# Patient Record
Sex: MALE | Race: WHITE | NOT HISPANIC OR LATINO | Employment: FULL TIME | URBAN - METROPOLITAN AREA
[De-identification: names, ages, dates, MRNs, and addresses within clinical notes are randomized per-mention and may not be internally consistent; named-entity substitution may affect disease eponyms.]

---

## 2017-02-13 ENCOUNTER — HOSPITAL ENCOUNTER (OUTPATIENT)
Facility: HOSPITAL | Age: 62
LOS: 1 days | Discharge: HOME | End: 2017-02-13
Attending: EMERGENCY MEDICINE | Admitting: INTERNAL MEDICINE
Payer: COMMERCIAL

## 2017-02-13 ENCOUNTER — TELEPHONE (OUTPATIENT)
Dept: FAMILY MEDICINE | Facility: CLINIC | Age: 62
End: 2017-02-13

## 2017-02-13 ENCOUNTER — NURSE TRIAGE (OUTPATIENT)
Dept: ADMINISTRATIVE | Facility: CLINIC | Age: 62
End: 2017-02-13

## 2017-02-13 VITALS
DIASTOLIC BLOOD PRESSURE: 80 MMHG | SYSTOLIC BLOOD PRESSURE: 128 MMHG | OXYGEN SATURATION: 100 % | WEIGHT: 205 LBS | RESPIRATION RATE: 19 BRPM | TEMPERATURE: 98 F | HEART RATE: 85 BPM | HEIGHT: 70 IN | BODY MASS INDEX: 29.35 KG/M2

## 2017-02-13 DIAGNOSIS — I48.91 ATRIAL FIBRILLATION: Primary | ICD-10-CM

## 2017-02-13 PROBLEM — Z71.89 ENCOUNTER FOR ANTICOAGULATION DISCUSSION AND COUNSELING: Status: ACTIVE | Noted: 2017-02-13

## 2017-02-13 LAB
ALBUMIN SERPL BCP-MCNC: 4.2 G/DL
ALP SERPL-CCNC: 80 U/L
ALT SERPL W/O P-5'-P-CCNC: 20 U/L
ANION GAP SERPL CALC-SCNC: 9 MMOL/L
AST SERPL-CCNC: 17 U/L
BASOPHILS # BLD AUTO: 0.03 K/UL
BASOPHILS NFR BLD: 0.3 %
BILIRUB SERPL-MCNC: 0.5 MG/DL
BNP SERPL-MCNC: 141 PG/ML
BUN SERPL-MCNC: 12 MG/DL
CALCIUM SERPL-MCNC: 9.7 MG/DL
CHLORIDE SERPL-SCNC: 103 MMOL/L
CO2 SERPL-SCNC: 24 MMOL/L
CREAT SERPL-MCNC: 1 MG/DL
DIASTOLIC DYSFUNCTION: NO
DIFFERENTIAL METHOD: ABNORMAL
EOSINOPHIL # BLD AUTO: 0.2 K/UL
EOSINOPHIL NFR BLD: 1.6 %
ERYTHROCYTE [DISTWIDTH] IN BLOOD BY AUTOMATED COUNT: 13.5 %
EST. GFR  (AFRICAN AMERICAN): >60 ML/MIN/1.73 M^2
EST. GFR  (NON AFRICAN AMERICAN): >60 ML/MIN/1.73 M^2
GLUCOSE SERPL-MCNC: 124 MG/DL
HCT VFR BLD AUTO: 45.6 %
HGB BLD-MCNC: 15.6 G/DL
INR PPP: 1.1
LYMPHOCYTES # BLD AUTO: 2.4 K/UL
LYMPHOCYTES NFR BLD: 20.2 %
MAGNESIUM SERPL-MCNC: 2.2 MG/DL
MCH RBC QN AUTO: 31 PG
MCHC RBC AUTO-ENTMCNC: 34.2 %
MCV RBC AUTO: 91 FL
MONOCYTES # BLD AUTO: 1 K/UL
MONOCYTES NFR BLD: 8.3 %
NEUTROPHILS # BLD AUTO: 8.1 K/UL
NEUTROPHILS NFR BLD: 69.3 %
PLATELET # BLD AUTO: 268 K/UL
PMV BLD AUTO: 11.2 FL
POTASSIUM SERPL-SCNC: 3.8 MMOL/L
PROT SERPL-MCNC: 7.6 G/DL
PROTHROMBIN TIME: 11.4 SEC
RBC # BLD AUTO: 5.03 M/UL
RETIRED EF AND QEF - SEE NOTES: 55 (ref 55–65)
SODIUM SERPL-SCNC: 136 MMOL/L
TROPONIN I SERPL DL<=0.01 NG/ML-MCNC: 0.01 NG/ML
TROPONIN I SERPL DL<=0.01 NG/ML-MCNC: 0.01 NG/ML
WBC # BLD AUTO: 11.66 K/UL

## 2017-02-13 PROCEDURE — 85610 PROTHROMBIN TIME: CPT

## 2017-02-13 PROCEDURE — G0378 HOSPITAL OBSERVATION PER HR: HCPCS

## 2017-02-13 PROCEDURE — 96361 HYDRATE IV INFUSION ADD-ON: CPT

## 2017-02-13 PROCEDURE — 93010 ELECTROCARDIOGRAM REPORT: CPT | Mod: ,,, | Performed by: INTERNAL MEDICINE

## 2017-02-13 PROCEDURE — C8929 TTE W OR WO FOL WCON,DOPPLER: HCPCS

## 2017-02-13 PROCEDURE — 96365 THER/PROPH/DIAG IV INF INIT: CPT

## 2017-02-13 PROCEDURE — 83735 ASSAY OF MAGNESIUM: CPT

## 2017-02-13 PROCEDURE — 93306 TTE W/DOPPLER COMPLETE: CPT | Mod: 26,,, | Performed by: INTERNAL MEDICINE

## 2017-02-13 PROCEDURE — 25000003 PHARM REV CODE 250: Performed by: EMERGENCY MEDICINE

## 2017-02-13 PROCEDURE — 93005 ELECTROCARDIOGRAM TRACING: CPT

## 2017-02-13 PROCEDURE — 93010 ELECTROCARDIOGRAM REPORT: CPT | Mod: 76,,, | Performed by: INTERNAL MEDICINE

## 2017-02-13 PROCEDURE — 25000003 PHARM REV CODE 250: Performed by: INTERNAL MEDICINE

## 2017-02-13 PROCEDURE — 99285 EMERGENCY DEPT VISIT HI MDM: CPT | Mod: 25

## 2017-02-13 PROCEDURE — 85025 COMPLETE CBC W/AUTO DIFF WBC: CPT

## 2017-02-13 PROCEDURE — 99236 HOSP IP/OBS SAME DATE HI 85: CPT | Mod: ,,, | Performed by: PHYSICIAN ASSISTANT

## 2017-02-13 PROCEDURE — 96376 TX/PRO/DX INJ SAME DRUG ADON: CPT

## 2017-02-13 PROCEDURE — 99236 HOSP IP/OBS SAME DATE HI 85: CPT | Mod: ,,, | Performed by: INTERNAL MEDICINE

## 2017-02-13 PROCEDURE — 84484 ASSAY OF TROPONIN QUANT: CPT

## 2017-02-13 PROCEDURE — 80053 COMPREHEN METABOLIC PANEL: CPT

## 2017-02-13 PROCEDURE — 99285 EMERGENCY DEPT VISIT HI MDM: CPT | Mod: ,,, | Performed by: EMERGENCY MEDICINE

## 2017-02-13 PROCEDURE — 63600175 PHARM REV CODE 636 W HCPCS: Performed by: EMERGENCY MEDICINE

## 2017-02-13 PROCEDURE — 96375 TX/PRO/DX INJ NEW DRUG ADDON: CPT

## 2017-02-13 PROCEDURE — 83880 ASSAY OF NATRIURETIC PEPTIDE: CPT

## 2017-02-13 RX ORDER — METOPROLOL TARTRATE 25 MG/1
25 TABLET, FILM COATED ORAL 2 TIMES DAILY
Qty: 60 TABLET | Refills: 3 | Status: ON HOLD | OUTPATIENT
Start: 2017-02-13 | End: 2017-06-15

## 2017-02-13 RX ORDER — METOPROLOL TARTRATE 25 MG/1
25 TABLET, FILM COATED ORAL 2 TIMES DAILY
Status: DISCONTINUED | OUTPATIENT
Start: 2017-02-13 | End: 2017-02-13 | Stop reason: HOSPADM

## 2017-02-13 RX ORDER — ASPIRIN 325 MG
325 TABLET ORAL
Status: COMPLETED | OUTPATIENT
Start: 2017-02-13 | End: 2017-02-13

## 2017-02-13 RX ORDER — METOPROLOL TARTRATE 25 MG/1
25 TABLET, FILM COATED ORAL 2 TIMES DAILY
Status: DISCONTINUED | OUTPATIENT
Start: 2017-02-13 | End: 2017-02-13

## 2017-02-13 RX ORDER — DILTIAZEM HYDROCHLORIDE 5 MG/ML
15 INJECTION INTRAVENOUS
Status: COMPLETED | OUTPATIENT
Start: 2017-02-13 | End: 2017-02-13

## 2017-02-13 RX ORDER — DILTIAZEM HCL 1 MG/ML
5 INJECTION, SOLUTION INTRAVENOUS
Status: COMPLETED | OUTPATIENT
Start: 2017-02-13 | End: 2017-02-13

## 2017-02-13 RX ORDER — ONDANSETRON 2 MG/ML
4 INJECTION INTRAMUSCULAR; INTRAVENOUS
Status: COMPLETED | OUTPATIENT
Start: 2017-02-13 | End: 2017-02-13

## 2017-02-13 RX ADMIN — DILTIAZEM HYDROCHLORIDE 5 MG/HR: 5 INJECTION INTRAVENOUS at 11:02

## 2017-02-13 RX ADMIN — SODIUM CHLORIDE 1000 ML: 0.9 INJECTION, SOLUTION INTRAVENOUS at 11:02

## 2017-02-13 RX ADMIN — ASPIRIN 325 MG ORAL TABLET 325 MG: 325 PILL ORAL at 11:02

## 2017-02-13 RX ADMIN — DILTIAZEM HYDROCHLORIDE 15 MG: 5 INJECTION INTRAVENOUS at 11:02

## 2017-02-13 RX ADMIN — ONDANSETRON 4 MG: 2 INJECTION INTRAMUSCULAR; INTRAVENOUS at 11:02

## 2017-02-13 RX ADMIN — APIXABAN 5 MG: 5 TABLET, FILM COATED ORAL at 03:02

## 2017-02-13 NOTE — PROVIDER PROGRESS NOTES - EMERGENCY DEPT.
Encounter Date: 2/13/2017    ED Physician Progress Notes       SCRIBE NOTE: I, Santos Martell, am scribing for, and in the presence of,  Dr. Hull.  Physician Statement: I, Dr. Hull, personally performed the services described in this documentation as scribed by Santos Martell in my presence, and it is both accurate and complete.     Physician Note:   EKG: heart rate 163, atrial flutter, no STEMI    EKG - STEMI Decision  Initial Reading: No STEMI present.

## 2017-02-13 NOTE — PLAN OF CARE
Rec'd call from Mandy Escamilla NP regarding pt in the ED. Amb Referral entered. Request for one week appt In EP  Future Appointments  Date Time Provider Department Center   2/13/2017 3:00 PM INPATIENT, [facility] [address]     Future Appointments  Date Time Provider Department Center   2/13/2017 3:00 PM INPATIENT, [facility] [address]   2/20/2017 11:20 AM Ajay Krishnan MD [facility]

## 2017-02-13 NOTE — ASSESSMENT & PLAN NOTE
- Patient presented to ED with new onset AFlutter and -180. Given Diltiazem and converted rhythm to AFib. Started on Diltiazem infusion and converted to NSR.  - EP and Cardiology consulted in ED. Initially planned for LOPEZ and DCCV but patient now rate controlled.   - Started on Apixaban 5 mg BID x 1 month and metoprolol 25 mg BID. Patient with no history of HTN or CHF and BP currently 120-131/67. D/C'd ASA (no current indication).   - 2D echo completed 2/13 but report pending at time of discharge  - Patient remained in NSR on telemetry and discharged home in stable condition  - Follow up Dr. José Grey/EP in 4 weeks. Will discuss need to discuss risk/benefit of long term anticoagulation given low CHADS-VASc score.

## 2017-02-13 NOTE — PLAN OF CARE
"Rec'd call back from Mandy Escamilla, RAFFI .  Instead of EP appt in one week, she wants Dr. José Grey appt in 4 weeks  Sent msg and they will contact the pt    Per Dr. uMniz's notes written 2/13"Follow-up with Dr. José Grey in EP in 4 weeks. "    Future Appointments  Date Time Provider Department Center   2/13/2017 3:00 PM INPATIENT, [facility]       "

## 2017-02-13 NOTE — ASSESSMENT & PLAN NOTE
- See above.   - Patient started on apixiban and counseled on anticoagulation options and s/s of bleeding. Avoid high risk activity.

## 2017-02-13 NOTE — ED PROVIDER NOTES
Encounter Date: 2/13/2017    SCRIBE #1 NOTE: I, Santosmel Martell, am scribing for, and in the presence of,  Dr. Hull. I have scribed the entire note.       History     Chief Complaint   Patient presents with    Shortness of Breath     sweats, rapid heart beat over weekend 80-90     Review of patient's allergies indicates:  No Known Allergies  HPI Comments: Time patient was seen by the provider: 10:51 AM      The patient is a 61 y.o. male that presents to the ED with a complaint of shortness of breath, which began within the last several days. Over the weekend the pt was noted to have an irregular pulse by family. 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 Today he reports an episode of diaphoresis while at work. No fever. No history of thyroid dysfunction. Pt is a former smoker.     The history is provided by the patient.     History reviewed. No pertinent past medical history.  No past medical history pertinent negatives.  Past Surgical History:   Procedure Laterality Date    COLONOSCOPY N/A 4/14/2016    Procedure: COLONOSCOPY;  Surgeon: Kade Wang MD;  Location: [address];  Service: Endoscopy;  Laterality: N/A;     History reviewed. No pertinent family history.  Social History   Substance Use Topics    Smoking status: Former Smoker    Smokeless tobacco: None    Alcohol use No     Review of Systems   Constitutional: Negative for activity change, chills and fever.   HENT: Negative for sore throat.    Eyes: Negative for photophobia and visual disturbance.   Respiratory: Positive for shortness of breath.    Cardiovascular: Negative for chest pain and leg swelling.   Gastrointestinal: Negative for nausea.   Genitourinary: Negative for dysuria.   Musculoskeletal: Negative for back pain, neck pain and neck stiffness.   Skin: Negative for rash.   Neurological: Positive for light-headedness. Negative for syncope and weakness.   Hematological:  Does not bruise/bleed easily.       Physical Exam   Initial Vitals   BP Pulse Resp Temp SpO2   02/13/17 1032 02/13/17 1032 02/13/17 1032 02/13/17 1032 02/13/17 1032   110/69 94 22 98.5 °F (36.9 °C) 95 %     Physical Exam    Constitutional: He is Obese . He is cooperative. He does not appear ill. No distress.   HENT:   Head: Normocephalic and atraumatic.   Mouth/Throat: Oropharynx is clear and moist and mucous membranes are normal.   Eyes: Conjunctivae and lids are normal. No scleral icterus.   Neck: Normal range of motion. Neck supple.   Cardiovascular: Normal heart sounds. An irregularly irregular rhythm present. Tachycardia present.    Pulmonary/Chest: Breath sounds normal. No accessory muscle usage. No tachypnea. No respiratory distress.   Abdominal: Normal appearance. He exhibits no distension. There is no tenderness.   Musculoskeletal: Normal range of motion.   Neurological: He is alert and oriented to person, place, and time. No cranial nerve deficit or sensory deficit. GCS eye subscore is 4. GCS verbal subscore is 5. GCS motor subscore is 6.   Skin: Skin is warm and dry.         ED Course   Procedures  Labs Reviewed   CBC W/ AUTO DIFFERENTIAL - Abnormal; Notable for the following:        Result Value    Gran # 8.1 (*)     All other components within normal limits    Narrative:     PLEASE REVIEW ORDER START TIME BEFORE MARKING SPECIMEN  COLLECTED.   COMPREHENSIVE METABOLIC PANEL - Abnormal; Notable for the following:     Glucose 124 (*)     All other components within normal limits    Narrative:     PLEASE REVIEW ORDER START TIME BEFORE MARKING SPECIMEN  COLLECTED.   B-TYPE NATRIURETIC PEPTIDE - Abnormal; Notable for the following:      (*)     All other components within normal limits    Narrative:     PLEASE REVIEW ORDER START TIME BEFORE MARKING SPECIMEN  COLLECTED.   PROTIME-INR    Narrative:     PLEASE REVIEW ORDER START TIME BEFORE MARKING SPECIMEN  COLLECTED.   TROPONIN I    Narrative:     PLEASE  REVIEW ORDER START TIME BEFORE MARKING SPECIMEN  COLLECTED.   TROPONIN I    Narrative:     PLEASE REVIEW ORDER START TIME BEFORE MARKING SPECIMEN  COLLECTED.   MAGNESIUM    Narrative:     PLEASE REVIEW ORDER START TIME BEFORE MARKING SPECIMEN  COLLECTED.   TSH     EKG Readings: (Independently Interpreted)   Heart rate 163, atrial flutter, no STEMI           Medical Decision Making:   History:   Old Medical Records: I decided to obtain old medical records.  Initial Assessment:   Patient presenting to the ED with a three-day history of noted tachycardia with mild symptoms works as a  and noted that he was having more shortness of breath and dyspnea on exertion denied any chest pain or any over-the-counter medication use  Differential Diagnosis:   SVT atrial fibrillation with RVR  Independently Interpreted Test(s):   I have ordered and independently interpreted EKG Reading(s) - see prior notes  Clinical Tests:   Lab Tests: Ordered and Reviewed  Radiological Study: Ordered and Reviewed  Medical Tests: Ordered and Reviewed  ED Management:  We'll do labs including cardiac markers and EKG,will control cardiac rate and will get cardiology consultation       Patient was evaluated in the ED and medication was started to control his heart rate cardiology was consulted patient will be placed in observation to [facility]          Scribe Attestation:   Scribe #1: I performed the above scribed service and the documentation accurately describes the services I performed. I attest to the accuracy of the note.    Attending Attestation:           Physician Attestation for Scribe:  Physician Attestation Statement for Scribe #1: I, Dr. Hull, reviewed documentation, as scribed by Santos Martell in my presence, and it is both accurate and complete.          patient converted to a normal sinus rhythm while in the ED; both cardiology and internal medicine C felt that the patient could be discharged on medication and follow-up with  cardiology the patient was subsequently discharged follow-up was arranged and the patient is improved condition upon discharge        ED Course     Clinical Impression:   The encounter diagnosis was Atrial fibrillation.    Disposition:   Disposition: Discharged  Condition: Stable       Hayes Hull MD  02/26/17 2042

## 2017-02-13 NOTE — CONSULTS
TRANSESOPHAGEAL ECHOCARDIOGRAPHY   PRE-PROCEDURE NOTE    02/13/2017    HPI:     Marcus Watkins is a 61 y.o. man without significant prior medical history who presents to the ED with complaints of palpitations. On arrival, he was found to be in atrial flutter with variable block and a HR from 160s to 180s. He was given diltiazem which converted his rhythm to atrial fibrillation.    LOPEZ is being requested prior to DCCV.    Dysphagia or odynophagia:  No  Liver Disease, esophageal disease, or known varices:  No  Upper GI Bleeding: No  Snoring:  No  Sleep Apnea:  No  Prior neck surgery or radiation:  No  History of anesthetic difficulties:  No  Family history of anesthetic difficulties:  No  Last oral intake:  12 hours ago  Able to move neck in all directions:  Yes      Meds:     Scheduled Meds:   apixaban  5 mg Oral BID     PRN Meds:  Continuous Infusions:     Physical Exam:     Vitals:  Temp:  [98.5 °F (36.9 °C)]   Pulse:  []   Resp:  [18-25]   BP: (104-132)/(56-94)   SpO2:  [93 %-97 %]        Constitutional: NAD, conversant  HEENT:   Sclera anicteric, Uvula midline, EOMI, OP clear  Neck:               No JVD, moves to all direction without any limitations  CV:               RRR, no murmurs / rubs / gallops, normal S1/S2  Pulm:               CTAB, no wheezes, rales, or ronchi  GI:               Abdomen soft, NTND, +BS  Extremities:              No LE edema, warm with palpable pulses  Neuro:   AAOX3, no focal motor deficits      Labs:     Recent Results (from the past 336 hour(s))   CBC auto differential    Collection Time: 02/13/17 11:02 AM   Result Value Ref Range    WBC 11.66 3.90 - 12.70 K/uL    Hemoglobin 15.6 14.0 - 18.0 g/dL    Hematocrit 45.6 40.0 - 54.0 %    Platelets 268 150 - 350 K/uL       No results found for this or any previous visit (from the past 336 hour(s)).    Estimated Creatinine Clearance: 86.6 mL/min (based on Cr of 1).    INR: 1.1    Imaging:  CXR pending  No prior echo in the system    EKG:  02/13/2017  aFib @ 84 bpm          Assessment & Plan:     PLAN:  1. LOPEZ for evaluation of WILIAN prior to DCCV    -The risks, benefits & alternatives of the procedure were explained to the patient.    -The risks of transesophageal echo include but are not limited to:  Dental trauma, esophageal trauma/perforation, bleeding, laryngospasm/brochospasm, aspiration, sore throat/hoarseness, & dislodgement of the endotracheal tube/nasogastric tube (where applicable).    -The risks of moderate sedation include hypotension, respiratory depression, arrhythmias, bronchospasm, & death.    -Informed consent was obtained & the patient is agreeable to proceed with the procedure.    I will discuss with the attending physician. Attending addendum is to follow.    Signed:    Alber Zavaleta MD  Cardiology Fellow, PGY-5  Pager: [phone]  2/13/2017 12:52 PM

## 2017-02-13 NOTE — ED NOTES
Hospital pharmacy called and notified diltiazem 125 mg in D5W 125 ml infusion is not loaded in pyxis and to please send ASAP

## 2017-02-13 NOTE — H&P
[facility] Hospital Medicine  History & Physical    Patient Name: Marcus Watkins  MRN: [id number]  Admission Date: 2/13/2017  Attending Physician: Dr. Camarillo  Primary Care Provider: Primary Doctor [facility]     Patient information was obtained from patient, spouse/SO, past medical records and ER records.     Subjective:     Principal Problem:Atrial fibrillation with RVR    Chief Complaint:   Chief Complaint   Patient presents with    Shortness of Breath     sweats, rapid heart beat over weekend 80-90        HPI: 61 year old male with no known significant past medical history. He presents to ED today with wife at bedside for shortness of breath and palpitations. Per patient, he first noticed symptoms of SOB on Saturday but would come and go with no associated chest pain. He reports associated diaphoresis and palpitations. No h/o similar episodes. His wife bought an over the counter heart rate cuff and noticed his resting heart rate was  over this weekend. This morning she checked it again and the cuff reported an 'irregular heart rate.' She contacted her PCP who instructed them to come to ED. At time of exam, patient denies SOB, CP, or palpitations. Denies smoking, drinking, or drug use.     While in ED, patient found be in AFlutter and -180. BP stable. He was given diltiazem and converted rhythm to AFib. Cardiology and EP consulted. He was started on diltiazem infusion and converted to normal sinus rhythm on monitor. Started on apixiban. Placed in Observation for further management.       History reviewed. No pertinent past medical history.    Past Surgical History   Procedure Laterality Date    Colonoscopy N/A 4/14/2016     Procedure: COLONOSCOPY;  Surgeon: Kade Wang MD;  Location: [address]);  Service: Endoscopy;  Laterality: N/A;       Review of patient's allergies indicates:  No Known Allergies    No current  facility-administered medications on file prior to encounter.      Current Outpatient Prescriptions on File Prior to Encounter   Medication Sig    aspirin 81 MG Chew Take 81 mg by mouth once daily.    multivitamin (THERAGRAN) per tablet Take 1 tablet by mouth once daily.    hydrocortisone-pramoxine (PROCTOFOAM-HS) rectal foam Place 1 applicator rectally 2 (two) times daily.     Family History     None        Social History Main Topics    Smoking status: Former Smoker    Smokeless tobacco: Not on file    Alcohol use No    Drug use: Not on file    Sexual activity: Not on file     Review of Systems   Constitutional: Positive for diaphoresis. Negative for activity change, appetite change, chills, fatigue and fever.   HENT: Negative for congestion, rhinorrhea, sinus pressure and sore throat.    Eyes: Negative for pain, redness and visual disturbance.   Respiratory: Positive for shortness of breath. Negative for cough, chest tightness, wheezing and stridor.    Cardiovascular: Positive for palpitations. Negative for chest pain and leg swelling.   Gastrointestinal: Negative for abdominal distention, abdominal pain, blood in stool, constipation, diarrhea, nausea and vomiting.   Endocrine: Negative for cold intolerance and heat intolerance.   Genitourinary: Negative for dysuria, frequency, hematuria and urgency.   Musculoskeletal: Negative for arthralgias, back pain, myalgias and neck pain.   Skin: Negative for color change, pallor and rash.   Neurological: Negative for dizziness, tremors, syncope, weakness, numbness and headaches.   Hematological: Does not bruise/bleed easily.   Psychiatric/Behavioral: Negative for agitation, confusion and hallucinations. The patient is not nervous/anxious.      Objective:     Vital Signs (Most Recent):  Temp: 98.5 °F (36.9 °C) (02/13/17 1032)  Pulse: 82 (02/13/17 1352)  Resp: (!) 23 (02/13/17 1157)  BP: 131/67 (02/13/17 1352)  SpO2: (!) 93 % (02/13/17 1352) Vital Signs (24h  Range):  Temp:  [98.5 °F (36.9 °C)] 98.5 °F (36.9 °C)  Pulse:  [] 82  Resp:  [18-25] 23  SpO2:  [93 %-97 %] 93 %  BP: (104-132)/(56-94) 131/67     Weight: 93 kg (205 lb)  Body mass index is 29.41 kg/(m^2).    Physical Exam   Constitutional: He is oriented to person, place, and time. He appears well-developed and well-nourished. No distress.   HENT:   Head: Normocephalic and atraumatic.   Mouth/Throat: Oropharynx is clear and moist.   Eyes: Conjunctivae and EOM are normal. Pupils are equal, round, and reactive to light. No scleral icterus.   Neck: Normal range of motion. Neck supple. No JVD present. No tracheal deviation present. No thyromegaly present.   Cardiovascular: Normal rate, regular rhythm and intact distal pulses.  Exam reveals no gallop and no friction rub.    No murmur heard.  Pulmonary/Chest: Effort normal and breath sounds normal. No respiratory distress. He has no wheezes. He has no rales.   Abdominal: Soft. Bowel sounds are normal. He exhibits no distension and no mass. There is no tenderness. There is no rebound and no guarding.   Musculoskeletal: Normal range of motion. He exhibits no edema.   Neurological: He is alert and oriented to person, place, and time. He displays normal reflexes. No cranial nerve deficit.   Skin: Skin is warm and dry. No rash noted. No erythema.   Psychiatric: He has a normal mood and affect. His behavior is normal.        Significant Labs:   BMP:   Recent Labs  Lab 02/13/17  1102   *      K 3.8      CO2 24   BUN 12   CREATININE 1.0   CALCIUM 9.7   MG 2.2     CMP:   Recent Labs  Lab 02/13/17  1102      K 3.8      CO2 24   *   BUN 12   CREATININE 1.0   CALCIUM 9.7   PROT 7.6   ALBUMIN 4.2   BILITOT 0.5   ALKPHOS 80   AST 17   ALT 20   ANIONGAP 9   EGFRNONAA >60.0     Cardiac Markers:   Recent Labs  Lab 02/13/17  1102   *     Coagulation:   Recent Labs  Lab 02/13/17  1102   INR 1.1     Troponin:   Recent Labs  Lab  17  1102   TROPONINI 0.009  0.009       Significant Imaging: CXR: I have reviewed all pertinent results/findings within the past 24 hours and my personal findings are:  no acute findings  EKG: I have reviewed all pertinent results/findings within the past 24 hours and my personal findings are: EKG: 10:36 showed AFlutter with rate 163. EK:29:34 showed AFib with rate 84. EKG 13:21:05 showed NSR with rate 83    Assessment/Plan:     * Atrial fibrillation with RVR  - Patient presented to ED with new onset AFlutter and -180. Given Diltiazem and converted rhythm to AFib. Started on Diltiazem infusion and converted to NSR.  - EP and Cardiology consulted in ED. Initially planned for LOPEZ and DCCV but patient now rate controlled.   - Continue on Apixaban 5 mg BID and start metoprolol 25 mg BID. Patient with no history of HTN and BP currently 120-131/67.   - Order stat 2D echo  - Monitor on telemetry and if rate remains controlled likely discharge later today.   - Will need close outpatient follow up with EP upon discharge.    Encounter for anticoagulation discussion and counseling  - See above.   - Patient started on apixiban and counseled on anticoagulation options and s/s of bleeding. Avoid high risk activity.     VTE Risk Mitigation     None        Nidhi Escamilla PA-C  Department of Hospital Medicine   [facility]

## 2017-02-13 NOTE — TELEPHONE ENCOUNTER
received the below nurse triage message   Shortness of Breath            ÁNGEL Cruz [facility] Staff 3 minutes ago (9:16 AM)                 Please give Mrs. Watkins a call back this morning at [phone]. Mr. Watkins's blood pressure monitor read that he has been having an irregular heart beat since the weekend. His blood pressure was 128/111 (63), then 123/71 (70) at around 5:00 this morning. Patient began sweating with sob. Mr. Watkins states symptoms have resolved, but he continues have sob with activity. Patient is currently at work. Appointment was made today @ 2:40 pm. Please give patient a call back if Dr. Brunson advises the ED or if he can be seen earlier, thank you. (Routing comment)                        Shelby Da Silva RN 11 minutes ago (9:08 AM)                    Reason for Disposition   MILD difficulty breathing (e.g., minimal/no SOB at rest, SOB with walking, pulse < 100) of new onset or worse than normal    Protocols used: ST BREATHING DIFFICULTY-A-OH  Mr. Watkins's blood pressure monitor read that he has been having an irregular heart beat since the weekend. His blood pressure was 128/111 (63), then 123/71 (70) at around 5:00 this morning. Patient began sweating with sob. Mr. Watkins states symptoms have resolved, but he continues have sob with activity. Patient is currently at work.

## 2017-02-13 NOTE — PROGRESS NOTES
Consent obtained. optison given ivp via left forearm sl for imaging. Denies transfusion rxn. Tolerated well. Sl flushed before and after with ns.

## 2017-02-13 NOTE — DISCHARGE SUMMARY
[facility] Hospital Medicine  Discharge Summary      Patient Name: Marcus Watkins  MRN: [id number]  Admission Date: 2/13/2017  Hospital Length of Stay: 1 days  Discharge Date and Time:  02/13/2017 3:53 PM  Attending Physician: Hayes Hull, *   Discharging Provider: Nidhi Escamilla PA-C  Primary Care Provider: Primary Doctor [facility] Medicine Team: [facility]OSP MED  Nidhi Escamilla PA-C    HPI:   61 year old male with no known significant past medical history. He presents to ED today with wife at bedside for shortness of breath and palpitations. Per patient, he first noticed symptoms of SOB on Saturday but would come and go with no associated chest pain. He reports associated diaphoresis and palpitations. No h/o similar episodes. His wife bought an over the counter heart rate cuff and noticed his resting heart rate was  over this weekend. This morning she checked it again and the cuff reported an 'irregular heart rate.' She contacted her PCP who instructed them to come to ED. At time of exam, patient denies SOB, CP, or palpitations. Denies smoking, drinking, or drug use.     While in ED, patient found be in AFlutter and -180. BP stable. He was given diltiazem and converted rhythm to AFib. Cardiology and EP consulted. He was started on diltiazem infusion and converted to normal sinus rhythm on monitor. Started on apixiban. Placed in Observation for further management.       Procedure(s) (LRB):  TRANSESOPHAGEAL ECHOCARDIOGRAM (LOPEZ) (N/A)      Indwelling Lines/Drains at time of discharge:   Lines/Drains/Airways          No matching active lines, drains, or airways        Hospital Course:   Placed in observation for management of AFib with RVR. At time of admission, patient already converted to NSR with medical management and monitored on telemetry. Started on metoprolol 25 mg BID and apixiban with close follow up as outpatient with Dr. Grey. 2D echo completed but report  pending at time of discharge. Ordered 48 hour holter monitor and referral placed to sleep disorders to arrange follow up sleep study. Patient discharged home in stable condition.      Consults:   Consults         Status Ordering Provider     Inpatient consult to Cardiology  Once     Provider:  (Not yet assigned)    Final result CARINA HAMILTON          Significant Diagnostic Studies: Labs:   BMP:   Recent Labs  Lab 02/13/17  1102   *      K 3.8      CO2 24   BUN 12   CREATININE 1.0   CALCIUM 9.7   MG 2.2   , CMP   Recent Labs  Lab 02/13/17  1102      K 3.8      CO2 24   *   BUN 12   CREATININE 1.0   CALCIUM 9.7   PROT 7.6   ALBUMIN 4.2   BILITOT 0.5   ALKPHOS 80   AST 17   ALT 20   ANIONGAP 9   ESTGFRAFRICA >60.0   EGFRNONAA >60.0   , Troponin   Recent Labs  Lab 02/13/17  1102   TROPONINI 0.009  0.009    and All labs within the past 24 hours have been reviewed    Pending Diagnostic Studies:     Procedure Component Value Units Date/Time    2D echo with color flow doppler [[id number]]     Order Status:  Sent Lab Status:  No result         Final Active Diagnoses:    Diagnosis Date Noted POA    Encounter for anticoagulation discussion and counseling [Z71.89] 02/13/2017 Not Applicable      Problems Resolved During this Admission:    Diagnosis Date Noted Date Resolved POA    PRINCIPAL PROBLEM:  Atrial fibrillation with RVR [I48.91] 02/13/2017 02/13/2017 Yes      * Atrial fibrillation with RVR, resolved as of 2/13/2017  - Patient presented to ED with new onset AFlutter and -180. Given Diltiazem and converted rhythm to AFib. Started on Diltiazem infusion and converted to NSR.  - EP and Cardiology consulted in ED. Initially planned for LOPEZ and DCCV but patient now rate controlled.   - Started on Apixaban 5 mg BID x 1 month and metoprolol 25 mg BID. Patient with no history of HTN or CHF and BP currently 120-131/67. D/C'd ASA (no current indication).   - 2D echo completed  2/13 but report pending at time of discharge  - Patient remained in NSR on telemetry and discharged home in stable condition  - Follow up Dr. José Grey/EP in 4 weeks. Will discuss need to discuss risk/benefit of long term anticoagulation given low CHADS-VASc score.    Encounter for anticoagulation discussion and counseling  - See above.   - Patient started on apixiban and counseled on anticoagulation options and s/s of bleeding. Avoid high risk activity.       Discharged Condition: good    Disposition: Home or Self Care    Follow Up:  Follow-up Information     Follow up with José Grey MD In 4 weeks.    Specialties:  Electrophysiology, Cardiovascular Disease    Contact information:    [facility]  [facility] [address]  [phone]          Please follow up.    Why:  Follow up PCP in 1 week        Patient Instructions:     Ambulatory Referral to Electrophysiology   Referral Priority: Routine Referral Type: Consultation   Referral Reason: Specialty Services Required    Requested Specialty: Electrophysiology    Number of Visits Requested: 1      Ambulatory consult to Sleep Disorders   Referral Priority: Routine Referral Type: Consultation   Number of Visits Requested: 1      Diet general     Activity as tolerated     Call MD for:  difficulty breathing or increased cough     Call MD for:  severe persistent headache     Call MD for:  persistent dizziness, light-headedness, or visual disturbances     Call MD for:  increased confusion or weakness     Call MD for:  temperature >100.4     Call MD for:  persistent nausea and vomiting or diarrhea     Holter monitor - 48 hour   Standing Status: Future  Standing Exp. Date: 02/13/18       Medications:  Reconciled Home Medications:   Current Discharge Medication List      START taking these medications    Details   apixaban 5 mg Tab Take 1 tablet (5 mg total) by mouth 2 (two) times daily.  Qty: 60 tablet, Refills: 3      metoprolol tartrate (LOPRESSOR) 25 MG tablet  Take 1 tablet (25 mg total) by mouth 2 (two) times daily.  Qty: 60 tablet, Refills: 3         CONTINUE these medications which have NOT CHANGED    Details   multivitamin (THERAGRAN) per tablet Take 1 tablet by mouth once daily.      hydrocortisone-pramoxine (PROCTOFOAM-HS) rectal foam Place 1 applicator rectally 2 (two) times daily.  Qty: 10 g, Refills: 2    Associated Diagnoses: Rectal bleeding         STOP taking these medications       aspirin 81 MG Chew Comments:   Reason for Stopping:             Time spent on the discharge of patient: 31 minutes    Nidhi Escamilla PA-C  Department of Hospital Medicine  [facility]

## 2017-02-13 NOTE — CONSULTS
History and Physical Exam  Cardiology    2/13/2017    Marcus Watkins  Age: 61 y.o.  Location: ED 04/04  Admit Date: 2/13/2017  Length of Stay: 1    SUBJECTIVE:     Chief Complaint/Reason for consult: atrial fibrillation / flutter    History of Presenting Illness:   Patient is a 61 y.o. male with history of hemorrhoids s/p banding presents to the ER with dyspnea, nausea, vomiting, and fast heart rate since Saturday. Prior to that he was in his normal state of health and denied dyspnea, angina, syncope, orthopnea, PND, or pedal edema. He did report one episode of palpitations a few months ago. Patient denies prior cardiac pathology and denies alcohol use. He does snore. In the ER his initial ECG showed atrial flutter with RVR. He was treated with IV diltiazem and then converted to atrial fibrillation and then converted spontaneously to sinus rhythm.     Review of Systems:  General: No syncope, fatigue, fevers, chills. + nausea, or vomiting  Neuro: No focal weakness or numbness  HEENT: no change in vision or pallor  Cardiac: No chest pain, palpitations, orthopnea, or PND  Pulmonary: + dyspnea. No cough, hemoptysis, or wheezing  GI: No abdominal distention, pain, constipation, or diarrhea  Hematologic: No night sweats, easy bruising, or enlarged lymph nodes  Extremities: No edema, claudication, arthralgias, or myalgias  Derm: No cyanosis or rash    History reviewed. No pertinent past medical history.    Past Surgical History   Procedure Laterality Date    Colonoscopy N/A 4/14/2016     Procedure: COLONOSCOPY;  Surgeon: Kade Wang MD;  Location: [address];  Service: Endoscopy;  Laterality: N/A;       Current Facility-Administered Medications   Medication    apixaban tablet 5 mg     Current Outpatient Prescriptions   Medication Sig    aspirin 81 MG Chew Take 81 mg by mouth once daily.    multivitamin (THERAGRAN) per tablet Take 1 tablet by mouth once daily.    hydrocortisone-pramoxine (PROCTOFOAM-HS)  "rectal foam Place 1 applicator rectally 2 (two) times daily.       Review of patient's allergies indicates:  No Known Allergies    History reviewed. No pertinent family history.    Social History   Substance Use Topics    Smoking status: Former Smoker    Smokeless tobacco: None    Alcohol use No       OBJECTIVE:     Visit Vitals    BP (!) 125/58    Pulse (!) 151    Temp 98.5 °F (36.9 °C) (Oral)    Resp (!) 23    Ht 5' 10" (1.778 m)    Wt 93 kg (205 lb)    SpO2 95%    BMI 29.41 kg/m2     Body mass index is 29.41 kg/(m^2).    Temp:  [98.5 °F (36.9 °C)]   Pulse:  []   Resp:  [18-25]   BP: (104-132)/(56-94)   SpO2:  [93 %-97 %]     No intake or output data in the 24 hours ending 02/13/17 1348    Physical Exam:  General: CM NAD  HEENT: No conjunctival injection, pallor, or icterus. No xanthelasma or Brandon's sign present.   Neuro: No focal motor or sensory deficits. Cranial nerves intact.   Neck: No JVD. No carotid bruits.   Heart: PMI not displaced. No thrills or heave. S1, S2 present. No murmurs, rubs, or gallops.   Pulses: Irregularly irregular. Carotid 2+, radial 2+, dorsalis pedis 2+, posterior tibial 2+.   Pulmonary: Clear to auscultation bilaterally.   Abdominal: Soft, non-tender. No hepatomegaly or abdominal distention. No abdominal bruits. No pulsatile mass.   Extremities: No clubbing, cyanosis, or edema.   Skin: No bruising, rash, ulceration, xanthomata, or splinter hemorrhage present.     Laboratory:     Recent Labs  Lab 02/13/17  1102      K 3.8      CO2 24   BUN 12   CREATININE 1.0   *   ANIONGAP 9       Recent Labs  Lab 02/13/17  1102   AST 17   ALT 20   ALKPHOS 80   BILITOT 0.5   ALBUMIN 4.2       Recent Labs  Lab 02/13/17  1102   CALCIUM 9.7   MG 2.2     Lab Results   Component Value Date     (H) 02/13/2017     Lab Results   Component Value Date    CHOL 185 03/09/2016    HDL 38 (L) 03/09/2016    TRIG 94 03/09/2016     Lab Results   Component Value Date    HGBA1C " 6.0 03/09/2016     Lab Results   Component Value Date    TSH 0.963 03/09/2016     No results for input(s): NITRITE, LEUKOCYTESUR, WBCUA, BACTERIA in the last 168 hours.    Invalid input(s): EPITHELIALCE, COGRCA   Recent Labs  Lab 02/13/17  1102   WBC 11.66   HGB 15.6   HCT 45.6      GRAN 69.3  8.1*       Recent Labs  Lab 02/13/17  1102   INR 1.1     No results for input(s): PHART, MVQ6RKO, PO2ART, UCW2ZYL, W9OHYADX in the last 168 hours.  No results for input(s): LACTATE in the last 168 hours.    Recent Labs  Lab 02/13/17  1102   TROPONINI 0.009  0.009           Investigation Results:    No results found for: EF    ASSESSMENT/PLAN:   Assessment:  61 y.o. male with no significant past medical history presenting to the ER with new onset atrial flutter / fibrillation. Converted to sinus rhythm with diltiazem in ER. Has CHADS-VASc of 0.     Plan:  1. Start low dose beta-blocker  2. Anticoagulation: Eliquis 5 mg PO BID for at least one month. Discontinue aspirin. Will then discuss risks / benefits of long-term anticoagulation given low CHADS-VASc score.   3. Echocardiogram with CFD  4. Outpatient 48-hour Holter monitor  5. Recommend sleep study as outpatient  6. Follow-up with Dr. José Grey in EP in 4 weeks.     Emerson Muniz  Cardiology Fellow  Pager: [phone]

## 2017-02-13 NOTE — SUBJECTIVE & OBJECTIVE
History reviewed. No pertinent past medical history.    Past Surgical History   Procedure Laterality Date    Colonoscopy N/A 4/14/2016     Procedure: COLONOSCOPY;  Surgeon: Kade Wang MD;  Location: [facility] ([address] [place] [address]);  Service: Endoscopy;  Laterality: N/A;       Review of patient's allergies indicates:  No Known Allergies    No current facility-administered medications on file prior to encounter.      Current Outpatient Prescriptions on File Prior to Encounter   Medication Sig    aspirin 81 MG Chew Take 81 mg by mouth once daily.    multivitamin (THERAGRAN) per tablet Take 1 tablet by mouth once daily.    hydrocortisone-pramoxine (PROCTOFOAM-HS) rectal foam Place 1 applicator rectally 2 (two) times daily.     Family History     None        Social History Main Topics    Smoking status: Former Smoker    Smokeless tobacco: Not on file    Alcohol use No    Drug use: Not on file    Sexual activity: Not on file     Review of Systems   Constitutional: Positive for diaphoresis. Negative for activity change, appetite change, chills, fatigue and fever.   HENT: Negative for congestion, rhinorrhea, sinus pressure and sore throat.    Eyes: Negative for pain, redness and visual disturbance.   Respiratory: Positive for shortness of breath. Negative for cough, chest tightness, wheezing and stridor.    Cardiovascular: Positive for palpitations. Negative for chest pain and leg swelling.   Gastrointestinal: Negative for abdominal distention, abdominal pain, blood in stool, constipation, diarrhea, nausea and vomiting.   Endocrine: Negative for cold intolerance and heat intolerance.   Genitourinary: Negative for dysuria, frequency, hematuria and urgency.   Musculoskeletal: Negative for arthralgias, back pain, myalgias and neck pain.   Skin: Negative for color change, pallor and rash.   Neurological: Negative for dizziness, tremors, syncope, weakness, numbness and headaches.   Hematological: Does not bruise/bleed easily.    Psychiatric/Behavioral: Negative for agitation, confusion and hallucinations. The patient is not nervous/anxious.      Objective:     Vital Signs (Most Recent):  Temp: 98.5 °F (36.9 °C) (02/13/17 1032)  Pulse: 82 (02/13/17 1352)  Resp: (!) 23 (02/13/17 1157)  BP: 131/67 (02/13/17 1352)  SpO2: (!) 93 % (02/13/17 1352) Vital Signs (24h Range):  Temp:  [98.5 °F (36.9 °C)] 98.5 °F (36.9 °C)  Pulse:  [] 82  Resp:  [18-25] 23  SpO2:  [93 %-97 %] 93 %  BP: (104-132)/(56-94) 131/67     Weight: 93 kg (205 lb)  Body mass index is 29.41 kg/(m^2).    Physical Exam   Constitutional: He is oriented to person, place, and time. He appears well-developed and well-nourished. No distress.   HENT:   Head: Normocephalic and atraumatic.   Mouth/Throat: Oropharynx is clear and moist.   Eyes: Conjunctivae and EOM are normal. Pupils are equal, round, and reactive to light. No scleral icterus.   Neck: Normal range of motion. Neck supple. No JVD present. No tracheal deviation present. No thyromegaly present.   Cardiovascular: Normal rate, regular rhythm and intact distal pulses.  Exam reveals no gallop and no friction rub.    No murmur heard.  Pulmonary/Chest: Effort normal and breath sounds normal. No respiratory distress. He has no wheezes. He has no rales.   Abdominal: Soft. Bowel sounds are normal. He exhibits no distension and no mass. There is no tenderness. There is no rebound and no guarding.   Musculoskeletal: Normal range of motion. He exhibits no edema.   Neurological: He is alert and oriented to person, place, and time. He displays normal reflexes. No cranial nerve deficit.   Skin: Skin is warm and dry. No rash noted. No erythema.   Psychiatric: He has a normal mood and affect. His behavior is normal.        Significant Labs:   BMP:   Recent Labs  Lab 02/13/17  1102   *      K 3.8      CO2 24   BUN 12   CREATININE 1.0   CALCIUM 9.7   MG 2.2     CMP:   Recent Labs  Lab 02/13/17  1102      K 3.8   CL  103   CO2 24   *   BUN 12   CREATININE 1.0   CALCIUM 9.7   PROT 7.6   ALBUMIN 4.2   BILITOT 0.5   ALKPHOS 80   AST 17   ALT 20   ANIONGAP 9   EGFRNONAA >60.0     Cardiac Markers:   Recent Labs  Lab 17  1102   *     Coagulation:   Recent Labs  Lab 17  1102   INR 1.1     Troponin:   Recent Labs  Lab 17  1102   TROPONINI 0.009  0.009       Significant Imaging: CXR: I have reviewed all pertinent results/findings within the past 24 hours and my personal findings are:  no acute findings  EKG: I have reviewed all pertinent results/findings within the past 24 hours and my personal findings are: EKG: 10:36 showed AFlutter with rate 163. EK:29:34 showed AFib with rate 84. EKG 13:21:05 showed NSR with rate 83

## 2017-02-13 NOTE — ED AVS SNAPSHOT
[facility]  [address] [facility] [address]               Marcus Watkins   2017 10:39 AM   ED    Description:  Male : 1955   Department:  [facility]           Your Care was Coordinated By:     Provider Role From To    Hayes Hull MD Attending Provider 17 1038 --    Jackie Alfaro MD Admitting Provider -- --      Reason for Visit     Shortness of Breath           Diagnoses this Visit        Comments    Atrial fibrillation    -  Primary       ED Disposition     ED Disposition Condition Comment    Observation  Marcus Watkins will be placed in OBS to IM C           To Do List           Follow-up Information     Follow up with José Grey MD In 4 weeks.    Specialties:  Electrophysiology, Cardiovascular Disease    Contact information:    [address] [place], [place] [id number]  [phone]          Please follow up.    Why:  Follow up PCP in 1 week      Referral Details     Referred By    Referred To    Kade Camarillo MD   [address] [place], [place] [id number]   Phone:  [phone]   Fax:  [phone]    Diagnoses:  Atrial fibrillation   Order:  Ambulatory Referral To Electrophysiology   Reason:  Specialty Services Required              Kade Camarillo MD   [address] [place] [address]   Phone:  [phone]   Fax:  [phone]    Diagnoses:  Atrial fibrillation   Order:  Ambulatory Consult To Sleep Disorders           These Medications        Disp Refills Start End    apixaban 5 mg Tab 60 tablet 3 2017     Take 1 tablet (5 mg total) by mouth 2 (two) times daily. - Oral    Pharmacy: [facility] [address] [place] [address] Ph #: [phone]       metoprolol tartrate (LOPRESSOR) 25 MG tablet 60 tablet 3 2017    Take 1 tablet (25 mg total) by mouth 2 (two) times daily. - Oral    Pharmacy: [facility] [address] [place] [address]  [facility] #: [phone]         [facility] On Call     [facility] On Call Nurse Care Line - 24/7 Assistance  Registered nurses in the [facility] On Call Center provide clinical advisement, health education, appointment booking, and other advisory services.  Call for this free service at [phone].             Medications           Message regarding Medications     Verify the changes and/or additions to your medication regime listed below are the same as discussed with your clinician today.  If any of these changes or additions are incorrect, please notify your healthcare provider.        START taking these NEW medications        Refills    apixaban 5 mg Tab 3    Sig: Take 1 tablet (5 mg total) by mouth 2 (two) times daily.    Class: Normal    Route: Oral    metoprolol tartrate (LOPRESSOR) 25 MG tablet 3    Sig: Take 1 tablet (25 mg total) by mouth 2 (two) times daily.    Class: Normal    Route: Oral      These medications were administered today        Dose Freq    aspirin tablet 325 mg 325 mg ED 1 Time    Sig: Take 1 tablet (325 mg total) by mouth ED 1 Time.    Class: Normal    Route: Oral    sodium chloride 0.9% bolus 1,000 mL 1,000 mL Once    Sig: Inject 1,000 mLs into the vein once.    Class: Normal    Route: Intravenous    ondansetron injection 4 mg 4 mg ED 1 Time    Sig: Inject 4 mg into the vein ED 1 Time.    Class: Normal    Route: Intravenous    diltiaZEM 125 mg in D5W 125 mL infusion 5 mg/hr ED 1 Time    Sig: Inject 5 mg/hr into the vein ED 1 Time.    Class: Normal    Route: Intravenous    diltiaZEM injection 15 mg 15 mg ED 1 Time    Sig: Inject 3 mLs (15 mg total) into the vein ED 1 Time.    Class: Normal    Route: Intravenous    apixaban tablet 5 mg 5 mg 2 times daily    Sig: Take 1 tablet (5 mg total) by mouth 2 (two) times daily.    Class: Normal    Route: Oral    metoprolol tartrate (LOPRESSOR) tablet 25 mg 25 mg 2 times daily    Sig: Take 1 tablet (25 mg total) by mouth 2 (two) times daily.    Class:  "Normal    Route: Oral      STOP taking these medications     aspirin 81 MG Chew Take 81 mg by mouth once daily.           Verify that the below list of medications is an accurate representation of the medications you are currently taking.  If none reported, the list may be blank. If incorrect, please contact your healthcare provider. Carry this list with you in case of emergency.           Current Medications     multivitamin (THERAGRAN) per tablet Take 1 tablet by mouth once daily.    apixaban 5 mg Tab Take 1 tablet (5 mg total) by mouth 2 (two) times daily.    apixaban tablet 5 mg Take 1 tablet (5 mg total) by mouth 2 (two) times daily.    hydrocortisone-pramoxine (PROCTOFOAM-HS) rectal foam Place 1 applicator rectally 2 (two) times daily.    metoprolol tartrate (LOPRESSOR) 25 MG tablet Take 1 tablet (25 mg total) by mouth 2 (two) times daily.    metoprolol tartrate (LOPRESSOR) tablet 25 mg Take 1 tablet (25 mg total) by mouth 2 (two) times daily.           Clinical Reference Information           Your Vitals Were     BP Pulse Temp Resp Height Weight    112/58 80 98.5 °F (36.9 °C) (Oral) 23 5' 10" (1.778 m) 93 kg (205 lb)    SpO2 BMI             93% 29.41 kg/m2         Allergies as of 2/13/2017     No Known Allergies      Immunizations Administered on Date of Encounter - 2/13/2017     None      ED Micro, Lab, POCT     Start Ordered       Status Ordering Provider    02/13/17 1107 02/13/17 1106    STAT,   Status:  Canceled      Canceled     02/13/17 1107 02/13/17 1107  TSH  Add-on      Completed     02/13/17 1044 02/13/17 1045  CBC auto differential  STAT      Final result     02/13/17 1044 02/13/17 1045  Comprehensive metabolic panel  STAT      Final result     02/13/17 1044 02/13/17 1045  Protime-INR  STAT      Final result     02/13/17 1044 02/13/17 1045  Troponin I  Now then every 3 hours     Comments:  PLEASE REVIEW ORDER START TIME BEFORE MARKING SPECIMEN COLLECTED.   Start Status   02/13/17 1044 Final result "   02/13/17 1344 Final result       Acknowledged     02/13/17 1044 02/13/17 1045  B-Type natriuretic peptide (BNP)  STAT      Final result     02/13/17 1044 02/13/17 1045  Magnesium  STAT      Final result       ED Imaging Orders     Start Ordered       Status Ordering Provider    02/13/17 1044 02/13/17 1045  X-Ray Chest AP Portable  1 time imaging      Final result       Your Scheduled Appointments     Mar 27, 2017 10:00 AM CDT   Established Patient Visit with MD Noe Tucker - Arrhythmia ([facility] )    [address]  [facility] [address]   [phone]              [facility] Sign-Up     Activating your [facility] account is as easy as 1-2-3!     1) Visit [email or web address], select Sign Up Now, enter this activation code and your date of birth, then select Next.  [email or web address]  Expires: 3/30/2017  5:18 PM      2) Create a username and password to use when you visit [facility] in the future and select a security question in case you lose your password and select Next.    3) Enter your e-mail address and click Sign Up!    Additional Information  If you have questions, please e-mail [email or web address] or call [phone] to talk to our [facility] staff. Remember, [facility] is NOT to be used for urgent needs. For medical emergencies, dial 911.         Smoking Cessation     If you would like to quit smoking:   You may be eligible for free services if you are a [place] resident and started smoking cigarettes before September 1, 1988.  Call the Smoking Cessation Trust (SCT) toll free at (630) 375-4705 or (847) 562-5158.   Call 2-800-QUIT-NOW if you do not meet the above criteria.             [facility] complies with applicable Federal civil rights laws and does not discriminate on the basis of race, color, national origin, age, disability, or sex.        Language Assistance Services     ATTENTION: Language assistance services are available, free of charge. Please call  [phone].      ATENCIÓN: Si habla español, tiene a cam disposición servicios gratuitos de asistencia lingüística. Llame al [phone].     CHÚ Ý: N?u b?n nói Ti?ng Vi?t, có các d?ch v? h? tr? ngôn ng? mi?n phí dành cho b?n. G?i s? [phone].

## 2017-02-13 NOTE — TELEPHONE ENCOUNTER
Reason for Disposition   MILD difficulty breathing (e.g., minimal/no SOB at rest, SOB with walking, pulse < 100) of new onset or worse than normal    Protocols used: ST BREATHING DIFFICULTY-A-OH  Mr. Watkins's blood pressure monitor read that he has been having an irregular heart beat since the weekend. His blood pressure was 128/111 (63), then 123/71 (70) at around 5:00 this morning. Patient began sweating with sob. Mr. Watkins states symptoms have resolved, but he continues have sob with activity. Patient is currently at work.

## 2017-02-13 NOTE — ED TRIAGE NOTES
Patient received with complaint of intermittent palpitations, diaphoresis, and shortness of breath.  Denies chest pain.  Onset at rest.     No LDA's in place on arrival to department.    Family present.    Pain:  Denies pain.    Psychosocial:  Patient is calm and cooperative.  Patients insight and judgement are appropriate to situation.  Appears clean, well maintained, with clothing appropriate to environment.  No evidence of delusions, hallucinations, or psychosis.    Neuro:  Eyes open spontaneously.  Awake, alert, oriented x 4.  Speech clear and appropriate.  Tolerating saliva secretions well.  Able to follow commands, demonstrating ability to actively and appropriately communicate within context of current conversation.  Symmetrical facial muscles.  Moving all extremities well with no noted weakness.  Adequate muscle tone present.    Movement is purposeful.  No evidence of impaired sensation.  Responds to external stimuli with appropriate reflexes.  Pupils equally round and reactive to light.  No noted drifts.      Airway:  Bilateral chest rise and fall.  RR regular and non-labored.  No crepitus or subcutaneous emphysema noted on palpation.      Circulatory:  Skin warm, dry, and pink.  Apical and radial pulses strong and regular, though rapid.  Capillary refill/skin blanching less than 3 seconds to distal of 4 extremities.    Abdomen:  Abdomen overweight, soft and non-distended.  Positive normo-active bowel sounds x 4 quadrants.      Urinary:  Patient reports routine urination without pain, frequency, or urgency.  Voids independently.  Reports urine appears ramakrishna/yellow in color.    Extremities:  No redness, heat, swelling, deformity, or pain.    Skin:  Intact with no bruising/discolorations noted.

## 2017-02-13 NOTE — ASSESSMENT & PLAN NOTE
- Patient presented to ED with new onset AFlutter and -180. Given Diltiazem and converted rhythm to AFib. Started on Diltiazem infusion and converted to NSR.  - EP and Cardiology consulted in ED. Initially planned for LOPEZ and DCCV but patient now rate controlled.   - Continue on Apixaban 5 mg BID and start metoprolol 25 mg BID. Patient with no history of HTN and BP currently 120-131/67.   - Order stat 2D echo  - Monitor on telemetry and if rate remains controlled likely discharge later today.   - Will need close outpatient follow up with EP upon discharge.

## 2017-02-21 ENCOUNTER — OFFICE VISIT (OUTPATIENT)
Dept: FAMILY MEDICINE | Facility: CLINIC | Age: 62
End: 2017-02-21
Payer: COMMERCIAL

## 2017-02-21 VITALS
DIASTOLIC BLOOD PRESSURE: 66 MMHG | WEIGHT: 276.25 LBS | TEMPERATURE: 99 F | BODY MASS INDEX: 38.67 KG/M2 | HEIGHT: 71 IN | SYSTOLIC BLOOD PRESSURE: 112 MMHG | HEART RATE: 92 BPM

## 2017-02-21 DIAGNOSIS — I48.91 ATRIAL FIBRILLATION, UNSPECIFIED TYPE: Primary | ICD-10-CM

## 2017-02-21 PROCEDURE — 93010 ELECTROCARDIOGRAM REPORT: CPT | Mod: S$GLB,,, | Performed by: INTERNAL MEDICINE

## 2017-02-21 PROCEDURE — 1160F RVW MEDS BY RX/DR IN RCRD: CPT | Mod: S$GLB,,, | Performed by: FAMILY MEDICINE

## 2017-02-21 PROCEDURE — 99999 PR PBB SHADOW E&M-EST. PATIENT-LVL III: CPT | Mod: PBBFAC,,, | Performed by: FAMILY MEDICINE

## 2017-02-21 PROCEDURE — 93005 ELECTROCARDIOGRAM TRACING: CPT | Mod: S$GLB,,, | Performed by: FAMILY MEDICINE

## 2017-02-21 PROCEDURE — 99214 OFFICE O/P EST MOD 30 MIN: CPT | Mod: S$GLB,,, | Performed by: FAMILY MEDICINE

## 2017-02-21 NOTE — MR AVS SNAPSHOT
[facility]  [address] [facility], [address]  [facility] [address]  Phone: [phone]  Fax: [phone]                  Marcus Watkins   2017 3:00 PM   Office Visit    Description:  Male : 1955   Provider:  Radha Brunson MD   Department:  [facility]           Reason for Visit     Hospital Follow Up           Diagnoses this Visit        Comments    Atrial fibrillation, unspecified type    -  Primary            To Do List           Future Appointments        Provider Department Dept Phone    3/27/2017 10:00 AM José Grey MD [facility] [phone]      Goals (5 Years of Data)     None      [facility]     [facility] Nurse Care [facility] -  Assistance  Registered nurses in the [facility] Center provide clinical advisement, health education, appointment booking, and other advisory services.  Call for this free service at [phone].             Medications           Message regarding Medications     Verify the changes and/or additions to your medication regime listed below are the same as discussed with your clinician today.  If any of these changes or additions are incorrect, please notify your healthcare provider.             Verify that the below list of medications is an accurate representation of the medications you are currently taking.  If none reported, the list may be blank. If incorrect, please contact your healthcare provider. Carry this list with you in case of emergency.           Current Medications     apixaban 5 mg Tab Take 1 tablet (5 mg total) by mouth 2 (two) times daily.    hydrocortisone-pramoxine (PROCTOFOAM-HS) rectal foam Place 1 applicator rectally 2 (two) times daily.    metoprolol tartrate (LOPRESSOR) 25 MG tablet Take 1 tablet (25 mg total) by mouth 2 (two) times daily.    multivitamin (THERAGRAN) per tablet Take 1 tablet by mouth once daily.           Clinical Reference Information          "  Your Vitals Were     BP Pulse Temp Height Weight BMI    112/66 (BP Location: Left arm) 92 98.9 °F (37.2 °C) 5' 11" (1.803 m) 125.3 kg (276 lb 3.8 oz) 38.53 kg/m2      Blood Pressure          Most Recent Value    BP  112/66      Allergies as of 2/21/2017     No Known Allergies      Immunizations Administered on Date of Encounter - 2/21/2017     None      Orders Placed During Today's Visit      Normal Orders This Visit    IN OFFICE EKG 12-LEAD (to Muse)       Language Assistance Services     ATTENTION: Language assistance services are available, free of charge. Please call [phone].      ATENCIÓN: Si habla jarett, tiene a cam disposición servicios gratuitos de asistencia lingüística. Llame al [phone].     CHÚ Ý: N?u b?n nói Ti?ng Vi?t, có các d?ch v? h? tr? ngôn ng? mi?n phí dành cho b?n. G?i s? [phone].         [facility] complies with applicable Federal civil rights laws and does not discriminate on the basis of race, color, national origin, age, disability, or sex.        "

## 2017-02-22 NOTE — PROGRESS NOTES
Subjective:       Patient ID: Marcus Watkins is a 61 y.o. male.    Chief Complaint: Hospital Follow Up   patient was seen in the emergency room over a week ago diagnosed with atrial fibrillation.  Patient saw cardiology consult in the ER was started on anticoagulants and beta blockers.  Patient was in sinus rhythm at the time that he was discharged from the emergency room,  He was advised to follow-up with his PCP in one week and cardiology in 4 weeks.  HPI see above  Review of Systems   Constitutional: Negative.    HENT: Negative.    Eyes: Negative.    Respiratory: Negative.    Cardiovascular:        History of new onset atrial fibrillation rate controlled now in normal sinus rhythm.   Gastrointestinal: Negative.    Endocrine: Negative.    Genitourinary: Negative.    Musculoskeletal: Negative.    Skin: Negative.    Allergic/Immunologic: Negative.    Neurological: Negative.    Hematological: Negative.        Objective:      Physical Exam   Constitutional: He is oriented to person, place, and time. He appears well-developed and well-nourished.   HENT:   Head: Normocephalic and atraumatic.   Right Ear: External ear normal.   Left Ear: External ear normal.   Nose: Nose normal.   Mouth/Throat: Oropharynx is clear and moist.   Eyes: Conjunctivae and EOM are normal. Pupils are equal, round, and reactive to light. Right eye exhibits no discharge. Left eye exhibits no discharge.   Neck: Normal range of motion. Neck supple. No JVD present. No tracheal deviation present. No thyromegaly present.   Cardiovascular: Normal rate, regular rhythm, normal heart sounds and intact distal pulses.  Exam reveals no gallop and no friction rub.    No murmur heard.  Pulmonary/Chest: Effort normal and breath sounds normal. No respiratory distress. He has no wheezes. He has no rales. He exhibits no tenderness.   Abdominal: Soft. Bowel sounds are normal. He exhibits no distension and no mass. There is no tenderness. There is no rebound and no  guarding. No hernia.   Neurological: He is alert and oriented to person, place, and time. He has normal reflexes. He displays normal reflexes. No cranial nerve deficit. Coordination normal.   Skin: Skin is warm and dry. No rash noted. No erythema. No pallor.   Psychiatric: He has a normal mood and affect. His behavior is normal. Judgment and thought content normal.   Nursing note and vitals reviewed.      Assessment:       1. Atrial fibrillation, unspecified type     2.     longterm use of high risk med  Plan:       EKG was performed today in clinic patient was found to be in normal sinus rhythm  Refer to the med card dated 2/21/2017 patient continue same medications follow up with cardiologist as scheduled

## 2017-03-02 DIAGNOSIS — I48.91 ATRIAL FIBRILLATION, UNSPECIFIED TYPE: Primary | ICD-10-CM

## 2017-03-06 ENCOUNTER — TELEPHONE (OUTPATIENT)
Dept: CARDIOLOGY | Facility: CLINIC | Age: 62
End: 2017-03-06

## 2017-03-13 ENCOUNTER — CLINICAL SUPPORT (OUTPATIENT)
Dept: ELECTROPHYSIOLOGY | Facility: CLINIC | Age: 62
End: 2017-03-13
Payer: COMMERCIAL

## 2017-03-13 DIAGNOSIS — I48.91 ATRIAL FIBRILLATION: ICD-10-CM

## 2017-03-13 PROCEDURE — 93224 XTRNL ECG REC UP TO 48 HRS: CPT | Mod: S$GLB,,, | Performed by: INTERNAL MEDICINE

## 2017-03-27 ENCOUNTER — OFFICE VISIT (OUTPATIENT)
Dept: ELECTROPHYSIOLOGY | Facility: CLINIC | Age: 62
End: 2017-03-27
Payer: COMMERCIAL

## 2017-03-27 ENCOUNTER — HOSPITAL ENCOUNTER (OUTPATIENT)
Dept: CARDIOLOGY | Facility: CLINIC | Age: 62
Discharge: HOME | End: 2017-03-27
Payer: COMMERCIAL

## 2017-03-27 VITALS
WEIGHT: 280 LBS | HEIGHT: 71 IN | HEART RATE: 76 BPM | DIASTOLIC BLOOD PRESSURE: 74 MMHG | BODY MASS INDEX: 39.2 KG/M2 | SYSTOLIC BLOOD PRESSURE: 142 MMHG

## 2017-03-27 DIAGNOSIS — I48.91 ATRIAL FIBRILLATION, UNSPECIFIED TYPE: ICD-10-CM

## 2017-03-27 DIAGNOSIS — I48.3 TYPICAL ATRIAL FLUTTER: ICD-10-CM

## 2017-03-27 PROCEDURE — 93000 ELECTROCARDIOGRAM COMPLETE: CPT | Mod: S$GLB,,, | Performed by: INTERNAL MEDICINE

## 2017-03-27 PROCEDURE — 99215 OFFICE O/P EST HI 40 MIN: CPT | Mod: S$GLB,,, | Performed by: INTERNAL MEDICINE

## 2017-03-27 PROCEDURE — 99999 PR PBB SHADOW E&M-EST. PATIENT-LVL III: CPT | Mod: PBBFAC,,, | Performed by: INTERNAL MEDICINE

## 2017-03-27 PROCEDURE — 1160F RVW MEDS BY RX/DR IN RCRD: CPT | Mod: S$GLB,,, | Performed by: INTERNAL MEDICINE

## 2017-03-27 RX ORDER — ASPIRIN 81 MG/1
81 TABLET ORAL DAILY
Start: 2017-03-27 | End: 2018-04-09 | Stop reason: ALTCHOICE

## 2017-03-27 NOTE — PROGRESS NOTES
Subjective:     HPI    I had the pleasure of seeing Marcus Watkins in follow-up today for his history of atrial arrhythmias. He is a 61-year old male with no significant past medical history who presented to the [facility] ED with a 24 hour history of palpitations associated with shortness of breath, nausea, and vomiting. He was found to be in typical atrial flutter with RVR, and was administered Diltiazem which converted him to AF. He then spontaneously reverted to sinus rhythm. He was discharged on a 30-day course of Eliquis, as well as Metoprolol, and presents to me today to discuss management options.    Recent cardiac studies include a Holter monitor performed in 3/2017 which showed sinus rhythm with no arrhythmias seen. An echo performed in 2/2017 showed an EF of 55-60%, mild LAE, and no significant valvular disease.    I reviewed today's ECG tracing, which shows sinus rhythm at 76 bpm.    Review of Systems   Constitution: Negative for decreased appetite, malaise/fatigue, weight gain and weight loss.   HENT: Negative for sore throat.    Eyes: Negative for blurred vision.   Cardiovascular: Negative for chest pain, dyspnea on exertion, irregular heartbeat, leg swelling, near-syncope, orthopnea, palpitations, paroxysmal nocturnal dyspnea and syncope.   Respiratory: Negative for shortness of breath.    Skin: Negative for rash.   Musculoskeletal: Negative for arthritis.   Gastrointestinal: Negative for abdominal pain.   Neurological: Negative for focal weakness.   Psychiatric/Behavioral: Negative for altered mental status.        Objective:    Physical Exam   Constitutional: He is oriented to person, place, and time. He appears well-developed and well-nourished. No distress.   HENT:   Head: Normocephalic and atraumatic.   Mouth/Throat: Oropharynx is clear and moist.   Eyes: Pupils are equal, round, and reactive to light. No scleral icterus.   Neck: Neck supple. No thyromegaly present.   Cardiovascular: Regular rhythm,  normal heart sounds and normal pulses.  Exam reveals no gallop and no friction rub.    No murmur heard.  Pulmonary/Chest: Effort normal and breath sounds normal. He has no rales.   Abdominal: Soft. Bowel sounds are normal. He exhibits no distension. There is no tenderness.   Musculoskeletal: He exhibits no edema.   Neurological: He is alert and oriented to person, place, and time.   Skin: Skin is warm and dry. No rash noted.   Psychiatric: He has a normal mood and affect. His behavior is normal.         Assessment:       1. Typical atrial flutter         Plan:   In summary, Marcus Watkins is a 61-year old male with a history of symptomatic typical atrial flutter. We discussed the pathophysiology of atrial flutter, as well as the treatment options available to manage it. We specifically discussed the risks, benefits, indications, and alternatives to EP study and CTI-line. After considering his options he has decided to proceed. Following ablation, Metoprolol will be stopped.    With regard to the AF that was seen, this was only observed following Diltiazem bolus and was not a de tamica or his index arrhythmia. I did explain to him that approximately 50% of patients who undergo AFL ablation will be diagnosed with AF within 5 years.     Thank you for allowing me to participate in the care of this patient. Please do not hesitate to call me with any questions or concerns.

## 2017-03-27 NOTE — MR AVS SNAPSHOT
[facility]  [address]  [facility] [address]  Phone: [phone]  Fax: [phone]                  Marcus Watkins   3/27/2017 10:00 AM   Office Visit    Description:  Male : 1955   Provider:  José Grey MD   Department:  [facility]           Reason for Visit     Atrial Fibrillation                To Do List           Goals (5 Years of Data)     None       These Medications        Disp Refills Start End    aspirin (ECOTRIN) 81 MG EC tablet   3/27/2017     Take 1 tablet (81 mg total) by mouth once daily. - Oral    Pharmacy: [facility] [address] [place] [address] #: [phone]         [facility]     [facility] On Call Nurse Care Line -  Assistance  Registered nurses in the [facility] On Call Center provide clinical advisement, health education, appointment booking, and other advisory services.  Call for this free service at [phone].             Medications           Message regarding Medications     Verify the changes and/or additions to your medication regime listed below are the same as discussed with your clinician today.  If any of these changes or additions are incorrect, please notify your healthcare provider.        START taking these NEW medications        Refills    aspirin (ECOTRIN) 81 MG EC tablet     Sig: Take 1 tablet (81 mg total) by mouth once daily.    Class: No Print    Route: Oral      STOP taking these medications     apixaban 5 mg Tab Take 1 tablet (5 mg total) by mouth 2 (two) times daily.           Verify that the below list of medications is an accurate representation of the medications you are currently taking.  If none reported, the list may be blank. If incorrect, please contact your healthcare provider. Carry this list with you in case of emergency.           Current Medications     hydrocortisone-pramoxine (PROCTOFOAM-HS) rectal foam Place 1 applicator rectally 2 (two) times daily.     "metoprolol tartrate (LOPRESSOR) 25 MG tablet Take 1 tablet (25 mg total) by mouth 2 (two) times daily.    multivitamin (THERAGRAN) per tablet Take 1 tablet by mouth once daily.    aspirin (ECOTRIN) 81 MG EC tablet Take 1 tablet (81 mg total) by mouth once daily.           Clinical Reference Information           Your Vitals Were     BP Pulse Height Weight BMI    142/74 76 5' 11" (1.803 m) 127 kg (280 lb) 39.05 kg/m2      Blood Pressure          Most Recent Value    BP  (!)  142/74      Allergies as of 3/27/2017     No Known Allergies      Immunizations Administered on Date of Encounter - 3/27/2017     None      Language Assistance Services     ATTENTION: Language assistance services are available, free of charge. Please call [phone].      ATENCIÓN: Si alvarola jarett, tiene a cam disposición servicios gratuitos de asistencia lingüística. Llame al [phone].     HUI Ý: N?u b?n nói Ti?ng Vi?t, có các d?ch v? h? tr? ngôn ng? mi?n phí dành cho b?n. G?i s? [phone].         [facility] complies with applicable Federal civil rights laws and does not discriminate on the basis of race, color, national origin, age, disability, or sex.        "

## 2017-04-06 ENCOUNTER — TELEPHONE (OUTPATIENT)
Dept: ELECTROPHYSIOLOGY | Facility: CLINIC | Age: 62
End: 2017-04-06

## 2017-04-06 DIAGNOSIS — I48.3 TYPICAL ATRIAL FLUTTER: Primary | ICD-10-CM

## 2017-04-07 NOTE — TELEPHONE ENCOUNTER
ABLATION EDUCATION CHECKLIST    4/21/17 @ 1030 AM - Lab Work   Pre-procedure labs have been ordered for you @ [facility]  Be sure to arrive at your scheduled time for this lab work!  You do not have to fast for this lab work!    4/27/17 @ 915 AM - [facility] (arrival time)   Report to Cardiology Waiting Room on 3rd floor of the Hospital    (Do not report to clinic)  Directions for Reporting to Cardiology Waiting Area in the Hospital  If you park in the Parking Garage:  Take elevators to the 2nd floor  Walk up ramp and turn right by Gold Elevators  Take elevator to the 3rd floor  Upon exiting the elevator, turn away from the clinic areas  Walk long pearson around to front of hospital to area with windows overlooking [facility]  Check in at Reception Desk  OR  If family is dropping you off:  Have them drop you off at the front of the Hospital  (Near the ER, where all the flags are hung).  Take the E elevators to the 3rd floor.  Check in at the Reception Desk in the waiting room.    Do not eat or drink anything after: 12 mn on the night before your procedure    Medications:   You may take your usual morning medications with a sip of water, including aspirin.     You will be spending the night after your procedure  You will need someone to drive you home the day after your procedure.    Your pain during your procedure will be managed by the anesthesia team.     THE ABOVE INSTRUCTIONS WERE GIVEN TO THE PATIENT VERBALLY AND THEY VERBALIZED UNDERSTANDING.  THEY DO NOT REQUIRE ANY SPECIAL NEEDS AND DO NOT HAVE ANY LEARNING BARRIERS.    Any need to reschedule or cancel procedures, or any questions regarding your procedures should be addressed directly with the Arrhythmia Department Nurses at the following phone number: [phone]

## 2017-04-10 ENCOUNTER — TELEPHONE (OUTPATIENT)
Dept: ELECTROPHYSIOLOGY | Facility: CLINIC | Age: 62
End: 2017-04-10

## 2017-04-10 NOTE — TELEPHONE ENCOUNTER
Returned pts call and informed him that he will receive papers in the mail with instructions prior to appt 4/27

## 2017-04-17 ENCOUNTER — TELEPHONE (OUTPATIENT)
Dept: CARDIOLOGY | Facility: CLINIC | Age: 62
End: 2017-04-17

## 2017-04-21 ENCOUNTER — LAB VISIT (OUTPATIENT)
Dept: LAB | Facility: HOSPITAL | Age: 62
End: 2017-04-21
Attending: INTERNAL MEDICINE
Payer: COMMERCIAL

## 2017-04-21 DIAGNOSIS — I48.3 TYPICAL ATRIAL FLUTTER: ICD-10-CM

## 2017-04-21 LAB
ANION GAP SERPL CALC-SCNC: 7 MMOL/L
APTT BLDCRRT: 24.8 SEC
BASOPHILS # BLD AUTO: 0.02 K/UL
BASOPHILS NFR BLD: 0.2 %
BUN SERPL-MCNC: 15 MG/DL
CALCIUM SERPL-MCNC: 9.3 MG/DL
CHLORIDE SERPL-SCNC: 105 MMOL/L
CO2 SERPL-SCNC: 27 MMOL/L
CREAT SERPL-MCNC: 1 MG/DL
DIFFERENTIAL METHOD: NORMAL
EOSINOPHIL # BLD AUTO: 0.2 K/UL
EOSINOPHIL NFR BLD: 2.5 %
ERYTHROCYTE [DISTWIDTH] IN BLOOD BY AUTOMATED COUNT: 13.2 %
EST. GFR  (AFRICAN AMERICAN): >60 ML/MIN/1.73 M^2
EST. GFR  (NON AFRICAN AMERICAN): >60 ML/MIN/1.73 M^2
GLUCOSE SERPL-MCNC: 104 MG/DL
HCT VFR BLD AUTO: 42.1 %
HGB BLD-MCNC: 14.6 G/DL
INR PPP: 0.9
LYMPHOCYTES # BLD AUTO: 2.5 K/UL
LYMPHOCYTES NFR BLD: 29.4 %
MCH RBC QN AUTO: 30.8 PG
MCHC RBC AUTO-ENTMCNC: 34.7 %
MCV RBC AUTO: 89 FL
MONOCYTES # BLD AUTO: 0.7 K/UL
MONOCYTES NFR BLD: 8 %
NEUTROPHILS # BLD AUTO: 5.1 K/UL
NEUTROPHILS NFR BLD: 59.7 %
PLATELET # BLD AUTO: 247 K/UL
PMV BLD AUTO: 11 FL
POTASSIUM SERPL-SCNC: 4.6 MMOL/L
PROTHROMBIN TIME: 10.2 SEC
RBC # BLD AUTO: 4.74 M/UL
SODIUM SERPL-SCNC: 139 MMOL/L
WBC # BLD AUTO: 8.48 K/UL

## 2017-04-21 PROCEDURE — 85025 COMPLETE CBC W/AUTO DIFF WBC: CPT

## 2017-04-21 PROCEDURE — 80048 BASIC METABOLIC PNL TOTAL CA: CPT

## 2017-04-21 PROCEDURE — 36415 COLL VENOUS BLD VENIPUNCTURE: CPT | Mod: PO

## 2017-04-21 PROCEDURE — 85610 PROTHROMBIN TIME: CPT

## 2017-04-21 PROCEDURE — 85730 THROMBOPLASTIN TIME PARTIAL: CPT

## 2017-04-26 ENCOUNTER — TELEPHONE (OUTPATIENT)
Dept: ELECTROPHYSIOLOGY | Facility: CLINIC | Age: 62
End: 2017-04-26

## 2017-04-26 NOTE — TELEPHONE ENCOUNTER
Called pt to review pre op instructions for EP lab procedure in AM. Instructed pt to fast after midnight, hold Eliquis today and tomorrow, and report to 3rd floor waiting area for 9 AM. Pt verbalized understanding.

## 2017-04-27 ENCOUNTER — HOSPITAL ENCOUNTER (OUTPATIENT)
Facility: HOSPITAL | Age: 62
Discharge: HOME | End: 2017-04-28
Attending: INTERNAL MEDICINE | Admitting: INTERNAL MEDICINE
Payer: COMMERCIAL

## 2017-04-27 ENCOUNTER — ANESTHESIA (OUTPATIENT)
Dept: MEDSURG UNIT | Facility: HOSPITAL | Age: 62
End: 2017-04-27
Payer: COMMERCIAL

## 2017-04-27 ENCOUNTER — ANESTHESIA EVENT (OUTPATIENT)
Dept: MEDSURG UNIT | Facility: HOSPITAL | Age: 62
End: 2017-04-27
Payer: COMMERCIAL

## 2017-04-27 ENCOUNTER — HOSPITAL ENCOUNTER (OUTPATIENT)
Dept: CARDIOLOGY | Facility: CLINIC | Age: 62
Discharge: HOME | End: 2017-04-27
Attending: INTERNAL MEDICINE | Admitting: INTERNAL MEDICINE
Payer: COMMERCIAL

## 2017-04-27 ENCOUNTER — SURGERY (OUTPATIENT)
Age: 62
End: 2017-04-27

## 2017-04-27 DIAGNOSIS — I48.92 ATRIAL FLUTTER: ICD-10-CM

## 2017-04-27 PROCEDURE — 93010 ELECTROCARDIOGRAM REPORT: CPT | Mod: 77,,, | Performed by: INTERNAL MEDICINE

## 2017-04-27 PROCEDURE — 93613 INTRACARDIAC EPHYS 3D MAPG: CPT | Mod: ,,, | Performed by: INTERNAL MEDICINE

## 2017-04-27 PROCEDURE — 27200049 EP LAB PROCEDURE

## 2017-04-27 PROCEDURE — 93653 COMPRE EP EVAL TX SVT: CPT | Mod: ,,, | Performed by: INTERNAL MEDICINE

## 2017-04-27 PROCEDURE — 25000003 PHARM REV CODE 250: Performed by: NURSE ANESTHETIST, CERTIFIED REGISTERED

## 2017-04-27 PROCEDURE — 37000009 HC ANESTHESIA EA ADD 15 MINS: Performed by: INTERNAL MEDICINE

## 2017-04-27 PROCEDURE — 25000003 PHARM REV CODE 250

## 2017-04-27 PROCEDURE — 93005 ELECTROCARDIOGRAM TRACING: CPT

## 2017-04-27 PROCEDURE — 63600175 PHARM REV CODE 636 W HCPCS: Performed by: NURSE ANESTHETIST, CERTIFIED REGISTERED

## 2017-04-27 PROCEDURE — 93010 ELECTROCARDIOGRAM REPORT: CPT | Mod: ,,, | Performed by: INTERNAL MEDICINE

## 2017-04-27 PROCEDURE — D9220A PRA ANESTHESIA: Mod: CRNA,,, | Performed by: NURSE ANESTHETIST, CERTIFIED REGISTERED

## 2017-04-27 PROCEDURE — D9220A PRA ANESTHESIA: Mod: ANES,,, | Performed by: ANESTHESIOLOGY

## 2017-04-27 PROCEDURE — 93621 COMP EP EVL L PAC&REC C SINS: CPT | Mod: 26,,, | Performed by: INTERNAL MEDICINE

## 2017-04-27 PROCEDURE — 37000008 HC ANESTHESIA 1ST 15 MINUTES: Performed by: INTERNAL MEDICINE

## 2017-04-27 RX ORDER — MIDAZOLAM HYDROCHLORIDE 1 MG/ML
INJECTION, SOLUTION INTRAMUSCULAR; INTRAVENOUS
Status: DISCONTINUED | OUTPATIENT
Start: 2017-04-27 | End: 2017-04-27

## 2017-04-27 RX ORDER — FENTANYL CITRATE 50 UG/ML
INJECTION, SOLUTION INTRAMUSCULAR; INTRAVENOUS
Status: DISCONTINUED | OUTPATIENT
Start: 2017-04-27 | End: 2017-04-27

## 2017-04-27 RX ORDER — AMOXICILLIN 500 MG
2 CAPSULE ORAL DAILY
COMMUNITY
End: 2020-07-24 | Stop reason: SDUPTHER

## 2017-04-27 RX ORDER — PROPOFOL 10 MG/ML
VIAL (ML) INTRAVENOUS
Status: DISCONTINUED | OUTPATIENT
Start: 2017-04-27 | End: 2017-04-27

## 2017-04-27 RX ORDER — SODIUM CHLORIDE 9 MG/ML
INJECTION, SOLUTION INTRAVENOUS CONTINUOUS PRN
Status: DISCONTINUED | OUTPATIENT
Start: 2017-04-27 | End: 2017-04-27

## 2017-04-27 RX ORDER — LIDOCAINE HCL/PF 100 MG/5ML
SYRINGE (ML) INTRAVENOUS
Status: DISCONTINUED | OUTPATIENT
Start: 2017-04-27 | End: 2017-04-27

## 2017-04-27 RX ORDER — ASPIRIN 81 MG/1
81 TABLET ORAL DAILY
Status: DISCONTINUED | OUTPATIENT
Start: 2017-04-28 | End: 2017-04-28 | Stop reason: HOSPADM

## 2017-04-27 RX ORDER — PROPOFOL 10 MG/ML
VIAL (ML) INTRAVENOUS CONTINUOUS PRN
Status: DISCONTINUED | OUTPATIENT
Start: 2017-04-27 | End: 2017-04-27

## 2017-04-27 RX ADMIN — FENTANYL CITRATE 25 MCG: 50 INJECTION, SOLUTION INTRAMUSCULAR; INTRAVENOUS at 01:04

## 2017-04-27 RX ADMIN — FENTANYL CITRATE 25 MCG: 50 INJECTION, SOLUTION INTRAMUSCULAR; INTRAVENOUS at 02:04

## 2017-04-27 RX ADMIN — SODIUM CHLORIDE: 0.9 INJECTION, SOLUTION INTRAVENOUS at 01:04

## 2017-04-27 RX ADMIN — LIDOCAINE HYDROCHLORIDE 50 MG: 20 INJECTION, SOLUTION INTRAVENOUS at 01:04

## 2017-04-27 RX ADMIN — MIDAZOLAM HYDROCHLORIDE 2 MG: 1 INJECTION INTRAMUSCULAR; INTRAVENOUS at 01:04

## 2017-04-27 RX ADMIN — PROPOFOL 150 MCG/KG/MIN: 10 INJECTION, EMULSION INTRAVENOUS at 01:04

## 2017-04-27 RX ADMIN — PROPOFOL 20 MG: 10 INJECTION, EMULSION INTRAVENOUS at 01:04

## 2017-04-27 NOTE — PROGRESS NOTES
Patient admitted to recovery see [facility] for complete assessment pacu bcg's maintained safety measures verified patient instructed on pain scale and patient verbalized understanding. Called patient 's wife and updated on patient location with the permission of patient. Also ep rn aware of need to pull sheaths.

## 2017-04-27 NOTE — ANESTHESIA POSTPROCEDURE EVALUATION
"Anesthesia Post Evaluation    Patient: Marcus Watkins    Procedure(s) Performed: Procedure(s) (LRB):  ABLATION (N/A)  TRANSESOPHAGEAL ECHOCARDIOGRAM ([facility]) (N/A)    Final Anesthesia Type: general  Patient location during evaluation: PACU  Patient participation: Yes- Able to Participate  Level of consciousness: awake and alert  Post-procedure vital signs: reviewed and stable  Pain management: adequate  Airway patency: patent  PONV status at discharge: No PONV  Anesthetic complications: no      Cardiovascular status: blood pressure returned to baseline  Respiratory status: unassisted  Hydration status: euvolemic  Follow-up not needed.        Visit Vitals    BP (!) 146/78 (BP Location: Left arm, Patient Position: Lying, BP Method: Automatic)    Pulse 68    Temp 36.3 °C (97.4 °F) (Oral)    Resp 18    Ht 5' 11" (1.803 m)    Wt 127 kg (280 lb)    SpO2 97%    BMI 39.05 kg/m2       Pain/Monika Score: Pain Assessment Performed: Yes (4/27/2017  4:15 PM)  Presence of Pain: denies (4/27/2017  4:15 PM)  Monika Score: 10 (4/27/2017  4:15 PM)      "

## 2017-04-27 NOTE — TRANSFER OF CARE
"Anesthesia Transfer of Care Note    Patient: Marcus Watkins    Procedure(s) Performed: Procedure(s) (LRB):  ABLATION (N/A)  TRANSESOPHAGEAL ECHOCARDIOGRAM ([facility]) (N/A)    Patient location: PACU    Anesthesia Type: general    Transport from OR: Transported from OR on room air with adequate spontaneous ventilation    Post pain: adequate analgesia    Post assessment: no apparent anesthetic complications and tolerated procedure well    Post vital signs: stable    Level of consciousness: awake, alert and oriented    Nausea/Vomiting: no nausea/vomiting    Complications: none          Last vitals:   Visit Vitals    /76 (BP Location: Left arm, Patient Position: Lying, BP Method: Automatic)    Pulse 63    Temp 36.9 °C (98.5 °F) (Oral)    Resp 18    Ht 5' 11" (1.803 m)    Wt 127 kg (280 lb)    SpO2 98%    BMI 39.05 kg/m2     "

## 2017-04-27 NOTE — ANESTHESIA RELEASE NOTE
Anesthesia Release from PACU Note    Patient: Marcus Watkins  Anesthesia Release from PACU Note    Patient: Marcus Watkins    Procedure(s) Performed: Procedure(s) (LRB):  ABLATION (N/A)  TRANSESOPHAGEAL ECHOCARDIOGRAM ([facility]) (N/A)    Anesthesia type: general    Post pain: Adequate analgesia    Post assessment: no apparent anesthetic complications, tolerated procedure well and no evidence of recall    Post vital signs:   Vitals:    04/27/17 1615   BP: (!) 146/78   Pulse: 68   Resp: 18   Temp: 36.3 °C (97.4 °F)        Level of consciousness: awake, alert  and oriented    Nausea/Vomiting: no nausea/no vomiting    Complications: none    Airway Patency: patent    Respiratory: unassisted, spontaneous ventilation    Cardiovascular: stable and blood pressure at baseline    Hydration: euvolemic

## 2017-04-27 NOTE — NURSING TRANSFER
Nursing Transfer Note      4/27/2017     Transfer To: 318    Transfer via stretcher    Transfer with cardiac monitoring    Transported by rn    Medicines sent: none    Chart send with patient: Yes    Notified: nurse    Patient reassessed at: see epic (date, time)

## 2017-04-27 NOTE — PLAN OF CARE
Problem: Patient Care Overview  Goal: Plan of Care Review  Outcome: Ongoing (interventions implemented as appropriate)  Received report from ÁNGEL Martin. Patient s/p RFA, AAOx3. VSS, no c/o pain or discomfort at this time, resp even and unlabored. Gauze/tegaderm dressing to B groin is CDI. No active bleeding. No hematoma noted. Post procedure protocol reviewed with patient and patient's family. Understanding verbalized. Family members at bedside. Nurse call bell within reach. Will continue to monitor per post procedure protocol.

## 2017-04-27 NOTE — ANESTHESIA PREPROCEDURE EVALUATION
04/27/2017  Marcus Watkins is a 61 y.o., male.    Anesthesia Evaluation    I have reviewed the Patient Summary Reports.    I have reviewed the Nursing Notes.   I have reviewed the Medications.     Review of Systems  Anesthesia Hx:  History of prior surgery of interest to airway management or planning: Previous anesthesia: General Denies Family Hx of Anesthesia complications.   Denies Personal Hx of Anesthesia complications.   Social:  Non-Smoker    Cardiovascular:   Exercise tolerance: good Dysrhythmias A-flueleer  Works as , climbs 3 FOS without DALY   Pulmonary:  Pulmonary Normal    Renal/:  Renal/ Normal     Hepatic/GI:  Hepatic/GI Normal    Neurological:  Neurology Normal    Endocrine:  Endocrine Normal        Physical Exam  General:  Well nourished, Morbid Obesity    Airway/Jaw/Neck:  Airway Findings: Mouth Opening: Normal Tongue: Normal  General Airway Assessment: Adult  Mallampati: IV  Improves to III with phonation.  TM Distance: Normal, at least 6 cm  Jaw/Neck Findings:  Neck ROM: Normal ROM      Dental:  Dental Findings: Upper Dentures   Chest/Lungs:  Chest/Lungs Findings: Normal Respiratory Rate, Clear to auscultation     Heart/Vascular:  Heart Findings: Rate: Normal        Mental Status:  Mental Status Findings:  Cooperative, Alert and Oriented         Anesthesia Plan  Type of Anesthesia, risks & benefits discussed:  Anesthesia Type:  general  Patient's Preference:   Intra-op Monitoring Plan:   Intra-op Monitoring Plan Comments:   Post Op Pain Control Plan:   Post Op Pain Control Plan Comments:   Induction:   IV  Beta Blocker:  Patient is on a Beta-Blocker and has received one dose within the past 24 hours (No further documentation required).       Informed Consent: Patient understands risks and agrees with Anesthesia plan.  Questions answered. Anesthesia consent signed with patient.  ASA  Score: 3     Day of Surgery Review of History & Physical:    H&P update referred to the surgeon.         Ready For Surgery From Anesthesia Perspective.

## 2017-04-27 NOTE — H&P
[facility]  Cardiology  History and Physical       Subjective:     Chief Complaint:  Atrial flutter    HPI:   61-year old male with no significant past medical history who [place] [facility] [place] with a 24 hour history of palpitations associated with shortness of breath, nausea, and vomiting. He was found to be in typical atrial flutter with RVR, and was administered Diltiazem which converted him to AF. He then spontaneously reverted to sinus rhythm. He was discharged on a 30-day course of Eliquis, as well as Metoprolol, and presents to me today to discuss management options.     Recent cardiac studies include a Holter monitor performed in 3/2017 which showed sinus rhythm with no arrhythmias seen. An echo performed in 2/2017 showed an EF of 55-60%, mild LAE, and no significant valvular disease.    Presents today for RFA of CTI.    History reviewed. No pertinent past medical history.    Past Surgical History:   Procedure Laterality Date    COLONOSCOPY N/A 4/14/2016    Procedure: COLONOSCOPY;  Surgeon: Kade Wang MD;  Location: [address]);  Service: Endoscopy;  Laterality: N/A;       Review of patient's allergies indicates:  No Known Allergies    No current facility-administered medications on file prior to encounter.      Current Outpatient Prescriptions on File Prior to Encounter   Medication Sig    metoprolol tartrate (LOPRESSOR) 25 MG tablet Take 1 tablet (25 mg total) by mouth 2 (two) times daily.    multivitamin (THERAGRAN) per tablet Take 1 tablet by mouth once daily.    aspirin (ECOTRIN) 81 MG EC tablet Take 1 tablet (81 mg total) by mouth once daily.    hydrocortisone-pramoxine (PROCTOFOAM-HS) rectal foam Place 1 applicator rectally 2 (two) times daily.     Family History     None        Social History Main Topics    Smoking status: Former Smoker    Smokeless tobacco: Not on file    Alcohol use No    Drug use: Not on file    Sexual activity: Not on file     ROS   As  per HPI, otherwise negative.    Objective:     Vital Signs (Most Recent):  Temp: 98.5 °F (36.9 °C) (04/27/17 0903)  Pulse: 63 (04/27/17 0903)  Resp: 18 (04/27/17 0903)  BP: 133/76 (04/27/17 0906)  SpO2: 98 % (04/27/17 0903) Vital Signs (24h Range):  Temp:  [98.5 °F (36.9 °C)] 98.5 °F (36.9 °C)  Pulse:  [63] 63  Resp:  [18] 18  SpO2:  [98 %] 98 %  BP: (131-133)/(76) 133/76     Weight: 127 kg (280 lb)  Body mass index is 39.05 kg/(m^2).    SpO2: 98 %  O2 Device (Oxygen Therapy): room air    No intake or output data in the 24 hours ending 04/27/17 1242    Lines/Drains/Airways     Peripheral Intravenous Line                 Peripheral IV - Single Lumen 04/27/17 0910 Right Hand less than 1 day                Physical Exam   Gen: alert, no distress  HEENT: no JVD, eomi  Cardiac: RRR, S1 and S2; no murmur  Pulm: non-labored, CTA  Abd: soft, non-tender  Ext: no edema or cyanosis  Neuro: no focal deficit  Skin: warm and dry    Significant Labs: reviewed    Significant Imaging: none    Assessment and Plan:     61 year old male with CTI dependant atrial flutter presenting for RFA.    The risks and benefits of the procedure were explained to the patient and wife, questions answered, consent obtained.    Sedation per anesthesia.    Ajay Gamboa MD  Cardiology   [facility]

## 2017-04-27 NOTE — PLAN OF CARE
Problem: Cardiac Rhythm Management Device (Adult)  Goal: Signs and Symptoms of Listed Potential Problems Will be Absent, Minimized or Managed (Cardiac Rhythm Management Device)  Signs and symptoms of listed potential problems will be absent, minimized or managed by discharge/transition of care (reference Cardiac Rhythm Management Device (Adult) CPG).  Outcome: Ongoing (interventions implemented as appropriate)  Admit assessment done. Labs sent. IV placed x 1. Plan of care and safety prec initiated. Will continue to monitor.

## 2017-04-27 NOTE — IP AVS SNAPSHOT
[facility]  [address]  [facility] [address]  Phone: [phone]           Patient Discharge Instructions   Our goal is to set you up for success. This packet includes information on your condition, medications, and your home care.  It will help you care for yourself to prevent having to return to the hospital.     Please ask your nurse if you have any questions.      There are many details to remember when preparing to leave the hospital. Here is what you will need to do:    1. Take your medicine. If you are prescribed medications, review your Medication List on the following pages. You may have new medications to  at the pharmacy and others that you'll need to stop taking. Review the instructions for how and when to take your medications. Talk with your doctor or nurses if you are unsure of what to do.     2. Go to your follow-up appointments. Specific follow-up information is listed in the following pages. Your may be contacted by a nurse or clinical provider about future appointments. Be sure we have all of the phone numbers to reach you. Please contact your provider's office if you are unable to make an appointment.     3. Watch for warning signs. Your doctor or nurse will give you detailed warning signs to watch for and when to call for assistance. These instructions may also include educational information about your condition. If you experience any of warning signs to your health, call your doctor.           [facility]  Unless otherwise directed by your provider, please   contact [facility], our nurse care line   that is available for 24/7 assistance.     [phone] (toll-free)     Registered nurses in the [facility]   provide: appointment scheduling, clinical advisement, health education, and other advisory services.                  ** Verify the list of medication(s) below is accurate and up to date. Carry this with you in case of  emergency. If your medications have changed, please notify your healthcare provider.             Medication List      CONTINUE taking these medications        Additional Info                      aspirin 81 MG EC tablet   Commonly known as:  ECOTRIN   Refills:  0   Dose:  81 mg    Instructions:  Take 1 tablet (81 mg total) by mouth once daily.     Begin Date    AM    Noon    PM    Bedtime       fish oil-omega-3 fatty acids 300-1,000 mg capsule   Refills:  0   Dose:  2 g    Instructions:  Take 2 g by mouth once daily.     Begin Date    AM    Noon    PM    Bedtime       hydrocortisone-pramoxine rectal foam   Commonly known as:  PROCTOFOAM-HS   Quantity:  10 g   Refills:  2   Dose:  1 applicator    Instructions:  Place 1 applicator rectally 2 (two) times daily.     Begin Date    AM    Noon    PM    Bedtime       metoprolol tartrate 25 MG tablet   Commonly known as:  LOPRESSOR   Quantity:  60 tablet   Refills:  3   Dose:  25 mg    Instructions:  Take 1 tablet (25 mg total) by mouth 2 (two) times daily.     Begin Date    AM    Noon    PM    Bedtime       multivitamin per tablet   Commonly known as:  THERAGRAN   Refills:  0   Dose:  1 tablet    Instructions:  Take 1 tablet by mouth once daily.     Begin Date    AM    Noon    PM    Bedtime         STOP taking these medications     APIXABAN ORAL                  Please bring to all follow up appointments:    1. A copy of your discharge instructions.  2. All medicines you are currently taking in their original bottles.  3. Identification and insurance card.    Please arrive 15 minutes ahead of scheduled appointment time.    Please call 24 hours in advance if you must reschedule your appointment and/or time.        Follow-up Information     Follow up with José Grey MD In 4 weeks.    Specialties:  Electrophysiology, Cardiovascular Disease    Why:  with EKG before    Contact information:    [address]  [facility] [address]  [phone]          Discharge  Instructions     Future Orders    Diet general     Questions:    Total calories:      Fat restriction, if any:      Protein restriction, if any:      Na restriction, if any:      Fluid restriction:      Additional restrictions:          Discharge Instructions       1. Do not strain or lift anything greater than 5 lb for 3 days.   2. Do not drive or operate any dangerous machinery for 24 hours.   3. Keep the dressing on, clean, and dry for 24 hours.   4. After 24 hours, the dressing may be removed and a shower is allowed.   5. Clean the area with mild soap and water and then cover it with a bandage.   6. Once the skin has healed, bathing in a tub or swimming is allowed.   7. Inspect the groin site daily and report to the physician any swelling at the site that   cannot be controlled with manual pressure for 10 minutes, unusual pain at the   access site or affected extremity, unusual swelling at the access site, or signs or   symptoms of infection such as redness, pain, or fever.   Call 911 if you have:   Bleeding from the puncture site that you cannot stop by doing the following:   Relax and lie down right away. Keep your leg flat and apply firm pressure to the site using your fingers and a gauze pad. Keep the pressure on for 20 minutes. Continue this until the bleeding stops. This may take awhile. When bleeding stops, cover the site with a sterile bandage and keep your leg still as much as possible.      Admission Information     Date & Time Provider Department CSN    4/27/2017  8:41 AM José Grey MD [facility] [id number]      Care Providers     Provider Role Specialty Primary office phone    José Grey MD Attending Provider Electrophysiology [phone]    José Grey MD Surgeon  Electrophysiology [phone]      Your Vitals Were     BP Pulse Temp Resp Height Weight    140/78 (BP Location: Left arm, Patient Position: Lying, BP Method: Automatic) 75 98.1 °F (36.7 °C) (Oral) 18 5'  "11" (1.803 m) 127 kg (280 lb)    SpO2 BMI             97% 39.05 kg/m2         Recent Lab Values        3/9/2016                           8:35 AM           A1C 6.0                       Allergies as of 4/28/2017     No Known Allergies      Advance Directives     An advance directive is a document which, in the event you are no longer able to make decisions for yourself, tells your healthcare team what kind of treatment you do or do not want to receive, or who you would like to make those decisions for you.  If you do not currently have an advance directive, [facility] encourages you to create one.  For more information call:  [phone]), [phone] ([phone]),  or log on to [email or web address].        Smoking Cessation     If you would like to quit smoking:   You may be eligible for free services if you are a [place] resident and started smoking cigarettes before September 1, 1988.  Call the Smoking Cessation Trust ([facility]) toll free at [phone] [phone] or [phone] [phone].   Call -878-QUIT-NOW if you do not meet the above criteria.   Contact us via email: [email or web address]   View our website for more information: [email or web address]        Language Assistance Services     ATTENTION: Language assistance services are available, free of charge. Please call [phone].      ATENCIÓN: Si habla español, tiene a cam disposición servicios gratuitos de asistencia lingüística. Llame al [phone].     CHÚ Ý: N?u b?n nói Ti?ng Vi?t, có các d?ch v? h? tr? ngôn ng? mi?n phí dành cho b?n. G?i s? [phone].         [facility] complies with applicable Federal civil rights laws and does not discriminate on the basis of race, color, national origin, age, disability, or sex.        "

## 2017-04-28 VITALS
HEIGHT: 71 IN | TEMPERATURE: 98 F | BODY MASS INDEX: 39.2 KG/M2 | RESPIRATION RATE: 18 BRPM | HEART RATE: 75 BPM | WEIGHT: 280 LBS | SYSTOLIC BLOOD PRESSURE: 140 MMHG | OXYGEN SATURATION: 97 % | DIASTOLIC BLOOD PRESSURE: 78 MMHG

## 2017-04-28 NOTE — PROGRESS NOTES
Pt is AAOx3 and in no apparent distress.  Bilateral groin sites c/d/i and soft.  Provided a copy of discharge instructions.  Teaching performed.  Pt verbalized understanding and denied any questions.  Provided pt with instructions on medication changes.  PIV d/c catheter tip intact.  2x2 applied and no active bleeding noted.  Pt refused wheelchair and is walking out with wife for discharge home.

## 2017-04-28 NOTE — DISCHARGE SUMMARY
[facility]  Short Stay  Discharge Summary    Admit Date: 4/27/2017    Discharge Date and Time:  04/28/2017 7:38 AM      Discharge Attending Physician: José Grey MD     Hospital Course Mr. Watkins is a 61-year old male with no significant past medical history who presented to the [facility] ED with a 24 hour history of palpitations associated with shortness of breath, nausea, and vomiting. He was found to be in typical atrial flutter with RVR.  He converted with dilt x 1, and presented yesterday in sinus.  He had successful CTI RFA and was discharged home this morning.  No anti-coagulation post op given low CHADS, AFL x 1, and sinus rhythm yesterday - discharged on ASA.  Resting tachycardia in 90s thus discharged on metoprolol low dose for now. Asked to schedule sleep study.    Final Diagnoses:    Principal Problem: atrial flutter   Secondary Diagnoses:   Active Hospital Problems    Diagnosis  POA    Atrial flutter [I48.92]  Yes      Resolved Hospital Problems    Diagnosis Date Resolved POA   No resolved problems to display.       Discharged Condition: good    Disposition: Home or Self Care    Follow up/Patient Instructions:    Medications:  Reconciled Home Medications:   Current Discharge Medication List      CONTINUE these medications which have NOT CHANGED    Details   fish oil-omega-3 fatty acids 300-1,000 mg capsule Take 2 g by mouth once daily.      metoprolol tartrate (LOPRESSOR) 25 MG tablet Take 1 tablet (25 mg total) by mouth 2 (two) times daily.  Qty: 60 tablet, Refills: 3      multivitamin (THERAGRAN) per tablet Take 1 tablet by mouth once daily.      aspirin (ECOTRIN) 81 MG EC tablet Take 1 tablet (81 mg total) by mouth once daily.      hydrocortisone-pramoxine (PROCTOFOAM-HS) rectal foam Place 1 applicator rectally 2 (two) times daily.  Qty: 10 g, Refills: 2    Associated Diagnoses: Rectal bleeding         STOP taking these medications       APIXABAN ORAL Comments:   Reason for  Stopping:               Discharge Procedure Orders  Diet general       Follow-up Information     Follow up with José Grye MD In 4 weeks.    Specialties:  Electrophysiology, Cardiovascular Disease    Why:  with EKG before    Contact information:    [address]  [facility] [id number] [phone]

## 2017-04-28 NOTE — DISCHARGE INSTRUCTIONS
1. Do not strain or lift anything greater than 5 lb for 3 days.   2. Do not drive or operate any dangerous machinery for 24 hours.   3. Keep the dressing on, clean, and dry for 24 hours.   4. After 24 hours, the dressing may be removed and a shower is allowed.   5. Clean the area with mild soap and water and then cover it with a bandage.   6. Once the skin has healed, bathing in a tub or swimming is allowed.   7. Inspect the groin site daily and report to the physician any swelling at the site that   cannot be controlled with manual pressure for 10 minutes, unusual pain at the   access site or affected extremity, unusual swelling at the access site, or signs or   symptoms of infection such as redness, pain, or fever.   Call 911 if you have:   Bleeding from the puncture site that you cannot stop by doing the following:   Relax and lie down right away. Keep your leg flat and apply firm pressure to the site using your fingers and a gauze pad. Keep the pressure on for 20 minutes. Continue this until the bleeding stops. This may take awhile. When bleeding stops, cover the site with a sterile bandage and keep your leg still as much as possible.

## 2017-06-10 ENCOUNTER — TELEPHONE (OUTPATIENT)
Dept: ELECTROPHYSIOLOGY | Facility: CLINIC | Age: 62
End: 2017-06-10

## 2017-06-12 ENCOUNTER — LAB VISIT (OUTPATIENT)
Dept: LAB | Facility: HOSPITAL | Age: 62
End: 2017-06-12
Attending: INTERNAL MEDICINE
Payer: COMMERCIAL

## 2017-06-12 ENCOUNTER — OFFICE VISIT (OUTPATIENT)
Dept: ELECTROPHYSIOLOGY | Facility: CLINIC | Age: 62
End: 2017-06-12
Payer: COMMERCIAL

## 2017-06-12 ENCOUNTER — HOSPITAL ENCOUNTER (OUTPATIENT)
Dept: CARDIOLOGY | Facility: CLINIC | Age: 62
Discharge: HOME | End: 2017-06-12
Payer: COMMERCIAL

## 2017-06-12 VITALS
HEART RATE: 140 BPM | DIASTOLIC BLOOD PRESSURE: 78 MMHG | SYSTOLIC BLOOD PRESSURE: 124 MMHG | WEIGHT: 273.56 LBS | HEIGHT: 71 IN | BODY MASS INDEX: 38.3 KG/M2

## 2017-06-12 DIAGNOSIS — I48.91 ATRIAL FIBRILLATION WITH RVR: ICD-10-CM

## 2017-06-12 DIAGNOSIS — Z98.890 STATUS POST CATHETER ABLATION OF ATRIAL FLUTTER: ICD-10-CM

## 2017-06-12 DIAGNOSIS — I48.3 TYPICAL ATRIAL FLUTTER: Primary | ICD-10-CM

## 2017-06-12 DIAGNOSIS — I48.91 ATRIAL FIBRILLATION WITH RVR: Primary | ICD-10-CM

## 2017-06-12 DIAGNOSIS — I48.91 ATRIAL FIBRILLATION, UNSPECIFIED TYPE: ICD-10-CM

## 2017-06-12 LAB
ANION GAP SERPL CALC-SCNC: 10 MMOL/L
APTT BLDCRRT: 22.8 SEC
BASOPHILS # BLD AUTO: 0.03 K/UL
BASOPHILS NFR BLD: 0.2 %
BUN SERPL-MCNC: 14 MG/DL
CALCIUM SERPL-MCNC: 10.1 MG/DL
CHLORIDE SERPL-SCNC: 103 MMOL/L
CO2 SERPL-SCNC: 28 MMOL/L
CREAT SERPL-MCNC: 1 MG/DL
DIFFERENTIAL METHOD: ABNORMAL
EOSINOPHIL # BLD AUTO: 0.3 K/UL
EOSINOPHIL NFR BLD: 2.7 %
ERYTHROCYTE [DISTWIDTH] IN BLOOD BY AUTOMATED COUNT: 13.3 %
EST. GFR  (AFRICAN AMERICAN): >60 ML/MIN/1.73 M^2
EST. GFR  (NON AFRICAN AMERICAN): >60 ML/MIN/1.73 M^2
GLUCOSE SERPL-MCNC: 101 MG/DL
HCT VFR BLD AUTO: 46.9 %
HGB BLD-MCNC: 15.6 G/DL
INR PPP: 0.9
LYMPHOCYTES # BLD AUTO: 3.2 K/UL
LYMPHOCYTES NFR BLD: 25.2 %
MCH RBC QN AUTO: 30.1 PG
MCHC RBC AUTO-ENTMCNC: 33.3 %
MCV RBC AUTO: 90 FL
MONOCYTES # BLD AUTO: 0.9 K/UL
MONOCYTES NFR BLD: 7.4 %
NEUTROPHILS # BLD AUTO: 8.1 K/UL
NEUTROPHILS NFR BLD: 64.2 %
PLATELET # BLD AUTO: 251 K/UL
PMV BLD AUTO: 11 FL
POTASSIUM SERPL-SCNC: 4.4 MMOL/L
PROTHROMBIN TIME: 10.1 SEC
RBC # BLD AUTO: 5.19 M/UL
SODIUM SERPL-SCNC: 141 MMOL/L
WBC # BLD AUTO: 12.64 K/UL

## 2017-06-12 PROCEDURE — 99215 OFFICE O/P EST HI 40 MIN: CPT | Mod: S$GLB,,, | Performed by: NURSE PRACTITIONER

## 2017-06-12 PROCEDURE — 36415 COLL VENOUS BLD VENIPUNCTURE: CPT

## 2017-06-12 PROCEDURE — 80048 BASIC METABOLIC PNL TOTAL CA: CPT

## 2017-06-12 PROCEDURE — 85730 THROMBOPLASTIN TIME PARTIAL: CPT

## 2017-06-12 PROCEDURE — 93000 ELECTROCARDIOGRAM COMPLETE: CPT | Mod: S$GLB,,, | Performed by: INTERNAL MEDICINE

## 2017-06-12 PROCEDURE — 85610 PROTHROMBIN TIME: CPT

## 2017-06-12 PROCEDURE — 99999 PR PBB SHADOW E&M-EST. PATIENT-LVL III: CPT | Mod: PBBFAC,,, | Performed by: NURSE PRACTITIONER

## 2017-06-12 PROCEDURE — 85025 COMPLETE CBC W/AUTO DIFF WBC: CPT

## 2017-06-12 RX ORDER — SOTALOL HYDROCHLORIDE 80 MG/1
40 TABLET ORAL 2 TIMES DAILY
Qty: 30 TABLET | Refills: 11 | Status: ON HOLD | OUTPATIENT
Start: 2017-06-13 | End: 2017-06-15

## 2017-06-12 NOTE — PROGRESS NOTES
CARDIOVERSION EDUCATION CHECK LIST    6-12-17  PRE - PROCEDURE LABS HAVE BEEN ORDERED FOR YOU @ [facility]  BE SURE TO ARRIVE AT YOUR SCHEDULED TIME FOR THIS LAB WORK!   (YOU DO NOT HAVE TO FAST FOR THIS LABWORK!!!!)     6-15-17 @ 9 AM  REPORT TO CARDIOLOGY WAITING ROOM ON 3RD FLOOR OF HOSPITAL (DO NOT REPORT TO CLINIC)  Directions for Reporting to Cardiology Waiting Area in the Hospital  If you park in the Parking Garage:  Take elevators to the 2nd floor  Walk up ramp and turn right by Gold Elevators  Take elevator to the 3rd floor  Upon exiting the elevator, turn away from the clinic areas  Walk long pearson around to front of hospital to area with windows overlooking [facility]  Check in at Reception Desk  OR  If family is dropping you off:  Have them drop you off at the front of the Hospital  (Near the ER, where all the flags are hung).  Take the E elevators to the 3rd floor.  Check in at the Reception Desk in the waiting room.      DO NOT EAT OR DRINK ANYTHING AFTER: 12 MN ON THE NIGHT BEFORE YOUR PROCEDURE    MEDICATIONS:    YOU MAY TAKE YOUR USUAL MORNING MEDICATIONS WITH A SIP OF WATER    YOU WILL BE GOING HOME THE SAME DAY AS YOUR PROCEDURE  YOU WILL NEED SOMEONE TO DRIVE YOU HOME AFTER YOUR PROCEDURE     YOUR PAIN DURING YOUR PROCEDURE WILL BE MANAGED BY THE ANESTHESIA TEAM      THE ABOVE INSTRUCTIONS WERE GIVEN TO THE PATIENT VERBALLY AND THEY VERBALIZED UNDERSTANDING. THEY DO NOT REQUIRE ANY SPECIAL NEEDS AND DO NOT HAVE ANY LEARNING BARRIERS.     Any need to reschedule or cancel procedures, or any questions regarding your procedure should be addressed directly with the Arrhythmia Department Nurses at the following phone number: [phone]

## 2017-06-12 NOTE — PROGRESS NOTES
Subjective:    Patient ID:  Marcus Watkins is a 61 y.o. male who presents for follow-up of Atrial Flutter    Marcus Watkins is a patient of Dr. Grey.    HPI     I had the pleasure of seeing Marcus Watkins in follow-up today for his history of atrial arrhythmias. He is a 61-year old male with no significant past medical history who presented to the [facility] ED with a 24 hour history of palpitations associated with shortness of breath, nausea, and vomiting. He was found to be in Typical AFL w/RVR, and was administered Diltiazem which converted him to AF. He then spontaneously reverted to sinus rhythm. He was discharged on a 30-day course of Eliquis, as well as Metoprolol. Mr. Watkins underwent a successful EPS/CTI RFA (04/27/17); EPS revealed bidirectional block persisting over a 20-minute waiting period s/p CTI RFA, no evidence of dual AV node physiology or a concealed accessory pathway, and normal a HV interval.    Since the procedure, Mr. Watkins reports feeling well; he denies chest pain, SOB/DALY, dizziness, palpitations, or syncope. He reports that his energy level remains stable.     Recent cardiac studies:   48-Hour Holter Monitor (03/13/17) revealed SR w/no arrhythmias seen.   Echo (02/13/17) revealed an EF of 55-60%, mild LAE, and no significant valvular disease.    I reviewed today's ECG which demonstrated AF w/RVR w/PVCs at 140 bpm; QRS 74, and QTc 461.    Review of Systems   Constitution: Negative for diaphoresis and malaise/fatigue.   HENT: Negative for headaches and nosebleeds.    Eyes: Negative for double vision.   Cardiovascular: Negative for chest pain, dyspnea on exertion, irregular heartbeat, near-syncope, palpitations and syncope.   Respiratory: Negative for shortness of breath.    Skin: Negative.    Musculoskeletal: Negative.    Gastrointestinal: Negative for abdominal pain, hematemesis and hematochezia.   Genitourinary: Negative for hematuria.   Neurological: Negative for dizziness and light-headedness.    Psychiatric/Behavioral: Negative for altered mental status.        Objective:    Physical Exam   Constitutional: He is oriented to person, place, and time. He appears well-developed and well-nourished.   HENT:   Head: Normocephalic and atraumatic.   Eyes: Pupils are equal, round, and reactive to light.   Neck: Normal range of motion.   Cardiovascular: Normal heart sounds and intact distal pulses.  An irregular rhythm present. Tachycardia present.    Pulmonary/Chest: Effort normal and breath sounds normal.   Musculoskeletal: Normal range of motion.   Neurological: He is alert and oriented to person, place, and time.   Vitals reviewed.        Assessment:       1. Typical atrial flutter    2. Status post catheter ablation of atrial flutter    3. Atrial fibrillation with RVR         Plan:       In summary, Mr. Watkins is a 61 y.o. male with typical AFL s/p recent CTI RFA. Mr. Watkins presents to EP clinic today in asymptomatic AF w/RVR at 140 bpm; he has been on Lopressor and ASA w/a [id number] of 0.     I have discussed the risks, benefits, and alternatives of a LOPEZ/DCCV in detail with  Marcus Watkins. Mr. Watkins verbalized understanding and all questions were answered. He wishes to proceed.    Start Eliquis 5 mg PO BID tonight (06/13/17).   Start Sotalol 80 mg PO Q 12H tomorrow (06/14/17).   Schedule Mr. Watkins for a LOPEZ/DCCV this Thursday (06/15/17).   Follow up in EP clinic as scheduled following the procedure, sooner as needed.     Alana Landeros, MN, APRN, FNP-C         Case discussed in detail with Dr. Grey who agrees with the aforementioned plan. (A copy of today's note was sent to Dr. Grey.)

## 2017-06-12 NOTE — PATIENT INSTRUCTIONS
Today 06/12/17:  Start Eliquis 5 mg, 1 tablet every 12 Hours.       Tomorrow (morning) 06/13/17:  Start Sotalol 80 mg, 1 tablet every 12 Hours.

## 2017-06-13 ENCOUNTER — TELEPHONE (OUTPATIENT)
Dept: CARDIOLOGY | Facility: CLINIC | Age: 62
End: 2017-06-13

## 2017-06-13 PROBLEM — Z98.890 STATUS POST CATHETER ABLATION OF ATRIAL FLUTTER: Status: ACTIVE | Noted: 2017-06-13

## 2017-06-13 NOTE — TELEPHONE ENCOUNTER
Patient called about LOPEZ scheduled on 6/15/17 . Pre-procedural questions asked.  Denies swallowing issues and esophageal issues. Denies previous sedation/anesthesia problems. States does not  have sleep apnea. Has dentures.Denies recent trauma/surgery/radiation therapy to head/neck/airway. States is able to move neck without difficulty. LOPEZ described to patient. Instructed NPO past midnight and to have a designated  to drive patient home after the procedures due to sedation being given. Instructed to report to  [facility] at 0900 am.   Verbalizes understanding. Questions answered.

## 2017-06-15 ENCOUNTER — HOSPITAL ENCOUNTER (OUTPATIENT)
Facility: HOSPITAL | Age: 62
Discharge: HOME | End: 2017-06-15
Attending: INTERNAL MEDICINE | Admitting: INTERNAL MEDICINE
Payer: COMMERCIAL

## 2017-06-15 VITALS
SYSTOLIC BLOOD PRESSURE: 133 MMHG | RESPIRATION RATE: 20 BRPM | HEART RATE: 59 BPM | OXYGEN SATURATION: 96 % | TEMPERATURE: 99 F | DIASTOLIC BLOOD PRESSURE: 77 MMHG

## 2017-06-15 DIAGNOSIS — I48.91 ATRIAL FIBRILLATION WITH RVR: Primary | ICD-10-CM

## 2017-06-15 DIAGNOSIS — I48.92 ATRIAL FLUTTER: ICD-10-CM

## 2017-06-15 PROCEDURE — 93010 ELECTROCARDIOGRAM REPORT: CPT | Mod: S$GLB,,, | Performed by: INTERNAL MEDICINE

## 2017-06-15 PROCEDURE — 93005 ELECTROCARDIOGRAM TRACING: CPT

## 2017-06-15 RX ORDER — SOTALOL HYDROCHLORIDE 80 MG/1
80 TABLET ORAL 2 TIMES DAILY
Qty: 60 TABLET | Refills: 11 | Status: SHIPPED | OUTPATIENT
Start: 2017-06-15 | End: 2018-01-19 | Stop reason: SDUPTHER

## 2017-06-15 RX ORDER — METOPROLOL TARTRATE 25 MG/1
12.5 TABLET, FILM COATED ORAL 2 TIMES DAILY
Qty: 60 TABLET | Refills: 3 | Status: SHIPPED | OUTPATIENT
Start: 2017-06-15 | End: 2018-01-19 | Stop reason: SDUPTHER

## 2017-06-15 NOTE — DISCHARGE SUMMARY
[facility]  Cardiology  Discharge Summary      Patient Name: Marcus Watkins  MRN: [id number]  Admission Date: 6/15/2017  Hospital Length of Stay: 0 days  Discharge Date and Time:  06/15/2017 8:24 AM  Attending Physician: José Grey MD  Discharging Provider: Stoney Hogue NP  Primary Care Physician: Radha Brunson MD    HPI: 61 year old male with typical AFL s/p recent CTI RFA 04/2017, followed up in EP clinic noted to be in    asymptomatic AF w/RVR at 140 bpm; Pt started on eliquis, and sotalol     Hospital Course:  Admission EKG revealed Sinus bradycardia at 59 BPM,  ms  > hence LOPEZ/ DCCV cancelled.  Plan to increase sotalol to 80 mg BID , SCr normal,  and given bradycardia will cut metoprolol to 12.5 mg BID.    Pt to follow up with MD José Grey in 4 weeks     Discharge plans/instructions discussed with patient and spouse  who verbalized understanding  and agreement of plans of care.       Final Active Diagnoses:    Diagnosis Date Noted POA    PRINCIPAL PROBLEM:  Atrial fibrillation with RVR [I48.91] 06/12/2017 Yes    Atrial flutter [I48.92] 04/27/2017 Yes      Problems Resolved During this Admission:    Diagnosis Date Noted Date Resolved POA       Discharged Condition: good    Follow Up:  Follow-up Information     José Grey MD In 4 weeks.    Specialties:  Electrophysiology, Cardiovascular Disease  Why:  with EKG   Contact information:  [address] [place] [address] [id number] [phone]                 Patient Instructions:     Diet general   Order Specific Question Answer Comments   Additional restrictions: Cardiac (Low Na/Chol)      Activity as tolerated     Call MD for:  temperature >100.4     Call MD for:  persistent nausea and vomiting or diarrhea     Call MD for:  severe uncontrolled pain     Call MD for:  difficulty breathing or increased cough     Call MD for:  severe persistent headache     Call MD for:  worsening rash     Call MD for:  persistent  dizziness, light-headedness, or visual disturbances     Call MD for:  increased confusion or weakness     Call MD for:   Scheduling Instructions: For any concerning medical symptoms       Medications:  Reconciled Home Medications:   Current Discharge Medication List      CONTINUE these medications which have CHANGED    Details   metoprolol tartrate (LOPRESSOR) 25 MG tablet Take 0.5 tablets (12.5 mg total) by mouth 2 (two) times daily.  Qty: 60 tablet, Refills: 3      sotalol (BETAPACE) 80 MG tablet Take 1 tablet (80 mg total) by mouth 2 (two) times daily.  Qty: 60 tablet, Refills: 11    Associated Diagnoses: Atrial fibrillation with RVR         CONTINUE these medications which have NOT CHANGED    Details   apixaban (ELIQUIS) 5 mg Tab Take 1 tablet (5 mg total) by mouth 2 (two) times daily.  Qty: 60 tablet, Refills: 11    Associated Diagnoses: Atrial fibrillation with RVR      aspirin (ECOTRIN) 81 MG EC tablet Take 1 tablet (81 mg total) by mouth once daily.      fish oil-omega-3 fatty acids 300-1,000 mg capsule Take 2 g by mouth once daily.      multivitamin (THERAGRAN) per tablet Take 1 tablet by mouth once daily.      hydrocortisone-pramoxine (PROCTOFOAM-HS) rectal foam Place 1 applicator rectally 2 (two) times daily.  Qty: 10 g, Refills: 2    Associated Diagnoses: Rectal bleeding             ADELA Starkey-BC  Cardiology Electrophysiology  NP   [facility]    Attending: MD Matilda Kumar

## 2017-06-15 NOTE — H&P (VIEW-ONLY)
Subjective:    Patient ID:  Marcus Watkins is a 61 y.o. male who presents for follow-up of Atrial Flutter    Marcus Watkins is a patient of Dr. Grey.    HPI     I had the pleasure of seeing Marcus Watkins in follow-up today for his history of atrial arrhythmias. He is a 61-year old male with no significant past medical history who presented to the [facility] ED with a 24 hour history of palpitations associated with shortness of breath, nausea, and vomiting. He was found to be in Typical AFL w/RVR, and was administered Diltiazem which converted him to AF. He then spontaneously reverted to sinus rhythm. He was discharged on a 30-day course of Eliquis, as well as Metoprolol. Mr. Watkins underwent a successful EPS/CTI RFA (04/27/17); EPS revealed bidirectional block persisting over a 20-minute waiting period s/p CTI RFA, no evidence of dual AV node physiology or a concealed accessory pathway, and normal a HV interval.    Since the procedure, Mr. Watkins reports feeling well; he denies chest pain, SOB/DALY, dizziness, palpitations, or syncope. He reports that his energy level remains stable.     Recent cardiac studies:   48-Hour Holter Monitor (03/13/17) revealed SR w/no arrhythmias seen.   Echo (02/13/17) revealed an EF of 55-60%, mild LAE, and no significant valvular disease.    I reviewed today's ECG which demonstrated AF w/RVR w/PVCs at 140 bpm; QRS 74, and QTc 461.    Review of Systems   Constitution: Negative for diaphoresis and malaise/fatigue.   HENT: Negative for headaches and nosebleeds.    Eyes: Negative for double vision.   Cardiovascular: Negative for chest pain, dyspnea on exertion, irregular heartbeat, near-syncope, palpitations and syncope.   Respiratory: Negative for shortness of breath.    Skin: Negative.    Musculoskeletal: Negative.    Gastrointestinal: Negative for abdominal pain, hematemesis and hematochezia.   Genitourinary: Negative for hematuria.   Neurological: Negative for dizziness and light-headedness.    Psychiatric/Behavioral: Negative for altered mental status.        Objective:    Physical Exam   Constitutional: He is oriented to person, place, and time. He appears well-developed and well-nourished.   HENT:   Head: Normocephalic and atraumatic.   Eyes: Pupils are equal, round, and reactive to light.   Neck: Normal range of motion.   Cardiovascular: Normal heart sounds and intact distal pulses.  An irregular rhythm present. Tachycardia present.    Pulmonary/Chest: Effort normal and breath sounds normal.   Musculoskeletal: Normal range of motion.   Neurological: He is alert and oriented to person, place, and time.   Vitals reviewed.        Assessment:       1. Typical atrial flutter    2. Status post catheter ablation of atrial flutter    3. Atrial fibrillation with RVR         Plan:       In summary, Mr. Watkins is a 61 y.o. male with typical AFL s/p recent CTI RFA. Mr. Watkins presents to EP clinic today in asymptomatic AF w/RVR at 140 bpm; he has been on Lopressor and ASA w/a [id number] of 0.     I have discussed the risks, benefits, and alternatives of a LOPEZ/DCCV in detail with  Marcus Watkins. Mr. Watkins verbalized understanding and all questions were answered. He wishes to proceed.    Start Eliquis 5 mg PO BID tonight (06/13/17).   Start Sotalol 80 mg PO Q 12H tomorrow (06/14/17).   Schedule Mr. Watkins for a LOPEZ/DCCV this Thursday (06/15/17).   Follow up in EP clinic as scheduled following the procedure, sooner as needed.     Alana Landeros, MN, APRN, FNP-C         Case discussed in detail with Dr. Grey who agrees with the aforementioned plan. (A copy of today's note was sent to Dr. Grey.)

## 2017-06-15 NOTE — INTERVAL H&P NOTE
The patient has been examined and the H&P has been reviewed:    I concur with the findings and changes have been noted since the H&P was written:        Hospital Course:  Admission EKG revealed Sinus bradycardia at 59 BPM,  ms  > hence LOPEZ/ DCCV cancelled.  Plan to increase sotalol to 80 mg BID , SCr normal,  and given bradycardia will cut metoprolol to 12.5 mg BID.    Pt to follow up with MD José Grey in 4 weeks     ADELA Starkey-BC  Cardiology Electrophysiology  NP   [facility]    Attending:  MD José Grey               Active Hospital Problems    Diagnosis  POA    *Atrial fibrillation with RVR [I48.91]  Yes    Atrial flutter [I48.92]  Yes      Resolved Hospital Problems    Diagnosis Date Resolved POA   No resolved problems to display.

## 2018-01-19 DIAGNOSIS — I48.91 ATRIAL FIBRILLATION WITH RVR: ICD-10-CM

## 2018-01-19 RX ORDER — METOPROLOL TARTRATE 25 MG/1
12.5 TABLET, FILM COATED ORAL 2 TIMES DAILY
Qty: 90 TABLET | Refills: 3 | Status: ON HOLD | OUTPATIENT
Start: 2018-01-19 | End: 2018-11-29 | Stop reason: HOSPADM

## 2018-01-19 RX ORDER — SOTALOL HYDROCHLORIDE 80 MG/1
80 TABLET ORAL 2 TIMES DAILY
Qty: 180 TABLET | Refills: 3 | Status: ON HOLD | OUTPATIENT
Start: 2018-01-19 | End: 2018-10-29 | Stop reason: HOSPADM

## 2018-03-06 ENCOUNTER — TELEPHONE (OUTPATIENT)
Dept: FAMILY MEDICINE | Facility: CLINIC | Age: 63
End: 2018-03-06

## 2018-03-06 NOTE — TELEPHONE ENCOUNTER
----- Message from Kait Blankenship sent at 3/6/2018  1:57 PM CST -----  Contact: pt [phone]  Patient just scheduled an appointment on 07/02/2018 for his physical and 06/27/2018 for his fasting lab. Patient would like to know if he can still take his vitamins and also his blood thinners while fasting and before he come in for his physical. Please advise pt

## 2018-03-06 NOTE — TELEPHONE ENCOUNTER
----- Message from Kait Blankenship sent at 3/6/2018  1:43 PM CST -----  Contact: pt [phone]  Patient is calling in regards to the nutrisystem diet that he's been on for about a month and he was told that if he take blood thinners at the same time that it may effect him. Patient would like to know if he can take blood thinners while being on this diet. Please advise pt

## 2018-04-09 ENCOUNTER — CLINICAL SUPPORT (OUTPATIENT)
Dept: CARDIOLOGY | Facility: CLINIC | Age: 63
End: 2018-04-09
Attending: FAMILY MEDICINE
Payer: COMMERCIAL

## 2018-04-09 ENCOUNTER — OFFICE VISIT (OUTPATIENT)
Dept: FAMILY MEDICINE | Facility: CLINIC | Age: 63
End: 2018-04-09
Payer: COMMERCIAL

## 2018-04-09 VITALS
RESPIRATION RATE: 16 BRPM | SYSTOLIC BLOOD PRESSURE: 113 MMHG | BODY MASS INDEX: 37.19 KG/M2 | TEMPERATURE: 99 F | HEIGHT: 71 IN | DIASTOLIC BLOOD PRESSURE: 70 MMHG | WEIGHT: 265.63 LBS

## 2018-04-09 DIAGNOSIS — R20.0 NUMBNESS: ICD-10-CM

## 2018-04-09 DIAGNOSIS — I34.9 NONRHEUMATIC MITRAL VALVE DISORDER: ICD-10-CM

## 2018-04-09 DIAGNOSIS — R20.0 NUMBNESS: Primary | ICD-10-CM

## 2018-04-09 LAB
DIASTOLIC DYSFUNCTION: YES
ESTIMATED PA SYSTOLIC PRESSURE: 34.36
GLUCOSE SERPL-MCNC: 80 MG/DL (ref 70–110)
MITRAL VALVE REGURGITATION: ABNORMAL
RETIRED EF AND QEF - SEE NOTES: 55 (ref 55–65)
TRICUSPID VALVE REGURGITATION: ABNORMAL

## 2018-04-09 PROCEDURE — 93306 TTE W/DOPPLER COMPLETE: CPT | Mod: S$GLB,,, | Performed by: INTERNAL MEDICINE

## 2018-04-09 PROCEDURE — 93925 LOWER EXTREMITY STUDY: CPT | Mod: S$GLB,,, | Performed by: INTERNAL MEDICINE

## 2018-04-09 PROCEDURE — 99999 PR PBB SHADOW E&M-EST. PATIENT-LVL III: CPT | Mod: PBBFAC,,, | Performed by: FAMILY MEDICINE

## 2018-04-09 PROCEDURE — 99214 OFFICE O/P EST MOD 30 MIN: CPT | Mod: S$GLB,,, | Performed by: FAMILY MEDICINE

## 2018-04-09 PROCEDURE — 82948 REAGENT STRIP/BLOOD GLUCOSE: CPT | Mod: S$GLB,,, | Performed by: FAMILY MEDICINE

## 2018-04-09 NOTE — PROGRESS NOTES
Subjective:       Patient ID: Marcus Watkins is a 62 y.o. male.    Chief Complaint: Numbness (bilateral, toes, discoloration, big toe, right foot)  Pt has notice numbness in all of his toes that has progressed over the last few months.  He has noted some discoloration of a few of his toes over the last week or so.  No hx of trauma, remote or recent.  HPIsee above  Review of Systems   Constitutional: Negative.    HENT: Negative.    Eyes: Negative.    Respiratory: Negative.    Cardiovascular: Negative.    Gastrointestinal: Negative.    Endocrine: Negative.    Genitourinary: Negative.    Musculoskeletal: Negative.    Skin: Positive for color change.   Allergic/Immunologic: Negative.    Neurological: Positive for numbness.   Hematological: Negative.         Two purple toes   Psychiatric/Behavioral: Negative.        Objective:      Physical Exam   Constitutional: He is oriented to person, place, and time. He appears well-developed and well-nourished. No distress.   HENT:   Head: Normocephalic and atraumatic.   Right Ear: External ear normal.   Left Ear: External ear normal.   Nose: Nose normal.   Mouth/Throat: Oropharynx is clear and moist.   Eyes: Conjunctivae and EOM are normal. Pupils are equal, round, and reactive to light.   Neck: Normal range of motion. Neck supple. No JVD present.   Cardiovascular: Normal rate, regular rhythm and normal heart sounds.    Pulmonary/Chest: Effort normal and breath sounds normal.   Abdominal: Soft. Bowel sounds are normal. He exhibits no distension.   Musculoskeletal:        Feet:    Neurological: He is alert and oriented to person, place, and time. He displays normal reflexes. A sensory deficit is present. No cranial nerve deficit. He exhibits normal muscle tone. Coordination normal.   Skin: He is not diaphoretic.   Nursing note and vitals reviewed.      Assessment:       1. Numbness     2.     Purple toes  Plan:     will contact pt with results of testing   2D Echo w/ Color Flow  Doppler         Cardiology Lab US Lower Extremity Arteries Bilateral         POCT glucose 80 mg/dl

## 2018-04-11 ENCOUNTER — TELEPHONE (OUTPATIENT)
Dept: FAMILY MEDICINE | Facility: CLINIC | Age: 63
End: 2018-04-11

## 2018-04-11 DIAGNOSIS — R20.0 NUMBNESS: ICD-10-CM

## 2018-04-11 DIAGNOSIS — R23.0 BLUE TOES: Primary | ICD-10-CM

## 2018-04-11 NOTE — TELEPHONE ENCOUNTER
Called pt regarding tests results will have someone call him regarding an appt with vascular surgery

## 2018-04-13 DIAGNOSIS — I83.93 VARICOSE VEINS OF BOTH LOWER EXTREMITIES: Primary | ICD-10-CM

## 2018-04-20 ENCOUNTER — HOSPITAL ENCOUNTER (OUTPATIENT)
Dept: VASCULAR SURGERY | Facility: CLINIC | Age: 63
Discharge: HOME | End: 2018-04-20
Attending: SURGERY
Payer: COMMERCIAL

## 2018-04-20 ENCOUNTER — INITIAL CONSULT (OUTPATIENT)
Dept: VASCULAR SURGERY | Facility: CLINIC | Age: 63
End: 2018-04-20
Attending: SURGERY
Payer: COMMERCIAL

## 2018-04-20 VITALS
DIASTOLIC BLOOD PRESSURE: 82 MMHG | TEMPERATURE: 98 F | HEART RATE: 67 BPM | WEIGHT: 260.13 LBS | SYSTOLIC BLOOD PRESSURE: 135 MMHG | BODY MASS INDEX: 36.42 KG/M2 | HEIGHT: 71 IN

## 2018-04-20 DIAGNOSIS — S90.212A SUBUNGUAL HEMATOMA OF GREAT TOE OF LEFT FOOT, INITIAL ENCOUNTER: Primary | ICD-10-CM

## 2018-04-20 DIAGNOSIS — I83.93 VARICOSE VEINS OF BOTH LOWER EXTREMITIES: ICD-10-CM

## 2018-04-20 DIAGNOSIS — R20.0 NUMBNESS OF FEET: ICD-10-CM

## 2018-04-20 PROCEDURE — 99244 OFF/OP CNSLTJ NEW/EST MOD 40: CPT | Mod: S$GLB,,, | Performed by: SURGERY

## 2018-04-20 PROCEDURE — 99999 PR PBB SHADOW E&M-EST. PATIENT-LVL III: CPT | Mod: PBBFAC,,, | Performed by: SURGERY

## 2018-04-20 PROCEDURE — 93970 EXTREMITY STUDY: CPT | Mod: S$GLB,,, | Performed by: SURGERY

## 2018-04-20 NOTE — LETTER
April 23, 2018      Radha Brunson MD  [address] [facility]  [address]  [facility] [address]           [facility] - Vascular Surgery  [address]  [place] [address]  Phone: [phone]  Fax: [phone]          Patient: Marcus Watkins   MR Number: [id number]   YOB: 1955   Date of Visit: 4/20/2018       Dear Dr. Radha Brunson:    Thank you for referring Marcus Watkins to me for evaluation. Attached you will find relevant portions of my assessment and plan of care.    If you have questions, please do not hesitate to call me. I look forward to following Marcus Watkins along with you.    Sincerely,        Enclosure  CC:  No Recipients    If you would like to receive this communication electronically, please contact [email or web address] or [phone] to request more information on [employer] access.    For providers and/or their staff who would like to refer a patient to [facility], please contact us through our one-stop-shop provider referral line, Isma Lynch, at [phone].    If you feel you have received this communication in error or would no longer like to receive these types of communications, please e-mail [email or web address]

## 2018-04-20 NOTE — PROGRESS NOTES
Marcus Watkins  04/20/2018    HPI:  Mr Watkins is a pleasant 62 y.o. male with a h/o atrial fibrillation who is otherwise healthy who is here today for evaluation of numbness and toenail discoloration. Patient states that approximately one month he noticed that he had numbness in both of his feet and toes. He states that he has no feeling to either pain or touch. About a week ago he noticed his left great toe developed discoloration and he did not remember hitting it. No wounds to his feet at that time or at present. A few days later he noticed discoloration in his right second toe and again did not remember hitting it. Of note, the patient does work as a  and states it is possible he could have hit it accidentally at work and not known about it because of the numbness. Denies any weakness, he does occasionally get cold feet but nothing out of the ordinary. Denies changes to bowel or bladder habits, abdominal pain, history of aneurysms, claudication symptoms, loss of vision, numbness traveling up his legs bilaterally. Currently on eliquis for atrial fibrillation.    Surgical history: cardioversion (6/15/2017) for atrial fibrillation    No history of Mi/stroke.  Tobacco use: remote history of smoking for a couple years in his 20s, denies alcohol use, denies illicit drug use    Past Medical History:   Diagnosis Date    Atrial fibrillation      Past Surgical History:   Procedure Laterality Date    COLONOSCOPY N/A 4/14/2016    Procedure: COLONOSCOPY;  Surgeon: Kade Wang MD;  Location: [address];  Service: Endoscopy;  Laterality: N/A;     No family history on file.  Social History     Social History    Marital status:      Spouse name: N/A    Number of children: N/A    Years of education: N/A     Occupational History    Not on file.     Social History Main Topics    Smoking status: Former Smoker    Smokeless tobacco: Not on file    Alcohol use No    Drug use: No    Sexual activity: Not  on file     Other Topics Concern    Not on file     Social History Narrative    No narrative on file       Current Outpatient Prescriptions:     apixaban (ELIQUIS) 5 mg Tab, Take 1 tablet (5 mg total) by mouth 2 (two) times daily., Disp: 180 tablet, Rfl: 3    fish oil-omega-3 fatty acids 300-1,000 mg capsule, Take 2 g by mouth once daily., Disp: , Rfl:     hydrocortisone-pramoxine (PROCTOFOAM-HS) rectal foam, Place 1 applicator rectally 2 (two) times daily., Disp: 10 g, Rfl: 2    metoprolol tartrate (LOPRESSOR) 25 MG tablet, Take 0.5 tablets (12.5 mg total) by mouth 2 (two) times daily., Disp: 90 tablet, Rfl: 3    multivitamin (THERAGRAN) per tablet, Take 1 tablet by mouth once daily., Disp: , Rfl:     sotalol (BETAPACE) 80 MG tablet, Take 1 tablet (80 mg total) by mouth 2 (two) times daily., Disp: 180 tablet, Rfl: 3     REVIEW OF SYSTEMS:  General: negative; Respiratory: no cough, shortness of breath, or wheezing; Cardiovascular: no chest pain or dyspnea on exertion; Gastrointestinal: no abdominal pain, change in bowel habits, or bloody stools; Genito-Urinary: no dysuria, trouble voiding, or hematuria; Musculoskeletal: no weakness or decreased range of motion, Neurological: no TIA or stroke symptoms; Psychiatric: no nervousness, anxiety or depression.    PHYSICAL EXAM:   Right Arm BP - Sittin/82 (18 1425)  Left Arm BP - Sittin/70 (18 1425)  Pulse: 67  Temp: 98.2 °F (36.8 °C)    General appearance: Alert, well-appearing, and in no distress.  Oriented to person, place, and time  Neurological: Normal speech, no focal findings noted; CN II - XII grossly intact  Musculoskeletal:Digits/nail without cyanosis/clubbing.  Normal muscle strength/tone.  Neck: Supple, no significant adenopathy, no carotid bruit can be auscultated  Chest:Clear to auscultation, no wheezes, rales or rhonchi, symmetric air entry, No use of accessory muscles   Cardiac:Normal rate and regular rhythm, S1 and S2  normal  Abdomen:Soft, nontender, nondistended   Extremities: 2+ femoral pulses bilaterally, 2+ pedal pulses palpable. No pre-tibial edema. No ulcerations.  Likely subungual hematoma of left great toe nail and right second toe nail.     LAB RESULTS:  Lab Results   Component Value Date    K 4.4 2017    K 4.6 2017    K 3.8 2017    CREATININE 1.0 2017    CREATININE 1.0 2017    CREATININE 1.0 2017     Lab Results   Component Value Date    WBC 12.64 2017    WBC 8.48 2017    WBC 11.66 2017    HCT 46.9 2017    HCT 42.1 2017    HCT 45.6 2017     2017     2017     2017     Lab Results   Component Value Date    HGBA1C 6.0 2016     IMAGIN18 Lower extremity venous duplex: fully compressible and patent veins with significant reflux in the GSV or SSV.    IMP/PLAN:  62 y.o. male with numbness in his feet bilaterally, a normal pulse examination, and no ultrasonographic evidence of DVT.     - No evidence of vascular disease, strong pulses bilaterally and patent veins bilaterally on ultrasound. Most likely subungal hematoma secondary to trauma given patient taking eliquis. Recommend follow up with primary care physician and possible referral to neurology for further evaluation.  - weight loss exercise  - continue eliquis  - RTC mynor Yousif MD  Vascular & Endovascular Surgery

## 2018-04-27 ENCOUNTER — TELEPHONE (OUTPATIENT)
Dept: FAMILY MEDICINE | Facility: CLINIC | Age: 63
End: 2018-04-27

## 2018-04-27 DIAGNOSIS — Z00.00 ROUTINE GENERAL MEDICAL EXAMINATION AT A HEALTH CARE FACILITY: Primary | ICD-10-CM

## 2018-04-27 DIAGNOSIS — Z11.59 ENCOUNTER FOR HEPATITIS C SCREENING TEST FOR LOW RISK PATIENT: Primary | ICD-10-CM

## 2018-06-27 ENCOUNTER — LAB VISIT (OUTPATIENT)
Dept: LAB | Facility: HOSPITAL | Age: 63
End: 2018-06-27
Attending: FAMILY MEDICINE
Payer: COMMERCIAL

## 2018-06-27 DIAGNOSIS — Z00.00 ROUTINE GENERAL MEDICAL EXAMINATION AT A HEALTH CARE FACILITY: ICD-10-CM

## 2018-06-27 DIAGNOSIS — Z11.59 ENCOUNTER FOR HEPATITIS C SCREENING TEST FOR LOW RISK PATIENT: ICD-10-CM

## 2018-06-27 LAB
ALBUMIN SERPL BCP-MCNC: 4 G/DL
ALP SERPL-CCNC: 73 U/L
ALT SERPL W/O P-5'-P-CCNC: 20 U/L
ANION GAP SERPL CALC-SCNC: 8 MMOL/L
AST SERPL-CCNC: 19 U/L
BASOPHILS # BLD AUTO: 0.05 K/UL
BASOPHILS NFR BLD: 0.6 %
BILIRUB SERPL-MCNC: 0.5 MG/DL
BUN SERPL-MCNC: 16 MG/DL
CALCIUM SERPL-MCNC: 9.9 MG/DL
CHLORIDE SERPL-SCNC: 105 MMOL/L
CHOLEST SERPL-MCNC: 209 MG/DL
CHOLEST/HDLC SERPL: 5 {RATIO}
CO2 SERPL-SCNC: 27 MMOL/L
CREAT SERPL-MCNC: 0.9 MG/DL
DIFFERENTIAL METHOD: NORMAL
EOSINOPHIL # BLD AUTO: 0.3 K/UL
EOSINOPHIL NFR BLD: 4 %
ERYTHROCYTE [DISTWIDTH] IN BLOOD BY AUTOMATED COUNT: 13.2 %
EST. GFR  (AFRICAN AMERICAN): >60 ML/MIN/1.73 M^2
EST. GFR  (NON AFRICAN AMERICAN): >60 ML/MIN/1.73 M^2
GLUCOSE SERPL-MCNC: 114 MG/DL
HCT VFR BLD AUTO: 44.9 %
HCV AB SERPL QL IA: NEGATIVE
HDLC SERPL-MCNC: 42 MG/DL
HDLC SERPL: 20.1 %
HGB BLD-MCNC: 14.8 G/DL
IMM GRANULOCYTES # BLD AUTO: 0.03 K/UL
IMM GRANULOCYTES NFR BLD AUTO: 0.4 %
LDLC SERPL CALC-MCNC: 145 MG/DL
LYMPHOCYTES # BLD AUTO: 2.2 K/UL
LYMPHOCYTES NFR BLD: 25.2 %
MCH RBC QN AUTO: 30.6 PG
MCHC RBC AUTO-ENTMCNC: 33 G/DL
MCV RBC AUTO: 93 FL
MONOCYTES # BLD AUTO: 0.7 K/UL
MONOCYTES NFR BLD: 8.1 %
NEUTROPHILS # BLD AUTO: 5.3 K/UL
NEUTROPHILS NFR BLD: 61.7 %
NONHDLC SERPL-MCNC: 167 MG/DL
NRBC BLD-RTO: 0 /100 WBC
PLATELET # BLD AUTO: 244 K/UL
PMV BLD AUTO: 11.6 FL
POTASSIUM SERPL-SCNC: 4.4 MMOL/L
PROT SERPL-MCNC: 7.3 G/DL
RBC # BLD AUTO: 4.84 M/UL
SODIUM SERPL-SCNC: 140 MMOL/L
TRIGL SERPL-MCNC: 110 MG/DL
TSH SERPL DL<=0.005 MIU/L-ACNC: 0.91 UIU/ML
WBC # BLD AUTO: 8.57 K/UL

## 2018-06-27 PROCEDURE — 80061 LIPID PANEL: CPT

## 2018-06-27 PROCEDURE — 85025 COMPLETE CBC W/AUTO DIFF WBC: CPT

## 2018-06-27 PROCEDURE — 86803 HEPATITIS C AB TEST: CPT

## 2018-06-27 PROCEDURE — 36415 COLL VENOUS BLD VENIPUNCTURE: CPT | Mod: PO

## 2018-06-27 PROCEDURE — 80053 COMPREHEN METABOLIC PANEL: CPT

## 2018-06-27 PROCEDURE — 84443 ASSAY THYROID STIM HORMONE: CPT

## 2018-07-02 ENCOUNTER — OFFICE VISIT (OUTPATIENT)
Dept: FAMILY MEDICINE | Facility: CLINIC | Age: 63
End: 2018-07-02
Payer: COMMERCIAL

## 2018-07-02 ENCOUNTER — LAB VISIT (OUTPATIENT)
Dept: LAB | Facility: HOSPITAL | Age: 63
End: 2018-07-02
Attending: FAMILY MEDICINE
Payer: COMMERCIAL

## 2018-07-02 VITALS
WEIGHT: 267.88 LBS | HEIGHT: 72 IN | DIASTOLIC BLOOD PRESSURE: 72 MMHG | HEART RATE: 67 BPM | BODY MASS INDEX: 36.28 KG/M2 | TEMPERATURE: 98 F | SYSTOLIC BLOOD PRESSURE: 118 MMHG

## 2018-07-02 DIAGNOSIS — Z23 IMMUNIZATION DUE: Primary | ICD-10-CM

## 2018-07-02 DIAGNOSIS — R73.9 HIGH BLOOD SUGAR: ICD-10-CM

## 2018-07-02 DIAGNOSIS — I48.91 ATRIAL FIBRILLATION WITH RVR: ICD-10-CM

## 2018-07-02 DIAGNOSIS — Z12.5 SCREENING FOR PROSTATE CANCER: ICD-10-CM

## 2018-07-02 LAB
COMPLEXED PSA SERPL-MCNC: 0.95 NG/ML
ESTIMATED AVG GLUCOSE: 120 MG/DL
HBA1C MFR BLD HPLC: 5.8 %

## 2018-07-02 PROCEDURE — 90715 TDAP VACCINE 7 YRS/> IM: CPT | Mod: S$GLB,,, | Performed by: FAMILY MEDICINE

## 2018-07-02 PROCEDURE — 83036 HEMOGLOBIN GLYCOSYLATED A1C: CPT

## 2018-07-02 PROCEDURE — 99396 PREV VISIT EST AGE 40-64: CPT | Mod: 25,S$GLB,, | Performed by: FAMILY MEDICINE

## 2018-07-02 PROCEDURE — 84153 ASSAY OF PSA TOTAL: CPT

## 2018-07-02 PROCEDURE — 82270 OCCULT BLOOD FECES: CPT | Mod: S$GLB,,, | Performed by: FAMILY MEDICINE

## 2018-07-02 PROCEDURE — 90471 IMMUNIZATION ADMIN: CPT | Mod: S$GLB,,, | Performed by: FAMILY MEDICINE

## 2018-07-02 PROCEDURE — 36415 COLL VENOUS BLD VENIPUNCTURE: CPT | Mod: PO

## 2018-07-02 PROCEDURE — 99999 PR PBB SHADOW E&M-EST. PATIENT-LVL III: CPT | Mod: PBBFAC,,, | Performed by: FAMILY MEDICINE

## 2018-07-02 NOTE — PROGRESS NOTES
Marcus Watkins is a 62 y.o. male     Chief Complaint:  Physical Exam  Source of history: Patient  Past Medical History:   Diagnosis Date    Atrial fibrillation      Patient  reports that he quit smoking about 40 years ago. He has never used smokeless tobacco. He reports that he does not drink alcohol or use drugs.  No family history on file.  ROS:                                                                                GENERAL: No fever, chills, fatigability or weight loss. .  SKIN: No rashes, itching or changes in color or texture of skin.  HEAD: No headaches or recent head trauma.  EYES: Visual acuity fine. No photophobia, ocular pain or diplopia.  EARS: Denies ear pain, discharge or vertigo.  NOSE: No loss of smell, no epistaxis or postnasal drip.  MOUTH & THROAT: No hoarseness or change in voice. No excessive gum bleeding.  NODES: Denies swollen glands.  CHEST: Denies DALY, cyanosis, wheezing, cough and sputum production.  CARDIOVASCULAR: Denies chest pain, PND, orthopnea or reduced exercise tolerance.  ABDOMEN: Appetite fine. No weight loss. Denies diarrhea, abdominal pain, hematemesis or blood in stool.  URINARY: No flank pain, dysuria or hematuria. Frequency of urination at nighttime.  PERIPHERAL VASCULAR: No claudication or cyanosis.  MUSCULOSKELETAL: no complaints  NEUROLOGIC: No history of seizures, paralysis, alteration of gait or coordination.    OBJECTIVE:  APPEARANCE: Well nourished, well developed, in no acute distress.   VS:  see nurses notes  7-2-18  SKIN: No lesions, good turgor, no rashes.    HEENT: TMs clear bilaterally, ear canals clear bilaterally, nasal mucosa clear bilaterally, sinuses nontender to percussion, throat mild postnasal drip.  HEAD: Normocephalic, nontender to palpation.  NECK: Supple nontender, no carotid bruits, no thyromegaly.  NODES: Normal.  CHEST: Clear bilaterally, with good respiratory excursion, no evidence of respiratory distress.  CARDIOVASCULAR: S1, S2, regular  rate and rhythm without murmur.  BREASTS: No masses palpated in either breast area, or axillary area, symmetry is noted.  ABDOMEN: Soft nontender no hepatosplenomegaly, no guarding or rebound, no pulsatile mass.  RECTAL: Prostate not enlarged, no masses, no rectal masses noted, Hemoccult negative.  PERIPHERAL VASCULAR: Distal pulses palpable throughout, normal capillary refill in all distal extremities, no edema.  MUSCULOSKELETAL: No abnormalities noted.  BACK: No scoliosis, no spasm, cervical, thoracic, lumbar spine nontender to palpation  NEUROLOGIC: Cranial nerves II through XII grossly intact, motor exam 5 out of 5 on distal extremities, no gait disturbances, sensation intact in all distal extremities  MENTAL STATUS: Patient oriented x3, normal affect, normal mood    ASSESSMENT/PLAN:   Marcus was seen today for annual exam.    Diagnoses and all orders for this visit:    Immunization due  -     Tdap Vaccine    Atrial fibrillation with RVR  -     apixaban (ELIQUIS) 5 mg Tab; Take 1 tablet (5 mg total) by mouth 2 (two) times daily.  -     Ambulatory consult to Cardiology    Screening for prostate cancer  -     PSA, Screening; Future    High blood sugar  -     Hemoglobin A1c; Future    labs discussed will contact pt with results when available

## 2018-07-02 NOTE — PROGRESS NOTES
Two patient identifiers verified.  Allergies reviewed.    Tdap administered to Left deltoid as per MD order advise patient to wait 15min after shot is given for any adverse reaction.

## 2018-07-12 DIAGNOSIS — I48.91 ATRIAL FIBRILLATION WITH RVR: ICD-10-CM

## 2018-07-18 ENCOUNTER — TELEPHONE (OUTPATIENT)
Dept: FAMILY MEDICINE | Facility: CLINIC | Age: 63
End: 2018-07-18

## 2018-07-18 ENCOUNTER — TELEPHONE (OUTPATIENT)
Dept: ELECTROPHYSIOLOGY | Facility: CLINIC | Age: 63
End: 2018-07-18

## 2018-07-18 DIAGNOSIS — I48.91 ATRIAL FIBRILLATION WITH RVR: ICD-10-CM

## 2018-07-18 NOTE — TELEPHONE ENCOUNTER
Received an approval for eliquis per patient's insurance approval letter faxed to patient's pharmacy.

## 2018-07-18 NOTE — TELEPHONE ENCOUNTER
Spoke to Jory with [employer] ([phone])    PA completed and APPROVED from 7/18/18 - 7/18/19  Approval #[id number]    Attempted to reach pt to notify him of above, however there was no answer.  Message left letting pt know request has been completed and to let us know if he has any further concerns.

## 2018-07-18 NOTE — TELEPHONE ENCOUNTER
Request from pt received to complete PA        Spoke to Jory with [employer] ([phone])    PA completed and APPROVED from 7/18/18 - 7/18/19  Approval #[id number]    Attempted to reach pt to notify him of above, however there was no answer.  Message left letting pt know request has been completed and to let us know if he has any further concerns.

## 2018-07-18 NOTE — TELEPHONE ENCOUNTER
----- Message from Brittany Carcamo sent at 7/18/2018  9:19 AM CDT -----  Contact: pt  Pt says [employer] sent a questionnaire over for the doctor to fill out in reference to his Eliquis 5 mg. Please call him at the number listed.    Thanks

## 2018-07-30 DIAGNOSIS — I48.91 ATRIAL FIBRILLATION WITH RVR: ICD-10-CM

## 2018-09-10 ENCOUNTER — TELEPHONE (OUTPATIENT)
Dept: ELECTROPHYSIOLOGY | Facility: CLINIC | Age: 63
End: 2018-09-10

## 2018-09-10 ENCOUNTER — OFFICE VISIT (OUTPATIENT)
Dept: ELECTROPHYSIOLOGY | Facility: CLINIC | Age: 63
End: 2018-09-10
Payer: COMMERCIAL

## 2018-09-10 ENCOUNTER — HOSPITAL ENCOUNTER (OUTPATIENT)
Dept: CARDIOLOGY | Facility: CLINIC | Age: 63
Discharge: HOME | End: 2018-09-10
Payer: COMMERCIAL

## 2018-09-10 VITALS
HEIGHT: 71 IN | WEIGHT: 275.56 LBS | SYSTOLIC BLOOD PRESSURE: 118 MMHG | DIASTOLIC BLOOD PRESSURE: 70 MMHG | BODY MASS INDEX: 38.58 KG/M2 | HEART RATE: 76 BPM

## 2018-09-10 DIAGNOSIS — I48.92 ATRIAL FLUTTER, UNSPECIFIED TYPE: ICD-10-CM

## 2018-09-10 DIAGNOSIS — Z98.890 STATUS POST CATHETER ABLATION OF ATRIAL FLUTTER: ICD-10-CM

## 2018-09-10 DIAGNOSIS — I48.3 TYPICAL ATRIAL FLUTTER: Primary | ICD-10-CM

## 2018-09-10 DIAGNOSIS — Z79.899 ENCOUNTER FOR MONITORING SOTALOL THERAPY: ICD-10-CM

## 2018-09-10 DIAGNOSIS — Z51.81 ENCOUNTER FOR MONITORING SOTALOL THERAPY: ICD-10-CM

## 2018-09-10 DIAGNOSIS — Z71.89 ENCOUNTER FOR ANTICOAGULATION DISCUSSION AND COUNSELING: ICD-10-CM

## 2018-09-10 DIAGNOSIS — I48.92 ATRIAL FLUTTER, UNSPECIFIED TYPE: Primary | ICD-10-CM

## 2018-09-10 DIAGNOSIS — I48.91 ATRIAL FIBRILLATION WITH RVR: ICD-10-CM

## 2018-09-10 PROCEDURE — 3008F BODY MASS INDEX DOCD: CPT | Mod: CPTII,S$GLB,, | Performed by: INTERNAL MEDICINE

## 2018-09-10 PROCEDURE — 1101F PT FALLS ASSESS-DOCD LE1/YR: CPT | Mod: CPTII,S$GLB,, | Performed by: INTERNAL MEDICINE

## 2018-09-10 PROCEDURE — 99999 PR PBB SHADOW E&M-EST. PATIENT-LVL III: CPT | Mod: PBBFAC,,, | Performed by: INTERNAL MEDICINE

## 2018-09-10 PROCEDURE — 99214 OFFICE O/P EST MOD 30 MIN: CPT | Mod: S$GLB,,, | Performed by: INTERNAL MEDICINE

## 2018-09-10 PROCEDURE — 93000 ELECTROCARDIOGRAM COMPLETE: CPT | Mod: S$GLB,,, | Performed by: INTERNAL MEDICINE

## 2018-09-10 RX ORDER — POTASSIUM &MAGNESIUM ASPARTATE 250-250 MG
1 CAPSULE ORAL DAILY
COMMUNITY
End: 2023-05-29

## 2018-09-10 NOTE — LETTER
September 10, 2018      Radha Brunson MD  [address] [facility]  [address]  [facility] [address]           [facility]  [address]  [facility] [address]  Phone: [phone]  Fax: [phone]          Patient: Marcus Watkins   MR Number: [id number]   YOB: 1955   Date of Visit: 9/10/2018       Dear Dr. Radha Brunson:    Thank you for referring Marcus Watkins to me for evaluation. Attached you will find relevant portions of my assessment and plan of care.    If you have questions, please do not hesitate to call me. I look forward to following Marcus Watkins along with you.    Sincerely,    José Grey MD    Enclosure  CC:  No Recipients    If you would like to receive this communication electronically, please contact [email or web address] or [phone] to request more information on [employer] access.    For providers and/or their staff who would like to refer a patient to [facility], please contact us through our one-stop-shop provider referral line, [facility], at [phone].    If you feel you have received this communication in error or would no longer like to receive these types of communications, please e-mail [email or web address]

## 2018-09-10 NOTE — PROGRESS NOTES
Subjective:     HPI    I had the pleasure of seeing Marcus Watkins in follow-up today for his history of atrial arrhythmias. He is a 63-year old male with no significant past medical history who presented to the [facility] [place] ED with a 24 hour history of palpitations associated with shortness of breath, nausea, and vomiting. He was found to be in typical atrial flutter with RVR, and was administered Diltiazem which converted him to AF. He then spontaneously reverted to sinus rhythm. He was discharged on a 30-day course of Eliquis, as well as Metoprolol.    In 4/2017, Mr. Watkins underwent EPS and CTI-line. In 6/2017, Mr. Watkins presented to [facility] [place] [place] in AF and underwent DCCV. He was started on Sotalol around this time. He has had no recurrent AF episodes. His only complaint today is foot numbness, for which he is currently in the midst of a work-up.    Recent cardiac studies include an echo performed in 4/2018 which showed an EF of 55-60% and mild LAE.    My interpretation of today's ECG is NSR at 77 bpm with occasional PVCs. QTc 445 ms.    Review of Systems   Constitution: Negative for decreased appetite, malaise/fatigue, weight gain and weight loss.   HENT: Negative for sore throat.    Eyes: Negative for blurred vision.   Cardiovascular: Negative for chest pain, dyspnea on exertion, irregular heartbeat, leg swelling, near-syncope, orthopnea, palpitations, paroxysmal nocturnal dyspnea and syncope.   Respiratory: Negative for shortness of breath.    Skin: Negative for rash.   Musculoskeletal: Negative for arthritis.   Gastrointestinal: Negative for abdominal pain.   Neurological: Positive for numbness. Negative for focal weakness.   Psychiatric/Behavioral: Negative for altered mental status.        Objective:    Physical Exam   Constitutional: He is oriented to person, place, and time. He appears well-developed and well-nourished. No distress.   HENT:   Head: Normocephalic and atraumatic.   Mouth/Throat: Oropharynx is clear and moist.    Eyes: Pupils are equal, round, and reactive to light. No scleral icterus.   Neck: Neck supple. No thyromegaly present.   Cardiovascular: Regular rhythm, normal heart sounds and normal pulses. Exam reveals no gallop and no friction rub.   No murmur heard.  Pulmonary/Chest: Effort normal and breath sounds normal. He has no rales.   Abdominal: Soft. Bowel sounds are normal. He exhibits no distension. There is no tenderness.   Musculoskeletal: He exhibits no edema.   Neurological: He is alert and oriented to person, place, and time.   Skin: Skin is warm and dry. No rash noted.   Psychiatric: He has a normal mood and affect. His behavior is normal.         Assessment:       1. Typical atrial flutter    2. Status post catheter ablation of atrial flutter    3. Atrial fibrillation with RVR    4. Encounter for monitoring sotalol therapy    5. Encounter for anticoagulation discussion and counseling         Plan:   In summary, Marcus Watkins is a 63-year old male with a history of symptomatic typical atrial flutter status-post CTI-line, who then developed AF requiring DCCV and Sotalol initiation. His [id number] Score is 2 (age, HTN) so he should remain on Eliquis. He thinks Eliquis may be playing a role in the foot numbness. I gave him the green light to stop it for several weeks to see if his symptoms improve. If they do, he has been instructed to call the office so an alternative anticoagulant can be prescribed. Otherwise, he will restart Eliquis. He should remain on Sotalol for rhythm control.    He will see me again in 1 year.    Thank you for allowing me to participate in the care of this patient. Please do not hesitate to call me with any questions or concerns.

## 2018-10-22 ENCOUNTER — NURSE TRIAGE (OUTPATIENT)
Dept: ADMINISTRATIVE | Facility: CLINIC | Age: 63
End: 2018-10-22

## 2018-10-22 ENCOUNTER — TELEPHONE (OUTPATIENT)
Dept: ELECTROPHYSIOLOGY | Facility: CLINIC | Age: 63
End: 2018-10-22

## 2018-10-22 DIAGNOSIS — I48.91 ATRIAL FIBRILLATION WITH RVR: Primary | ICD-10-CM

## 2018-10-22 NOTE — TELEPHONE ENCOUNTER
Returned call to Pt. No answer. Left message with number for pt to return call.      ----- Message from Jennifer Harrington MA sent at 10/22/2018 10:05 AM CDT -----  Contact: Patient's wife      ----- Message -----  From: Sofie Hughes  Sent: 10/22/2018  10:00 AM  To: Matilda Kumar Staff    The Pt's wife is calling to report that he has been in AFIB all weekend long and she wants to know what to do. Please call him back @ [phone]. Thanks, Soife

## 2018-10-22 NOTE — TELEPHONE ENCOUNTER
"  Reason for Disposition   Difficulty breathing    Protocols used: ST HEART RATE AND HEARTBEAT [id number]    Pt states his wife checked his heart and states his HR is "out of rhythm again" for approx 1 week. Pt has a history of A fib. Pt states that he is waiting to hear back from cardiologist. Pt advised per protocol to ED and pt verbalizes understanding.   "

## 2018-10-22 NOTE — TELEPHONE ENCOUNTER
Spoke with Pt. He has been out of rhythm for about a week. He is SOB with minimal activity. Advised Pt we need to get an EKG to check his rhythm. I will address with Dr Grey and get back with his recommendations.    Discussed with Dr Grey. Pt back in AFIB. Set up DCCV. No LOPEZ needed if Pt has been compliant with Eliquis.      Attempted to reach Pt no answer. Left message for Pt to return call to schedule procedure.

## 2018-10-23 ENCOUNTER — TELEPHONE (OUTPATIENT)
Dept: ELECTROPHYSIOLOGY | Facility: CLINIC | Age: 63
End: 2018-10-23

## 2018-10-23 ENCOUNTER — HOSPITAL ENCOUNTER (OUTPATIENT)
Dept: CARDIOLOGY | Facility: CLINIC | Age: 63
Discharge: HOME | End: 2018-10-23
Payer: COMMERCIAL

## 2018-10-23 DIAGNOSIS — I48.91 ATRIAL FIBRILLATION WITH RVR: ICD-10-CM

## 2018-10-23 DIAGNOSIS — I48.91 ATRIAL FIBRILLATION WITH RVR: Primary | ICD-10-CM

## 2018-10-23 PROCEDURE — 93000 ELECTROCARDIOGRAM COMPLETE: CPT | Mod: S$GLB,,, | Performed by: INTERNAL MEDICINE

## 2018-10-23 NOTE — TELEPHONE ENCOUNTER
Returned call to Pt. No answer. Left message that Dr Grey reviewed EKG and would like to schedule for cardioversion.          ----- Message from Jennifer Harrington MA sent at 10/23/2018  1:43 PM CDT -----  Contact: Pt      ----- Message -----  From: Brittany Carcamo  Sent: 10/23/2018   1:02 PM  To: [facility]ilda Kumar Staff    Pt would like a call with his EKG results    Thanks

## 2018-10-23 NOTE — PROGRESS NOTES
CARDIOVERSION EDUCATION CHECK LIST    PRE PROCEDURE TESTING    10-24-18 LAB WORK @ 2PM  Pre - procedure labs [facility] at:  [facility]  · Be sure to arrive at your scheduled time.   · YOU DO NOT HAVE TO FAST FOR THIS LAB WORK!      DAY OF PROCEDURE     10/29/18 @ 7 AM   LOPEZ/CARDIOVERSION  Report to Cardiology Waiting Room on 3rd floor of the hospital  · Do not eat or drink anything after 12 mn on the night before your procedure.  · Please do not wear makeup (especially mascara) when arriving for your procedure.    Medications:    · You may take your usual morning medications with a sip of water    Directions to the Cardiology Waiting Room  If you park in the Parking Garage:  Take elevators to the 2nd floor  Walk up ramp and turn right by Gold Elevators  Take elevator to the 3rd floor  Upon exiting the elevator, turn away from the clinic areas  Walk long pearson around to front of hospital to area with windows overlooking [facility]  Check in at Reception Desk  OR  If family is dropping you off:  Have them drop you off at the front of the Hospital  (Near the ER, where all the flags are hung).  Take the E elevators to the 3rd floor.  Check in at the Reception Desk in the waiting room.        · YOU WILL BE GOING HOME THE SAME DAY AS YOUR PROCEDURE  · YOU WILL NEED SOMEONE TO DRIVE YOU HOME AFTER YOUR PROCEDURE   · YOUR PAIN DURING YOUR PROCEDURE WILL BE MANAGED BY THE ANESTHESIA TEAM    Any need to reschedule or cancel procedures, or any questions regarding your procedure should be addressed directly with the Arrhythmia Department Nurses at the following phone number: [phone]

## 2018-10-23 NOTE — TELEPHONE ENCOUNTER
Returned call and procedure scheduled.         ----- Message from Jennifer Harrington MA sent at 10/23/2018  2:54 PM CDT -----  Contact: patient      ----- Message -----  From: Sofie Hughes  Sent: 10/23/2018   1:59 PM  To: [facility]ilda Kumar Staff    The Pt is returning a call. Please call him back @ [phone]. Thanks, Sofie

## 2018-10-24 ENCOUNTER — LAB VISIT (OUTPATIENT)
Dept: LAB | Facility: HOSPITAL | Age: 63
End: 2018-10-24
Attending: INTERNAL MEDICINE
Payer: COMMERCIAL

## 2018-10-24 DIAGNOSIS — I48.91 ATRIAL FIBRILLATION WITH RVR: ICD-10-CM

## 2018-10-24 LAB
ANION GAP SERPL CALC-SCNC: 8 MMOL/L
BASOPHILS # BLD AUTO: 0.04 K/UL
BASOPHILS NFR BLD: 0.4 %
BUN SERPL-MCNC: 19 MG/DL
CALCIUM SERPL-MCNC: 9.5 MG/DL
CHLORIDE SERPL-SCNC: 102 MMOL/L
CO2 SERPL-SCNC: 26 MMOL/L
CREAT SERPL-MCNC: 0.8 MG/DL
DIFFERENTIAL METHOD: ABNORMAL
EOSINOPHIL # BLD AUTO: 0.3 K/UL
EOSINOPHIL NFR BLD: 2.7 %
ERYTHROCYTE [DISTWIDTH] IN BLOOD BY AUTOMATED COUNT: 13.6 %
EST. GFR  (AFRICAN AMERICAN): >60 ML/MIN/1.73 M^2
EST. GFR  (NON AFRICAN AMERICAN): >60 ML/MIN/1.73 M^2
GLUCOSE SERPL-MCNC: 122 MG/DL
HCT VFR BLD AUTO: 42.4 %
HGB BLD-MCNC: 13.6 G/DL
IMM GRANULOCYTES # BLD AUTO: 0.04 K/UL
IMM GRANULOCYTES NFR BLD AUTO: 0.4 %
LYMPHOCYTES # BLD AUTO: 2.6 K/UL
LYMPHOCYTES NFR BLD: 22.5 %
MCH RBC QN AUTO: 30.4 PG
MCHC RBC AUTO-ENTMCNC: 32.1 G/DL
MCV RBC AUTO: 95 FL
MONOCYTES # BLD AUTO: 0.8 K/UL
MONOCYTES NFR BLD: 6.6 %
NEUTROPHILS # BLD AUTO: 7.6 K/UL
NEUTROPHILS NFR BLD: 67.4 %
NRBC BLD-RTO: 0 /100 WBC
PLATELET # BLD AUTO: 216 K/UL
PMV BLD AUTO: 12.1 FL
POTASSIUM SERPL-SCNC: 4 MMOL/L
RBC # BLD AUTO: 4.47 M/UL
SODIUM SERPL-SCNC: 136 MMOL/L
WBC # BLD AUTO: 11.32 K/UL

## 2018-10-24 PROCEDURE — 85730 THROMBOPLASTIN TIME PARTIAL: CPT

## 2018-10-24 PROCEDURE — 80048 BASIC METABOLIC PNL TOTAL CA: CPT

## 2018-10-24 PROCEDURE — 85025 COMPLETE CBC W/AUTO DIFF WBC: CPT

## 2018-10-24 PROCEDURE — 85610 PROTHROMBIN TIME: CPT

## 2018-10-24 PROCEDURE — 36415 COLL VENOUS BLD VENIPUNCTURE: CPT | Mod: PO

## 2018-10-25 LAB
APTT BLDCRRT: 22.4 SEC
INR PPP: 1
PROTHROMBIN TIME: 10.8 SEC

## 2018-10-26 ENCOUNTER — TELEPHONE (OUTPATIENT)
Dept: ELECTROPHYSIOLOGY | Facility: CLINIC | Age: 63
End: 2018-10-26

## 2018-10-26 NOTE — TELEPHONE ENCOUNTER
Returned call to Pt. Spoke with Pt and reviewed pre op instructions including arrival time of 7 am and NPO after MN Sunday. Pt voiced understanding and stated he would be here.         ----- Message from Meenu Rendon MA sent at 10/26/2018 11:50 AM CDT -----  Contact: Patient      ----- Message -----  From: Sofie Hughes  Sent: 10/26/2018   9:41 AM  To: [facility]ilda Kumar Staff    The Pt is returning a call. Please call him back @ [phone]. Thanks, Sofie

## 2018-10-29 ENCOUNTER — HOSPITAL ENCOUNTER (OUTPATIENT)
Dept: CARDIOLOGY | Facility: CLINIC | Age: 63
Discharge: HOME | End: 2018-10-29
Attending: INTERNAL MEDICINE | Admitting: INTERNAL MEDICINE
Payer: COMMERCIAL

## 2018-10-29 ENCOUNTER — ANESTHESIA (OUTPATIENT)
Dept: MEDSURG UNIT | Facility: HOSPITAL | Age: 63
End: 2018-10-29
Payer: COMMERCIAL

## 2018-10-29 ENCOUNTER — HOSPITAL ENCOUNTER (OUTPATIENT)
Facility: HOSPITAL | Age: 63
Discharge: HOME | End: 2018-10-29
Attending: INTERNAL MEDICINE | Admitting: INTERNAL MEDICINE
Payer: COMMERCIAL

## 2018-10-29 ENCOUNTER — ANESTHESIA EVENT (OUTPATIENT)
Dept: MEDSURG UNIT | Facility: HOSPITAL | Age: 63
End: 2018-10-29
Payer: COMMERCIAL

## 2018-10-29 VITALS
BODY MASS INDEX: 38.5 KG/M2 | HEART RATE: 84 BPM | OXYGEN SATURATION: 95 % | HEIGHT: 71 IN | SYSTOLIC BLOOD PRESSURE: 154 MMHG | RESPIRATION RATE: 20 BRPM | DIASTOLIC BLOOD PRESSURE: 99 MMHG | WEIGHT: 275 LBS | TEMPERATURE: 98 F

## 2018-10-29 DIAGNOSIS — I48.91 ATRIAL FIBRILLATION: Primary | ICD-10-CM

## 2018-10-29 DIAGNOSIS — I48.91 ATRIAL FIBRILLATION WITH RVR: ICD-10-CM

## 2018-10-29 LAB
ESTIMATED PA SYSTOLIC PRESSURE: 23.43
MITRAL VALVE MOBILITY: NORMAL
MITRAL VALVE REGURGITATION: NORMAL
TRICUSPID VALVE REGURGITATION: NORMAL

## 2018-10-29 PROCEDURE — 93320 DOPPLER ECHO COMPLETE: CPT | Mod: 26,,, | Performed by: INTERNAL MEDICINE

## 2018-10-29 PROCEDURE — 25000003 PHARM REV CODE 250: Performed by: NURSE PRACTITIONER

## 2018-10-29 PROCEDURE — D9220A PRA ANESTHESIA: Mod: CRNA,,, | Performed by: NURSE ANESTHETIST, CERTIFIED REGISTERED

## 2018-10-29 PROCEDURE — 63600175 PHARM REV CODE 636 W HCPCS: Performed by: NURSE ANESTHETIST, CERTIFIED REGISTERED

## 2018-10-29 PROCEDURE — 37000008 HC ANESTHESIA 1ST 15 MINUTES: Performed by: INTERNAL MEDICINE

## 2018-10-29 PROCEDURE — D9220A PRA ANESTHESIA: Mod: ANES,,, | Performed by: ANESTHESIOLOGY

## 2018-10-29 PROCEDURE — 93010 ELECTROCARDIOGRAM REPORT: CPT | Mod: 76,,, | Performed by: INTERNAL MEDICINE

## 2018-10-29 PROCEDURE — 25000003 PHARM REV CODE 250: Performed by: NURSE ANESTHETIST, CERTIFIED REGISTERED

## 2018-10-29 PROCEDURE — 92960 CARDIOVERSION ELECTRIC EXT: CPT

## 2018-10-29 PROCEDURE — 93320 DOPPLER ECHO COMPLETE: CPT

## 2018-10-29 PROCEDURE — 93325 DOPPLER ECHO COLOR FLOW MAPG: CPT | Mod: 26,,, | Performed by: INTERNAL MEDICINE

## 2018-10-29 PROCEDURE — 92960 CARDIOVERSION ELECTRIC EXT: CPT | Mod: ,,, | Performed by: INTERNAL MEDICINE

## 2018-10-29 PROCEDURE — 27100006 CARDIOVERSION (DCCV)

## 2018-10-29 PROCEDURE — 93005 ELECTROCARDIOGRAM TRACING: CPT

## 2018-10-29 PROCEDURE — 25000003 PHARM REV CODE 250

## 2018-10-29 PROCEDURE — 93010 ELECTROCARDIOGRAM REPORT: CPT | Mod: ,,, | Performed by: INTERNAL MEDICINE

## 2018-10-29 PROCEDURE — 93312 ECHO TRANSESOPHAGEAL: CPT | Mod: 26,,, | Performed by: INTERNAL MEDICINE

## 2018-10-29 PROCEDURE — 37000009 HC ANESTHESIA EA ADD 15 MINS: Performed by: INTERNAL MEDICINE

## 2018-10-29 RX ORDER — DIPHENHYDRAMINE HYDROCHLORIDE 50 MG/ML
25 INJECTION INTRAMUSCULAR; INTRAVENOUS EVERY 6 HOURS PRN
Status: DISCONTINUED | OUTPATIENT
Start: 2018-10-29 | End: 2018-10-29 | Stop reason: HOSPADM

## 2018-10-29 RX ORDER — LIDOCAINE HCL/PF 100 MG/5ML
SYRINGE (ML) INTRAVENOUS
Status: DISCONTINUED | OUTPATIENT
Start: 2018-10-29 | End: 2018-10-29

## 2018-10-29 RX ORDER — KETAMINE HYDROCHLORIDE 10 MG/ML
INJECTION, SOLUTION INTRAMUSCULAR; INTRAVENOUS
Status: DISCONTINUED | OUTPATIENT
Start: 2018-10-29 | End: 2018-10-29

## 2018-10-29 RX ORDER — GLYCOPYRROLATE 0.2 MG/ML
INJECTION INTRAMUSCULAR; INTRAVENOUS
Status: DISCONTINUED | OUTPATIENT
Start: 2018-10-29 | End: 2018-10-29

## 2018-10-29 RX ORDER — AMIODARONE HYDROCHLORIDE 400 MG/1
400 TABLET ORAL 2 TIMES DAILY
Qty: 28 TABLET | Refills: 0 | Status: SHIPPED | OUTPATIENT
Start: 2018-10-29 | End: 2018-11-12

## 2018-10-29 RX ORDER — PROPOFOL 10 MG/ML
VIAL (ML) INTRAVENOUS CONTINUOUS PRN
Status: DISCONTINUED | OUTPATIENT
Start: 2018-10-29 | End: 2018-10-29

## 2018-10-29 RX ORDER — FENTANYL CITRATE 50 UG/ML
25 INJECTION, SOLUTION INTRAMUSCULAR; INTRAVENOUS EVERY 5 MIN PRN
Status: DISCONTINUED | OUTPATIENT
Start: 2018-10-29 | End: 2018-10-29 | Stop reason: HOSPADM

## 2018-10-29 RX ORDER — HYDROMORPHONE HYDROCHLORIDE 1 MG/ML
0.2 INJECTION, SOLUTION INTRAMUSCULAR; INTRAVENOUS; SUBCUTANEOUS EVERY 5 MIN PRN
Status: DISCONTINUED | OUTPATIENT
Start: 2018-10-29 | End: 2018-10-29 | Stop reason: HOSPADM

## 2018-10-29 RX ORDER — AMIODARONE HYDROCHLORIDE 200 MG/1
200 TABLET ORAL DAILY
Qty: 30 TABLET | Refills: 11 | Status: SHIPPED | OUTPATIENT
Start: 2018-10-29 | End: 2019-05-02 | Stop reason: ALTCHOICE

## 2018-10-29 RX ORDER — ONDANSETRON 2 MG/ML
4 INJECTION INTRAMUSCULAR; INTRAVENOUS ONCE AS NEEDED
Status: DISCONTINUED | OUTPATIENT
Start: 2018-10-29 | End: 2018-10-29 | Stop reason: HOSPADM

## 2018-10-29 RX ORDER — LIDOCAINE HYDROCHLORIDE 20 MG/ML
SOLUTION OROPHARYNGEAL
Status: DISCONTINUED | OUTPATIENT
Start: 2018-10-29 | End: 2018-10-29

## 2018-10-29 RX ORDER — PROPOFOL 10 MG/ML
VIAL (ML) INTRAVENOUS
Status: DISCONTINUED | OUTPATIENT
Start: 2018-10-29 | End: 2018-10-29

## 2018-10-29 RX ORDER — MEPERIDINE HYDROCHLORIDE 50 MG/ML
12.5 INJECTION INTRAMUSCULAR; INTRAVENOUS; SUBCUTANEOUS ONCE AS NEEDED
Status: DISCONTINUED | OUTPATIENT
Start: 2018-10-29 | End: 2018-10-29 | Stop reason: HOSPADM

## 2018-10-29 RX ORDER — SODIUM CHLORIDE 9 MG/ML
INJECTION, SOLUTION INTRAVENOUS CONTINUOUS
Status: DISCONTINUED | OUTPATIENT
Start: 2018-10-29 | End: 2018-10-29 | Stop reason: HOSPADM

## 2018-10-29 RX ADMIN — GLYCOPYRROLATE 0.1 MG: 0.2 INJECTION, SOLUTION INTRAMUSCULAR; INTRAVENOUS at 09:10

## 2018-10-29 RX ADMIN — PROPOFOL 100 MG: 10 INJECTION, EMULSION INTRAVENOUS at 09:10

## 2018-10-29 RX ADMIN — SODIUM CHLORIDE: 0.9 INJECTION, SOLUTION INTRAVENOUS at 09:10

## 2018-10-29 RX ADMIN — LIDOCAINE HYDROCHLORIDE 100 MG: 20 INJECTION, SOLUTION INTRAVENOUS at 09:10

## 2018-10-29 RX ADMIN — KETAMINE HYDROCHLORIDE 20 MG: 10 INJECTION, SOLUTION INTRAMUSCULAR; INTRAVENOUS at 09:10

## 2018-10-29 RX ADMIN — LIDOCAINE HYDROCHLORIDE 15 ML: 20 SOLUTION OROPHARYNGEAL at 09:10

## 2018-10-29 RX ADMIN — PROPOFOL 150 MCG/KG/MIN: 10 INJECTION, EMULSION INTRAVENOUS at 09:10

## 2018-10-29 NOTE — ANESTHESIA POSTPROCEDURE EVALUATION
"Anesthesia Post Evaluation    Patient: Marcus Watkins    Procedure(s) Performed: Procedure(s) (LRB):  CARDIOVERSION (N/A)  ECHOCARDIOGRAM,TRANSESOPHAGEAL (N/A)    Final Anesthesia Type: general  Patient location during evaluation: PACU  Patient participation: Yes- Able to Participate  Level of consciousness: awake and alert  Post-procedure vital signs: reviewed and stable  Pain management: adequate  Airway patency: patent  PONV status at discharge: No PONV  Anesthetic complications: no      Cardiovascular status: blood pressure returned to baseline  Respiratory status: unassisted  Hydration status: euvolemic  Follow-up not needed.        Visit Vitals  BP (!) 142/99 (BP Location: Right arm, Patient Position: Lying)   Pulse (!) 120   Temp 36.7 °C (98.1 °F) (Temporal)   Resp 20   Ht 5' 11" (1.803 m)   Wt 124.7 kg (275 lb)   SpO2 96%   BMI 38.35 kg/m²       Pain/Monika Score: Pain Assessment Performed: Yes (10/29/2018 10:49 AM)  Presence of Pain: denies (10/29/2018 10:49 AM)  Pain Rating Prior to Med Admin: 0 (10/29/2018 10:49 AM)  Monika Score: 9 (10/29/2018 10:49 AM)        "

## 2018-10-29 NOTE — NURSING TRANSFER
Nursing Transfer Note      10/29/2018     Transfer To: ep pacu 3 to sscu 5    Transfer via stretcher    Transfer with none  Transported by  Veronica beasley, rajeev    Medicines sent: silvadene    Chart send with patient: Yes    Notified: spouse    Patient reassessed at: 10/29/18 1055    Upon arrival to floor: patient oriented to room, call bell in reach and bed in lowest position

## 2018-10-29 NOTE — NURSING
Ambulated off unit with wife at side for discharge home.  Pt and wife verbalized understanding of dc instructions.

## 2018-10-29 NOTE — ANESTHESIA PREPROCEDURE EVALUATION
10/29/2018  Marcus Watkins is a 63 y.o., male.    Anesthesia Evaluation         Review of Systems  Anesthesia Hx:  No problems with previous Anesthesia   Social:  Non-Smoker    Cardiovascular:   Exercise tolerance: good Denies Hypertension.  Denies CAD.   Dysrhythmias atrial fibrillation  Denies Angina.  Functional Capacity Can you climb two flights of stairs? ==> Yes    Pulmonary:   Denies Asthma.  Denies Recent URI.  Denies Sleep Apnea.    Renal/:  Renal/ Normal     Hepatic/GI:   Denies PUD. Denies Hiatal Hernia.  Denies GERD. Denies Liver Disease.  Denies Hepatitis.    Neurological:   Denies CVA. Denies Seizures.    Endocrine:   Denies Diabetes. Denies Hypothyroidism.        Physical Exam  General:  Well nourished    Airway/Jaw/Neck:  Airway Findings: Mouth Opening: Normal Tongue: Normal  General Airway Assessment: Adult  Mallampati: III  Improves to II with phonation.  TM Distance: Normal, at least 6 cm  Jaw/Neck Findings:  Neck ROM: Normal ROM  Neck Findings:     Eyes/Ears/Nose:  EYES/EARS/NOSE FINDINGS: Normal   Dental:  Dental Findings: In tact, Upper Dentures   Chest/Lungs:  Chest/Lungs Findings: Clear to auscultation     Heart/Vascular:  Heart Findings: Rate: Normal  Rhythm: Regular Rhythm  Sounds: Normal        Mental Status:  Mental Status Findings:  Alert and Oriented         Anesthesia Plan  Type of Anesthesia, risks & benefits discussed:  Anesthesia Type:  general  Patient's Preference: Proceed with anesthesia understanding that the risks are very small but could be serious or life threatening.  Intra-op Monitoring Plan: standard ASA monitors  Intra-op Monitoring Plan Comments:   Post Op Pain Control Plan:   Post Op Pain Control Plan Comments:   Induction:   IV  Beta Blocker:  Patient is not currently on a Beta-Blocker (No further documentation required).       Informed Consent: Patient  understands risks and agrees with Anesthesia plan.  Questions answered. Anesthesia consent signed with patient.  ASA Score: 2     Day of Surgery Review of History & Physical: I have interviewed and examined the patient. I have reviewed the patient's H&P dated:            Ready For Surgery From Anesthesia Perspective.

## 2018-10-29 NOTE — H&P
[facility]  Cardiology  History and Physical     Patient Name: Marcus Watkins  MRN: [id number]  Admission Date: 10/29/2018  Code Status: No Order   Attending Provider: José Grey MD   Primary Care Physician: Radha Brunson MD  Principal Problem:<principal problem not specified>    Patient information was obtained from patient, spouse/SO and past medical records.   Chief Complaint:  Afib     HPI:      TRANSESOPHAGEAL ECHOCARDIOGRAPHY   PRE-PROCEDURE NOTE    10/29/2018    HPI:     63-year old male with a history of symptomatic typical atrial flutter status-post CTI-line, who then developed AF requiring DCCV and Sotalol initiation. His [id number] Score is 2 (age, HTN).  His wife called in on 10/22 informing EP that patient has been in AFib x 1 week.  Set up for LOPEZ DCCV as he has been off of Eliquis x couple of weeks due to patient's account that taking eliquis gives him 'foot numbness.'  2de 4/2018 with ef 55-60, UMESH, PASP 34, +Diastolic dysfunction.        Dysphagia or odynophagia:  No  Liver Disease, esophageal disease, or known varices:  No  Upper GI Bleeding: No  Snoring:  Yes  Sleep Apnea:  No  Prior neck surgery or radiation:  No  History of anesthetic difficulties:  No  Family history of anesthetic difficulties:  No  Last oral intake:  12 hours ago  Able to move neck in all directions:  Yes      Meds:     Scheduled Meds:  PRN Meds:  Continuous Infusions:   sodium chloride 0.9%         Physical Exam:     Vitals:          Constitutional: NAD, conversant  HEENT:   Sclera anicteric, Uvula midline, EOMI, OP clear  Neck:               No JVD, moves to all direction without any limitations  CV:               RRR, no murmurs / rubs / gallops, normal S1/S2  Pulm:               CTAB, no wheezes, rales, or ronchi  GI:               Abdomen soft, NTND, +BS  Extremities:              No LE edema, warm with palpable pulses  Neuro:   AAOX3, no focal motor deficits    Mallampati: 2  ASA:  2      EKG: 10/29/2018  AF controlled VR    Past Medical History:   Diagnosis Date    Atrial fibrillation        Past Surgical History:   Procedure Laterality Date    ABLATION N/A 4/27/2017    Performed by [facility] at [facility] CATH LAB    CARDIOVERSION N/A 6/15/2017    Performed by José Grey [facility] at [facility] CATH LAB    COLONOSCOPY N/A 4/14/2016    Procedure: COLONOSCOPY;  Surgeon: Kade Wang MD;  Location: [facility] (4TH FLR);  Service: Endoscopy;  Laterality: N/A;    COLONOSCOPY N/A 4/14/2016    Performed by Kade Wang MD at [facility] ENDO (4TH FLR)    RADIOFREQUENCY ABLATION  2017    TRANSESOPHAGEAL ECHOCARDIOGRAM (LOPEZ) N/A 6/15/2017    Performed by José Grey MD at [facility] CATH LAB    TRANSESOPHAGEAL ECHOCARDIOGRAM (LOPEZ) N/A 4/27/2017    Performed by José Grey MD at [facility] CATH LAB       Review of patient's allergies indicates:  No Known Allergies    No current facility-administered medications on file prior to encounter.      Current Outpatient Medications on File Prior to Encounter   Medication Sig    apixaban (ELIQUIS) 5 mg Tab Take 1 tablet (5 mg total) by mouth 2 (two) times daily.    cartilage/collagen II/hyaluron (MOVE FREE ULTRA ORAL) Take by mouth.    cranberry 500 mg Cap Take by mouth.    ferrous sulfate (IRON ORAL) Take 1 tablet by mouth once daily.    fish oil-omega-3 fatty acids 300-1,000 mg capsule Take 2 g by mouth once daily.    L.acidophilus/B.bifidum,longum (HEALTHY COLON ORAL) Take by mouth.    metoprolol tartrate (LOPRESSOR) 25 MG tablet Take 0.5 tablets (12.5 mg total) by mouth 2 (two) times daily.    multivit-min/folic/vit K/lycop (ONE-A-DAY MEN'S 50 PLUS ORAL) Take by mouth.    sotalol (BETAPACE) 80 MG tablet Take 1 tablet (80 mg total) by mouth 2 (two) times daily.    vitamin E, dl,tocopheryl acet, (VITAMIN E, DL, ACETATE, ORAL) Take by mouth.     Family History     None        Tobacco Use    Smoking status: Former Smoker     Last attempt to quit:  1978     Years since quittin.3    Smokeless tobacco: Never Used   Substance and Sexual Activity    Alcohol use: No     Alcohol/week: 0.0 oz    Drug use: No    Sexual activity: Not on file     Review of Systems   Constitution: Negative.   HENT: Negative.    Eyes: Negative.    Cardiovascular: Negative.    Respiratory: Negative.    Endocrine: Negative.    Skin: Negative.    Musculoskeletal: Negative.    Gastrointestinal: Negative.    Genitourinary: Negative.    Neurological: Negative.      Objective:     Vital Signs (Most Recent):    Vital Signs (24h Range):           There is no height or weight on file to calculate BMI.            No intake or output data in the 24 hours ending 10/29/18 0721    Lines/Drains/Airways          None          Physical Exam   Constitutional: He is oriented to person, place, and time. He appears well-developed and well-nourished.   HENT:   Head: Normocephalic and atraumatic.   Eyes: Conjunctivae and EOM are normal. Pupils are equal, round, and reactive to light.   Neck: Normal range of motion. Neck supple. No JVD present.   Cardiovascular: Normal rate and normal heart sounds. Exam reveals no gallop and no friction rub.   No murmur heard.  IRR   Pulmonary/Chest: Effort normal and breath sounds normal. No respiratory distress. He has no wheezes. He has no rales. He exhibits no tenderness.   Abdominal: Soft. Bowel sounds are normal. He exhibits no distension. There is no tenderness.   Musculoskeletal: Normal range of motion. He exhibits no edema or tenderness.   Neurological: He is alert and oriented to person, place, and time.   Skin: Skin is warm and dry. No erythema. No pallor.       Significant Labs:   Recent Lab Results     None          Significant Imaging: per hpi    Assessment and Plan:     Atrial fibrillation    1. LOPEZ for evaluation of LA/WILIAN prior to DCCV.    -The risks, benefits & alternatives of the procedure were explained to the patient.    -The risks of  transesophageal echo include but are not limited to:  Dental trauma, esophageal trauma/perforation, bleeding, laryngospasm/brochospasm, aspiration, sore throat/hoarseness, & dislodgement of the endotracheal tube/nasogastric tube (where applicable).    -The risks of moderate sedation include hypotension, respiratory depression, arrhythmias, bronchospasm, & death.    -Informed consent was obtained & the patient is agreeable to proceed with the procedure.    I will discuss with the attending physician. Attending addendum is to follow.     Further recommendations per attending addendum    Christian Rollins MD  PGY-V Cardiology Fellow  [phone]         VTE Risk Mitigation (From admission, onward)    None          Christian Rollins MD  Cardiology   [facility]

## 2018-10-29 NOTE — SUBJECTIVE & OBJECTIVE
Past Medical History:   Diagnosis Date    Atrial fibrillation        Past Surgical History:   Procedure Laterality Date    ABLATION N/A 2017    Performed by [facility] at [facility] CATH LAB    CARDIOVERSION N/A 6/15/2017    Performed by José [facility] at [facility] CATH LAB    COLONOSCOPY N/A 2016    Procedure: COLONOSCOPY;  Surgeon: Kade Wang MD;  Location: [facility] ([facility] FLR);  Service: Endoscopy;  Laterality: N/A;    COLONOSCOPY N/A 2016    Performed by Kade Wang MD at [facility] ENDO (4TH FLR)    RADIOFREQUENCY ABLATION      TRANSESOPHAGEAL ECHOCARDIOGRAM (LOPEZ) N/A 6/15/2017    Performed by José Grey MD at [facility] CATH LAB    TRANSESOPHAGEAL ECHOCARDIOGRAM (LOPEZ) N/A 2017    Performed by José Grey MD at [facility] CATH LAB       Review of patient's allergies indicates:  No Known Allergies    No current facility-administered medications on file prior to encounter.      Current Outpatient Medications on File Prior to Encounter   Medication Sig    apixaban (ELIQUIS) 5 mg Tab Take 1 tablet (5 mg total) by mouth 2 (two) times daily.    cartilage/collagen II/hyaluron (MOVE FREE ULTRA ORAL) Take by mouth.    cranberry 500 mg Cap Take by mouth.    ferrous sulfate (IRON ORAL) Take 1 tablet by mouth once daily.    fish oil-omega-3 fatty acids 300-1,000 mg capsule Take 2 g by mouth once daily.    L.acidophilus/B.bifidum,longum (HEALTHY COLON ORAL) Take by mouth.    metoprolol tartrate (LOPRESSOR) 25 MG tablet Take 0.5 tablets (12.5 mg total) by mouth 2 (two) times daily.    multivit-min/folic/vit K/lycop (ONE-A-DAY MEN'S 50 PLUS ORAL) Take by mouth.    sotalol (BETAPACE) 80 MG tablet Take 1 tablet (80 mg total) by mouth 2 (two) times daily.    vitamin E, dl,tocopheryl acet, (VITAMIN E, DL, ACETATE, ORAL) Take by mouth.     Family History     None        Tobacco Use    Smoking status: Former Smoker     Last attempt to quit: 1978     Years since quittin.3     Smokeless tobacco: Never Used   Substance and Sexual Activity    Alcohol use: No     Alcohol/week: 0.0 oz    Drug use: No    Sexual activity: Not on file     Review of Systems   Constitution: Negative.   HENT: Negative.    Eyes: Negative.    Cardiovascular: Negative.    Respiratory: Negative.    Endocrine: Negative.    Skin: Negative.    Musculoskeletal: Negative.    Gastrointestinal: Negative.    Genitourinary: Negative.    Neurological: Negative.      Objective:     Vital Signs (Most Recent):    Vital Signs (24h Range):           There is no height or weight on file to calculate BMI.            No intake or output data in the 24 hours ending 10/29/18 0721    Lines/Drains/Airways          None          Physical Exam   Constitutional: He is oriented to person, place, and time. He appears well-developed and well-nourished.   HENT:   Head: Normocephalic and atraumatic.   Eyes: Conjunctivae and EOM are normal. Pupils are equal, round, and reactive to light.   Neck: Normal range of motion. Neck supple. No JVD present.   Cardiovascular: Normal rate and normal heart sounds. Exam reveals no gallop and no friction rub.   No murmur heard.  IRR   Pulmonary/Chest: Effort normal and breath sounds normal. No respiratory distress. He has no wheezes. He has no rales. He exhibits no tenderness.   Abdominal: Soft. Bowel sounds are normal. He exhibits no distension. There is no tenderness.   Musculoskeletal: Normal range of motion. He exhibits no edema or tenderness.   Neurological: He is alert and oriented to person, place, and time.   Skin: Skin is warm and dry. No erythema. No pallor.       Significant Labs:   Recent Lab Results     None          Significant Imaging: per hpi

## 2018-10-29 NOTE — PLAN OF CARE
Problem: Patient Care Overview  Goal: Plan of Care Review  Outcome: Ongoing (interventions implemented as appropriate)  Received report from ÁNGEL Ahn. Patient s/p DCCV, AAOx3. VSS, no c/o pain or discomfort at this time, resp even and unlabored. Post procedure protocol reviewed with patient and patient's family. Understanding verbalized. Family members at bedside. Nurse call bell within reach. Will continue to monitor per post procedure protocol.

## 2018-10-29 NOTE — PLAN OF CARE
Vss. sats 99% on room air. afib noted. Silvadene ointment to chest wall and back intact.  Pt's wife updated. Pt tolerating po intake in ep pacu.  12 lead ekg done per md order.  See flowsheet for full assessment.

## 2018-10-29 NOTE — TRANSFER OF CARE
"Anesthesia Transfer of Care Note    Patient: Marcus Watkins    Procedure(s) Performed: Procedure(s) (LRB):  CARDIOVERSION (N/A)  ECHOCARDIOGRAM,TRANSESOPHAGEAL (N/A)    Patient location: PACU    Anesthesia Type: general    Transport from OR: Transported from OR on 2-3 L/min O2 by NC with adequate spontaneous ventilation    Post pain: adequate analgesia    Post assessment: no apparent anesthetic complications and tolerated procedure well    Post vital signs: stable    Level of consciousness: sedated and responds to stimulation    Nausea/Vomiting: no nausea/vomiting    Complications: none    Transfer of care protocol was followed      Last vitals:   Visit Vitals  /78 (BP Location: Right arm, Patient Position: Lying)   Pulse 69   Temp 36.6 °C (97.9 °F) (Oral)   Resp 20   Ht 5' 11" (1.803 m)   Wt 124.7 kg (275 lb)   SpO2 (!) 94%   BMI 38.35 kg/m²     "

## 2018-10-29 NOTE — HPI
TRANSESOPHAGEAL ECHOCARDIOGRAPHY   PRE-PROCEDURE NOTE    10/29/2018    HPI:     63-year old male with a history of symptomatic typical atrial flutter status-post CTI-line, who then developed AF requiring DCCV and Sotalol initiation. His [id number] Score is 2 (age, HTN).  His wife called in on 10/22 informing EP that patient has been in AFib x 1 week.  Set up for LOPEZ DCCV as he has been off of Eliquis x couple of weeks due to patient's account that taking eliquis gives him 'foot numbness.'  2de 4/2018 with ef 55-60, UMESH, PASP 34, +Diastolic dysfunction.        Dysphagia or odynophagia:  No  Liver Disease, esophageal disease, or known varices:  No  Upper GI Bleeding: No  Snoring:  Yes  Sleep Apnea:  No  Prior neck surgery or radiation:  No  History of anesthetic difficulties:  No  Family history of anesthetic difficulties:  No  Last oral intake:  12 hours ago  Able to move neck in all directions:  Yes      Meds:     Scheduled Meds:  PRN Meds:  Continuous Infusions:   sodium chloride 0.9%         Physical Exam:     Vitals:          Constitutional: NAD, conversant  HEENT:   Sclera anicteric, Uvula midline, EOMI, OP clear  Neck:               No JVD, moves to all direction without any limitations  CV:               RRR, no murmurs / rubs / gallops, normal S1/S2  Pulm:               CTAB, no wheezes, rales, or ronchi  GI:               Abdomen soft, NTND, +BS  Extremities:              No LE edema, warm with palpable pulses  Neuro:   AAOX3, no focal motor deficits    Mallampati: 2  ASA: 2      EKG: 10/29/2018  AF controlled VR

## 2018-10-29 NOTE — ASSESSMENT & PLAN NOTE
1. LOPEZ for evaluation of LA/WILIAN prior to DCCV.    -The risks, benefits & alternatives of the procedure were explained to the patient.    -The risks of transesophageal echo include but are not limited to:  Dental trauma, esophageal trauma/perforation, bleeding, laryngospasm/brochospasm, aspiration, sore throat/hoarseness, & dislodgement of the endotracheal tube/nasogastric tube (where applicable).    -The risks of moderate sedation include hypotension, respiratory depression, arrhythmias, bronchospasm, & death.    -Informed consent was obtained & the patient is agreeable to proceed with the procedure.    I will discuss with the attending physician. Attending addendum is to follow.     Further recommendations per attending addendum    Christian Rollins MD  PGY-V Cardiology Fellow  [phone]

## 2018-10-30 NOTE — DISCHARGE SUMMARY
[facility]  Cardiac Electrophysiology  Discharge Summary      Patient Name: Marcus Watkins  MRN: [id number]  Admission Date: 10/29/2018  Hospital Length of Stay: 0 days  Discharge Date and Time:  10/29/2018 8:20 PM  Attending Physician: José Grey. MD  Discharging Provider: Shavonne Moore NP  Primary Care Physician: Radha Brunson MD    HPI: Mr. Watkins is a 63-year old male with a history of symptomatic typical atrial flutter status-post CTI-line, who then developed AF requiring DCCV and Sotalol initiation. His [id number] Score is 2 (age, HTN). His wife called on 10/22 informing EP that patient has been in AFib x 1 week. Set up for LOPEZ DCCV as he has been off of Eliquis x couple of weeks due to patient's account that taking eliquis gives him 'foot numbness.'  Echo 4/2018 with EF of 55-60% and +Diastolic dysfunction.    He is currently taking sotalol, eliquis, and metoprolol.    Procedure(s) (LRB):  CARDIOVERSION (N/A)  ECHOCARDIOGRAM,TRANSESOPHAGEAL (N/A)     Indwelling Lines/Drains at time of discharge:  Lines/Drains/Airways          None        Hospital Course: Patient presented in atrial fibrillation. Patient on Eliquis. LOPEZ completed, negative for WILIAN thrombus. Unsuccessful DCCV (see procedure note). Patient tolerated procedure well with no acute complications. Post procedure EKG showed atrial fibrillation with a ventricular rate of 102 bpm. Plan to continue home medications including Eliquis and metoprolol. Stop taking sotalol. In 5 days start amiodarone 400 mg BID x 14 days then 200 mg daily.  Repeat DCCV in 3 weeks. Follow up with Dr. Grey in 4 weeks. Referral for sleep study.  Discharge plans/instructions discussed with patient and wife who verbalized understanding and agreement of plans of care. No further questions or concerns voiced at this time. Discharged home in stable condition.     Consults:    Anesthesia.    Significant Diagnostic Studies: EKG    Final Active  Diagnoses:    Diagnosis Date Noted POA    PRINCIPAL PROBLEM:  Atrial fibrillation [I48.91] 10/29/2018 Yes      Problems Resolved During this Admission:     No new Assessment & Plan notes have been filed under this hospital service since the last note was generated.  Service: Arrhythmia    Discharged Condition: stable    Disposition: Home or Self Care    Follow Up:  Follow-up Information     José Grey MD In 1 month.    Specialties:  Electrophysiology, Cardiovascular Disease, Cardiology  Why:  s/p DCC  Contact information:  Sh[address]  [facility] [id number] [phone]                 Patient Instructions:      Ambulatory consult to Sleep Disorders   Referral Priority: Routine Referral Type: Consultation   Referral Reason: Patient Preference   Requested Specialty: Sleep Medicine   Number of Visits Requested: 1     Diet Cardiac     No driving until:   Order Comments: You received anesthesia for your procedure, NO driving for 24-48 hours post procedure.     Other restrictions (specify):   Order Comments: Medications:  -Take your home medications after you are discharged, especially your eliquis.  -STOP taking your Sotalol.  -START Amiodarone 5 days from your last dose of Sotalol (on 11/3/18).   -If there are any questions about your new medication: Amiodarone.    Take Amiodarone 1 tablet (400 mg total) by mouth 2 (two) times daily for 14 days, THEN,    Take Amiodarone 1 tablet (200 mg total) by mouth 1 (one) time daily.   -If you have problems or side effects caused by your medications, call your  Physician.    New Medications:  Amiodarone.    Side effects:  -You may be drowsy for the remainder of the day.    Diet  -You may resume oral intake after you are discharged, as long you have no swallowing difficulties.    Because you have received sedation for this procedure:  -Limit activity for the remainder of the day.  -Do not drive or operate any equipment for the remainder of the day.  -Do not smoke for  at least 6 hours and until you are fully awake and alert.  -Do not drink alcoholic beverage for 24 hours.  -Defer important decision making until the following day.    Go to the Emergency Department if you develop:  -Bleeding  -Weakness or numbness  -Visual, gait or speech disturbance  -New chest pain, palpitations, shortness of breath, or fainting  -Fever    Follow up:  -Repeat cardioversion in 3 weeks.  -Dr. Grey in 1 month.  -Referral to sleep medicine.     Notify your health care provider if you experience any of the following:  temperature >100.4     Notify your health care provider if you experience any of the following:  persistent nausea and vomiting or diarrhea     Notify your health care provider if you experience any of the following:  severe uncontrolled pain     Notify your health care provider if you experience any of the following:  redness, tenderness, or signs of infection (pain, swelling, redness, odor or green/yellow discharge around incision site)     Notify your health care provider if you experience any of the following:  difficulty breathing or increased cough     Notify your health care provider if you experience any of the following:  severe persistent headache     Notify your health care provider if you experience any of the following:  worsening rash     Notify your health care provider if you experience any of the following:  persistent dizziness, light-headedness, or visual disturbances     Notify your health care provider if you experience any of the following:  increased confusion or weakness     Notify your health care provider if you experience any of the following:   Order Comments: Any concerning medical symptoms.     Activity as tolerated     Medications:  Reconciled Home Medications:      Medication List      START taking these medications    * amiodarone 400 MG tablet  Commonly known as:  PACERONE  Take 1 tablet (400 mg total) by mouth 2 (two) times daily. for 14 days     *  amiodarone 200 MG Tab  Commonly known as:  PACERONE  Take 1 tablet (200 mg total) by mouth once daily.         * This list has 2 medication(s) that are the same as other medications prescribed for you. Read the directions carefully, and ask your doctor or other care provider to review them with you.            CONTINUE taking these medications    apixaban 5 mg Tab  Commonly known as:  ELIQUIS  Take 1 tablet (5 mg total) by mouth 2 (two) times daily.     cranberry 500 mg Cap  Take by mouth.     fish oil-omega-3 fatty acids 300-1,000 mg capsule  Take 2 g by mouth once daily.     HEALTHY COLON ORAL  Take by mouth.     IRON ORAL  Take 1 tablet by mouth once daily.     metoprolol tartrate 25 MG tablet  Commonly known as:  LOPRESSOR  Take 0.5 tablets (12.5 mg total) by mouth 2 (two) times daily.     MOVE FREE ULTRA ORAL  Take by mouth.     ONE-A-DAY MEN'S 50 PLUS ORAL  Take by mouth.     VITAMIN E (DL, ACETATE) ORAL  Take by mouth.        STOP taking these medications    sotalol 80 MG tablet  Commonly known as:  BETAPACE          Plan:  -Continue home medications.  -Stop Sotalol.  -In 5 days start Amiodarone 400 mg bid x2 weeks then 200 mg daily.   -Repeat DCCV in 3 weeks.  -Follow up with Dr. Grey in 4 weeks.  -Referral to sleep medicine.  .  Time spent on the discharge of patient: 21 minutes    Shavonne Moore NP  Cardiac Electrophysiology  [facility]    STAFF: José Grey MD

## 2018-11-09 ENCOUNTER — HOSPITAL ENCOUNTER (OUTPATIENT)
Dept: CARDIOLOGY | Facility: CLINIC | Age: 63
Discharge: HOME | End: 2018-11-09
Payer: COMMERCIAL

## 2018-11-26 ENCOUNTER — HOSPITAL ENCOUNTER (OUTPATIENT)
Dept: CARDIOLOGY | Facility: CLINIC | Age: 63
Discharge: HOME | End: 2018-11-26
Payer: MEDICARE

## 2018-11-26 ENCOUNTER — LAB VISIT (OUTPATIENT)
Dept: LAB | Facility: HOSPITAL | Age: 63
End: 2018-11-26
Attending: INTERNAL MEDICINE
Payer: COMMERCIAL

## 2018-11-26 ENCOUNTER — OFFICE VISIT (OUTPATIENT)
Dept: ELECTROPHYSIOLOGY | Facility: CLINIC | Age: 63
End: 2018-11-26
Payer: COMMERCIAL

## 2018-11-26 VITALS
HEIGHT: 71 IN | DIASTOLIC BLOOD PRESSURE: 84 MMHG | HEART RATE: 82 BPM | WEIGHT: 281.31 LBS | SYSTOLIC BLOOD PRESSURE: 128 MMHG | BODY MASS INDEX: 39.38 KG/M2

## 2018-11-26 DIAGNOSIS — I48.92 ATRIAL FLUTTER, UNSPECIFIED TYPE: ICD-10-CM

## 2018-11-26 DIAGNOSIS — I48.19 PERSISTENT ATRIAL FIBRILLATION: Primary | ICD-10-CM

## 2018-11-26 DIAGNOSIS — I48.19 PERSISTENT ATRIAL FIBRILLATION: ICD-10-CM

## 2018-11-26 DIAGNOSIS — I48.3 TYPICAL ATRIAL FLUTTER: ICD-10-CM

## 2018-11-26 DIAGNOSIS — Z98.890 STATUS POST CATHETER ABLATION OF ATRIAL FLUTTER: ICD-10-CM

## 2018-11-26 PROBLEM — Z79.899 ENCOUNTER FOR MONITORING SOTALOL THERAPY: Status: RESOLVED | Noted: 2018-09-10 | Resolved: 2018-11-26

## 2018-11-26 PROBLEM — Z51.81 ENCOUNTER FOR MONITORING SOTALOL THERAPY: Status: RESOLVED | Noted: 2018-09-10 | Resolved: 2018-11-26

## 2018-11-26 LAB
ANION GAP SERPL CALC-SCNC: 8 MMOL/L
APTT BLDCRRT: 23.2 SEC
BASOPHILS # BLD AUTO: 0.05 K/UL
BASOPHILS NFR BLD: 0.5 %
BUN SERPL-MCNC: 20 MG/DL
CALCIUM SERPL-MCNC: 9.8 MG/DL
CHLORIDE SERPL-SCNC: 103 MMOL/L
CO2 SERPL-SCNC: 26 MMOL/L
CREAT SERPL-MCNC: 0.9 MG/DL
DIFFERENTIAL METHOD: ABNORMAL
EOSINOPHIL # BLD AUTO: 0.3 K/UL
EOSINOPHIL NFR BLD: 2.7 %
ERYTHROCYTE [DISTWIDTH] IN BLOOD BY AUTOMATED COUNT: 13.2 %
EST. GFR  (AFRICAN AMERICAN): >60 ML/MIN/1.73 M^2
EST. GFR  (NON AFRICAN AMERICAN): >60 ML/MIN/1.73 M^2
GLUCOSE SERPL-MCNC: 104 MG/DL
HCT VFR BLD AUTO: 45.5 %
HGB BLD-MCNC: 15.1 G/DL
IMM GRANULOCYTES # BLD AUTO: 0.05 K/UL
IMM GRANULOCYTES NFR BLD AUTO: 0.5 %
INR PPP: 1
LYMPHOCYTES # BLD AUTO: 2.1 K/UL
LYMPHOCYTES NFR BLD: 23.3 %
MCH RBC QN AUTO: 31.1 PG
MCHC RBC AUTO-ENTMCNC: 33.2 G/DL
MCV RBC AUTO: 94 FL
MONOCYTES # BLD AUTO: 0.8 K/UL
MONOCYTES NFR BLD: 8.6 %
NEUTROPHILS # BLD AUTO: 5.9 K/UL
NEUTROPHILS NFR BLD: 64.4 %
NRBC BLD-RTO: 0 /100 WBC
PLATELET # BLD AUTO: 244 K/UL
PMV BLD AUTO: 11.1 FL
POTASSIUM SERPL-SCNC: 4.5 MMOL/L
PROTHROMBIN TIME: 10.2 SEC
RBC # BLD AUTO: 4.86 M/UL
SODIUM SERPL-SCNC: 137 MMOL/L
WBC # BLD AUTO: 9.15 K/UL

## 2018-11-26 PROCEDURE — 99215 OFFICE O/P EST HI 40 MIN: CPT | Mod: S$GLB,,, | Performed by: NURSE PRACTITIONER

## 2018-11-26 PROCEDURE — 36415 COLL VENOUS BLD VENIPUNCTURE: CPT

## 2018-11-26 PROCEDURE — 1101F PT FALLS ASSESS-DOCD LE1/YR: CPT | Mod: CPTII,S$GLB,, | Performed by: NURSE PRACTITIONER

## 2018-11-26 PROCEDURE — 85730 THROMBOPLASTIN TIME PARTIAL: CPT

## 2018-11-26 PROCEDURE — 93010 ELECTROCARDIOGRAM REPORT: CPT | Mod: S$GLB,,, | Performed by: INTERNAL MEDICINE

## 2018-11-26 PROCEDURE — 3008F BODY MASS INDEX DOCD: CPT | Mod: CPTII,S$GLB,, | Performed by: NURSE PRACTITIONER

## 2018-11-26 PROCEDURE — 80048 BASIC METABOLIC PNL TOTAL CA: CPT

## 2018-11-26 PROCEDURE — 99999 PR PBB SHADOW E&M-EST. PATIENT-LVL III: CPT | Mod: PBBFAC,,, | Performed by: NURSE PRACTITIONER

## 2018-11-26 PROCEDURE — 85025 COMPLETE CBC W/AUTO DIFF WBC: CPT

## 2018-11-26 PROCEDURE — 93005 ELECTROCARDIOGRAM TRACING: CPT | Mod: S$GLB,,, | Performed by: INTERNAL MEDICINE

## 2018-11-26 PROCEDURE — 99499 UNLISTED E&M SERVICE: CPT | Mod: S$GLB,,, | Performed by: NURSE PRACTITIONER

## 2018-11-26 PROCEDURE — 85610 PROTHROMBIN TIME: CPT

## 2018-11-26 NOTE — PROGRESS NOTES
CARDIOVERSION EDUCATION CHECK LIST      DAY OF PROCEDURE     11/29/18 @ 6:15 AM - CARDIOVERSION   Report to Cardiology Waiting Room on 3rd floor of the hospital  · Do not eat or drink anything after 12 mn on the night before your procedure.  · Please do not wear makeup (especially mascara) when arriving for your procedure.    Medications:    · You may take your usual morning medications with a sip of water      Directions to the Cardiology Waiting Room  If you park in the Parking Garage:  Take elevators to the 2nd floor  Walk up ramp and turn right by [employer] Elevators  Take elevator to the 3rd floor  Upon exiting the elevator, turn away from the clinic areas  Walk long pearson around to front of hospital to area with windows overlooking [facility]  Check in at Reception Desk  OR  If family is dropping you off:  Have them drop you off at the front of the Hospital  (Near the ER, where all the flags are hung).  Take the E elevators to the 3rd floor.  Check in at the Reception Desk in the waiting room.        · YOU WILL BE GOING HOME THE SAME DAY AS YOUR PROCEDURE  · YOU WILL NEED SOMEONE TO DRIVE YOU HOME AFTER YOUR PROCEDURE   · YOUR PAIN DURING YOUR PROCEDURE WILL BE MANAGED BY THE ANESTHESIA TEAM    Any need to reschedule or cancel procedures, or any questions regarding your procedure should be addressed directly with the Arrhythmia Department Nurses at the following phone number: [phone]

## 2018-11-26 NOTE — PROGRESS NOTES
Mr. Watkins is a patient of Dr. Grey and was last seen in clinic 9/10/2018.      Subjective:   Patient ID:  Marcus Watkins is a 63 y.o. male who presents for follow-up of Atrial Fibrillation and Shortness of Breath (climing stairs)  .     HPI:    Mr. Watkins is a 63 y.o. male with pAF, AFL here for follow up after unsuccessful DCCV.     Background:    Mr. Watkins is a 63-year old male with no significant past medical history who presented to the [facility] ED with a 24 hour history of palpitations associated with shortness of breath, nausea, and vomiting. He was found to be in typical atrial flutter with RVR, and was administered Diltiazem which converted him to AF. He then spontaneously reverted to sinus rhythm. He was discharged on a 30-day course of Eliquis, as well as Metoprolol.  In 4/2017, Mr. Watkins underwent EPS and CTI-line. In 6/2017, Mr. Watkins presented to [facility] in AF and underwent DCCV. He was started on Sotalol around this time. He has had no recurrent AF episodes. His only complaint today is foot numbness, for which he is currently in the midst of a work-up.    Recent cardiac studies include an echo performed in 4/2018 which showed an EF of 55-60% and mild LAE.    His wife called on 10/22 informing EP that patient has been in AFib x 1 week. Set up for LOPEZ DCCV as he has been off of Eliquis x couple of weeks due to patient's account that taking eliquis gives him 'foot numbness.'      Update (11/26/2018):    On 10/29/2018 he presented to the hospital for DCCV. Patient tolerated procedure well with no acute complications. Post procedure EKG showed atrial fibrillation with a ventricular rate of 102 bpm. Plan to continue home medications including Eliquis and metoprolol. Stop taking sotalol. In 5 days start amiodarone 400 mg BID x 14 days then 200 mg daily. Plan was to repeat DCCV in 3 weeks but this did not occur. Referral for sleep study provided.    Today he continues to feel SOB as he has since he went back into AF. Mr. Watkins  denies chest pain with exertion or at rest, palpitations, dizziness, or syncope.    He is currently taking eliquis 5mg BID for stroke prophylaxis and denies significant bleeding episodes. He is currently being treated with amiodarone 200mg daily for rhythm control and metoprolol 12.5mg BID for HR control.  Kidney function is stable, with a creatinine of 0.8 on 10/24/2018.    I have personally reviewed the patient's EKG today, which shows AF at 82bpm.    Recent Cardiac Tests:    2D Echo (10/20/9/2018):  CONCLUSIONS     1 - Biatrial enlargement.     2 - No LA or WILIAN thrombu.     3 - Low normal to mildly depressed right ventricular function .     4 - Low normal to mildly depressed left ventricular systolic function.     5 - Mild to moderate mitral regurgitation.     6 - Mild tricuspid regurgitation.     Current Outpatient Medications   Medication Sig    amiodarone (PACERONE) 200 MG Tab Take 1 tablet (200 mg total) by mouth once daily.    apixaban (ELIQUIS) 5 mg Tab Take 1 tablet (5 mg total) by mouth 2 (two) times daily.    cartilage/collagen II/hyaluron (MOVE FREE ULTRA ORAL) Take by mouth.    cranberry 500 mg Cap Take by mouth.    ferrous sulfate (IRON ORAL) Take 1 tablet by mouth once daily.    fish oil-omega-3 fatty acids 300-1,000 mg capsule Take 2 g by mouth once daily.    L.acidophilus/B.bifidum,longum (HEALTHY COLON ORAL) Take by mouth.    metoprolol tartrate (LOPRESSOR) 25 MG tablet Take 0.5 tablets (12.5 mg total) by mouth 2 (two) times daily.    multivit-min/folic/vit K/lycop (ONE-A-DAY MEN'S 50 PLUS ORAL) Take by mouth.    vitamin E, dl,tocopheryl acet, (VITAMIN E, DL, ACETATE, ORAL) Take by mouth.     No current facility-administered medications for this visit.      Review of Systems   Constitution: Negative for malaise/fatigue.   Cardiovascular: Positive for dyspnea on exertion and irregular heartbeat. Negative for chest pain, leg swelling and palpitations.   Respiratory: Negative for shortness  "of breath.    Hematologic/Lymphatic: Negative for bleeding problem.   Skin: Negative for rash.   Musculoskeletal: Negative for myalgias.   Gastrointestinal: Negative for hematemesis, hematochezia and nausea.   Genitourinary: Negative for hematuria.   Neurological: Negative for light-headedness.   Psychiatric/Behavioral: Negative for altered mental status.   Allergic/Immunologic: Negative for persistent infections.     Objective:        /84   Pulse 82   Ht 5' 11" (1.803 m)   Wt 127.6 kg (281 lb 4.9 oz)   BMI 39.23 kg/m²     Physical Exam   Constitutional: He is oriented to person, place, and time. He appears well-developed and well-nourished.   HENT:   Head: Normocephalic.   Nose: Nose normal.   Eyes: Pupils are equal, round, and reactive to light.   Cardiovascular: Normal rate, S1 normal and S2 normal. An irregularly irregular rhythm present.   No murmur heard.  Pulses:       Radial pulses are 2+ on the right side, and 2+ on the left side.   Pulmonary/Chest: Breath sounds normal. No respiratory distress.   Abdominal: Normal appearance.   Musculoskeletal: Normal range of motion. He exhibits no edema.   Neurological: He is alert and oriented to person, place, and time.   Skin: Skin is warm and dry. No erythema.   Psychiatric: He has a normal mood and affect. His speech is normal and behavior is normal.   Nursing note and vitals reviewed.    Lab Results   Component Value Date     10/24/2018    K 4.0 10/24/2018    MG 2.2 02/13/2017    BUN 19 10/24/2018    CREATININE 0.8 10/24/2018    ALT 20 06/27/2018    AST 19 06/27/2018    HGB 13.6 (L) 10/24/2018    HCT 42.4 10/24/2018    TSH 0.913 06/27/2018    LDLCALC 145.0 06/27/2018       Recent Labs   Lab 02/13/17  1102 04/21/17  0924 06/12/17  1644 10/24/18  1332   INR 1.1 0.9 0.9 1.0         Assessment:     1. Persistent atrial fibrillation    2. Typical atrial flutter    3. Status post catheter ablation of atrial flutter      Plan:     In summary, Mr. Watkins is a " 63 y.o. male with pAF, AFL (s/p CTI 4/2017) here for follow up after unsuccessful DCCV.   He is currently on amiodarone and still symptomatic - in rate controlled AF.  Plan will be to schedule him for DCCV now. We discussed steps for after cardioversion. He agrees he does not want to be on amiodarone long-term (failed sotalol). He is interested in PVI.   I had extensive discussion with Marcus Watkins regarding the risks, benefits, indications, and alternatives to invasive electrophysiology study and catheter ablation. Risks of the procedure include, but are not limited to, bleeding, stroke, perforation (requiring emergency draining or surgery), AV block, death, AV fistula, and PV stenosis. I discussed ablative therapy for atrial fibrillation, including the procedural details as well as the preparation for the procedure. All questions were answered, patient verbalized understanding.   He would like to proceed.    Schedule DCCV (no LOPEZ needed prior to this - he says he has been compliant with eliquis) and then schedule PVI.  Obtain CTA of chest to evaluate pulmonary vein anatomy.  Hold eliquis 1 day prior to PVI.  Hold amiodarone 2 weeks prior to PVI.  LOPEZ within 1-3 days of procedure  Follow up in EP clinic as scheduled following procedure, sooner as needed.     *A copy of this note has been sent to Dr. Grey*    Follow-up as scheduled following procedure.    ------------------------------------------------------------------    GENNARO Bravo, NP-C  [facility]

## 2018-11-26 NOTE — H&P (VIEW-ONLY)
Mr. Watkins is a patient of Dr. Grey and was last seen in clinic 9/10/2018.      Subjective:   Patient ID:  Marcus Watkins is a 63 y.o. male who presents for follow-up of Atrial Fibrillation and Shortness of Breath (climing stairs)  .     HPI:    Mr. Watkins is a 63 y.o. male with pAF, AFL here for follow up after unsuccessful DCCV.     Background:    Mr. Watkins is a 63-year old male with no significant past medical history who [place] [facility] [place] with a 24 hour history of palpitations associated with shortness of breath, nausea, and vomiting. He was found to be in typical atrial flutter with RVR, and was administered Diltiazem which converted him to AF. He then spontaneously reverted to sinus rhythm. He was discharged on a 30-day course of Eliquis, as well as Metoprolol.  In 4/2017, Mr. Watkins underwent EPS and CTI-line. In 6/2017, Mr. [place] [facility] [place] and underwent DCCV. He was started on Sotalol around this time. He has had no recurrent AF episodes. His only complaint today is foot numbness, for which he is currently in the midst of a work-up.    Recent cardiac studies include an echo performed in 4/2018 which showed an EF of 55-60% and mild LAE.    His wife called on 10/22 informing EP that patient has been in AFib x 1 week. Set up for LOPEZ DCCV as he has been off of Eliquis x couple of weeks due to patient's account that taking eliquis gives him 'foot numbness.'      Update (11/26/2018):    On 10/29/2018 he presented to the hospital for DCCV. Patient tolerated procedure well with no acute complications. Post procedure EKG showed atrial fibrillation with a ventricular rate of 102 bpm. Plan to continue home medications including Eliquis and metoprolol. Stop taking sotalol. In 5 days start amiodarone 400 mg BID x 14 days then 200 mg daily. Plan was to repeat DCCV in 3 weeks but this did not occur. Referral for sleep study provided.    Today he continues to feel SOB as he has since he went back into AF. Mr. Watkins  denies chest pain with exertion or at rest, palpitations, dizziness, or syncope.    He is currently taking eliquis 5mg BID for stroke prophylaxis and denies significant bleeding episodes. He is currently being treated with amiodarone 200mg daily for rhythm control and metoprolol 12.5mg BID for HR control.  Kidney function is stable, with a creatinine of 0.8 on 10/24/2018.    I have personally reviewed the patient's EKG today, which shows AF at 82bpm.    Recent Cardiac Tests:    2D Echo (10/20/9/2018):  CONCLUSIONS     1 - Biatrial enlargement.     2 - No LA or WILIAN thrombu.     3 - Low normal to mildly depressed right ventricular function .     4 - Low normal to mildly depressed left ventricular systolic function.     5 - Mild to moderate mitral regurgitation.     6 - Mild tricuspid regurgitation.     Current Outpatient Medications   Medication Sig    amiodarone (PACERONE) 200 MG Tab Take 1 tablet (200 mg total) by mouth once daily.    apixaban (ELIQUIS) 5 mg Tab Take 1 tablet (5 mg total) by mouth 2 (two) times daily.    cartilage/collagen II/hyaluron (MOVE FREE ULTRA ORAL) Take by mouth.    cranberry 500 mg Cap Take by mouth.    ferrous sulfate (IRON ORAL) Take 1 tablet by mouth once daily.    fish oil-omega-3 fatty acids 300-1,000 mg capsule Take 2 g by mouth once daily.    L.acidophilus/B.bifidum,longum (HEALTHY COLON ORAL) Take by mouth.    metoprolol tartrate (LOPRESSOR) 25 MG tablet Take 0.5 tablets (12.5 mg total) by mouth 2 (two) times daily.    multivit-min/folic/vit K/lycop (ONE-A-DAY MEN'S 50 PLUS ORAL) Take by mouth.    vitamin E, dl,tocopheryl acet, (VITAMIN E, DL, ACETATE, ORAL) Take by mouth.     No current facility-administered medications for this visit.      Review of Systems   Constitution: Negative for malaise/fatigue.   Cardiovascular: Positive for dyspnea on exertion and irregular heartbeat. Negative for chest pain, leg swelling and palpitations.   Respiratory: Negative for shortness  "of breath.    Hematologic/Lymphatic: Negative for bleeding problem.   Skin: Negative for rash.   Musculoskeletal: Negative for myalgias.   Gastrointestinal: Negative for hematemesis, hematochezia and nausea.   Genitourinary: Negative for hematuria.   Neurological: Negative for light-headedness.   Psychiatric/Behavioral: Negative for altered mental status.   Allergic/Immunologic: Negative for persistent infections.     Objective:        /84   Pulse 82   Ht 5' 11" (1.803 m)   Wt 127.6 kg (281 lb 4.9 oz)   BMI 39.23 kg/m²     Physical Exam   Constitutional: He is oriented to person, place, and time. He appears well-developed and well-nourished.   HENT:   Head: Normocephalic.   Nose: Nose normal.   Eyes: Pupils are equal, round, and reactive to light.   Cardiovascular: Normal rate, S1 normal and S2 normal. An irregularly irregular rhythm present.   No murmur heard.  Pulses:       Radial pulses are 2+ on the right side, and 2+ on the left side.   Pulmonary/Chest: Breath sounds normal. No respiratory distress.   Abdominal: Normal appearance.   Musculoskeletal: Normal range of motion. He exhibits no edema.   Neurological: He is alert and oriented to person, place, and time.   Skin: Skin is warm and dry. No erythema.   Psychiatric: He has a normal mood and affect. His speech is normal and behavior is normal.   Nursing note and vitals reviewed.    Lab Results   Component Value Date     10/24/2018    K 4.0 10/24/2018    MG 2.2 02/13/2017    BUN 19 10/24/2018    CREATININE 0.8 10/24/2018    ALT 20 06/27/2018    AST 19 06/27/2018    HGB 13.6 (L) 10/24/2018    HCT 42.4 10/24/2018    TSH 0.913 06/27/2018    LDLCALC 145.0 06/27/2018       Recent Labs   Lab 02/13/17  1102 04/21/17  0924 06/12/17  1644 10/24/18  1332   INR 1.1 0.9 0.9 1.0         Assessment:     1. Persistent atrial fibrillation    2. Typical atrial flutter    3. Status post catheter ablation of atrial flutter      Plan:     In summary, Mr. Watkins is a " 63 y.o. male with pAF, AFL (s/p CTI 4/2017) here for follow up after unsuccessful DCCV.   He is currently on amiodarone and still symptomatic - in rate controlled AF.  Plan will be to schedule him for DCCV now. We discussed steps for after cardioversion. He agrees he does not want to be on amiodarone long-term (failed sotalol). He is interested in PVI.   I had extensive discussion with Marcus Watkins regarding the risks, benefits, indications, and alternatives to invasive electrophysiology study and catheter ablation. Risks of the procedure include, but are not limited to, bleeding, stroke, perforation (requiring emergency draining or surgery), AV block, death, AV fistula, and PV stenosis. I discussed ablative therapy for atrial fibrillation, including the procedural details as well as the preparation for the procedure. All questions were answered, patient verbalized understanding.   He would like to proceed.    Schedule DCCV (no LOPEZ needed prior to this - he says he has been compliant with eliquis) and then schedule PVI.  Obtain CTA of chest to evaluate pulmonary vein anatomy.  Hold eliquis 1 day prior to PVI.  Hold amiodarone 2 weeks prior to PVI.  LOPEZ within 1-3 days of procedure  Follow up in EP clinic as scheduled following procedure, sooner as needed.     *A copy of this note has been sent to Dr. Grey*    Follow-up as scheduled following procedure.    ------------------------------------------------------------------    GENNARO Bravo, NP-C  [facility]

## 2018-11-28 ENCOUNTER — TELEPHONE (OUTPATIENT)
Dept: ELECTROPHYSIOLOGY | Facility: CLINIC | Age: 63
End: 2018-11-28

## 2018-11-28 NOTE — TELEPHONE ENCOUNTER
Called pt to review pre op instructions for DCCV in AM. Requested pt arrive for 530 AM to SSCU instead of 6:15. Pt agrees and verbalized understanding. Reminded pt to fast after midnight, take usual AM meds with small amount of water, and will need ride home post procedure. Pt verbalized understanding and denies any questions/concerns at this time.

## 2018-11-29 ENCOUNTER — ANESTHESIA (OUTPATIENT)
Dept: MEDSURG UNIT | Facility: HOSPITAL | Age: 63
End: 2018-11-29
Payer: COMMERCIAL

## 2018-11-29 ENCOUNTER — HOSPITAL ENCOUNTER (OUTPATIENT)
Facility: HOSPITAL | Age: 63
Discharge: HOME | End: 2018-11-29
Attending: INTERNAL MEDICINE | Admitting: INTERNAL MEDICINE
Payer: COMMERCIAL

## 2018-11-29 ENCOUNTER — ANESTHESIA EVENT (OUTPATIENT)
Dept: MEDSURG UNIT | Facility: HOSPITAL | Age: 63
End: 2018-11-29
Payer: COMMERCIAL

## 2018-11-29 VITALS
TEMPERATURE: 97 F | SYSTOLIC BLOOD PRESSURE: 129 MMHG | OXYGEN SATURATION: 95 % | RESPIRATION RATE: 18 BRPM | DIASTOLIC BLOOD PRESSURE: 81 MMHG | HEART RATE: 84 BPM | HEIGHT: 71 IN | WEIGHT: 280 LBS | BODY MASS INDEX: 39.2 KG/M2

## 2018-11-29 DIAGNOSIS — I48.91 ATRIAL FIBRILLATION: Primary | ICD-10-CM

## 2018-11-29 DIAGNOSIS — I48.19 PERSISTENT ATRIAL FIBRILLATION: ICD-10-CM

## 2018-11-29 DIAGNOSIS — I48.19 PERSISTENT ATRIAL FIBRILLATION: Primary | ICD-10-CM

## 2018-11-29 PROCEDURE — 63600175 PHARM REV CODE 636 W HCPCS: Performed by: NURSE ANESTHETIST, CERTIFIED REGISTERED

## 2018-11-29 PROCEDURE — 92960 CARDIOVERSION ELECTRIC EXT: CPT | Performed by: INTERNAL MEDICINE

## 2018-11-29 PROCEDURE — 93005 ELECTROCARDIOGRAM TRACING: CPT

## 2018-11-29 PROCEDURE — D9220A PRA ANESTHESIA: Mod: CRNA,,, | Performed by: NURSE ANESTHETIST, CERTIFIED REGISTERED

## 2018-11-29 PROCEDURE — 37000008 HC ANESTHESIA 1ST 15 MINUTES: Performed by: INTERNAL MEDICINE

## 2018-11-29 PROCEDURE — D9220A PRA ANESTHESIA: Mod: ANES,,, | Performed by: ANESTHESIOLOGY

## 2018-11-29 PROCEDURE — 37000009 HC ANESTHESIA EA ADD 15 MINS: Performed by: INTERNAL MEDICINE

## 2018-11-29 PROCEDURE — 93010 ELECTROCARDIOGRAM REPORT: CPT | Mod: 59,,, | Performed by: INTERNAL MEDICINE

## 2018-11-29 PROCEDURE — 92960 CARDIOVERSION ELECTRIC EXT: CPT | Mod: ,,, | Performed by: INTERNAL MEDICINE

## 2018-11-29 PROCEDURE — 93005 ELECTROCARDIOGRAM TRACING: CPT | Mod: 59

## 2018-11-29 PROCEDURE — 25000003 PHARM REV CODE 250: Performed by: NURSE ANESTHETIST, CERTIFIED REGISTERED

## 2018-11-29 PROCEDURE — 93010 ELECTROCARDIOGRAM REPORT: CPT | Mod: ,,, | Performed by: INTERNAL MEDICINE

## 2018-11-29 RX ORDER — LIDOCAINE HCL/PF 100 MG/5ML
SYRINGE (ML) INTRAVENOUS
Status: DISCONTINUED | OUTPATIENT
Start: 2018-11-29 | End: 2018-11-29

## 2018-11-29 RX ORDER — METOPROLOL SUCCINATE 25 MG/1
25 TABLET, EXTENDED RELEASE ORAL DAILY
Qty: 30 TABLET | Refills: 11 | Status: SHIPPED | OUTPATIENT
Start: 2018-11-29 | End: 2019-11-14 | Stop reason: SDUPTHER

## 2018-11-29 RX ORDER — KETAMINE HCL IN 0.9 % NACL 50 MG/5 ML
SYRINGE (ML) INTRAVENOUS
Status: DISCONTINUED | OUTPATIENT
Start: 2018-11-29 | End: 2018-11-29

## 2018-11-29 RX ORDER — PROPOFOL 10 MG/ML
VIAL (ML) INTRAVENOUS
Status: DISCONTINUED | OUTPATIENT
Start: 2018-11-29 | End: 2018-11-29

## 2018-11-29 RX ADMIN — LIDOCAINE HYDROCHLORIDE 20 MG: 20 INJECTION, SOLUTION INTRAVENOUS at 07:11

## 2018-11-29 RX ADMIN — PROPOFOL 50 MG: 10 INJECTION, EMULSION INTRAVENOUS at 07:11

## 2018-11-29 RX ADMIN — Medication 20 MG: at 07:11

## 2018-11-29 NOTE — ANESTHESIA PREPROCEDURE EVALUATION
11/29/2018  Marcus Watkins is a 63 y.o., male.    Anesthesia Evaluation    I have reviewed the Patient Summary Reports.    I have reviewed the Nursing Notes.   I have reviewed the Medications.     Review of Systems  Anesthesia Hx:  No problems with previous Anesthesia  History of prior surgery of interest to airway management or planning: Previous anesthesia: General  Denies Personal Hx of Anesthesia complications.   Cardiovascular:   Dysrhythmias atrial fibrillation ECG has been reviewed.    Pulmonary:  Pulmonary Normal    Renal/:  Renal/ Normal     Hepatic/GI:  Hepatic/GI Normal    Neurological:  Neurology Normal    Endocrine:  Endocrine Normal      Patient Active Problem List   Diagnosis    Rectal bleeding    History of colon polyps    Screening    Encounter for anticoagulation discussion and counseling    Typical atrial flutter    Atrial fibrillation with RVR    Status post catheter ablation of atrial flutter    Numbness of feet    Atrial fibrillation     Past Medical History:   Diagnosis Date    Atrial fibrillation      Past Surgical History:   Procedure Laterality Date    ABLATION N/A 4/27/2017    Performed by José Grey MD at [facility] CATH LAB    CARDIOVERSION N/A 10/29/2018    Procedure: CARDIOVERSION;  Surgeon: José Grey MD;  Location: [facility] CATH LAB;  Service: Cardiology;  Laterality: N/A;  AF, LOPEZ, DCCV, MAC, GP, 3 PREP    CARDIOVERSION N/A 10/29/2018    Performed by José Grey MD at [facility] CATH LAB    CARDIOVERSION N/A 6/15/2017    Performed by José Grey MD at [facility] CATH LAB    COLONOSCOPY N/A 4/14/2016    Procedure: COLONOSCOPY;  Surgeon: Kade Wang MD;  Location: [address]);  Service: Endoscopy;  Laterality: N/A;    COLONOSCOPY N/A 4/14/2016    Performed by Kade Wang MD at [facility] ([address] [place] [address])    ECHOCARDIOGRAM,TRANSESOPHAGEAL N/A 10/29/2018     Performed by José Grey MD at [facility] CATH LAB    RADIOFREQUENCY ABLATION  2017    TRANSESOPHAGEAL ECHOCARDIOGRAM (LOPEZ) N/A 6/15/2017    Performed by José Grey MD at [facility] CATH LAB    TRANSESOPHAGEAL ECHOCARDIOGRAM (LOPEZ) N/A 4/27/2017    Performed by José Grey MD at [facility] CATH LAB         Physical Exam  General:  Well nourished    Airway/Jaw/Neck:  Airway Findings: Mouth Opening: Normal Tongue: Normal  General Airway Assessment: Adult  Mallampati: II  TM Distance: Normal, at least 6 cm  Jaw/Neck Findings:  Neck ROM: Normal ROM      Dental:  Dental Findings: In tact   Chest/Lungs:  Chest/Lungs Findings: Clear to auscultation     Heart/Vascular:  Heart Findings: Rate: Normal  Rhythm: Regular Rhythm  Sounds: Normal        Mental Status:  Mental Status Findings:  Cooperative, Alert and Oriented         Anesthesia Plan  Type of Anesthesia, risks & benefits discussed:  Anesthesia Type:  general  Patient's Preference:   Intra-op Monitoring Plan: standard ASA monitors  Intra-op Monitoring Plan Comments:   Post Op Pain Control Plan: per primary service following discharge from PACU  Post Op Pain Control Plan Comments:   Induction:   IV  Beta Blocker:  Patient is not currently on a Beta-Blocker (No further documentation required).       Informed Consent: Patient understands risks and agrees with Anesthesia plan.  Questions answered. Anesthesia consent signed with patient.  ASA Score: 3     Day of Surgery Review of History & Physical:    H&P update referred to the surgeon.         Ready For Surgery From Anesthesia Perspective.

## 2018-11-29 NOTE — INTERVAL H&P NOTE
Mr. Watkins is a 63 year old male with a PMHx of pAF and AFL. He presents today for scheduled DCCV with Dr. Grey.   I have personally reviewed the patient's EKG today, which shows AF with a ventricular rate of 91 bpm. QTc is 460.  10/29/18 unsuccessful DCCV, patient started on amiodarone with loading dose 5 days post DCCV (due to sotalol wash out). Patient is currently taking amiodarone 200 mg daily, metoprolol 12.5 mg BID, and eliquis 5 mg BID.   He denies any chest pain, palpitations, SOB, dizziness, light headedness, weakness, syncope, or near syncopal episodes. He does state he has DALY mostly when climbing stairs. He denies any bleeding, infections, rashes, or surgeries in the past 30 days. No LOPEZ needed per Dr. Grey as patient reports full compliance with eliquis, has been taking this medication as prescribed  for > 1 month, denies missing any doses. Last dose this AM (11/29/18). LOPEZ (10/29/18) negative for WILIAN thrombus.     The patient has been examined and the H&P has been reviewed:    I concur with the findings and no changes have occurred since H&P was written.     Review of Systems   Constitution: Negative. Negative for fevers/chills, weakness and malaise/fatigue.   HENT: Negative.  Negative for ear pain and tinnitus.    Eyes: Negative for blurred vision.   Cardiovascular: Negative. Negative for chest pain, dyspnea on exertion, leg swelling, near-syncope, palpitations and syncope.   Respiratory: Negative. Negative for shortness of breath. Positive for DALY.   Endocrine: Negative.  Negative for polyuria.   Hematologic/Lymphatic: Negative for significant bleeding, bruise/bleed easily.  Skin: Negative.  Negative for rash.   Musculoskeletal: Negative.  Negative for joint pain and muscle weakness.   Gastrointestinal: Negative.  Negative for abdominal pain hematemesis, hematochezia, and change in bowel habit.   Genitourinary: Negative for frequency or hematuria.   Neurological: Negative.  Negative for dizziness.    Psychiatric/Behavioral: Negative.  Negative for depression. The patient is not nervous/anxious.     Physical Exam   Constitutional: He is oriented to person, place, and time. He appears well-developed and well-nourished.   HENT:   Head: Normocephalic and atraumatic.   Eyes: Conjunctivae, EOM and lids are normal. No scleral icterus.   Neck: Normal range of motion. No JVD present. No tracheal deviation present. No thyromegaly present.   Cardiovascular: Normal rate and intact distal pulses. An irregularly irregular rhythm present. No extrasystoles are present. PMI is not displaced. Exam reveals no gallop and no friction rub. No murmur noted.   Pulses:       Radial pulses are 2+ on the right side, and 2+ on the left side.        Pedal pulses are 2+ on the right side, and 2+ on the left side.   Pulmonary/Chest: Effort normal and breath sounds normal. No accessory muscle usage. No tachypnea. No respiratory distress. He has no wheezes. He has no rales.   Abdominal: Soft. Bowel sounds are normal. He exhibits no distension. There is no hepatosplenomegaly. There is no tenderness.   Musculoskeletal: Normal range of motion. He exhibits no edema.   Neurological: He is alert and oriented to person, place, and time. He has normal reflexes. He exhibits normal muscle tone.   Skin: Skin is warm and dry. No rash noted.   Psychiatric: He has a normal mood and affect. His behavior is normal.   Nursing notes and vitals reviewed.     Active Hospital Problems    Diagnosis  POA    Atrial fibrillation [I48.91]  Yes      Resolved Hospital Problems   No resolved problems to display.     Labs: Labs from 11/26/18 reviewed.     Significant Studies: EKG this AM reveals atrial fibrillation at 91 BPM. EKG reviewed by Dr. Grey.     Plan:  - Planned for DCCV.  - No LOPEZ needed as patient reports full compliance with eliquis, has been taking this medication as prescribed  for > 1 month, denies missing any doses. Wife also verifies this information.  Last dose this AM (11/29/18).   - Anesthesia for sedation. Anesthesia/Surgery risks, benefits and alternative options discussed and understood by patient/family.    Prior to procedure, extensive discussion with patient regarding risks and benefits of DCCV, and patient would like to proceed. All questions answered.  The patient and wife voice understanding and all questions have been answered. No further questions/concerns voiced at this time. Dr. Grey at bedside speaking with patient.     ESAU Guerrero-C  Cardiology Electrophysiology  NP   [facility]    Attending: José Grey MD

## 2018-11-29 NOTE — DISCHARGE SUMMARY
[facility]  Cardiac Electrophysiology  Discharge Summary      Patient Name: Marcus Watkins  MRN: [id number]  Admission Date: 11/29/2018  Hospital Length of Stay: 0 days  Discharge Date and Time:  11/29/2018 10:28 AM  Attending Physician: José Grey MD  Discharging Provider: Shavonne Moore NP  Primary Care Physician: Radha Brunson MD    HPI: Mr. Watkins is a 63 year old male with a PMHx of pAF and AFL. He presents today for scheduled DCCV with Dr. Grey.   I have personally reviewed the patient's EKG today, which shows AF with a ventricular rate of 91 bpm. QTc is 460.  10/29/18 unsuccessful DCCV, patient started on amiodarone with loading dose 5 days post DCCV (due to sotalol wash out). Patient is currently taking amiodarone 200 mg daily, metoprolol 12.5 mg BID, and eliquis 5 mg BID.   He denies any chest pain, palpitations, SOB, dizziness, light headedness, weakness, syncope, or near syncopal episodes. He does state he has DALY mostly when climbing stairs. He denies any bleeding, infections, rashes, or surgeries in the past 30 days. No LOPEZ needed per Dr. Grey as patient reports full compliance with eliquis, has been taking this medication as prescribed  for > 1 month, denies missing any doses. Last dose this AM (11/29/18). LOPEZ (10/29/18) negative for WILIAN thrombus.      The patient has been examined and the H&P has been reviewed:     I concur with the findings and no changes have occurred since H&P was written.      Procedure(s) (LRB):  CARDIOVERSION (N/A)     Indwelling Lines/Drains at time of discharge:  Lines/Drains/Airways          None        Hospital Course: Patient presented in atrial fibrillation. Patient on Eliquis. No LOPEZ needed per Dr. Grey as patient reports full compliance with eliquis, has been taking this medication as prescribed for > 1 month, denies missing any doses. Last dose this AM (11/29/18). LOPEZ (10/29/18) negative for WILIAN thrombus. DCCV today unsuccessful  (see report). Patient tolerated procedure well with no acute complications. Post procedure EKG showed atrial flutter with a ventricular rate of 99 bpm. Plan to continue home medications including amiodarone and eliquis. Discontinue Lopresor. Start Toprol XL 25 mg daily. Patient taking 5,000 units of Vitamin D not 50,000 units (wife accidentally had wrong dose written down).  Follow up EKG in 1 week. Patient scheduled for ablation 12/27/18 with LOPEZ the day prior.  Discharge plans/instructions discussed with patient and wife who verbalized understanding and agreement of plans of care. No further questions or concerns voiced at this time. Discharged home in stable condition. VSS.     Consults:   Anesthesia.    Significant Diagnostic Studies: Labs from 11/26/19 reviewed.  Lab Results   Component Value Date    INR 1.0 11/26/2018    INR 1.0 10/24/2018    INR 0.9 06/12/2017     Cardiac Graphics:  Echocardiogram: 2D echo with color flow doppler:   Results for orders placed or performed in visit on 04/09/18   2D Echo w/ Color Flow Doppler   Result Value Ref Range    QEF 55 55 - 65    Mitral Valve Regurgitation MILD     Diastolic Dysfunction Yes (A)     Est. PA Systolic Pressure 34.36     Tricuspid Valve Regurgitation MILD      Final Active Diagnoses:    Diagnosis Date Noted POA    PRINCIPAL PROBLEM:  Atrial fibrillation [I48.91] 10/29/2018 Yes      Problems Resolved During this Admission:     No new Assessment & Plan notes have been filed under this hospital service since the last note was generated.  Service: Arrhythmia    Discharged Condition: stable    Disposition: Home or Self Care    Follow Up:  Follow-up Information     EKG 1 [facility] In 1 week.    Specialty:  Cardiology               Patient Instructions:      Diet Cardiac     No driving until:   Order Comments: No driving or operating heavy machinery for 24-48 hours post procedure.     Other restrictions (specify):   Order Comments: Medications:  -Continue to take your  home medications as listed on your medication list after you are discharged.  -Stop taking your Metoprolol (lopressor).  -If you have problems or side effects caused by your medications, call the clinic.    New Medications:  Metoprolol (toprol) 25 mg (one tablet) daily.    Diet  -You may resume oral intake after you are discharged, as long you have no swallowing difficulties.    Side effects:  -You may be drowsy for the remainder of the day from the sedation.    Because you have received sedation for this procedure:  -Limit activity for the remainder of the day.  -Do not drive or operate any equipment for the remainder of the day.  -Do not smoke for at least 6 hours and until you are fully awake and alert.  -Do not drink alcoholic beverage for 24 hours.  -Defer important decision making until the following day.    Go to the Emergency Department if you develop:  -Bleeding  -Weakness or numbness  -Visual, gait or speech disturbance  -New chest pain, palpitations, shortness of breath, rapid heart beat, or fainting  -Fever    Follow up:  -EKG in 1 week.   -Scheduled for Ablation December 27, 2018-ÁNGEL Aquino will be calling with instructions. LOPEZ will be the day before.     Notify your health care provider if you experience any of the following:  temperature >100.4     Notify your health care provider if you experience any of the following:  persistent nausea and vomiting or diarrhea     Notify your health care provider if you experience any of the following:  severe uncontrolled pain     Notify your health care provider if you experience any of the following:  redness, tenderness, or signs of infection (pain, swelling, redness, odor or green/yellow discharge around incision site)     Notify your health care provider if you experience any of the following:  difficulty breathing or increased cough     Notify your health care provider if you experience any of the following:  severe persistent headache     Notify your health  care provider if you experience any of the following:  worsening rash     Notify your health care provider if you experience any of the following:  persistent dizziness, light-headedness, or visual disturbances     Notify your health care provider if you experience any of the following:  increased confusion or weakness     Notify your health care provider if you experience any of the following:   Order Comments: For any concerning medical symptoms.     Medications:  Reconciled Home Medications:      Medication List      START taking these medications    metoprolol succinate 25 MG 24 hr tablet  Commonly known as:  TOPROL-XL  Take 1 tablet (25 mg total) by mouth once daily.        CONTINUE taking these medications    amiodarone 200 MG Tab  Commonly known as:  PACERONE  Take 1 tablet (200 mg total) by mouth once daily.     apixaban 5 mg Tab  Commonly known as:  ELIQUIS  Take 1 tablet (5 mg total) by mouth 2 (two) times daily.     cranberry 500 mg Cap  Take by mouth.     fish oil-omega-3 fatty acids 300-1,000 mg capsule  Take 2 g by mouth once daily.     HEALTHY COLON ORAL  Take by mouth.     IRON ORAL  Take 1 tablet by mouth once daily.     MOVE FREE ULTRA ORAL  Take by mouth.     ONE-A-DAY MEN'S 50 PLUS ORAL  Take by mouth.     VITAMIN D ORAL  Take 5,000 Units by mouth.     VITAMIN E (DL, ACETATE) ORAL  Take by mouth.        STOP taking these medications    metoprolol tartrate 25 MG tablet  Commonly known as:  LOPRESSOR          Plan:  -Discontinue Lopressor 12.5 BID.  -Start Toprol XL 25 daily.  -Follow up EKG in 1 week.  -Ablation scheduled for 12/27/18 with LOPEZ the day prior.    Time spent on the discharge of patient: 13 minutes    Shavonne Moore NP  Cardiac Electrophysiology  [facility]    Attending: José Grey MD

## 2018-11-29 NOTE — TRANSFER OF CARE
"Anesthesia Transfer of Care Note    Patient: Marcus Watkins    Procedure(s) Performed: Procedure(s) (LRB):  CARDIOVERSION (N/A)    Patient location: [facility]    Anesthesia Type: general    Transport from OR: Transported from OR on 2-3 L/min O2 by NC with adequate spontaneous ventilation    Post pain: adequate analgesia    Post assessment: no apparent anesthetic complications and tolerated procedure well    Post vital signs: stable    Level of consciousness: awake, alert and oriented    Nausea/Vomiting: no nausea/vomiting    Complications: none    Transfer of care protocol was followed      Last vitals:   Visit Vitals  /84   Pulse 97   Temp 36.5 °C (97.7 °F) (Temporal)   Resp 20   Ht 5' 11" (1.803 m)   Wt 127 kg (280 lb)   SpO2 98%   BMI 39.05 kg/m²     "

## 2018-11-29 NOTE — ANESTHESIA POSTPROCEDURE EVALUATION
"Anesthesia Post Evaluation    Patient: Marcus Watkins    Procedure(s) Performed: Procedure(s) (LRB):  CARDIOVERSION (N/A)    Final Anesthesia Type: general  Patient location during evaluation: PACU  Patient participation: Yes- Able to Participate  Level of consciousness: awake and alert and oriented  Post-procedure vital signs: reviewed and stable  Pain management: adequate  Airway patency: patent  PONV status at discharge: No PONV  Anesthetic complications: no      Cardiovascular status: hemodynamically stable  Respiratory status: unassisted, spontaneous ventilation and room air  Hydration status: euvolemic  Follow-up not needed.        Visit Vitals  /84   Pulse 97   Temp 36.5 °C (97.7 °F) (Temporal)   Resp 20   Ht 5' 11" (1.803 m)   Wt 127 kg (280 lb)   SpO2 98%   BMI 39.05 kg/m²       Pain/Monika Score: Pain Assessment Performed: Yes (11/29/2018  7:55 AM)  Presence of Pain: denies (11/29/2018  7:55 AM)        "

## 2018-11-29 NOTE — PROGRESS NOTES
Pt returned to SSCU 3 via stretcher. No acute distress noted. Pt is AAOX4 and denies pain at this time.

## 2018-11-29 NOTE — NURSING TRANSFER
Nursing Transfer Note      11/29/2018     Transfer To: 3 prep      Transfer via stretcher    Transfer with n/a    Transported by EMIR Ferrari & EMIR Bocanegra    Medicines sent: Silvadene cream    Chart send with patient: Yes    Notified: no family in waiting room.    Patient reassessed at: 11/29/18 0825     Upon arrival to floor:

## 2018-12-04 ENCOUNTER — TELEPHONE (OUTPATIENT)
Dept: ELECTROPHYSIOLOGY | Facility: CLINIC | Age: 63
End: 2018-12-04

## 2018-12-04 DIAGNOSIS — I48.19 PERSISTENT ATRIAL FIBRILLATION: Primary | ICD-10-CM

## 2018-12-04 NOTE — TELEPHONE ENCOUNTER
Returned call to pt. Pt stated he needed 1 week post DCCV EKG. Appt scheduled. Advised his instructions would be sent to him for his procedure on 12/27/18 sometime today.        ----- Message from Diana Warren sent at 12/4/2018  9:41 AM CST -----  Contact: pt  Pls call pt at [phone].  He says he is supposed to have a EKG appt this week and needs to know when to stop his Eliquis for his procedure.mariana rivera    Thank you

## 2018-12-04 NOTE — PROGRESS NOTES
ABLATION EDUCATION CHECKLIST      PRE-PROCEDURE TESTIN/12/18- LAST DOSE OF AMIODARONE (14 DAY WASH OUT)      18 @ 7 AM - LABS  Pre-Procedure labs have been ordered for you at:  [facility]   Be sure to arrive at your scheduled time.       18 @ 7:30 AM -- CT SCAN of chest   [facility] (across the street from [facility])  [address] [facility] [place]    · Please fast 4 hours prior to procedure.  · Arrive 30 min prior to procedure.      18  @ 11 AM -  LOPEZ  TRANSESOPHAGEAL ECHO (LOPEZ) INSTRUCTIONS   Please report to the Cardiology Waiting Area on the Third floor of the hospital(see instructions below)   You will then be taken to the SSCU (Short Stay Cardiac Unit) and prepared for your procedure.      You may not have anything to eat or drink after midnight the night before your test.     Medications:  You may take your regular morning medications with water.     The procedure will take 1-2 hours to perform. After the procedure, you will return to SSCU on the third floor of the hospital. Your family may remain in the room with you during this time.      You will be discharged home that same afternoon. You must have someone to drive you home.  Your doctor will determine, based on your progress, the time of your discharge. The results of your procedure will be discussed with you before you are discharged.      You will be contacted by the echo department prior to your procedure for a  pre-procedure questionnaire.       DAY OF PROCEDURE:      18 @ 5:45 AM - ATRIAL FIBRILLATION   Report to Cardiology Waiting Room on 3rd floor of the Hospital    · Do not eat or drink anything after: 12 mn on the night before your procedure  · Please do not wear makeup (especially mascara) when arriving for your procedure      Medications:   · HOLD AMIODARONE 14 DAYS PRIOR TO PROCEDURE. LAST DOSE 18.  · MORNING OF THE PROCEDURE DO NOT TAKE ELIQUIS.  · You  may take all other morning medications with a sip of water      Directions to the Cardiology Waiting Room  If you park in the Parking Garage:  Take elevators to the 2nd floor  Walk up ramp and turn right by Gold Elevators  Take elevator to the 3rd floor  Upon exiting the elevator, turn away from the clinic areas  Walk long pearson around to front of hospital to area with windows overlooking [facility]  Check in at Reception Desk  OR  If family is dropping you off:  Have them drop you off at the front of the Hospital  (Near the ER, where all the flags are hung).  Take the E elevators to the 3rd floor.  Check in at the Reception Desk in the waiting room.        · You will be spending the night after your procedure.  · You will need someone to drive you home the day after your procedure.  · Your pain during your procedure will be managed by the anesthesia team.     Any need to reschedule or cancel procedures, or any questions regarding your procedures should be addressed directly with the Arrhythmia Department Nurses at the following phone number: [phone]

## 2018-12-07 ENCOUNTER — HOSPITAL ENCOUNTER (OUTPATIENT)
Dept: CARDIOLOGY | Facility: CLINIC | Age: 63
Discharge: HOME | End: 2018-12-07
Payer: COMMERCIAL

## 2018-12-07 DIAGNOSIS — I48.19 PERSISTENT ATRIAL FIBRILLATION: ICD-10-CM

## 2018-12-07 PROCEDURE — 93005 ELECTROCARDIOGRAM TRACING: CPT | Mod: S$GLB,,, | Performed by: INTERNAL MEDICINE

## 2018-12-07 PROCEDURE — 93010 ELECTROCARDIOGRAM REPORT: CPT | Mod: S$GLB,,, | Performed by: INTERNAL MEDICINE

## 2018-12-20 ENCOUNTER — TELEPHONE (OUTPATIENT)
Dept: ELECTROPHYSIOLOGY | Facility: CLINIC | Age: 63
End: 2018-12-20

## 2018-12-20 ENCOUNTER — HOSPITAL ENCOUNTER (OUTPATIENT)
Dept: RADIOLOGY | Facility: HOSPITAL | Age: 63
Discharge: HOME | End: 2018-12-20
Attending: INTERNAL MEDICINE
Payer: COMMERCIAL

## 2018-12-20 DIAGNOSIS — I48.19 PERSISTENT ATRIAL FIBRILLATION: ICD-10-CM

## 2018-12-20 PROCEDURE — 25500020 PHARM REV CODE 255: Performed by: INTERNAL MEDICINE

## 2018-12-20 PROCEDURE — 71275 CT ANGIOGRAPHY CHEST: CPT | Mod: 26,,, | Performed by: RADIOLOGY

## 2018-12-20 PROCEDURE — 71275 CT ANGIOGRAPHY CHEST: CPT | Mod: TC

## 2018-12-20 RX ORDER — SODIUM CHLORIDE 9 MG/ML
INJECTION, SOLUTION INTRAVENOUS CONTINUOUS
Status: CANCELLED | OUTPATIENT
Start: 2018-12-20

## 2018-12-20 RX ADMIN — IOHEXOL 100 ML: 350 INJECTION, SOLUTION INTRAVENOUS at 07:12

## 2018-12-20 NOTE — TELEPHONE ENCOUNTER
Called Pt to discuss findings on CT scan.  Advised Pt that a 2.2 cm thyroid nodule was noted on scan. These incidental findings are common on a lot of the scans. Dr Grey spoke with his PCP, Radha Rodriguez, and she will be contacting him to do additional work up and testing. Pt voiced understanding.       ----- Message from José Grey MD sent at 12/20/2018  1:29 PM CST -----  Will you call pt and let him know we found a thyroid nodule on CT scan. I contacted his primary who will likely order an ultrasound. Please reassure him we see these sorts of incidental findings on a lot of our scans--nothing for him to sweat about.    Thanks GP

## 2018-12-24 ENCOUNTER — TELEPHONE (OUTPATIENT)
Dept: ELECTROPHYSIOLOGY | Facility: CLINIC | Age: 63
End: 2018-12-24

## 2018-12-24 NOTE — TELEPHONE ENCOUNTER
Contacted pt to confirm procedure for 12/26 and 12/27. Spoke with Pt and reviewed pre op instructions including 12/26 : NPO after midnight, take eliquis morning of procedure, arrival time 11am  12/27 : NPO after midnight, arrival time 5:45 am and hold eliquis. Pt voiced understanding and stated he would be here.

## 2018-12-26 ENCOUNTER — HOSPITAL ENCOUNTER (OUTPATIENT)
Dept: CARDIOLOGY | Facility: CLINIC | Age: 63
Discharge: HOME | End: 2018-12-26
Payer: COMMERCIAL

## 2018-12-26 ENCOUNTER — ANESTHESIA (OUTPATIENT)
Dept: MEDSURG UNIT | Facility: HOSPITAL | Age: 63
End: 2018-12-26
Payer: COMMERCIAL

## 2018-12-26 ENCOUNTER — HOSPITAL ENCOUNTER (OUTPATIENT)
Facility: HOSPITAL | Age: 63
Discharge: HOME | End: 2018-12-26
Attending: INTERNAL MEDICINE | Admitting: INTERNAL MEDICINE
Payer: COMMERCIAL

## 2018-12-26 ENCOUNTER — TELEPHONE (OUTPATIENT)
Dept: FAMILY MEDICINE | Facility: CLINIC | Age: 63
End: 2018-12-26

## 2018-12-26 ENCOUNTER — ANESTHESIA EVENT (OUTPATIENT)
Dept: MEDSURG UNIT | Facility: HOSPITAL | Age: 63
End: 2018-12-26
Payer: COMMERCIAL

## 2018-12-26 VITALS
BODY MASS INDEX: 38.5 KG/M2 | HEIGHT: 71 IN | DIASTOLIC BLOOD PRESSURE: 86 MMHG | OXYGEN SATURATION: 94 % | SYSTOLIC BLOOD PRESSURE: 134 MMHG | TEMPERATURE: 97 F | WEIGHT: 275 LBS | HEART RATE: 76 BPM | RESPIRATION RATE: 18 BRPM

## 2018-12-26 DIAGNOSIS — I48.19 PERSISTENT ATRIAL FIBRILLATION: ICD-10-CM

## 2018-12-26 DIAGNOSIS — I48.91 AF (ATRIAL FIBRILLATION): ICD-10-CM

## 2018-12-26 LAB
BSA FOR ECHO PROCEDURE: 2.5 M2
SINUS: 3.3 CM
STJ: 2.8 CM

## 2018-12-26 PROCEDURE — 93005 ELECTROCARDIOGRAM TRACING: CPT

## 2018-12-26 PROCEDURE — 93320 DOPPLER ECHO COMPLETE: CPT | Mod: 26,,, | Performed by: INTERNAL MEDICINE

## 2018-12-26 PROCEDURE — D9220A PRA ANESTHESIA: Mod: CRNA,,, | Performed by: NURSE ANESTHETIST, CERTIFIED REGISTERED

## 2018-12-26 PROCEDURE — 63600175 PHARM REV CODE 636 W HCPCS: Performed by: NURSE ANESTHETIST, CERTIFIED REGISTERED

## 2018-12-26 PROCEDURE — 93010 ELECTROCARDIOGRAM REPORT: CPT | Mod: ,,, | Performed by: INTERNAL MEDICINE

## 2018-12-26 PROCEDURE — 93306 TTE W/DOPPLER COMPLETE: CPT

## 2018-12-26 PROCEDURE — 93312 ECHO TRANSESOPHAGEAL: CPT | Mod: PBBFAC | Performed by: INTERNAL MEDICINE

## 2018-12-26 PROCEDURE — 93312 ECHO TRANSESOPHAGEAL: CPT | Mod: 26,,, | Performed by: INTERNAL MEDICINE

## 2018-12-26 PROCEDURE — D9220A PRA ANESTHESIA: Mod: ANES,,, | Performed by: ANESTHESIOLOGY

## 2018-12-26 PROCEDURE — 25000003 PHARM REV CODE 250: Performed by: NURSE ANESTHETIST, CERTIFIED REGISTERED

## 2018-12-26 RX ORDER — SODIUM CHLORIDE 9 MG/ML
INJECTION, SOLUTION INTRAVENOUS CONTINUOUS PRN
Status: DISCONTINUED | OUTPATIENT
Start: 2018-12-26 | End: 2018-12-26

## 2018-12-26 RX ORDER — PROPOFOL 10 MG/ML
VIAL (ML) INTRAVENOUS
Status: DISCONTINUED | OUTPATIENT
Start: 2018-12-26 | End: 2018-12-26

## 2018-12-26 RX ORDER — SODIUM CHLORIDE 0.9 % (FLUSH) 0.9 %
3 SYRINGE (ML) INJECTION
Status: DISCONTINUED | OUTPATIENT
Start: 2018-12-26 | End: 2018-12-26 | Stop reason: HOSPADM

## 2018-12-26 RX ORDER — LIDOCAINE HCL/PF 100 MG/5ML
SYRINGE (ML) INTRAVENOUS
Status: DISCONTINUED | OUTPATIENT
Start: 2018-12-26 | End: 2018-12-26

## 2018-12-26 RX ORDER — PROPOFOL 10 MG/ML
VIAL (ML) INTRAVENOUS CONTINUOUS PRN
Status: DISCONTINUED | OUTPATIENT
Start: 2018-12-26 | End: 2018-12-26

## 2018-12-26 RX ORDER — SODIUM CHLORIDE 0.9 % (FLUSH) 0.9 %
5 SYRINGE (ML) INJECTION
Status: DISCONTINUED | OUTPATIENT
Start: 2018-12-26 | End: 2018-12-26 | Stop reason: HOSPADM

## 2018-12-26 RX ADMIN — PROPOFOL 150 MCG/KG/MIN: 10 INJECTION, EMULSION INTRAVENOUS at 01:12

## 2018-12-26 RX ADMIN — PROPOFOL 50 MG: 10 INJECTION, EMULSION INTRAVENOUS at 01:12

## 2018-12-26 RX ADMIN — SODIUM CHLORIDE: 0.9 INJECTION, SOLUTION INTRAVENOUS at 01:12

## 2018-12-26 RX ADMIN — LIDOCAINE HYDROCHLORIDE 100 MG: 20 INJECTION, SOLUTION INTRAVENOUS at 01:12

## 2018-12-26 NOTE — TELEPHONE ENCOUNTER
----- Message from José Grey MD sent at 12/20/2018  1:27 PM CST -----  Ronak Garcia,    Our mutual pt Mr. Watkins is going to undergo an afib ablation in the near future. As part of his pre-procedure work-up a cardiac CT scan was performed to define left atrial anatomy.     A 2.2 cm left thyroid nodule was noted on the ultrasound. The interpreting radiologist is recommending further work-up with a thyroid ultrasound.     My office will call him, notify him of the finding, and mention that you are aware and will proceed with any additional work-up/testing.    Thanks!    José Grey

## 2018-12-26 NOTE — NURSING
Discharge instructions and medlist given.  Patient and spouse verbalized understanding.  Denies need for escort or wheelchair off unit.  Off unit walking with spouse.

## 2018-12-26 NOTE — HPI
TRANSESOPHAGEAL ECHOCARDIOGRAPHY   PRE-PROCEDURE NOTE    12/26/2018    HPI:     Marcus Watkins is a 63 y.o. man with PMHx of AF here for LOPEZ pre-PVI tomorrow. Compliant with eliquis, no hx of CABG. LOPEZ done 10/2018 prior to DCCV, low normal EF 50% no WILIAN thrombus and emptying velocity is 0.4m/s.     Dysphagia or odynophagia:  No  Liver Disease, esophageal disease, or known varices:  No  Upper GI Bleeding: No  Snoring:  No  Sleep Apnea:  No  Prior neck surgery or radiation:  No  History of anesthetic difficulties:  No  Family history of anesthetic difficulties:  No  Last oral intake:  12 hours ago  Able to move neck in all directions:  Yes

## 2018-12-26 NOTE — SUBJECTIVE & OBJECTIVE
Past Medical History:   Diagnosis Date    Atrial fibrillation        Past Surgical History:   Procedure Laterality Date    ABLATION N/A 4/27/2017    Performed by [facility] MD at [facility] CATH LAB    CARDIOVERSION N/A 11/29/2018    Performed by José Grey MD at [facility] EP LAB    CARDIOVERSION N/A 10/29/2018    Performed by José Grey MD at [facility] CATH LAB    CARDIOVERSION N/A 6/15/2017    Performed by José Grey MD at [facility] CATH LAB    COLONOSCOPY N/A 4/14/2016    Performed by Kade Wang MD at [facility] ENDO (4TH FLR)    ECHOCARDIOGRAM,TRANSESOPHAGEAL N/A 10/29/2018    Performed by José Grey MD at [facility] CATH LAB    RADIOFREQUENCY ABLATION  2017    TRANSESOPHAGEAL ECHOCARDIOGRAM (LOPEZ) N/A 6/15/2017    Performed by José Grey MD at [facility] CATH LAB    TRANSESOPHAGEAL ECHOCARDIOGRAM (LOPEZ) N/A 4/27/2017    Performed by José Grey MD at [facility] CATH LAB       Review of patient's allergies indicates:  No Known Allergies    No current facility-administered medications on file prior to encounter.      Current Outpatient Medications on File Prior to Encounter   Medication Sig    apixaban (ELIQUIS) 5 mg Tab Take 1 tablet (5 mg total) by mouth 2 (two) times daily.    cartilage/collagen II/hyaluron (MOVE FREE ULTRA ORAL) Take by mouth once daily.     cranberry 500 mg Cap Take by mouth once daily.     ergocalciferol, vitamin D2, (VITAMIN D ORAL) Take 5,000 Units by mouth once daily.     ferrous sulfate (IRON ORAL) Take 1 tablet by mouth once daily.    fish oil-omega-3 fatty acids 300-1,000 mg capsule Take 2 g by mouth once daily.    L.acidophilus/B.bifidum,longum (HEALTHY COLON ORAL) Take by mouth.    metoprolol succinate (TOPROL-XL) 25 MG 24 hr tablet Take 1 tablet (25 mg total) by mouth once daily.    multivit-min/folic/vit K/lycop (ONE-A-DAY MEN'S 50 PLUS ORAL) Take by mouth.    vitamin E, dl,tocopheryl acet, (VITAMIN E, DL, ACETATE, ORAL) Take by mouth.    amiodarone (PACERONE) 200 MG  Tab Take 1 tablet (200 mg total) by mouth once daily.     Family History     None        Tobacco Use    Smoking status: Former Smoker     Last attempt to quit: 1978     Years since quittin.5    Smokeless tobacco: Never Used   Substance and Sexual Activity    Alcohol use: No     Alcohol/week: 0.0 oz    Drug use: No    Sexual activity: Not on file     Review of Systems   All other systems reviewed and are negative.    Objective:     Vital Signs (Most Recent):  Temp: 97.5 °F (36.4 °C) (18 1026)  Pulse: 86 (18 1026)  Resp: 18 (18 1026)  BP: 127/76 (18 1027)  SpO2: 95 % (18 1026) Vital Signs (24h Range):  Temp:  [97.5 °F (36.4 °C)] 97.5 °F (36.4 °C)  Pulse:  [86] 86  Resp:  [18] 18  SpO2:  [95 %] 95 %  BP: (127-132)/(76-82) 127/76     Weight: 124.7 kg (275 lb)  Body mass index is 38.35 kg/m².    SpO2: 95 %  O2 Device (Oxygen Therapy): room air    No intake or output data in the 24 hours ending 18 1247    Lines/Drains/Airways     Peripheral Intravenous Line                 Peripheral IV - Single Lumen 18 1025 Right Hand less than 1 day                Physical Exam   Constitutional: He is oriented to person, place, and time. He appears well-developed and well-nourished.   HENT:   Head: Normocephalic and atraumatic.   Eyes: No scleral icterus.   Cardiovascular:   Irregular rhythm and tachycardic   Pulmonary/Chest: Effort normal and breath sounds normal.   Musculoskeletal: He exhibits no edema.   Neurological: He is alert and oriented to person, place, and time.   Skin: Skin is warm.       Significant Labs: All pertinent lab results from the last 24 hours have been reviewed.    Significant Imaging: Echocardiogram: Transthoracic echo (TTE) complete (Cupid Only): No results found for this or any previous visit.

## 2018-12-26 NOTE — NURSING TRANSFER
Nursing Transfer Note      12/26/2018     Transfer from [facility] 25 to 3prep 7A    Transfer via stretcher    Transfer with na    Transported by Shayne     Medicines sent: na    Chart send with patient: yes    Notified: Cassidy    Patient reassessed at: 12/26/2018 1430    Upon arrival to floor: Nurses station notified of patient's arrival

## 2018-12-26 NOTE — DISCHARGE SUMMARY
[facility]  Cardiology  Discharge Summary      Patient Name: Marcus Watkins  MRN: [id number]  Admission Date: 12/26/2018  Hospital Length of Stay: 0 days  Discharge Date and Time:  12/26/2018 2:08 PM  Attending Physician: José Grey MD    Discharging Provider: Tarik Katz MD  Primary Care Physician: Radha Brunson MD    HPI:       TRANSESOPHAGEAL ECHOCARDIOGRAPHY   PRE-PROCEDURE NOTE    12/26/2018    HPI:     Marcus Watkins is a 63 y.o. man with PMHx of AF here for LOPEZ pre-PVI tomorrow. Compliant with eliquis, no hx of CABG. LOPEZ done 10/2018 prior to DCCV, low normal EF 50% no WILIAN thrombus and emptying velocity is 0.4m/s.     Dysphagia or odynophagia:  No  Liver Disease, esophageal disease, or known varices:  No  Upper GI Bleeding: No  Snoring:  No  Sleep Apnea:  No  Prior neck surgery or radiation:  No  History of anesthetic difficulties:  No  Family history of anesthetic difficulties:  No  Last oral intake:  12 hours ago  Able to move neck in all directions:  Yes      Procedure(s) (LRB):  ECHOCARDIOGRAM,TRANSESOPHAGEAL (N/A)     Indwelling Lines/Drains at time of discharge:  Lines/Drains/Airways          None          Hospital Course:  LOPEZ complete, no complications no thrombus in Left atrial appendage.    Consults:     Significant Diagnostic Studies: LOPEZ      Pending Diagnostic Studies:     None          Final Active Diagnoses:    Diagnosis Date Noted POA    Atrial fibrillation [I48.91] 10/29/2018 Yes      Problems Resolved During this Admission:     No new Assessment & Plan notes have been filed under this hospital service since the last note was generated.  Service: Cardiology      Discharged Condition: good    Disposition: Home or Self Care    Follow Up: Please keep your previously scheduled appointment with Dr. Navid Grey    Patient Instructions:      Diet Cardiac     Activity as tolerated     Medications:  Reconciled Home Medications:      Medication List      CONTINUE  taking these medications    amiodarone 200 MG Tab  Commonly known as:  PACERONE  Take 1 tablet (200 mg total) by mouth once daily.     apixaban 5 mg Tab  Commonly known as:  ELIQUIS  Take 1 tablet (5 mg total) by mouth 2 (two) times daily.    Please follow instructions given to you by Dr. Grey's office regarding the eliquis.     cranberry 500 mg Cap  Take by mouth once daily.     fish oil-omega-3 fatty acids 300-1,000 mg capsule  Take 2 g by mouth once daily.     HEALTHY COLON ORAL  Take by mouth.     IRON ORAL  Take 1 tablet by mouth once daily.     metoprolol succinate 25 MG 24 hr tablet  Commonly known as:  TOPROL-XL  Take 1 tablet (25 mg total) by mouth once daily.     MOVE FREE ULTRA ORAL  Take by mouth once daily.     ONE-A-DAY MEN'S 50 PLUS ORAL  Take by mouth.     VITAMIN D ORAL  Take 5,000 Units by mouth once daily.     VITAMIN E (DL, ACETATE) ORAL  Take by mouth.            Time spent on the discharge of patient: 10 minutes    Tarik Kazt MD  Cardiology  [facility]

## 2018-12-26 NOTE — TRANSFER OF CARE
"Anesthesia Transfer of Care Note    Patient: Marcus Watkins    Procedure(s) Performed: Procedure(s) (LRB):  ECHOCARDIOGRAM,TRANSESOPHAGEAL (N/A)    Patient location: PACU    Anesthesia Type: general    Transport from OR: Transported from OR on room air with adequate spontaneous ventilation    Post pain: adequate analgesia    Post assessment: no apparent anesthetic complications and tolerated procedure well    Post vital signs: stable    Level of consciousness: awake, alert and oriented    Nausea/Vomiting: no nausea/vomiting    Complications: none    Transfer of care protocol was followed      Last vitals:   Visit Vitals  /76 (BP Location: Right arm, Patient Position: Lying)   Pulse 86   Temp 36.4 °C (97.5 °F) (Oral)   Resp 18   Ht 5' 11" (1.803 m)   Wt 124.7 kg (275 lb)   SpO2 95%   BMI 38.35 kg/m²     "

## 2018-12-26 NOTE — ANESTHESIA POSTPROCEDURE EVALUATION
"Anesthesia Post Evaluation    Patient: Marcus Watkins    Procedure(s) Performed: Procedure(s) (LRB):  ECHOCARDIOGRAM,TRANSESOPHAGEAL (N/A)    Final Anesthesia Type: general  Patient location during evaluation: PACU  Patient participation: Yes- Able to Participate  Level of consciousness: awake and alert and oriented  Post-procedure vital signs: reviewed and stable  Pain management: adequate  Airway patency: patent  PONV status at discharge: No PONV  Anesthetic complications: no      Cardiovascular status: hemodynamically stable  Respiratory status: unassisted, spontaneous ventilation and room air  Hydration status: euvolemic  Follow-up not needed.        Visit Vitals  /86 (BP Location: Left arm, Patient Position: Sitting)   Pulse 76   Temp 36.2 °C (97.1 °F) (Oral)   Resp 18   Ht 5' 11" (1.803 m)   Wt 124.7 kg (275 lb)   SpO2 (!) 94%   BMI 38.35 kg/m²       Pain/Monika Score: Monika Score: 10 (12/26/2018  2:28 PM)        "

## 2018-12-26 NOTE — H&P
[facility]  Cardiology  History and Physical     Patient Name: Marcus Watkins  MRN: [id number]  Admission Date: 12/26/2018  Code Status: No Order   Attending Provider: José Grey MD   Primary Care Physician: Radha Brunson MD  Principal Problem:<principal problem not specified>    Patient information was obtained from patient and ER records.     Subjective:     Chief Complaint:  AF     HPI:      TRANSESOPHAGEAL ECHOCARDIOGRAPHY   PRE-PROCEDURE NOTE    12/26/2018    HPI:     Marcus Watkins is a 63 y.o. man with PMHx of AF here for LOPEZ pre-PVI tomorrow. Compliant with eliquis, no hx of CABG. LOPEZ done 10/2018 prior to DCCV, low normal EF 50% no WILIAN thrombus and emptying velocity is 0.4m/s.     Dysphagia or odynophagia:  No  Liver Disease, esophageal disease, or known varices:  No  Upper GI Bleeding: No  Snoring:  No  Sleep Apnea:  No  Prior neck surgery or radiation:  No  History of anesthetic difficulties:  No  Family history of anesthetic difficulties:  No  Last oral intake:  12 hours ago  Able to move neck in all directions:  Yes      Past Medical History:   Diagnosis Date    Atrial fibrillation        Past Surgical History:   Procedure Laterality Date    ABLATION N/A 4/27/2017    Performed by José Grey MD at [facility] CATH LAB    CARDIOVERSION N/A 11/29/2018    Performed by José Grey MD at [facility] EP LAB    CARDIOVERSION N/A 10/29/2018    Performed by José Grey MD at [facility] CATH LAB    CARDIOVERSION N/A 6/15/2017    Performed by José Grey MD at [facility] CATH LAB    COLONOSCOPY N/A 4/14/2016    Performed by Kade Wang MD at [facility] ENDO (4TH FLR)    ECHOCARDIOGRAM,TRANSESOPHAGEAL N/A 10/29/2018    Performed by José Grey MD at [facility] CATH LAB    RADIOFREQUENCY ABLATION  2017    TRANSESOPHAGEAL ECHOCARDIOGRAM (LOPEZ) N/A 6/15/2017    Performed by José Grey MD at [facility] CATH LAB    TRANSESOPHAGEAL ECHOCARDIOGRAM (LOPEZ) N/A 4/27/2017    Performed by José WRAY  MD [facility] at [facility] CATH LAB       Review of patient's allergies indicates:  No Known Allergies    No current facility-administered medications on file prior to encounter.      Current Outpatient Medications on File Prior to Encounter   Medication Sig    apixaban (ELIQUIS) 5 mg Tab Take 1 tablet (5 mg total) by mouth 2 (two) times daily.    cartilage/collagen II/hyaluron (MOVE FREE ULTRA ORAL) Take by mouth once daily.     cranberry 500 mg Cap Take by mouth once daily.     ergocalciferol, vitamin D2, (VITAMIN D ORAL) Take 5,000 Units by mouth once daily.     ferrous sulfate (IRON ORAL) Take 1 tablet by mouth once daily.    fish oil-omega-3 fatty acids 300-1,000 mg capsule Take 2 g by mouth once daily.    L.acidophilus/B.bifidum,longum (HEALTHY COLON ORAL) Take by mouth.    metoprolol succinate (TOPROL-XL) 25 MG 24 hr tablet Take 1 tablet (25 mg total) by mouth once daily.    multivit-min/folic/vit K/lycop (ONE-A-DAY MEN'S 50 PLUS ORAL) Take by mouth.    vitamin E, dl,tocopheryl acet, (VITAMIN E, DL, ACETATE, ORAL) Take by mouth.    amiodarone (PACERONE) 200 MG Tab Take 1 tablet (200 mg total) by mouth once daily.     Family History     None        Tobacco Use    Smoking status: Former Smoker     Last attempt to quit: 1978     Years since quittin.5    Smokeless tobacco: Never Used   Substance and Sexual Activity    Alcohol use: No     Alcohol/week: 0.0 oz    Drug use: No    Sexual activity: Not on file     Review of Systems   All other systems reviewed and are negative.    Objective:     Vital Signs (Most Recent):  Temp: 97.5 °F (36.4 °C) (18 1026)  Pulse: 86 (18 1026)  Resp: 18 (18 1026)  BP: 127/76 (18 1027)  SpO2: 95 % (18 1026) Vital Signs (24h Range):  Temp:  [97.5 °F (36.4 °C)] 97.5 °F (36.4 °C)  Pulse:  [86] 86  Resp:  [18] 18  SpO2:  [95 %] 95 %  BP: (127-132)/(76-82) 127/76     Weight: 124.7 kg (275 lb)  Body mass index is 38.35 kg/m².    SpO2: 95 %  O2  Device (Oxygen Therapy): room air    No intake or output data in the 24 hours ending 12/26/18 1247    Lines/Drains/Airways     Peripheral Intravenous Line                 Peripheral IV - Single Lumen 12/26/18 1025 Right Hand less than 1 day                Physical Exam   Constitutional: He is oriented to person, place, and time. He appears well-developed and well-nourished.   HENT:   Head: Normocephalic and atraumatic.   Eyes: No scleral icterus.   Cardiovascular:   Irregular rhythm and tachycardic   Pulmonary/Chest: Effort normal and breath sounds normal.   Musculoskeletal: He exhibits no edema.   Neurological: He is alert and oriented to person, place, and time.   Skin: Skin is warm.       Significant Labs: All pertinent lab results from the last 24 hours have been reviewed.    Significant Imaging: Echocardiogram: Transthoracic echo (TTE) complete (Cupid Only): No results found for this or any previous visit.    Assessment and Plan:     Atrial fibrillation      1. LOPEZ for evaluation of LA/WILIAN thrombus     -The risks, benefits & alternatives of the procedure were explained to the patient.    -The risks of transesophageal echo include but are not limited to:  Dental trauma, esophageal trauma/perforation, bleeding, laryngospasm/brochospasm, aspiration, sore throat/hoarseness, & dislodgement of the endotracheal tube/nasogastric tube (where applicable).    -The risks of moderate sedation include hypotension, respiratory depression, arrhythmias, bronchospasm, & death.    -Informed consent was obtained & the patient is agreeable to proceed with the procedure.    I will discuss with the attending physician. Attending addendum is to follow.     Further recommendations per attending addendum           VTE Risk Mitigation (From admission, onward)    None          Tarik Katz MD  Cardiology   [facility]

## 2018-12-26 NOTE — ASSESSMENT & PLAN NOTE
1. LOPEZ for evaluation of LA/WILIAN thrombus     -The risks, benefits & alternatives of the procedure were explained to the patient.    -The risks of transesophageal echo include but are not limited to:  Dental trauma, esophageal trauma/perforation, bleeding, laryngospasm/brochospasm, aspiration, sore throat/hoarseness, & dislodgement of the endotracheal tube/nasogastric tube (where applicable).    -The risks of moderate sedation include hypotension, respiratory depression, arrhythmias, bronchospasm, & death.    -Informed consent was obtained & the patient is agreeable to proceed with the procedure.    I will discuss with the attending physician. Attending addendum is to follow.     Further recommendations per attending addendum

## 2018-12-26 NOTE — ANESTHESIA PREPROCEDURE EVALUATION
12/26/2018  Pre-operative evaluation for Procedure(s) (LRB):  ECHOCARDIOGRAM,TRANSESOPHAGEAL (N/A)    Marcus Watkins is a 63 y.o. male normal EF, mild-moderate MR.     Patient Active Problem List   Diagnosis    Rectal bleeding    History of colon polyps    Screening    Encounter for anticoagulation discussion and counseling    Typical atrial flutter    Atrial fibrillation with RVR    Status post catheter ablation of atrial flutter    Numbness of feet    Atrial fibrillation       Review of patient's allergies indicates:  No Known Allergies    No current facility-administered medications on file prior to encounter.      Current Outpatient Medications on File Prior to Encounter   Medication Sig Dispense Refill    apixaban (ELIQUIS) 5 mg Tab Take 1 tablet (5 mg total) by mouth 2 (two) times daily. 180 tablet 3    cartilage/collagen II/hyaluron (MOVE FREE ULTRA ORAL) Take by mouth once daily.       cranberry 500 mg Cap Take by mouth once daily.       ergocalciferol, vitamin D2, (VITAMIN D ORAL) Take 5,000 Units by mouth once daily.       ferrous sulfate (IRON ORAL) Take 1 tablet by mouth once daily.      fish oil-omega-3 fatty acids 300-1,000 mg capsule Take 2 g by mouth once daily.      L.acidophilus/B.bifidum,longum (HEALTHY COLON ORAL) Take by mouth.      metoprolol succinate (TOPROL-XL) 25 MG 24 hr tablet Take 1 tablet (25 mg total) by mouth once daily. 30 tablet 11    multivit-min/folic/vit K/lycop (ONE-A-DAY MEN'S 50 PLUS ORAL) Take by mouth.      vitamin E, dl,tocopheryl acet, (VITAMIN E, DL, ACETATE, ORAL) Take by mouth.      amiodarone (PACERONE) 200 MG Tab Take 1 tablet (200 mg total) by mouth once daily. 30 tablet 11       Past Surgical History:   Procedure Laterality Date    ABLATION N/A 4/27/2017    [facility]ATH [facility]    CARDIOVERSION N/A 11/29/2018     Performed [facility] at [facility] EP LAB    CARDIOVERSION N/A 10/29/2018    Performed by [facility] at [facility] CATH LAB    CARDIOVERSION N/A 6/15/2017    Performed by [facility] at [facility] CATH LAB    COLONOSCOPY N/A 2016    Performed by Kade Wang MD at [facility] ENDO (4TH FLR)    ECHOCARDIOGRAM,TRANSESOPHAGEAL N/A 10/29/2018    Performed by José [facility] at [facility] CATH LAB    RADIOFREQUENCY ABLATION  2017    TRANSESOPHAGEAL ECHOCARDIOGRAM (LOPEZ) N/A 6/15/2017    Performed by [facility] at [facility] CATH LAB    TRANSESOPHAGEAL ECHOCARDIOGRAM (LOPEZ) N/A 2017    Performed by José Grey [facility] at [facility] CATH LAB       Social History     Socioeconomic History    Marital status:      Spouse name: Not on file    Number of children: Not on file    Years of education: Not on file    Highest education level: Not on file   Social Needs    Financial resource strain: Not on file    Food insecurity - worry: Not on file    Food insecurity - inability: Not on file    Transportation needs - medical: Not on file    Transportation needs - non-medical: Not on file   Occupational History    Not on file   Tobacco Use    Smoking status: Former Smoker     Last attempt to quit: 1978     Years since quittin.5    Smokeless tobacco: Never Used   Substance and Sexual Activity    Alcohol use: No     Alcohol/week: 0.0 oz    Drug use: No    Sexual activity: Not on file   Other Topics Concern    Not on file   Social History Narrative    Not on file         CBC: No results for input(s): WBC, RBC, HGB, HCT, PLT, MCV, MCH, MCHC in the last 72 hours.    CMP: No results for input(s): NA, K, CL, CO2, BUN, CREATININE, GLU, MG, PHOS, CALCIUM, ALBUMIN, PROT, ALKPHOS, ALT, AST, BILITOT in the last 72 hours.    INR  No results for input(s): PT, INR, PROTIME, APTT in the last 72 hours.        Diagnostic Studies:      EKD Echo:  Results for orders placed or performed in visit on  04/09/18   2D Echo w/ Color Flow Doppler   Result Value Ref Range    QEF 55 55 - 65    Mitral Valve Regurgitation MILD     Diastolic Dysfunction Yes (A)     Est. PA Systolic Pressure 34.36     Tricuspid Valve Regurgitation MILD          Anesthesia Evaluation    I have reviewed the Patient Summary Reports.     I have reviewed the Medications.     Review of Systems  Anesthesia Hx:  History of prior surgery of interest to airway management or planning: Denies Family Hx of Anesthesia complications.   Denies Personal Hx of Anesthesia complications.       Physical Exam  General:  Obesity    Airway/Jaw/Neck:  Airway Findings: Mouth Opening: Normal Tongue: Normal  General Airway Assessment: Adult  Mallampati: II  TM Distance: Normal, at least 6 cm  Jaw/Neck Findings:  Neck ROM: Normal ROM      Dental:  Dental Findings: Upper Dentures   Chest/Lungs:  Chest/Lungs Findings: Clear to auscultation, Normal Respiratory Rate         Mental Status:  Mental Status Findings:  Cooperative, Alert and Oriented         Anesthesia Plan  Type of Anesthesia, risks & benefits discussed:  Anesthesia Type:  general  Patient's Preference:   Intra-op Monitoring Plan: standard ASA monitors  Intra-op Monitoring Plan Comments:   Post Op Pain Control Plan: multimodal analgesia  Post Op Pain Control Plan Comments:   Induction:   IV  Beta Blocker:  Patient is on a Beta-Blocker and has received one dose within the past 24 hours (No further documentation required).       Informed Consent: Patient understands risks and agrees with Anesthesia plan.  Questions answered. Anesthesia consent signed with patient.  ASA Score: 3     Day of Surgery Review of History & Physical:    H&P update referred to the surgeon.         Ready For Surgery From Anesthesia Perspective.

## 2018-12-27 ENCOUNTER — ANESTHESIA EVENT (OUTPATIENT)
Dept: MEDSURG UNIT | Facility: HOSPITAL | Age: 63
End: 2018-12-27
Payer: COMMERCIAL

## 2018-12-27 ENCOUNTER — HOSPITAL ENCOUNTER (OUTPATIENT)
Facility: HOSPITAL | Age: 63
Discharge: HOME | End: 2018-12-28
Attending: INTERNAL MEDICINE | Admitting: INTERNAL MEDICINE
Payer: COMMERCIAL

## 2018-12-27 ENCOUNTER — ANESTHESIA (OUTPATIENT)
Dept: MEDSURG UNIT | Facility: HOSPITAL | Age: 63
End: 2018-12-27
Payer: COMMERCIAL

## 2018-12-27 DIAGNOSIS — I48.19 ATRIAL FIBRILLATION, PERSISTENT: Primary | ICD-10-CM

## 2018-12-27 DIAGNOSIS — I48.21 PERMANENT ATRIAL FIBRILLATION: ICD-10-CM

## 2018-12-27 DIAGNOSIS — I48.91 ATRIAL FIBRILLATION: ICD-10-CM

## 2018-12-27 LAB
ABO + RH BLD: NORMAL
BLD GP AB SCN CELLS X3 SERPL QL: NORMAL
BSA FOR ECHO PROCEDURE: 2.5 M2
POC ACTIVATED CLOTTING TIME K: 202 SEC (ref 74–137)
POC ACTIVATED CLOTTING TIME K: 247 SEC (ref 74–137)
POC ACTIVATED CLOTTING TIME K: 268 SEC (ref 74–137)
POC ACTIVATED CLOTTING TIME K: 307 SEC (ref 74–137)
POC ACTIVATED CLOTTING TIME K: 346 SEC (ref 74–137)
POC ACTIVATED CLOTTING TIME K: 373 SEC (ref 74–137)
POC ACTIVATED CLOTTING TIME K: 379 SEC (ref 74–137)
POC ACTIVATED CLOTTING TIME K: 406 SEC (ref 74–137)
POC ACTIVATED CLOTTING TIME K: 445 SEC (ref 74–137)
RA PRESSURE: 8 MMHG
SAMPLE: ABNORMAL

## 2018-12-27 PROCEDURE — D9220A PRA ANESTHESIA: Mod: ANES,,, | Performed by: ANESTHESIOLOGY

## 2018-12-27 PROCEDURE — 86901 BLOOD TYPING SEROLOGIC RH(D): CPT

## 2018-12-27 PROCEDURE — 36620 INSERTION CATHETER ARTERY: CPT | Mod: 59,,, | Performed by: ANESTHESIOLOGY

## 2018-12-27 PROCEDURE — 93010 ELECTROCARDIOGRAM REPORT: CPT | Mod: 76,,, | Performed by: INTERNAL MEDICINE

## 2018-12-27 PROCEDURE — 25000003 PHARM REV CODE 250: Performed by: NURSE ANESTHETIST, CERTIFIED REGISTERED

## 2018-12-27 PROCEDURE — 25000003 PHARM REV CODE 250: Performed by: NURSE PRACTITIONER

## 2018-12-27 PROCEDURE — 93613 PR INTRACARD ELECTROPHYS 3-DIMENS MAPPING: ICD-10-PCS | Mod: ,,, | Performed by: INTERNAL MEDICINE

## 2018-12-27 PROCEDURE — 63600175 PHARM REV CODE 636 W HCPCS: Performed by: INTERNAL MEDICINE

## 2018-12-27 PROCEDURE — C1732 CATH, EP, DIAG/ABL, 3D/VECT: HCPCS | Performed by: INTERNAL MEDICINE

## 2018-12-27 PROCEDURE — 37000009 HC ANESTHESIA EA ADD 15 MINS: Performed by: INTERNAL MEDICINE

## 2018-12-27 PROCEDURE — 93656 COMPRE EP EVAL ABLTJ ATR FIB: CPT | Performed by: INTERNAL MEDICINE

## 2018-12-27 PROCEDURE — 93005 ELECTROCARDIOGRAM TRACING: CPT

## 2018-12-27 PROCEDURE — 92960 CARDIOVERSION ELECTRIC EXT: CPT | Mod: 59,,, | Performed by: INTERNAL MEDICINE

## 2018-12-27 PROCEDURE — 27201423 OPTIME MED/SURG SUP & DEVICES STERILE SUPPLY: Performed by: INTERNAL MEDICINE

## 2018-12-27 PROCEDURE — 92960 PR CARDIOVERSION, ELECTIVE;EXTERN: ICD-10-PCS | Mod: 59,,, | Performed by: INTERNAL MEDICINE

## 2018-12-27 PROCEDURE — C1730 CATH, EP, 19 OR FEW ELECT: HCPCS | Performed by: INTERNAL MEDICINE

## 2018-12-27 PROCEDURE — 93662 PR INTRACARD ECHO, THER/DX INTERVENT: ICD-10-PCS | Mod: 26,,, | Performed by: INTERNAL MEDICINE

## 2018-12-27 PROCEDURE — 93662 INTRACARDIAC ECG (ICE): CPT | Performed by: INTERNAL MEDICINE

## 2018-12-27 PROCEDURE — 93010 EKG 12-LEAD: ICD-10-PCS | Mod: 76,,, | Performed by: INTERNAL MEDICINE

## 2018-12-27 PROCEDURE — 93662 INTRACARDIAC ECG (ICE): CPT | Mod: 26,,, | Performed by: INTERNAL MEDICINE

## 2018-12-27 PROCEDURE — 27201037 HC PRESSURE MONITORING SET UP

## 2018-12-27 PROCEDURE — 37000008 HC ANESTHESIA 1ST 15 MINUTES: Performed by: INTERNAL MEDICINE

## 2018-12-27 PROCEDURE — C1753 CATH, INTRAVAS ULTRASOUND: HCPCS | Performed by: INTERNAL MEDICINE

## 2018-12-27 PROCEDURE — D9220A PRA ANESTHESIA: Mod: CRNA,,, | Performed by: NURSE ANESTHETIST, CERTIFIED REGISTERED

## 2018-12-27 PROCEDURE — 93656 PR ELECTROPHYS EVAL, COMPREHEN, W/ABLATION OF ATRIAL FIBR: ICD-10-PCS | Mod: ,,, | Performed by: INTERNAL MEDICINE

## 2018-12-27 PROCEDURE — 93656 COMPRE EP EVAL ABLTJ ATR FIB: CPT | Mod: ,,, | Performed by: INTERNAL MEDICINE

## 2018-12-27 PROCEDURE — 63600175 PHARM REV CODE 636 W HCPCS: Performed by: NURSE ANESTHETIST, CERTIFIED REGISTERED

## 2018-12-27 PROCEDURE — C1766 INTRO/SHEATH,STRBLE,NON-PEEL: HCPCS | Performed by: INTERNAL MEDICINE

## 2018-12-27 PROCEDURE — 93613 INTRACARDIAC EPHYS 3D MAPG: CPT | Performed by: INTERNAL MEDICINE

## 2018-12-27 PROCEDURE — C1894 INTRO/SHEATH, NON-LASER: HCPCS | Performed by: INTERNAL MEDICINE

## 2018-12-27 PROCEDURE — 92960 CARDIOVERSION ELECTRIC EXT: CPT | Mod: 59 | Performed by: INTERNAL MEDICINE

## 2018-12-27 PROCEDURE — 93613 INTRACARDIAC EPHYS 3D MAPG: CPT | Mod: ,,, | Performed by: INTERNAL MEDICINE

## 2018-12-27 PROCEDURE — C1731 CATH, EP, 20 OR MORE ELEC: HCPCS | Performed by: INTERNAL MEDICINE

## 2018-12-27 PROCEDURE — 25000003 PHARM REV CODE 250: Performed by: INTERNAL MEDICINE

## 2018-12-27 PROCEDURE — 93010 ELECTROCARDIOGRAM REPORT: CPT | Mod: ,,, | Performed by: INTERNAL MEDICINE

## 2018-12-27 RX ORDER — FUROSEMIDE 10 MG/ML
INJECTION INTRAMUSCULAR; INTRAVENOUS
Status: DISCONTINUED | OUTPATIENT
Start: 2018-12-27 | End: 2018-12-27

## 2018-12-27 RX ORDER — PHENYLEPHRINE HYDROCHLORIDE 10 MG/ML
INJECTION INTRAVENOUS
Status: DISCONTINUED | OUTPATIENT
Start: 2018-12-27 | End: 2018-12-27

## 2018-12-27 RX ORDER — PROTAMINE SULFATE 10 MG/ML
INJECTION, SOLUTION INTRAVENOUS
Status: DISCONTINUED | OUTPATIENT
Start: 2018-12-27 | End: 2018-12-27

## 2018-12-27 RX ORDER — HYDROMORPHONE HYDROCHLORIDE 1 MG/ML
0.2 INJECTION, SOLUTION INTRAMUSCULAR; INTRAVENOUS; SUBCUTANEOUS EVERY 5 MIN PRN
Status: DISCONTINUED | OUTPATIENT
Start: 2018-12-27 | End: 2018-12-28 | Stop reason: HOSPADM

## 2018-12-27 RX ORDER — ACETAMINOPHEN 325 MG/1
650 TABLET ORAL EVERY 6 HOURS PRN
Status: DISCONTINUED | OUTPATIENT
Start: 2018-12-27 | End: 2018-12-28 | Stop reason: HOSPADM

## 2018-12-27 RX ORDER — MIDAZOLAM HYDROCHLORIDE 1 MG/ML
INJECTION, SOLUTION INTRAMUSCULAR; INTRAVENOUS
Status: DISCONTINUED | OUTPATIENT
Start: 2018-12-27 | End: 2018-12-27

## 2018-12-27 RX ORDER — HEPARIN SODIUM 10000 [USP'U]/100ML
INJECTION, SOLUTION INTRAVENOUS CONTINUOUS PRN
Status: DISCONTINUED | OUTPATIENT
Start: 2018-12-27 | End: 2018-12-27

## 2018-12-27 RX ORDER — METOPROLOL SUCCINATE 25 MG/1
25 TABLET, EXTENDED RELEASE ORAL DAILY
Status: DISCONTINUED | OUTPATIENT
Start: 2018-12-28 | End: 2018-12-28 | Stop reason: HOSPADM

## 2018-12-27 RX ORDER — MEPERIDINE HYDROCHLORIDE 50 MG/ML
12.5 INJECTION INTRAMUSCULAR; INTRAVENOUS; SUBCUTANEOUS ONCE AS NEEDED
Status: DISCONTINUED | OUTPATIENT
Start: 2018-12-27 | End: 2018-12-28 | Stop reason: HOSPADM

## 2018-12-27 RX ORDER — LORAZEPAM 2 MG/ML
0.25 INJECTION INTRAMUSCULAR ONCE AS NEEDED
Status: DISCONTINUED | OUTPATIENT
Start: 2018-12-27 | End: 2018-12-28 | Stop reason: HOSPADM

## 2018-12-27 RX ORDER — ROCURONIUM BROMIDE 10 MG/ML
INJECTION, SOLUTION INTRAVENOUS
Status: DISCONTINUED | OUTPATIENT
Start: 2018-12-27 | End: 2018-12-27

## 2018-12-27 RX ORDER — PROPOFOL 10 MG/ML
VIAL (ML) INTRAVENOUS
Status: DISCONTINUED | OUTPATIENT
Start: 2018-12-27 | End: 2018-12-27

## 2018-12-27 RX ORDER — HEPARIN SODIUM 1000 [USP'U]/ML
INJECTION, SOLUTION INTRAVENOUS; SUBCUTANEOUS
Status: DISCONTINUED | OUTPATIENT
Start: 2018-12-27 | End: 2018-12-27

## 2018-12-27 RX ORDER — SUCCINYLCHOLINE CHLORIDE 20 MG/ML
INJECTION INTRAMUSCULAR; INTRAVENOUS
Status: DISCONTINUED | OUTPATIENT
Start: 2018-12-27 | End: 2018-12-27

## 2018-12-27 RX ORDER — DOPAMINE HYDROCHLORIDE 160 MG/100ML
INJECTION, SOLUTION INTRAVENOUS CONTINUOUS PRN
Status: DISCONTINUED | OUTPATIENT
Start: 2018-12-27 | End: 2018-12-27

## 2018-12-27 RX ORDER — PANTOPRAZOLE SODIUM 40 MG/1
40 TABLET, DELAYED RELEASE ORAL DAILY
Status: DISCONTINUED | OUTPATIENT
Start: 2018-12-27 | End: 2018-12-28 | Stop reason: HOSPADM

## 2018-12-27 RX ORDER — FENTANYL CITRATE 50 UG/ML
INJECTION, SOLUTION INTRAMUSCULAR; INTRAVENOUS
Status: DISCONTINUED | OUTPATIENT
Start: 2018-12-27 | End: 2018-12-27

## 2018-12-27 RX ORDER — LIDOCAINE HCL/PF 100 MG/5ML
SYRINGE (ML) INTRAVENOUS
Status: DISCONTINUED | OUTPATIENT
Start: 2018-12-27 | End: 2018-12-27

## 2018-12-27 RX ORDER — PANTOPRAZOLE SODIUM 40 MG/1
40 TABLET, DELAYED RELEASE ORAL DAILY
Qty: 30 TABLET | Refills: 0 | Status: SHIPPED | OUTPATIENT
Start: 2018-12-27 | End: 2019-02-18

## 2018-12-27 RX ORDER — LIDOCAINE HYDROCHLORIDE 20 MG/ML
INJECTION, SOLUTION EPIDURAL; INFILTRATION; INTRACAUDAL; PERINEURAL
Status: DISCONTINUED | OUTPATIENT
Start: 2018-12-27 | End: 2018-12-27

## 2018-12-27 RX ORDER — SODIUM CHLORIDE 9 MG/ML
INJECTION, SOLUTION INTRAVENOUS CONTINUOUS
Status: DISCONTINUED | OUTPATIENT
Start: 2018-12-27 | End: 2018-12-27

## 2018-12-27 RX ORDER — SILVER SULFADIAZINE 10 G/1000G
CREAM TOPICAL
Status: DISCONTINUED | OUTPATIENT
Start: 2018-12-27 | End: 2018-12-28 | Stop reason: HOSPADM

## 2018-12-27 RX ORDER — SODIUM CHLORIDE 9 MG/ML
INJECTION, SOLUTION INTRAVENOUS CONTINUOUS PRN
Status: DISCONTINUED | OUTPATIENT
Start: 2018-12-27 | End: 2018-12-27

## 2018-12-27 RX ORDER — AMIODARONE HYDROCHLORIDE 200 MG/1
200 TABLET ORAL DAILY
Status: DISCONTINUED | OUTPATIENT
Start: 2018-12-27 | End: 2018-12-28 | Stop reason: HOSPADM

## 2018-12-27 RX ORDER — HEPARIN SODIUM 200 [USP'U]/100ML
INJECTION, SOLUTION INTRAVENOUS
Status: DISCONTINUED | OUTPATIENT
Start: 2018-12-27 | End: 2018-12-27

## 2018-12-27 RX ORDER — SODIUM CHLORIDE 9 MG/ML
INJECTION, SOLUTION INTRAVENOUS CONTINUOUS
Status: DISCONTINUED | OUTPATIENT
Start: 2018-12-27 | End: 2018-12-28 | Stop reason: HOSPADM

## 2018-12-27 RX ADMIN — DOPAMINE HYDROCHLORIDE 3 MCG/KG/MIN: 160 INJECTION, SOLUTION INTRAVENOUS at 08:12

## 2018-12-27 RX ADMIN — HEPARIN SODIUM AND DEXTROSE 5000 UNITS/HR: 10000; 5 INJECTION INTRAVENOUS at 08:12

## 2018-12-27 RX ADMIN — FUROSEMIDE 40 MG: 10 INJECTION, SOLUTION INTRAMUSCULAR; INTRAVENOUS at 11:12

## 2018-12-27 RX ADMIN — HEPARIN SODIUM 3000 UNITS: 1000 INJECTION INTRAVENOUS; SUBCUTANEOUS at 09:12

## 2018-12-27 RX ADMIN — PROPOFOL 100 MG: 10 INJECTION, EMULSION INTRAVENOUS at 07:12

## 2018-12-27 RX ADMIN — VASOPRESSIN 2 UNITS: 20 INJECTION INTRAVENOUS at 08:12

## 2018-12-27 RX ADMIN — PROTAMINE SULFATE 80 MG: 10 INJECTION, SOLUTION INTRAVENOUS at 10:12

## 2018-12-27 RX ADMIN — ROCURONIUM BROMIDE 20 MG: 10 INJECTION, SOLUTION INTRAVENOUS at 07:12

## 2018-12-27 RX ADMIN — PROTAMINE SULFATE 80 MG: 10 INJECTION, SOLUTION INTRAVENOUS at 11:12

## 2018-12-27 RX ADMIN — HEPARIN SODIUM 6000 UNITS: 1000 INJECTION INTRAVENOUS; SUBCUTANEOUS at 08:12

## 2018-12-27 RX ADMIN — HEPARIN SODIUM 3000 UNITS: 1000 INJECTION INTRAVENOUS; SUBCUTANEOUS at 08:12

## 2018-12-27 RX ADMIN — PHENYLEPHRINE HYDROCHLORIDE 200 MCG: 10 INJECTION INTRAVENOUS at 07:12

## 2018-12-27 RX ADMIN — VASOPRESSIN 2 UNITS: 20 INJECTION INTRAVENOUS at 10:12

## 2018-12-27 RX ADMIN — SODIUM CHLORIDE: 0.9 INJECTION, SOLUTION INTRAVENOUS at 09:12

## 2018-12-27 RX ADMIN — HEPARIN SODIUM 9000 UNITS: 1000 INJECTION INTRAVENOUS; SUBCUTANEOUS at 08:12

## 2018-12-27 RX ADMIN — PROPOFOL 100 MG: 10 INJECTION, EMULSION INTRAVENOUS at 10:12

## 2018-12-27 RX ADMIN — MIDAZOLAM 2 MG: 1 INJECTION INTRAMUSCULAR; INTRAVENOUS at 07:12

## 2018-12-27 RX ADMIN — HEPARIN SODIUM 4000 UNITS: 1000 INJECTION INTRAVENOUS; SUBCUTANEOUS at 09:12

## 2018-12-27 RX ADMIN — FENTANYL CITRATE 100 MCG: 50 INJECTION, SOLUTION INTRAMUSCULAR; INTRAVENOUS at 07:12

## 2018-12-27 RX ADMIN — PHENYLEPHRINE HYDROCHLORIDE 100 MCG: 10 INJECTION INTRAVENOUS at 07:12

## 2018-12-27 RX ADMIN — SUCCINYLCHOLINE CHLORIDE 160 MG: 20 INJECTION, SOLUTION INTRAMUSCULAR; INTRAVENOUS at 07:12

## 2018-12-27 RX ADMIN — PROTAMINE SULFATE 10 MG: 10 INJECTION, SOLUTION INTRAVENOUS at 10:12

## 2018-12-27 RX ADMIN — VASOPRESSIN 3 UNITS: 20 INJECTION INTRAVENOUS at 08:12

## 2018-12-27 RX ADMIN — LIDOCAINE HYDROCHLORIDE 50 MG: 20 INJECTION, SOLUTION INTRAVENOUS at 07:12

## 2018-12-27 RX ADMIN — AMIODARONE HYDROCHLORIDE 200 MG: 200 TABLET ORAL at 05:12

## 2018-12-27 RX ADMIN — APIXABAN 5 MG: 5 TABLET, FILM COATED ORAL at 05:12

## 2018-12-27 RX ADMIN — ACETAMINOPHEN 650 MG: 325 TABLET ORAL at 02:12

## 2018-12-27 RX ADMIN — SODIUM CHLORIDE: 0.9 INJECTION, SOLUTION INTRAVENOUS at 07:12

## 2018-12-27 RX ADMIN — SODIUM CHLORIDE, SODIUM GLUCONATE, SODIUM ACETATE, POTASSIUM CHLORIDE, MAGNESIUM CHLORIDE, SODIUM PHOSPHATE, DIBASIC, AND POTASSIUM PHOSPHATE: .53; .5; .37; .037; .03; .012; .00082 INJECTION, SOLUTION INTRAVENOUS at 07:12

## 2018-12-27 RX ADMIN — VASOPRESSIN 2 UNITS: 20 INJECTION INTRAVENOUS at 07:12

## 2018-12-27 RX ADMIN — PANTOPRAZOLE SODIUM 40 MG: 40 TABLET, DELAYED RELEASE ORAL at 05:12

## 2018-12-27 NOTE — SUBJECTIVE & OBJECTIVE
Past Medical History:   Diagnosis Date    Atrial fibrillation        Past Surgical History:   Procedure Laterality Date    ABLATION N/A 4/27/2017    Performed by [facility] at [facility] CATH LAB    CARDIOVERSION N/A 11/29/2018    Performed by José [facility] at [facility] EP LAB    CARDIOVERSION N/A 10/29/2018    Performed by José [facility] at [facility] CATH LAB    CARDIOVERSION N/A 6/15/2017    Performed by José [facility] MD at [facility] CATH LAB    COLONOSCOPY N/A 4/14/2016    Performed by Kade Wang MD at [facility] ENDO (4TH FLR)    ECHOCARDIOGRAM,TRANSESOPHAGEAL N/A 10/29/2018    Performed by José [facility] MD at [facility] CATH LAB    RADIOFREQUENCY ABLATION  2017    TRANSESOPHAGEAL ECHOCARDIOGRAM (LOPEZ) N/A 6/15/2017    Performed by José Grey MD at [facility] CATH LAB    TRANSESOPHAGEAL ECHOCARDIOGRAM (LOPEZ) N/A 4/27/2017    Performed by José Grey MD at [facility] CATH LAB       Review of patient's allergies indicates:  No Known Allergies    No current facility-administered medications on file prior to encounter.      Current Outpatient Medications on File Prior to Encounter   Medication Sig    apixaban (ELIQUIS) 5 mg Tab Take 1 tablet (5 mg total) by mouth 2 (two) times daily.    cartilage/collagen II/hyaluron (MOVE FREE ULTRA ORAL) Take by mouth once daily.     cranberry 500 mg Cap Take by mouth once daily.     ergocalciferol, vitamin D2, (VITAMIN D ORAL) Take 5,000 Units by mouth once daily.     ferrous sulfate (IRON ORAL) Take 1 tablet by mouth once daily.    fish oil-omega-3 fatty acids 300-1,000 mg capsule Take 2 g by mouth once daily.    L.acidophilus/B.bifidum,longum (HEALTHY COLON ORAL) Take by mouth.    metoprolol succinate (TOPROL-XL) 25 MG 24 hr tablet Take 1 tablet (25 mg total) by mouth once daily.    multivit-min/folic/vit K/lycop (ONE-A-DAY MEN'S 50 PLUS ORAL) Take by mouth.    vitamin E, dl,tocopheryl acet, (VITAMIN E, DL, ACETATE, ORAL) Take by mouth.    amiodarone (PACERONE) 200 MG  Tab Take 1 tablet (200 mg total) by mouth once daily.     Family History     None        Tobacco Use    Smoking status: Former Smoker     Last attempt to quit: 1978     Years since quittin.5    Smokeless tobacco: Never Used   Substance and Sexual Activity    Alcohol use: No     Alcohol/week: 0.0 oz    Drug use: No    Sexual activity: Not on file     ROS     Review of Systems   Constitution: Negative for fevers/chills.   Positive for easily gets    weak and has malaise/fatigue.   HENT: Negative for ear pain and tinnitus.   Eyes: Negative for blurred vision.   Cardiovascular:   Negative for chest pain,  near-syncope, and syncope.   Respiratory:  Positive for shortness of breath on exertion   Endocrine:  Negative for polyuria.   Hematologic/Lymphatic: Negative for bruise/bleed easily.   Skin:  Negative for rash.   Musculoskeletal: Negative for joint pain and muscle weakness.   Extremity: Negative for swelling in the lower extremities.   Gastrointestinal:  Negative for abdominal pain and change in bowel habit.   Genitourinary: Negative for frequency.   Neurological:  Negative for dizziness.   Psychiatric/Behavioral:  Negative for depression, anxiety     Objective:     Vital Signs (Most Recent):  Temp: 97.6 °F (36.4 °C) (18 0550)  Pulse: 100 (18 0550)  Resp: 18 (18 0550)  BP: (!) 141/80 (18 0551)  SpO2: 96 % (18 0550) Vital Signs (24h Range):  Temp:  [97.1 °F (36.2 °C)-97.7 °F (36.5 °C)] 97.6 °F (36.4 °C)  Pulse:  [] 100  Resp:  [18-23] 18  SpO2:  [94 %-97 %] 96 %  BP: (127-147)/(73-86) 141/80       Weight: 124.7 kg (275 lb)  Body mass index is 38.35 kg/m².    SpO2: 96 %  O2 Device (Oxygen Therapy): room air    Physical Exam      General: Well developed, well nourished, No distress on room air   HEENT: Head is normocephalic, atraumatic;  Lungs: Clear to auscultation bilaterally.  No wheezing. Normal respiratory effort.  Cardiovascular:  S1 S2 Normal rate, regular rhythm  and normal heart sounds.    PMI is not displaced  Extremities: No LE edema. Pulses 2+ and symmetric.   Abdomen: Abdomen is soft, non-tender non-distended with normal bowel sounds.  Skin: Skin color, texture, turgor normal. No rashes.  Musculoskeletal: normal range of motion   Neurologic: Normal strength and tone. No focal numbness or weakness.   Psychiatric: normal mood and affect.  behavior is normal. Alert and oriented X 3.  Labs reviewed       Significant Imaging: chart reviwed > Left thyroid gland nodule measuring 2.2 cm.  Per the ACR guidelines, diagnostic thyroid ultrasound is recommended. > ultrasound ordered per PCP

## 2018-12-27 NOTE — H&P
[facility]  Cardiac Electrophysiology  History and Physical     Admission Date: 12/27/2018  Code Status: No Order   Attending Provider: José Grey MD   Principal Problem:Atrial fibrillation, persistent    Subjective:     Chief Complaint:  AF     HPI:   Mr. Watkins is a 63 year old male with a PMH of  AFL ( EPS and CTI-line. In 6/2017), AF > s/p last  DCCV on 11/29, currenly on eliquis and was on amiodarone. Pt elected to proceed for planned PVI with MD Grey. LOPEZ was done yesterday and was negative for LA/WILIAN thrombus.   He denies any bleeding, chest pain, palpitations, SOB, dizziness, light headedness, weakness, syncope, or near syncopal episodes. He does state he has DALY mostly when climbing stairs.   EKG in AF at 111 BPM. He denies any Hx TIA.CVA     LOPEZ 12/26/18   ·   Normal left ventricular systolic function. The estimated ejection fraction is 60%  · Normal right ventricular systolic function.  · Normal appearing left atrial appendage. No thrombus is present in the appendage. Normal appendage velocities.  · Mild-to-moderate mitral regurgitation.  · Mild tricuspid regurgitation.       Past Medical History:   Diagnosis Date    Atrial fibrillation        Past Surgical History:   Procedure Laterality Date    ABLATION N/A 4/27/2017    Performed by José Grey MD at [facility] CATH LAB    CARDIOVERSION N/A 11/29/2018    Performed by José Grey MD at [facility] EP LAB    CARDIOVERSION N/A 10/29/2018    Performed by José Grey MD at [facility] CATH LAB    CARDIOVERSION N/A 6/15/2017    Performed by José Grey MD at [facility] CATH LAB    COLONOSCOPY N/A 4/14/2016    Performed by Kade Wang MD at [facility] ENDO (4TH FLR)    ECHOCARDIOGRAM,TRANSESOPHAGEAL N/A 10/29/2018    Performed by José Grey MD at [facility] CATH LAB    RADIOFREQUENCY ABLATION  2017    TRANSESOPHAGEAL ECHOCARDIOGRAM (LOPEZ) N/A 6/15/2017    Performed by José Grey MD at [facility] CATH LAB    TRANSESOPHAGEAL ECHOCARDIOGRAM  (LOPEZ) N/A 2017    [facility] by [facility] at [facility] CATH LAB       Review of patient's allergies indicates:  No Known Allergies    No current facility-administered medications on file prior to encounter.      Current Outpatient Medications on File Prior to Encounter   Medication Sig    apixaban (ELIQUIS) 5 mg Tab Take 1 tablet (5 mg total) by mouth 2 (two) times daily.    cartilage/collagen II/hyaluron (MOVE FREE ULTRA ORAL) Take by mouth once daily.     cranberry 500 mg Cap Take by mouth once daily.     ergocalciferol, vitamin D2, (VITAMIN D ORAL) Take 5,000 Units by mouth once daily.     ferrous sulfate (IRON ORAL) Take 1 tablet by mouth once daily.    fish oil-omega-3 fatty acids 300-1,000 mg capsule Take 2 g by mouth once daily.    L.acidophilus/B.bifidum,longum (HEALTHY COLON ORAL) Take by mouth.    metoprolol succinate (TOPROL-XL) 25 MG 24 hr tablet Take 1 tablet (25 mg total) by mouth once daily.    multivit-min/folic/vit K/lycop (ONE-A-DAY MEN'S 50 PLUS ORAL) Take by mouth.    vitamin E, dl,tocopheryl acet, (VITAMIN E, DL, ACETATE, ORAL) Take by mouth.    amiodarone (PACERONE) 200 MG Tab Take 1 tablet (200 mg total) by mouth once daily.     Family History     None        Tobacco Use    Smoking status: Former Smoker     Last attempt to quit: 1978     Years since quittin.5    Smokeless tobacco: Never Used   Substance and Sexual Activity    Alcohol use: No     Alcohol/week: 0.0 oz    Drug use: No    Sexual activity: Not on file     ROS     Review of Systems   Constitution: Negative for fevers/chills.   Positive for easily gets    weak and has malaise/fatigue.   HENT: Negative for ear pain and tinnitus.   Eyes: Negative for blurred vision.   Cardiovascular:   Negative for chest pain,  near-syncope, and syncope.   Respiratory:  Positive for shortness of breath on exertion   Endocrine:  Negative for polyuria.   Hematologic/Lymphatic: Negative for bruise/bleed easily.   Skin:   Negative for rash.   Musculoskeletal: Negative for joint pain and muscle weakness.   Extremity: Negative for swelling in the lower extremities.   Gastrointestinal:  Negative for abdominal pain and change in bowel habit.   Genitourinary: Negative for frequency.   Neurological:  Negative for dizziness.   Psychiatric/Behavioral:  Negative for depression, anxiety     Objective:     Vital Signs (Most Recent):  Temp: 97.6 °F (36.4 °C) (12/27/18 0550)  Pulse: 100 (12/27/18 0550)  Resp: 18 (12/27/18 0550)  BP: (!) 141/80 (12/27/18 0551)  SpO2: 96 % (12/27/18 0550) Vital Signs (24h Range):  Temp:  [97.1 °F (36.2 °C)-97.7 °F (36.5 °C)] 97.6 °F (36.4 °C)  Pulse:  [] 100  Resp:  [18-23] 18  SpO2:  [94 %-97 %] 96 %  BP: (127-147)/(73-86) 141/80       Weight: 124.7 kg (275 lb)  Body mass index is 38.35 kg/m².    SpO2: 96 %  O2 Device (Oxygen Therapy): room air    Physical Exam      General: Well developed, well nourished, No distress on room air   HEENT: Head is normocephalic, atraumatic;  Lungs: Clear to auscultation bilaterally.  No wheezing. Normal respiratory effort.  Cardiovascular:   Irregularly irregular rhythm and normal heart sounds.    PMI is not displaced  Extremities: No LE edema. Pulses 2+ and symmetric.   Abdomen: Abdomen is soft, non-tender non-distended with normal bowel sounds.  Skin: Skin color, texture, turgor normal. No rashes.  Musculoskeletal: normal range of motion   Neurologic: Normal strength and tone. No focal numbness or weakness.   Psychiatric: normal mood and affect.  behavior is normal. Alert and oriented X 3.  Labs reviewed       Significant Imaging: chart reviwed > Left thyroid gland nodule measuring 2.2 cm.  Per the ACR guidelines, diagnostic thyroid ultrasound is recommended. > ultrasound ordered per PCP     Assessment and Plan:     * Atrial fibrillation, persistent    PVI > last dose of eliquis was last night, amiodarone last dose was on 12/12/18   LOPEZ 11/26/18 was negative for LA/WILIAN  thrombus  Sedation per anesthesia.       Prior to procedure, extensive discussion with patient regarding risks and benefits of  PVI by STAFF, and patient  would like to proceed.  The patient voices understanding and all questions have been answered. No further questions/concerns voiced at this time.        Stoney Hogue NP  Cardiac Electrophysiology  [facility]  STAFF Matilda MARTINEZ

## 2018-12-27 NOTE — ANESTHESIA PREPROCEDURE EVALUATION
12/27/2018  Marcus Watkins is a 63 y.o., male.    Anesthesia Evaluation    I have reviewed the Patient Summary Reports.    I have reviewed the Nursing Notes.      Review of Systems  Anesthesia Hx:  No problems with previous Anesthesia    Hematology/Oncology:  Hematology Normal   Oncology Normal     EENT/Dental:EENT/Dental Normal   Cardiovascular:   Dysrhythmias atrial fibrillation ECG has been reviewed. Left ventricular systolic function appears low normal to mildly depressed (estimated ejection fraction is 50%   Pulmonary:  Pulmonary Normal    Renal/:  Renal/ Normal     Hepatic/GI:  Hepatic/GI Normal    Musculoskeletal:  Musculoskeletal Normal    Neurological:  Neurology Normal    Endocrine:  Endocrine Normal    Dermatological:  Skin Normal    Psych:  Psychiatric Normal           Physical Exam  General:  Obesity    Airway/Jaw/Neck:  Airway Findings: Mouth Opening: Normal Tongue: Normal  General Airway Assessment: Adult  Mallampati: III  TM Distance: Normal, at least 6 cm        Eyes/Ears/Nose:  EYES/EARS/NOSE FINDINGS: Normal   Dental:  Dental Findings: In tact   Chest/Lungs:  Chest/Lungs Clear    Heart/Vascular:  Heart Findings: Normal Heart murmur: negative Vascular Findings: Normal    Abdomen:  Abdomen Findings: Normal    Musculoskeletal:  Musculoskeletal Findings: Normal   Skin:  Skin Findings: Normal    Mental Status:  Mental Status Findings: Normal        Anesthesia Plan  Type of Anesthesia, risks & benefits discussed:  Anesthesia Type:  general  Patient's Preference:   Intra-op Monitoring Plan:   Intra-op Monitoring Plan Comments:   Post Op Pain Control Plan:   Post Op Pain Control Plan Comments:   Induction:   IV  Beta Blocker:  Patient is on a Beta-Blocker and has received one dose within the past 24 hours (No further documentation required).       Informed Consent: Patient understands risks and  agrees with Anesthesia plan.  Questions answered. Anesthesia consent signed with patient.  ASA Score: 3     Day of Surgery Review of History & Physical:    H&P update referred to the surgeon.         Ready For Surgery From Anesthesia Perspective.

## 2018-12-27 NOTE — ANESTHESIA PROCEDURE NOTES
Arterial    Diagnosis: afib    Patient location during procedure: done in OR  Procedure start time: 12/27/2018 7:45 AM  Procedure end time: 12/27/2018 7:50 AM  Staffing  Anesthesiologist: Chadd Farias Jr., MD  Performed: anesthesiologist   Anesthesiologist was present at the time of the procedure.  Preanesthetic Checklist  Completed: patient identified, site marked, surgical consent, pre-op evaluation, timeout performed, IV checked, risks and benefits discussed, monitors and equipment checked and anesthesia consent givenArterial  Skin Prep: chlorhexidine gluconate  Local Infiltration: none  Orientation: right  Location: radial  Catheter Size: 20 G  Catheter placement by Anatomical landmarks. Heme positive aspiration all ports.Insertion Attempts: 2  Assessment  Dressing: tegaderm and secured with tape  Patient: Tolerated well

## 2018-12-27 NOTE — PROGRESS NOTES
Pt ambulated down pearson with nurse. Pt had even independent gait. Bilateral groin dressings clean, dry, and intact with no evidence of hematoma or bleeding.

## 2018-12-27 NOTE — PLAN OF CARE
Problem: Adult Inpatient Plan of Care  Goal: Plan of Care Review  Outcome: Ongoing (interventions implemented as appropriate)  Pt arrived to SSCU 318 at 1255 via stretcher with nurse. Vital signs stable, no acute distress noted. Bed locked in lowest position, side rails upx2, call bell within reach, nonskid socks on pt. Wife at bedside. Bilateral groin sutures dry and intact, no bleeding or hematoma present.

## 2018-12-27 NOTE — TRANSFER OF CARE
"Anesthesia Transfer of Care Note    Patient: Marcus Watkins    Procedure(s) Performed: Procedure(s) (LRB):  ABLATION (N/A)  Cardioversion or Defibrillation    Patient location: PACU    Anesthesia Type: general    Transport from OR: Transported from OR on 6-10 L/min O2 by face mask with adequate spontaneous ventilation    Post pain: adequate analgesia    Post assessment: no apparent anesthetic complications and tolerated procedure well    Post vital signs: stable    Level of consciousness: awake, alert and oriented    Nausea/Vomiting: no nausea/vomiting    Complications: none    Transfer of care protocol was followed      Last vitals:   Visit Vitals  BP (!) 141/80 (BP Location: Left arm, Patient Position: Lying)   Pulse 100   Temp 36.4 °C (97.6 °F) (Oral)   Resp 18   Ht 5' 11" (1.803 m)   Wt 124.7 kg (275 lb)   SpO2 96%   BMI 38.35 kg/m²     "

## 2018-12-27 NOTE — PROGRESS NOTES
Pt's bilateral groin sutures removed per protocol. Bilateral dressings applied and are clean, dry, and intact. Pt tolerated well.

## 2018-12-27 NOTE — HPI
Mr. Watkins is a 63 year old male with a PMH of  AFL ( EPS and CTI-line. In 6/2017), AF > s/p last  DCCV on 11/29, currenly on eliquis and was on amiodarone. Pt elected to proceed for planned PVI with MD Grey. LOPEZ was done yesterday and was negative for LA/WILIAN thrombus.   He denies any chest pain, palpitations, SOB, dizziness, light headedness, weakness, syncope, or near syncopal episodes. He does state he has DALY mostly when climbing stairs.   EKG in AF at 111 BPM     LOPEZ 12/26/18   ·  Normal left ventricular systolic function. The estimated ejection fraction is 60%  · Normal right ventricular systolic function.  · Normal appearing left atrial appendage. No thrombus is present in the appendage. Normal appendage velocities.  · Mild-to-moderate mitral regurgitation.  · Mild tricuspid regurgitation.

## 2018-12-27 NOTE — NURSING TRANSFER
Nursing Transfer Note      12/27/2018     Transfer To: ep pacu 3 to sscu 318    Transfer via stretcher    Transfer with cardiac monitoring, tele box on    Transported by rajeev mullen    Medicines sent: none    Chart send with patient: Yes    Notified: spouse    Patient reassessed at: 12/27/18 1244. Next due 1315    Upon arrival to floor: cardiac monitor applied, patient oriented to room, call bell in reach and bed in lowest position

## 2018-12-27 NOTE — ANESTHESIA POSTPROCEDURE EVALUATION
"Anesthesia Post Evaluation    Patient: Marcus Watkins    Procedure(s) Performed: Procedure(s) (LRB):  ABLATION (N/A)  Cardioversion or Defibrillation    Final Anesthesia Type: general  Patient location during evaluation: PACU  Patient participation: Yes- Able to Participate  Level of consciousness: awake and alert  Post-procedure vital signs: reviewed and stable  Pain management: adequate  Airway patency: patent  PONV status at discharge: No PONV  Anesthetic complications: no      Cardiovascular status: blood pressure returned to baseline  Respiratory status: spontaneous ventilation and room air  Hydration status: euvolemic  Follow-up not needed.        Visit Vitals  /72   Pulse 86   Temp 36.4 °C (97.6 °F) (Oral)   Resp 16   Ht 5' 11" (1.803 m)   Wt 124.7 kg (275 lb)   SpO2 96%   BMI 38.35 kg/m²       Pain/Monika Score: Pain Rating Prior to Med Admin: 4 (12/27/2018  2:22 PM)  Monika Score: 9 (12/27/2018 12:45 PM)        "

## 2018-12-27 NOTE — PLAN OF CARE
Vss. sats 96% on 2l nc. Pt denies pain or sob.  sakina groins with figure 8 sutures intact no hematoma or drainage noted. Palpable pulses. Clear yellow urine noted via urinal.  See flowsheet for full assessment. Once pt released from anesthesia to transfer to sscu 318. Tele box on.  Pt's wife updated by ep pacu rn. Verbalizes understanding.

## 2018-12-28 VITALS
WEIGHT: 275 LBS | OXYGEN SATURATION: 97 % | DIASTOLIC BLOOD PRESSURE: 67 MMHG | TEMPERATURE: 98 F | BODY MASS INDEX: 38.5 KG/M2 | SYSTOLIC BLOOD PRESSURE: 132 MMHG | RESPIRATION RATE: 20 BRPM | HEIGHT: 71 IN | HEART RATE: 91 BPM

## 2018-12-28 PROCEDURE — 93010 EKG 12-LEAD: ICD-10-PCS | Mod: ,,, | Performed by: INTERNAL MEDICINE

## 2018-12-28 PROCEDURE — 93005 ELECTROCARDIOGRAM TRACING: CPT

## 2018-12-28 PROCEDURE — 93010 ELECTROCARDIOGRAM REPORT: CPT | Mod: ,,, | Performed by: INTERNAL MEDICINE

## 2018-12-28 PROCEDURE — 25000003 PHARM REV CODE 250: Performed by: NURSE PRACTITIONER

## 2018-12-28 RX ORDER — FUROSEMIDE 20 MG/1
20 TABLET ORAL DAILY PRN
Qty: 10 TABLET | Refills: 0 | Status: SHIPPED | OUTPATIENT
Start: 2018-12-28 | End: 2019-09-16

## 2018-12-28 RX ADMIN — AMIODARONE HYDROCHLORIDE 200 MG: 200 TABLET ORAL at 08:12

## 2018-12-28 RX ADMIN — APIXABAN 5 MG: 5 TABLET, FILM COATED ORAL at 08:12

## 2018-12-28 RX ADMIN — METOPROLOL SUCCINATE 25 MG: 25 TABLET, EXTENDED RELEASE ORAL at 08:12

## 2018-12-28 RX ADMIN — PANTOPRAZOLE SODIUM 40 MG: 40 TABLET, DELAYED RELEASE ORAL at 08:12

## 2018-12-28 NOTE — PLAN OF CARE
Problem: Adult Inpatient Plan of Care  Goal: Plan of Care Review  Outcome: Ongoing (interventions implemented as appropriate)  Plan of care discussed with patient. Patient is free of fall/trauma/injury. Denies CP, SOB, or pain/discomfort. Bilateral groin dressings CDI. All questions addressed. Will continue to monitor. Patient to be discharged this AM.

## 2018-12-28 NOTE — DISCHARGE INSTRUCTIONS
1. Do not strain or lift anything greater than 5 lb for 1 week.  2. Do not drive or operate any dangerous machinery for 24 hours.   3. Keep the dressing on, clean, and dry for 24 hours.   4. After 24 hours, the dressing may be removed and a shower is allowed.   5. Clean the area with mild soap and water and then cover it with a bandage.   6. Once the skin has healed, bathing in a tub or swimming is allowed.   7. Inspect the groin site daily and report to the physician any swelling at the site that   cannot be controlled with manual pressure for 10 minutes, unusual pain at the   access site or affected extremity, unusual swelling at the access site, or signs or   symptoms of infection such as redness, pain, or fever.   Call 911 if you have:   Bleeding from the puncture site that you cannot stop by doing the following:   Relax and lie down right away. Keep your leg flat and apply firm pressure to the site using your fingers and a gauze pad. Keep the pressure on for 20 minutes. Continue this until the bleeding stops. This may take awhile. When bleeding stops, cover the site with a sterile bandage and keep your leg still as much as possible.      Recovery After Procedural Sedation (Adult)  You have been given medicine by vein to make you sleep during your surgery. This may have included both a pain medicine and sleeping medicine. Most of the effects have worn off. But you may still have some drowsiness for the next 6 to 8 hours.  Home care  Follow these guidelines when you get home:  · For the next 8 hours, you should be watched by a responsible adult. This person should make sure your condition is not getting worse.  · Don't drink any alcohol for the next 24 hours.  · Don't drive, operate dangerous machinery, or make important business or personal decisions during the next 24 hours.  Note: Your healthcare provider may tell you not to take any medicine by mouth for pain or sleep in the next 4 hours. These medicines may  react with the medicines you were given in the hospital. This could cause a much stronger response than usual.  Follow-up care  Follow up with your healthcare provider if you are not alert and back to your usual level of activity within 12 hours.  When to seek medical advice  Call your healthcare provider right away if any of these occur:  · Drowsiness gets worse  · Weakness or dizziness gets worse  · Repeated vomiting  · You can't be awakened   Date Last Reviewed: 10/18/2016  © 8502-1189 [employer], [employer]. [address] [place] [address], [place] [address]. All rights reserved. This information is not intended as a substitute for professional medical care. Always follow your healthcare professional's instructions.

## 2018-12-28 NOTE — DISCHARGE SUMMARY
[facility]  Cardiac Electrophysiology  Discharge Summary      Patient Name: Marcus Watkins  MRN: [id number]  Admission Date: 12/27/2018  Hospital Length of Stay: 0 days  Discharge Date and Time:  12/28/2018 8:30 AM  Attending Physician: José Grey MD    Discharging Provider: Stoney Hogue NP  Primary Care Physician: Radha Brunson MD    HPI:    Mr. Watkins is a 63 year old male with a PMH of  AFL ( EPS and CTI-line. In 6/2017), AF > s/p last  DCCV on 11/29, currenly on eliquis and was on amiodarone. Pt elected to proceed for planned PVI with MD Grey. LOPEZ was done 12/26/18 and was negative for LA/WILIAN thrombus.   He denies any chest pain, palpitations, SOB, dizziness, light headedness, weakness, syncope, or near syncopal episodes. He does state he has DALY mostly when climbing stairs.   EKG in AF at 111 BPM     LOPEZ 12/26/18   ·   Normal left ventricular systolic function. The estimated ejection fraction is 60%  · Normal right ventricular systolic function.  · Normal appearing left atrial appendage. No thrombus is present in the appendage. Normal appendage velocities.  · Mild-to-moderate mitral regurgitation.  · Mild tricuspid regurgitation.     Procedure(s) (LRB):  ABLATION (N/A)  Cardioversion or Defibrillation     Hospital Course: Pt underwent PVI RFA / DCCV  ( see procedure note for details)  , tolerated procedure with no acute complications. Monitored overnight, Bilateral groins with no bleeding or hematoma > ambulating  with no issues . Pt  to continue on eliquis and amiodarone . Pt to follow up with MD Matilda Kumar  In 6  weeks.   Discharge plans/instructions discussed with patient   who verbalized understanding  and agreement of plans of care.  No further questions or concerns  voiced at this time     Physical Exam:   Gen: no acute distress, pleasant patient answering questions appropriately  CVS: regular rate and rhythm, S1S2 normal, no murmurs/rubs/gallops  CHEST: clear to  auscultation bilaterally, no wheezes/rales/ronchi  ABD: Soft, non-tender, nondistended, bowel sounds present  GROINS: bilateral  Soft, non tender, no bleeding no hematoma   Neurologic: Normal strength and tone. No focal numbness or weakness.   Psychiatric: normal mood and affect.  behavior is normal. Alert and oriented X 3.  EXT: No Edema       Consults: anesthesia     Significant Diagnostic Studies:     · Normal left ventricular systolic function. The estimated ejection fraction is 55%  · Intermediate central venous pressure (8 mm Hg).  · This was a limited echo to assess for pericardial effusion post procedure. No pericardial effusion noted         Final Active Diagnoses:    Diagnosis Date Noted POA    PRINCIPAL PROBLEM:  Atrial fibrillation, persistent [I48.1] 10/29/2018 Yes      Problems Resolved During this Admission:         Discharged Condition: good    Disposition: Home     Follow Up:  Follow-up Information     José Grey MD In 6 weeks.    Specialties:  Electrophysiology, Cardiovascular Disease, Cardiology  Contact information:  [facility]  [facility] [address]  [phone]                 Patient Instructions:      Diet Cardiac     Other restrictions (specify):   Order Comments:    1. Do not strain or lift anything greater than 5 lb for 1 week.   2. Do not drive or operate any dangerous machinery for 24 hours. .   3. After 24 hours, a shower is allowed.   4. Clean the area with mild soap and water.   5. Once the skin has healed (1 week), bathing in a tub, swimming, or hot tub is allowed.   6. Inspect the groin site daily and report to the physician any signs of infection at the site: redness, pain, fever >100.4, unusual pain at the access site or affected extremity, unusual swelling at the access site, or any yellow, white or green drainage.    Seek immediate medical attention   Bleeding from the puncture site that you cannot stop by doing the following:   Relax and lie down right away. Keep  your leg flat and apply firm pressure to the site using your fingers and a gauze pad. Keep the pressure on for 10 minutes. Continue this until the bleeding stops. This may take awhile. When bleeding stops, cover the site with a sterile bandage and keep your leg still as much as possible.     No driving until:   Order Comments: For 24 to 48 hours post procedure     Notify your health care provider if you experience any of the following:  temperature >100.4     Notify your health care provider if you experience any of the following:  persistent nausea and vomiting or diarrhea     Notify your health care provider if you experience any of the following:  severe uncontrolled pain     Notify your health care provider if you experience any of the following:  redness, tenderness, or signs of infection (pain, swelling, redness, odor or green/yellow discharge around incision site)     Notify your health care provider if you experience any of the following:  difficulty breathing or increased cough     Notify your health care provider if you experience any of the following:  severe persistent headache     Notify your health care provider if you experience any of the following:  worsening rash     Notify your health care provider if you experience any of the following:  persistent dizziness, light-headedness, or visual disturbances     Notify your health care provider if you experience any of the following:  increased confusion or weakness     Notify your health care provider if you experience any of the following:   Order Comments: For any concerning medical symptoms     Medications:  Reconciled Home Medications:      Medication List      START taking these medications    furosemide 20 MG tablet  Commonly known as:  LASIX  Take 1 tablet (20 mg total) by mouth daily as needed (for weight gain or shortness of breath).     pantoprazole 40 MG tablet  Commonly known as:  PROTONIX  Take 1 tablet (40 mg total) by mouth once daily.         CONTINUE taking these medications    amiodarone 200 MG Tab  Commonly known as:  PACERONE  Take 1 tablet (200 mg total) by mouth once daily.     apixaban 5 mg Tab  Commonly known as:  ELIQUIS  Take 1 tablet (5 mg total) by mouth 2 (two) times daily.     cranberry 500 mg Cap  Take by mouth once daily.     fish oil-omega-3 fatty acids 300-1,000 mg capsule  Take 2 g by mouth once daily.     HEALTHY COLON ORAL  Take by mouth.     IRON ORAL  Take 1 tablet by mouth once daily.     metoprolol succinate 25 MG 24 hr tablet  Commonly known as:  TOPROL-XL  Take 1 tablet (25 mg total) by mouth once daily.     MOVE FREE ULTRA ORAL  Take by mouth once daily.     ONE-A-DAY MEN'S 50 PLUS ORAL  Take by mouth.     VITAMIN D ORAL  Take 5,000 Units by mouth once daily.     VITAMIN E (DL, ACETATE) ORAL  Take by mouth.            Time spent on the discharge of patient:  20  minutes    Stoney Hogue NP  Cardiac Electrophysiology  [facility]    STAFF MD Zbigniew Kumar

## 2018-12-28 NOTE — PROGRESS NOTES
Pt DC'd per MD order. Discharge instructions given including activity, wound care, S&S of infections,  future appointments, and when to call MD. Medications reviewed including drug name, indication, dosage, frequency, and when to take next dose. Telemetry and PIV DC'd, catheter tip intact. Pt refused transport and ambulated off unit with family.

## 2019-01-03 ENCOUNTER — OFFICE VISIT (OUTPATIENT)
Dept: FAMILY MEDICINE | Facility: CLINIC | Age: 64
End: 2019-01-03
Payer: COMMERCIAL

## 2019-01-03 ENCOUNTER — LAB VISIT (OUTPATIENT)
Dept: LAB | Facility: HOSPITAL | Age: 64
End: 2019-01-03
Attending: FAMILY MEDICINE
Payer: COMMERCIAL

## 2019-01-03 VITALS
TEMPERATURE: 98 F | WEIGHT: 276.25 LBS | SYSTOLIC BLOOD PRESSURE: 110 MMHG | HEIGHT: 71 IN | DIASTOLIC BLOOD PRESSURE: 72 MMHG | RESPIRATION RATE: 16 BRPM | BODY MASS INDEX: 38.67 KG/M2

## 2019-01-03 DIAGNOSIS — Z23 IMMUNIZATION DUE: ICD-10-CM

## 2019-01-03 DIAGNOSIS — E04.2 MULTIPLE THYROID NODULES: Primary | ICD-10-CM

## 2019-01-03 DIAGNOSIS — E04.2 MULTIPLE THYROID NODULES: ICD-10-CM

## 2019-01-03 DIAGNOSIS — R09.82 POSTNASAL DRIP: ICD-10-CM

## 2019-01-03 PROBLEM — I48.19 ATRIAL FIBRILLATION, PERSISTENT: Status: RESOLVED | Noted: 2018-10-29 | Resolved: 2019-01-03

## 2019-01-03 PROBLEM — I48.3 TYPICAL ATRIAL FLUTTER: Status: RESOLVED | Noted: 2017-03-27 | Resolved: 2019-01-03

## 2019-01-03 PROBLEM — I48.91 ATRIAL FIBRILLATION WITH RVR: Status: RESOLVED | Noted: 2017-06-12 | Resolved: 2019-01-03

## 2019-01-03 LAB
BASOPHILS # BLD AUTO: 0.05 K/UL
BASOPHILS NFR BLD: 0.5 %
DIFFERENTIAL METHOD: ABNORMAL
EOSINOPHIL # BLD AUTO: 0.2 K/UL
EOSINOPHIL NFR BLD: 2 %
ERYTHROCYTE [DISTWIDTH] IN BLOOD BY AUTOMATED COUNT: 13 %
HCT VFR BLD AUTO: 43.7 %
HGB BLD-MCNC: 14.5 G/DL
IMM GRANULOCYTES # BLD AUTO: 0.05 K/UL
IMM GRANULOCYTES NFR BLD AUTO: 0.5 %
LYMPHOCYTES # BLD AUTO: 2.1 K/UL
LYMPHOCYTES NFR BLD: 19.8 %
MCH RBC QN AUTO: 31.3 PG
MCHC RBC AUTO-ENTMCNC: 33.2 G/DL
MCV RBC AUTO: 94 FL
MONOCYTES # BLD AUTO: 0.8 K/UL
MONOCYTES NFR BLD: 7.6 %
NEUTROPHILS # BLD AUTO: 7.4 K/UL
NEUTROPHILS NFR BLD: 69.6 %
NRBC BLD-RTO: 0 /100 WBC
PLATELET # BLD AUTO: 261 K/UL
PMV BLD AUTO: 11.2 FL
RBC # BLD AUTO: 4.64 M/UL
T3 SERPL-MCNC: 86 NG/DL
T4 SERPL-MCNC: 8.2 UG/DL
TSH SERPL DL<=0.005 MIU/L-ACNC: 1.18 UIU/ML
WBC # BLD AUTO: 10.63 K/UL

## 2019-01-03 PROCEDURE — 84436 ASSAY OF TOTAL THYROXINE: CPT

## 2019-01-03 PROCEDURE — 90670 PNEUMOCOCCAL CONJUGATE VACCINE 13-VALENT LESS THAN 5YO & GREATER THAN: ICD-10-PCS | Mod: S$GLB,,, | Performed by: FAMILY MEDICINE

## 2019-01-03 PROCEDURE — 1101F PT FALLS ASSESS-DOCD LE1/YR: CPT | Mod: CPTII,S$GLB,, | Performed by: FAMILY MEDICINE

## 2019-01-03 PROCEDURE — 1101F PR PT FALLS ASSESS DOC 0-1 FALLS W/OUT INJ PAST YR: ICD-10-PCS | Mod: CPTII,S$GLB,, | Performed by: FAMILY MEDICINE

## 2019-01-03 PROCEDURE — 84480 ASSAY TRIIODOTHYRONINE (T3): CPT

## 2019-01-03 PROCEDURE — 99999 PR PBB SHADOW E&M-EST. PATIENT-LVL III: ICD-10-PCS | Mod: PBBFAC,,, | Performed by: FAMILY MEDICINE

## 2019-01-03 PROCEDURE — 90670 PCV13 VACCINE IM: CPT | Mod: S$GLB,,, | Performed by: FAMILY MEDICINE

## 2019-01-03 PROCEDURE — 84443 ASSAY THYROID STIM HORMONE: CPT

## 2019-01-03 PROCEDURE — 90471 PNEUMOCOCCAL CONJUGATE VACCINE 13-VALENT LESS THAN 5YO & GREATER THAN: ICD-10-PCS | Mod: S$GLB,,, | Performed by: FAMILY MEDICINE

## 2019-01-03 PROCEDURE — 99214 OFFICE O/P EST MOD 30 MIN: CPT | Mod: 25,S$GLB,, | Performed by: FAMILY MEDICINE

## 2019-01-03 PROCEDURE — 99999 PR PBB SHADOW E&M-EST. PATIENT-LVL III: CPT | Mod: PBBFAC,,, | Performed by: FAMILY MEDICINE

## 2019-01-03 PROCEDURE — 36415 COLL VENOUS BLD VENIPUNCTURE: CPT | Mod: PO

## 2019-01-03 PROCEDURE — 3008F BODY MASS INDEX DOCD: CPT | Mod: CPTII,S$GLB,, | Performed by: FAMILY MEDICINE

## 2019-01-03 PROCEDURE — 90471 IMMUNIZATION ADMIN: CPT | Mod: S$GLB,,, | Performed by: FAMILY MEDICINE

## 2019-01-03 PROCEDURE — 85025 COMPLETE CBC W/AUTO DIFF WBC: CPT

## 2019-01-03 PROCEDURE — 3008F PR BODY MASS INDEX (BMI) DOCUMENTED: ICD-10-PCS | Mod: CPTII,S$GLB,, | Performed by: FAMILY MEDICINE

## 2019-01-03 PROCEDURE — 99214 PR OFFICE/OUTPT VISIT, EST, LEVL IV, 30-39 MIN: ICD-10-PCS | Mod: 25,S$GLB,, | Performed by: FAMILY MEDICINE

## 2019-01-03 RX ORDER — IPRATROPIUM BROMIDE 21 UG/1
2 SPRAY, METERED NASAL 2 TIMES DAILY PRN
Qty: 30 ML | Refills: 1 | Status: SHIPPED | OUTPATIENT
Start: 2019-01-03 | End: 2019-05-02

## 2019-01-03 NOTE — PROGRESS NOTES
Two patient identifiers verified.  Allergies reviewed.  Prevnar administered to right  deltoid as per MD order advise patient to wait 15min after shot is given for any adverse reaction.

## 2019-01-04 NOTE — PROGRESS NOTES
Subjective:       Patient ID: Marcus Watkins is a 63 y.o. male.    Chief Complaint: Results (thyroid imaging) and Cough   Had a cardiac CTA done and incidental note of thyroid nodules was noted by the radiologists  I was sent message to schedule follow-up with this patient and appropriate referrals   HPI the see above  Review of Systems   Constitutional: Positive for fatigue.   HENT: Positive for congestion and sneezing.    Eyes: Negative.    Respiratory: Positive for cough.    Cardiovascular: Negative.    Gastrointestinal: Negative.    Endocrine:        Incidental thyroid nodule seen on a cardiac CTA   Genitourinary: Negative.    Musculoskeletal: Negative.    Skin:        Bilateral groin bruising status post patient's recent procedure   Allergic/Immunologic: Negative.    Neurological: Negative.    Hematological: Negative.    Psychiatric/Behavioral: Negative.        Objective:      Physical Exam   Constitutional: He is oriented to person, place, and time. He appears well-developed and well-nourished. No distress.   HENT:   Head: Normocephalic and atraumatic.   Right Ear: External ear normal.   Left Ear: External ear normal.   Nose: Nose normal.   Mouth/Throat: Oropharynx is clear and moist.   Eyes: Conjunctivae and EOM are normal. Pupils are equal, round, and reactive to light.   Neck: Normal range of motion. Neck supple. No JVD present. No thyromegaly present.   Cardiovascular: Normal rate, regular rhythm, normal heart sounds and intact distal pulses.   No murmur heard.  Pulmonary/Chest: Effort normal and breath sounds normal. No respiratory distress.   Abdominal: Soft. Bowel sounds are normal. He exhibits no distension. There is no tenderness. There is no guarding.   Neurological: He is alert and oriented to person, place, and time. He displays normal reflexes. No cranial nerve deficit or sensory deficit. He exhibits normal muscle tone. Coordination normal.   Skin: Bruising and ecchymosis noted. No rash noted. He is  not diaphoretic.        Psychiatric: He has a normal mood and affect. His behavior is normal. Judgment and thought content normal.   Nursing note and vitals reviewed.      Assessment:       1. Multiple thyroid nodules    2. Postnasal drip    3. Immunization due     4.    Postprocedural bruising bilateral groins  Plan:     see orders dated 01/04/2019  (In Office Administered) Pneumococcal Conjugate Vaccine (13 Valent) (IM)          CBC auto differential         T3         T4         TSH         US Soft Tissue Head Neck Thyroid          Will contact patient with results when available    Refer to the med card dated 01/04/2019   cranberry 500 mg Cap Daily       Antiarrhythmic - Class III    amiodarone (PACERONE) 200 MG Tab 200 mg, Daily       Antihyperlipidemic Agents - Dietary Source Combinations    fish oil-omega-3 fatty acids 300-1,000 mg capsule 2 g, Daily       Beta Blockers Cardiac Selective    metoprolol succinate (TOPROL-XL) 25 MG 24 hr tablet 25 mg, Daily       Direct Factor Xa Inhibitors    apixaban (ELIQUIS) 5 mg Tab 5 mg, 2 times daily       Diuretic - Loop    furosemide (LASIX) 20 MG tablet 20 mg, Daily PRN       Gastric Acid Secretion Reducing Agents - Proton Pump Inhibitors (PPIs)    pantoprazole (PROTONIX) 40 MG tablet 40 mg, Daily       Intestinal Juilet Modifiers    L.acidophilus/B.bifidum,longum (HEALTHY COLON ORAL)        Minerals and Electrolytes - Iron    ferrous sulfate (IRON ORAL) 1 tablet, Daily       Multivitamin and Mineral Combinations    multivit-min/folic/vit K/lycop (ONE-A-DAY MEN'S 50 PLUS ORAL)

## 2019-01-08 ENCOUNTER — HOSPITAL ENCOUNTER (OUTPATIENT)
Dept: RADIOLOGY | Facility: HOSPITAL | Age: 64
Discharge: HOME | End: 2019-01-08
Attending: FAMILY MEDICINE
Payer: COMMERCIAL

## 2019-01-08 DIAGNOSIS — E04.2 MULTIPLE THYROID NODULES: ICD-10-CM

## 2019-01-08 PROCEDURE — 76536 US EXAM OF HEAD AND NECK: CPT | Mod: 26,,, | Performed by: RADIOLOGY

## 2019-01-08 PROCEDURE — 76536 US SOFT TISSUE HEAD NECK THYROID: ICD-10-PCS | Mod: 26,,, | Performed by: RADIOLOGY

## 2019-01-08 PROCEDURE — 76536 US EXAM OF HEAD AND NECK: CPT | Mod: TC

## 2019-01-09 ENCOUNTER — TELEPHONE (OUTPATIENT)
Dept: FAMILY MEDICINE | Facility: CLINIC | Age: 64
End: 2019-01-09

## 2019-01-09 DIAGNOSIS — E04.2 MULTIPLE THYROID NODULES: Primary | ICD-10-CM

## 2019-01-09 NOTE — TELEPHONE ENCOUNTER
Called the patient let him know that he needs a thyroid biopsy and someone will call him to set him up.l

## 2019-01-10 ENCOUNTER — OFFICE VISIT (OUTPATIENT)
Dept: URGENT CARE | Facility: CLINIC | Age: 64
End: 2019-01-10
Payer: OTHER MISCELLANEOUS

## 2019-01-10 VITALS
BODY MASS INDEX: 39.2 KG/M2 | OXYGEN SATURATION: 96 % | WEIGHT: 280 LBS | TEMPERATURE: 99 F | HEART RATE: 85 BPM | RESPIRATION RATE: 18 BRPM | DIASTOLIC BLOOD PRESSURE: 78 MMHG | SYSTOLIC BLOOD PRESSURE: 136 MMHG | HEIGHT: 71 IN

## 2019-01-10 DIAGNOSIS — S93.402A SPRAIN OF LEFT ANKLE, UNSPECIFIED LIGAMENT, INITIAL ENCOUNTER: ICD-10-CM

## 2019-01-10 DIAGNOSIS — S99.912A ANKLE INJURY, LEFT, INITIAL ENCOUNTER: Primary | ICD-10-CM

## 2019-01-10 PROCEDURE — 73610 XR ANKLE COMPLETE 3 VIEW LEFT: ICD-10-PCS | Mod: FY,LT,S$GLB, | Performed by: RADIOLOGY

## 2019-01-10 PROCEDURE — 73610 X-RAY EXAM OF ANKLE: CPT | Mod: FY,LT,S$GLB, | Performed by: RADIOLOGY

## 2019-01-10 PROCEDURE — 99204 OFFICE O/P NEW MOD 45 MIN: CPT | Mod: S$GLB,,, | Performed by: INTERNAL MEDICINE

## 2019-01-10 PROCEDURE — 99204 PR OFFICE/OUTPT VISIT, NEW, LEVL IV, 45-59 MIN: ICD-10-PCS | Mod: S$GLB,,, | Performed by: INTERNAL MEDICINE

## 2019-01-10 RX ORDER — NAPROXEN 500 MG/1
500 TABLET ORAL 2 TIMES DAILY WITH MEALS
Qty: 20 TABLET | Refills: 2 | Status: SHIPPED | OUTPATIENT
Start: 2019-01-10 | End: 2019-01-20

## 2019-01-10 NOTE — PROGRESS NOTES
"Subjective:       Patient ID: Marcus Watkins is a 63 y.o. male.    Vitals:  height is 5' 11" (1.803 m) and weight is 127 kg (280 lb). His oral temperature is 98.8 °F (37.1 °C). His blood pressure is 136/78 and his pulse is 85. His respiration is 18 and oxygen saturation is 96%.     Chief Complaint: Ankle Injury    Ankle Injury    The incident occurred 6 to 12 hours ago (around 8:30 am). The incident occurred at work. Injury mechanism: stepped in a hole  The pain is present in the left ankle. The quality of the pain is described as aching and burning. The pain is at a severity of 5/10. The pain is moderate. The pain has been fluctuating since onset. Associated symptoms include numbness. Pertinent negatives include no inability to bear weight, loss of motion, loss of sensation, muscle weakness or tingling. He reports no foreign bodies present. The symptoms are aggravated by movement and weight bearing. He has tried ice and elevation for the symptoms. The treatment provided mild relief.       Constitution: Negative for fatigue.   HENT: Negative for facial swelling and facial trauma.    Neck: Negative for neck stiffness.   Cardiovascular: Negative for chest trauma.   Eyes: Negative for eye trauma, double vision and blurred vision.   Gastrointestinal: Negative for abdominal trauma, abdominal pain and rectal bleeding.   Genitourinary: Negative for hematuria, genital trauma and pelvic pain.   Musculoskeletal: Positive for joint pain and joint swelling. Negative for pain, trauma, abnormal ROM of joint and pain with walking.   Skin: Negative for color change, wound, abrasion and laceration.   Neurological: Positive for numbness. Negative for dizziness, history of vertigo, light-headedness, coordination disturbances, altered mental status and loss of consciousness.   Hematologic/Lymphatic: Negative for history of bleeding disorder.   Psychiatric/Behavioral: Negative for altered mental status.       Objective:      Physical Exam "   Constitutional: He appears well-developed and well-nourished.   HENT:   Head: Normocephalic and atraumatic.   Neck: Normal range of motion. Neck supple.   Cardiovascular: Intact distal pulses.   Musculoskeletal:   L lateral malleolar swelling,ecchymosis and tenderness   Neurological:   Motor and sensory intact L lower ext   Nursing note and vitals reviewed.      Assessment:       1. Ankle injury, left, initial encounter    2. Sprain of left ankle, unspecified ligament, initial encounter        Plan:         Ankle injury, left, initial encounter  -     X-Ray Ankle Complete 3 View Left; Future; Expected date: 01/10/2019    Sprain of left ankle, unspecified ligament, initial encounter  -     ORTHOPEDIC BRACING FOR HOME USE - LOWER EXTREMITY

## 2019-01-16 ENCOUNTER — OFFICE VISIT (OUTPATIENT)
Dept: ENDOCRINOLOGY | Facility: CLINIC | Age: 64
End: 2019-01-16
Payer: COMMERCIAL

## 2019-01-16 VITALS
SYSTOLIC BLOOD PRESSURE: 146 MMHG | RESPIRATION RATE: 18 BRPM | WEIGHT: 282.63 LBS | HEIGHT: 71 IN | BODY MASS INDEX: 39.57 KG/M2 | DIASTOLIC BLOOD PRESSURE: 90 MMHG | HEART RATE: 88 BPM

## 2019-01-16 DIAGNOSIS — E04.2 MULTINODULAR GOITER: ICD-10-CM

## 2019-01-16 DIAGNOSIS — I48.91 ATRIAL FIBRILLATION WITH RVR: ICD-10-CM

## 2019-01-16 PROCEDURE — 99244 PR OFFICE CONSULTATION,LEVEL IV: ICD-10-PCS | Mod: S$GLB,,, | Performed by: INTERNAL MEDICINE

## 2019-01-16 PROCEDURE — 99244 OFF/OP CNSLTJ NEW/EST MOD 40: CPT | Mod: S$GLB,,, | Performed by: INTERNAL MEDICINE

## 2019-01-16 PROCEDURE — 99999 PR PBB SHADOW E&M-EST. PATIENT-LVL IV: ICD-10-PCS | Mod: PBBFAC,,, | Performed by: INTERNAL MEDICINE

## 2019-01-16 PROCEDURE — 99999 PR PBB SHADOW E&M-EST. PATIENT-LVL IV: CPT | Mod: PBBFAC,,, | Performed by: INTERNAL MEDICINE

## 2019-01-16 NOTE — LETTER
January 17, 2019      Radha Brunson MD  [address] [facility]  [address]  [facility] [address]           [facility] - Endocrinology  [address]  [place] [address]  Phone: [phone]          Patient: Marcus Watkins   MR Number: [id number]   YOB: 1955   Date of Visit: 1/16/2019       Dear Dr. Radha Brunson:    Thank you for referring Marcus Watkins to me for evaluation. Attached you will find relevant portions of my assessment and plan of care.    If you have questions, please do not hesitate to call me. I look forward to following Marcus Watkins along with you.    Sincerely,    Jose Lucas II, MD    Enclosure  CC:  No Recipients    If you would like to receive this communication electronically, please contact [email or web address] or [phone] to request more information on [employer] Link access.    For providers and/or their staff who would like to refer a patient to [facility], please contact us through our one-stop-shop provider referral line, [facility], at [phone].    If you feel you have received this communication in error or would no longer like to receive these types of communications, please e-mail [email or web address]

## 2019-01-17 NOTE — PROGRESS NOTES
CHIEF COMPLAINT:  Thyroid.    Consult from Dr. Radha Brunson for thyroid nodules.    HISTORY OF PRESENT ILLNESS:  The patient states while undergoing cardiac   evaluation, he had a CAT scan that revealed abnormalities in the thyroid with   thyroid nodules.  Thyroid ultrasound was then ordered that showed multiple   thyroid nodules with the largest being in the left inferior pole measuring 3 x   3.1 x 0.4 cm, isoechoic and heterogeneous.  The patient denies taking thyroid   medicine, have any history of thyroid disease or having any family history of   thyroid cancer.  He denies any history of head or neck irradiation.  His TSH is   normal at 1.17.    REVIEW OF SYSTEMS:  HEENT:  Good eyesight and hearing.  CARDIOPULMONARY:  Denies chest pain, cough or shortness of breath.  GASTROINTESTINAL:  Denies nausea, vomiting, diarrhea, constipation or abdominal   pain.  GENITOURINARY:  Denies dysuria, hematuria or nocturia.  NEUROMUSCULAR:  Denies numbness, tingling, or paresthesias.  All other systems reviewed and are negative.    PHYSICAL EXAMINATION:  GENERAL:  The patient is alert and cooperative, in no acute distress.  VITAL SIGNS:  Blood pressure 146/90, heart rate 88, weight is 282.8 pounds,   128.2 kilograms, BMI is 39.42.  EYES:  No eye findings of Graves' disease.  No scleral icterus.  ENT:  No lesion on tongue, hard palate, soft palate.  NECK:  Left lobe is palpable and enlarged on the thyroid.  No neck vein   distention.  No tracheal deviation.  LYMPHATICS:  No anterior or posterior cervical adenopathy.  No supraclavicular   adenopathy.  HEART:  Normal sinus rhythm.  No murmurs, no rubs, no carotid bruits.  LUNGS:  Clear.  Percussion normal.  No respiratory difficulty.  ABDOMEN:  No mass, tenderness or organomegaly.  EXTREMITIES:  No clubbing, cyanosis, edema or tremor.  MUSCULOSKELETAL:  Gait normal.  Stance normal.  Good muscle strength in all four   extremities.    IMPRESSION:  Multinodular goiter,  dominant inferior left lobe nodule needs an   FNA.  Noted this patient is taking amiodarone, so thyroid function should be   checked periodically.  The patient will be set up for an FNA in our office.    Consult note to Dr. Radha Vizcarra.      SSA/HN  dd: 01/17/2019 10:04:01 (CST)  td: 01/17/2019 22:36:11 (CST)  Doc ID   #[id number]  Job ID #[id number]    CC: Radha Vizcarra M.D.    Note dictated 1/17

## 2019-01-24 ENCOUNTER — TELEPHONE (OUTPATIENT)
Dept: ELECTROPHYSIOLOGY | Facility: CLINIC | Age: 64
End: 2019-01-24

## 2019-01-24 NOTE — TELEPHONE ENCOUNTER
Returned call to Pt. Advised that protonix is usually 30-60 days post ablation. He states he is feeling fine. Advised that he didn't need to refill and to call if things change.         ----- Message from Jennifer Harrington MA sent at 1/24/2019  9:39 AM CST -----  Contact: pt      ----- Message -----  From: Brittany Carcamo  Sent: 1/24/2019   9:28 AM  To: Matilda Kumar Staff    Pt would like a call in ref to his Rx Protonix 40 mg. He need to know if he should refill this prescription once it's completed?    Thanks

## 2019-01-29 ENCOUNTER — HOSPITAL ENCOUNTER (OUTPATIENT)
Dept: ENDOCRINOLOGY | Facility: CLINIC | Age: 64
Discharge: HOME | End: 2019-01-29
Attending: INTERNAL MEDICINE
Payer: COMMERCIAL

## 2019-01-29 DIAGNOSIS — E04.2 MULTINODULAR GOITER: ICD-10-CM

## 2019-01-29 PROCEDURE — 10005 US FINE NEEDLE ASPIRATION BIOPSY, FIRST LESION: ICD-10-PCS | Mod: S$GLB,,, | Performed by: INTERNAL MEDICINE

## 2019-01-29 PROCEDURE — 10005 FNA BX W/US GDN 1ST LES: CPT | Mod: S$GLB,,, | Performed by: INTERNAL MEDICINE

## 2019-01-29 PROCEDURE — 88173 CYTOLOGY SPECIMEN-FNA NON-RADIOLOGY CLINICIAN PERFORMED W/O ON SITE: ICD-10-PCS | Mod: 26,,, | Performed by: PATHOLOGY

## 2019-01-29 PROCEDURE — 88173 CYTOPATH EVAL FNA REPORT: CPT | Performed by: PATHOLOGY

## 2019-02-18 ENCOUNTER — OFFICE VISIT (OUTPATIENT)
Dept: ELECTROPHYSIOLOGY | Facility: CLINIC | Age: 64
End: 2019-02-18
Payer: COMMERCIAL

## 2019-02-18 ENCOUNTER — HOSPITAL ENCOUNTER (OUTPATIENT)
Dept: CARDIOLOGY | Facility: CLINIC | Age: 64
Discharge: HOME | End: 2019-02-18
Payer: COMMERCIAL

## 2019-02-18 VITALS
WEIGHT: 269 LBS | SYSTOLIC BLOOD PRESSURE: 118 MMHG | HEART RATE: 71 BPM | BODY MASS INDEX: 37.66 KG/M2 | HEIGHT: 71 IN | DIASTOLIC BLOOD PRESSURE: 82 MMHG

## 2019-02-18 DIAGNOSIS — Z79.899 LONG TERM CURRENT USE OF AMIODARONE: ICD-10-CM

## 2019-02-18 DIAGNOSIS — Z79.01 CURRENT USE OF LONG TERM ANTICOAGULATION: ICD-10-CM

## 2019-02-18 DIAGNOSIS — E04.2 MULTINODULAR GOITER: ICD-10-CM

## 2019-02-18 DIAGNOSIS — I48.92 ATRIAL FLUTTER, UNSPECIFIED TYPE: ICD-10-CM

## 2019-02-18 DIAGNOSIS — Z98.890 STATUS POST CATHETER ABLATION OF ATRIAL FLUTTER: Primary | ICD-10-CM

## 2019-02-18 DIAGNOSIS — E66.01 MORBID OBESITY: ICD-10-CM

## 2019-02-18 DIAGNOSIS — Z86.79 S/P ABLATION OF ATRIAL FIBRILLATION: ICD-10-CM

## 2019-02-18 DIAGNOSIS — Z98.890 S/P ABLATION OF ATRIAL FIBRILLATION: ICD-10-CM

## 2019-02-18 DIAGNOSIS — Z71.89 ENCOUNTER FOR ANTICOAGULATION DISCUSSION AND COUNSELING: ICD-10-CM

## 2019-02-18 DIAGNOSIS — I48.19 PERSISTENT ATRIAL FIBRILLATION: ICD-10-CM

## 2019-02-18 PROCEDURE — 1101F PR PT FALLS ASSESS DOC 0-1 FALLS W/OUT INJ PAST YR: ICD-10-PCS | Mod: CPTII,S$GLB,, | Performed by: INTERNAL MEDICINE

## 2019-02-18 PROCEDURE — 93000 RHYTHM STRIP: ICD-10-PCS | Mod: S$GLB,,, | Performed by: INTERNAL MEDICINE

## 2019-02-18 PROCEDURE — 1101F PT FALLS ASSESS-DOCD LE1/YR: CPT | Mod: CPTII,S$GLB,, | Performed by: INTERNAL MEDICINE

## 2019-02-18 PROCEDURE — 99214 OFFICE O/P EST MOD 30 MIN: CPT | Mod: S$GLB,,, | Performed by: INTERNAL MEDICINE

## 2019-02-18 PROCEDURE — 93000 ELECTROCARDIOGRAM COMPLETE: CPT | Mod: S$GLB,,, | Performed by: INTERNAL MEDICINE

## 2019-02-18 PROCEDURE — 99999 PR PBB SHADOW E&M-EST. PATIENT-LVL IV: ICD-10-PCS | Mod: PBBFAC,,, | Performed by: INTERNAL MEDICINE

## 2019-02-18 PROCEDURE — 99214 PR OFFICE/OUTPT VISIT, EST, LEVL IV, 30-39 MIN: ICD-10-PCS | Mod: S$GLB,,, | Performed by: INTERNAL MEDICINE

## 2019-02-18 PROCEDURE — 3008F PR BODY MASS INDEX (BMI) DOCUMENTED: ICD-10-PCS | Mod: CPTII,S$GLB,, | Performed by: INTERNAL MEDICINE

## 2019-02-18 PROCEDURE — 99999 PR PBB SHADOW E&M-EST. PATIENT-LVL IV: CPT | Mod: PBBFAC,,, | Performed by: INTERNAL MEDICINE

## 2019-02-18 PROCEDURE — 3008F BODY MASS INDEX DOCD: CPT | Mod: CPTII,S$GLB,, | Performed by: INTERNAL MEDICINE

## 2019-02-18 NOTE — PROGRESS NOTES
Subjective:     I had the pleasure of seeing Marcus Watkins in follow-up today for his history of atrial arrhythmias. He is a 63-year old male with no significant past medical history who presented to the [facility] ED with a 24 hour history of palpitations associated with shortness of breath, nausea, and vomiting. He was found to be in typical atrial flutter with RVR, and was administered Diltiazem which converted him to AF. He then spontaneously reverted to sinus rhythm. He was discharged on a 30-day course of Eliquis, as well as Metoprolol.    In 4/2017, Mr. Watkins underwent EPS and CTI-line. In 6/2017, Mr. Watkins presented to [facility] in AF and underwent DCCV. He was started on Sotalol around this time. In fall 2018, AF recurred. A DCCV was unsuccessful, so he was switched to Amiodarone. A repeat DCCV done in 11/2018 was performed, but AF recurred again.    In 12/2018, Mr. Watkins underwent PVI. He was discharged on Amiodarone 200 mg daily. He has had no symptomatic AF episodes since ablation.    My interpretation of today's ECG is NSR at 71 bpm.    Review of Systems   Constitution: Negative for decreased appetite, malaise/fatigue, weight gain and weight loss.   HENT: Negative for sore throat.    Eyes: Negative for blurred vision.   Cardiovascular: Negative for chest pain, dyspnea on exertion, irregular heartbeat, leg swelling, near-syncope, orthopnea, palpitations, paroxysmal nocturnal dyspnea and syncope.   Respiratory: Negative for shortness of breath.    Skin: Negative for rash.   Musculoskeletal: Negative for arthritis.   Gastrointestinal: Negative for abdominal pain.   Neurological: Negative for focal weakness and numbness.   Psychiatric/Behavioral: Negative for altered mental status.        Objective:    Physical Exam   Constitutional: He is oriented to person, place, and time. He appears well-developed and well-nourished. No distress.   HENT:   Head: Normocephalic and atraumatic.   Mouth/Throat: Oropharynx is clear and  moist.   Eyes: Pupils are equal, round, and reactive to light. No scleral icterus.   Neck: Neck supple. No thyromegaly present.   Cardiovascular: Regular rhythm, normal heart sounds and normal pulses. Exam reveals no gallop and no friction rub.   No murmur heard.  Pulmonary/Chest: Effort normal and breath sounds normal. He has no rales.   Abdominal: Soft. Bowel sounds are normal. He exhibits no distension. There is no tenderness.   Musculoskeletal: He exhibits no edema.   Neurological: He is alert and oriented to person, place, and time.   Skin: Skin is warm and dry. No rash noted.   Psychiatric: He has a normal mood and affect. His behavior is normal.         Assessment:       1. Status post catheter ablation of atrial flutter    2. Persistent atrial fibrillation    3. S/P ablation of atrial fibrillation    4. Encounter for anticoagulation discussion and counseling    5. Morbid obesity    6. Multinodular goiter    7. Long term current use of amiodarone    8. Current use of long term anticoagulation         Plan:   In summary, Marcus Watkins is a 63-year old male with a history of symptomatic typical atrial flutter status-post CTI-line, who then developed AF refractory to Sotalol and Amiodarone. He is now 6 weeks post-PVI, and is maintaining sinus rhythm. The plan is to stop Amiodarone on 4/1/2019, and to see me again in 5 months. He should continue Eliquis. He has been instructed to check his pulse daily, and call me if it is irregular or inappropriately fast.    Thank you for allowing me to participate in the care of this patient. Please do not hesitate to call me with any questions or concerns.

## 2019-05-01 ENCOUNTER — TELEPHONE (OUTPATIENT)
Dept: FAMILY MEDICINE | Facility: CLINIC | Age: 64
End: 2019-05-01

## 2019-05-02 ENCOUNTER — OFFICE VISIT (OUTPATIENT)
Dept: FAMILY MEDICINE | Facility: CLINIC | Age: 64
End: 2019-05-02
Payer: COMMERCIAL

## 2019-05-02 VITALS
SYSTOLIC BLOOD PRESSURE: 134 MMHG | WEIGHT: 277.13 LBS | HEART RATE: 80 BPM | BODY MASS INDEX: 38.8 KG/M2 | TEMPERATURE: 98 F | HEIGHT: 71 IN | DIASTOLIC BLOOD PRESSURE: 72 MMHG

## 2019-05-02 DIAGNOSIS — Z00.00 ENCOUNTER FOR PREVENTIVE HEALTH EXAMINATION: Primary | ICD-10-CM

## 2019-05-02 DIAGNOSIS — E66.01 SEVERE OBESITY (BMI 35.0-39.9) WITH COMORBIDITY: ICD-10-CM

## 2019-05-02 DIAGNOSIS — Z98.890 S/P ABLATION OF ATRIAL FIBRILLATION: ICD-10-CM

## 2019-05-02 DIAGNOSIS — I48.19 PERSISTENT ATRIAL FIBRILLATION: ICD-10-CM

## 2019-05-02 DIAGNOSIS — Z86.79 S/P ABLATION OF ATRIAL FIBRILLATION: ICD-10-CM

## 2019-05-02 DIAGNOSIS — Z23 NEED FOR SHINGLES VACCINE: ICD-10-CM

## 2019-05-02 DIAGNOSIS — Z13.6 SCREENING FOR AAA (ABDOMINAL AORTIC ANEURYSM): ICD-10-CM

## 2019-05-02 DIAGNOSIS — G62.9 PERIPHERAL POLYNEUROPATHY: ICD-10-CM

## 2019-05-02 PROCEDURE — G0439 PPPS, SUBSEQ VISIT: HCPCS | Mod: S$GLB,,, | Performed by: NURSE PRACTITIONER

## 2019-05-02 PROCEDURE — 99999 PR PBB SHADOW E&M-EST. PATIENT-LVL V: ICD-10-PCS | Mod: PBBFAC,,, | Performed by: NURSE PRACTITIONER

## 2019-05-02 PROCEDURE — G0439 PR MEDICARE ANNUAL WELLNESS SUBSEQUENT VISIT: ICD-10-PCS | Mod: S$GLB,,, | Performed by: NURSE PRACTITIONER

## 2019-05-02 PROCEDURE — 99499 RISK ADDL DX/OHS AUDIT: ICD-10-PCS | Mod: S$GLB,,, | Performed by: NURSE PRACTITIONER

## 2019-05-02 PROCEDURE — 99499 UNLISTED E&M SERVICE: CPT | Mod: S$GLB,,, | Performed by: NURSE PRACTITIONER

## 2019-05-02 PROCEDURE — 99999 PR PBB SHADOW E&M-EST. PATIENT-LVL V: CPT | Mod: PBBFAC,,, | Performed by: NURSE PRACTITIONER

## 2019-05-02 RX ORDER — TAMARIND SEED/TURMERIC EXTRACT 250 MG
TABLET ORAL
COMMUNITY
End: 2020-08-24

## 2019-05-02 NOTE — PATIENT INSTRUCTIONS
Counseling and Referral of Other Preventative  (Italic type indicates deductible and co-insurance are waived)    Patient Name: Marcus Watkins  Today's Date: 5/2/2019    Health Maintenance       Date Due Completion Date    Shingles Vaccine (1 of 2) 09/08/2003 ---    Abdominal Aortic Aneurysm Screening 09/08/2018 ---    Influenza Vaccine 08/01/2019 10/17/2018 (Done)    Override on 10/17/2018: Done    Override on 9/8/2017: Done    Pneumococcal Vaccine (65+ Low/Medium Risk) (2 of 2 - PPSV23) 01/03/2020 1/3/2019    Lipid Panel 06/27/2023 6/27/2018    Colonoscopy 04/14/2026 4/14/2016    TETANUS VACCINE 07/02/2028 7/2/2018        No orders of the defined types were placed in this encounter.    The following information is provided to all patients.  This information is to help you find resources for any of the problems found today that may be affecting your health:                Living healthy guide: www.[facility].[place].[facility]      Understanding Diabetes: www.diabetes.org      Eating healthy: www.cdc.gov/healthyweight      CDC home safety checklist: www.cdc.gov/steadi/patient.html      Agency on Aging: www.goea.[place].gov      Alcoholics anonymous (AA): www.aa.org      Physical Activity: www.devin.nih.gov/fl3rtcy      Tobacco use: www.quitwithusla.org

## 2019-05-02 NOTE — PROGRESS NOTES
"Marcus Watkins presented for a  Medicare AWV and comprehensive Health Risk Assessment today. The following components were reviewed and updated:    · Medical history  · Family History  · Social history  · Allergies and Current Medications  · Health Risk Assessment  · Health Maintenance  · Care Team     ** See Completed Assessments for Annual Wellness Visit within the encounter summary.**       The following assessments were completed:  · Living Situation  · CAGE  · Depression Screening  · Timed Get Up and Go  · Whisper Test  · Cognitive Function Screening  · Nutrition Screening  · ADL Screening  · PAQ Screening    I offered to discuss end of life issues, including information on how to make advance directives that the patient could use to name someone who would make medical decisions on their behalf if they became too ill to make themselves.    ___Patient declined  _X_Patient is interested, I provided paper work and offered to discuss.    Vitals:    05/02/19 0751   BP: 134/72   BP Location: Left arm   Patient Position: Sitting   BP Method: Large (Manual)   Pulse: 80   Temp: 98.3 °F (36.8 °C)   TempSrc: Oral   Weight: 125.7 kg (277 lb 1.9 oz)   Height: 5' 11" (1.803 m)     Body mass index is 38.65 kg/m².  Physical Exam   Constitutional: He is oriented to person, place, and time. He appears well-developed. No distress.   obese   HENT:   Head: Normocephalic.   Eyes: No scleral icterus.   Neck: Neck supple.   Cardiovascular: Normal rate, regular rhythm and normal heart sounds. Exam reveals no gallop and no friction rub.   No murmur heard.  Pulmonary/Chest: Effort normal and breath sounds normal. No stridor. No respiratory distress. He has no wheezes. He has no rales.   Musculoskeletal:   Trace dependent edema of lower left leg.    Neurological: He is alert and oriented to person, place, and time.   Diminished sensation toes bilat, left medial ankle.    Skin: Skin is warm and dry. He is not diaphoretic.   Psychiatric: He has " a normal mood and affect. His behavior is normal.   Nursing note and vitals reviewed.        Diagnoses and health risks identified today and associated recommendations/orders:    1. Encounter for preventive health examination  - Screenings performed, as noted above. Personal preventative testing needs reviewed.     2. Screening for AAA (abdominal aortic aneurysm)  - CV Ultrasound Mesenteric Arterial (Cupid Only); Future    3. Need for shingles vaccine  - varicella-zoster [id number], PF, (SHINGRIX, PF,) 50 mcg/0.5 mL injection; Inject 0.5 mLs into the muscle once. for 1 dose  Dispense: 0.5 mL; Refill: 1    4. Peripheral polyneuropathy  - Persistent numbness of toes bilaterally, worsening on left side. Non-diabetic.   - Ambulatory referral to Neurology    5. Severe obesity (BMI 35.0-39.9) with comorbidity  - 8# weight gain since last visit. Recommend portion control and increasing exercise.   - Ambulatory Referral to [facility] ([employer])  - [facility] ASSIGN QUESTIONNAIRE SERIES ([employer])  - [facility] Patient Entered [facility] ([employer])    6. Persistent atrial fibrillation  - NSR in clinic today. Off Amiodarone x 1 month. F/U with electrophysiology in June/July 2019    7. S/P ablation of atrial fibrillation  - NSR in clinic today. Off Amiodarone x 1 month      Provided Willard with a 5-10 year written screening schedule and personal prevention plan. Recommendations were developed using the USPSTF age appropriate recommendations. Education, counseling, and referrals were provided as needed. After Visit Summary printed and given to patient which includes a list of additional screenings\tests needed.    Follow up in about 1 year (around 5/2/2020) for your next annual wellness visit.    Leda Rodrigues NP

## 2019-05-29 ENCOUNTER — OFFICE VISIT (OUTPATIENT)
Dept: NEUROLOGY | Facility: CLINIC | Age: 64
End: 2019-05-29
Payer: COMMERCIAL

## 2019-05-29 ENCOUNTER — LAB VISIT (OUTPATIENT)
Dept: LAB | Facility: OTHER | Age: 64
End: 2019-05-29
Payer: COMMERCIAL

## 2019-05-29 VITALS
HEART RATE: 76 BPM | SYSTOLIC BLOOD PRESSURE: 117 MMHG | BODY MASS INDEX: 37.93 KG/M2 | HEIGHT: 71 IN | DIASTOLIC BLOOD PRESSURE: 69 MMHG | WEIGHT: 270.94 LBS

## 2019-05-29 DIAGNOSIS — R20.2 PARESTHESIA: ICD-10-CM

## 2019-05-29 DIAGNOSIS — G62.9 PERIPHERAL POLYNEUROPATHY: Primary | ICD-10-CM

## 2019-05-29 DIAGNOSIS — R73.9 ELEVATED BLOOD SUGAR: ICD-10-CM

## 2019-05-29 DIAGNOSIS — Z79.899 LONG TERM CURRENT USE OF AMIODARONE: ICD-10-CM

## 2019-05-29 LAB
25(OH)D3+25(OH)D2 SERPL-MCNC: 56 NG/ML (ref 30–96)
ALBUMIN SERPL BCP-MCNC: 4.3 G/DL (ref 3.5–5.2)
ALP SERPL-CCNC: 68 U/L (ref 55–135)
ALT SERPL W/O P-5'-P-CCNC: 23 U/L (ref 10–44)
ANION GAP SERPL CALC-SCNC: 10 MMOL/L (ref 8–16)
AST SERPL-CCNC: 18 U/L (ref 10–40)
BASOPHILS # BLD AUTO: 0.03 K/UL (ref 0–0.2)
BASOPHILS NFR BLD: 0.4 % (ref 0–1.9)
BILIRUB SERPL-MCNC: 0.5 MG/DL (ref 0.1–1)
BUN SERPL-MCNC: 12 MG/DL (ref 8–23)
CALCIUM SERPL-MCNC: 9.6 MG/DL (ref 8.7–10.5)
CHLORIDE SERPL-SCNC: 101 MMOL/L (ref 95–110)
CO2 SERPL-SCNC: 25 MMOL/L (ref 23–29)
CREAT SERPL-MCNC: 0.9 MG/DL (ref 0.5–1.4)
CRP SERPL-MCNC: 2.8 MG/L (ref 0–8.2)
DIFFERENTIAL METHOD: NORMAL
EOSINOPHIL # BLD AUTO: 0.2 K/UL (ref 0–0.5)
EOSINOPHIL NFR BLD: 2.4 % (ref 0–8)
ERYTHROCYTE [DISTWIDTH] IN BLOOD BY AUTOMATED COUNT: 13.2 % (ref 11.5–14.5)
ERYTHROCYTE [SEDIMENTATION RATE] IN BLOOD: 8 MM/HR (ref 0–10)
EST. GFR  (AFRICAN AMERICAN): >60 ML/MIN/1.73 M^2
EST. GFR  (NON AFRICAN AMERICAN): >60 ML/MIN/1.73 M^2
ESTIMATED AVG GLUCOSE: 114 MG/DL (ref 68–131)
FOLATE SERPL-MCNC: 15.2 NG/ML (ref 4–24)
GLUCOSE SERPL-MCNC: 102 MG/DL (ref 70–110)
HBA1C MFR BLD HPLC: 5.6 % (ref 4–5.6)
HCT VFR BLD AUTO: 43.7 % (ref 40–54)
HGB BLD-MCNC: 14.8 G/DL (ref 14–18)
HIV 1+2 AB+HIV1 P24 AG SERPL QL IA: NEGATIVE
LYMPHOCYTES # BLD AUTO: 2 K/UL (ref 1–4.8)
LYMPHOCYTES NFR BLD: 24.5 % (ref 18–48)
MCH RBC QN AUTO: 30.9 PG (ref 27–31)
MCHC RBC AUTO-ENTMCNC: 33.9 G/DL (ref 32–36)
MCV RBC AUTO: 91 FL (ref 82–98)
MONOCYTES # BLD AUTO: 0.6 K/UL (ref 0.3–1)
MONOCYTES NFR BLD: 6.7 % (ref 4–15)
NEUTROPHILS # BLD AUTO: 5.4 K/UL (ref 1.8–7.7)
NEUTROPHILS NFR BLD: 65.6 % (ref 38–73)
PLATELET # BLD AUTO: 244 K/UL (ref 150–350)
PMV BLD AUTO: 10.9 FL (ref 9.2–12.9)
POTASSIUM SERPL-SCNC: 3.9 MMOL/L (ref 3.5–5.1)
PROT SERPL-MCNC: 7.5 G/DL (ref 6–8.4)
RBC # BLD AUTO: 4.79 M/UL (ref 4.6–6.2)
RPR SER QL: NORMAL
SODIUM SERPL-SCNC: 136 MMOL/L (ref 136–145)
TSH SERPL DL<=0.005 MIU/L-ACNC: 0.76 UIU/ML (ref 0.4–4)
VIT B12 SERPL-MCNC: 1118 PG/ML (ref 210–950)
WBC # BLD AUTO: 8.25 K/UL (ref 3.9–12.7)

## 2019-05-29 PROCEDURE — 1101F PR PT FALLS ASSESS DOC 0-1 FALLS W/OUT INJ PAST YR: ICD-10-PCS | Mod: CPTII,S$GLB,, | Performed by: PSYCHIATRY & NEUROLOGY

## 2019-05-29 PROCEDURE — 86140 C-REACTIVE PROTEIN: CPT

## 2019-05-29 PROCEDURE — 3008F BODY MASS INDEX DOCD: CPT | Mod: CPTII,S$GLB,, | Performed by: PSYCHIATRY & NEUROLOGY

## 2019-05-29 PROCEDURE — 83036 HEMOGLOBIN GLYCOSYLATED A1C: CPT

## 2019-05-29 PROCEDURE — 84207 ASSAY OF VITAMIN B-6: CPT

## 2019-05-29 PROCEDURE — 82607 VITAMIN B-12: CPT

## 2019-05-29 PROCEDURE — 3008F PR BODY MASS INDEX (BMI) DOCUMENTED: ICD-10-PCS | Mod: CPTII,S$GLB,, | Performed by: PSYCHIATRY & NEUROLOGY

## 2019-05-29 PROCEDURE — 36415 COLL VENOUS BLD VENIPUNCTURE: CPT

## 2019-05-29 PROCEDURE — 82746 ASSAY OF FOLIC ACID SERUM: CPT

## 2019-05-29 PROCEDURE — 99205 OFFICE O/P NEW HI 60 MIN: CPT | Mod: S$GLB,,, | Performed by: PSYCHIATRY & NEUROLOGY

## 2019-05-29 PROCEDURE — 99999 PR PBB SHADOW E&M-EST. PATIENT-LVL III: ICD-10-PCS | Mod: PBBFAC,,, | Performed by: PSYCHIATRY & NEUROLOGY

## 2019-05-29 PROCEDURE — 85025 COMPLETE CBC W/AUTO DIFF WBC: CPT

## 2019-05-29 PROCEDURE — 84165 PROTEIN E-PHORESIS SERUM: CPT | Mod: 26,,, | Performed by: PATHOLOGY

## 2019-05-29 PROCEDURE — 80053 COMPREHEN METABOLIC PANEL: CPT

## 2019-05-29 PROCEDURE — 1101F PT FALLS ASSESS-DOCD LE1/YR: CPT | Mod: CPTII,S$GLB,, | Performed by: PSYCHIATRY & NEUROLOGY

## 2019-05-29 PROCEDURE — 85651 RBC SED RATE NONAUTOMATED: CPT

## 2019-05-29 PROCEDURE — 84443 ASSAY THYROID STIM HORMONE: CPT

## 2019-05-29 PROCEDURE — 99205 PR OFFICE/OUTPT VISIT, NEW, LEVL V, 60-74 MIN: ICD-10-PCS | Mod: S$GLB,,, | Performed by: PSYCHIATRY & NEUROLOGY

## 2019-05-29 PROCEDURE — 84165 PROTEIN E-PHORESIS SERUM: CPT

## 2019-05-29 PROCEDURE — 84252 ASSAY OF VITAMIN B-2: CPT

## 2019-05-29 PROCEDURE — 84425 ASSAY OF VITAMIN B-1: CPT

## 2019-05-29 PROCEDURE — 84165 PATHOLOGIST INTERPRETATION SPE: ICD-10-PCS | Mod: 26,,, | Performed by: PATHOLOGY

## 2019-05-29 PROCEDURE — 99999 PR PBB SHADOW E&M-EST. PATIENT-LVL III: CPT | Mod: PBBFAC,,, | Performed by: PSYCHIATRY & NEUROLOGY

## 2019-05-29 PROCEDURE — 86703 HIV-1/HIV-2 1 RESULT ANTBDY: CPT

## 2019-05-29 PROCEDURE — 86592 SYPHILIS TEST NON-TREP QUAL: CPT

## 2019-05-29 PROCEDURE — 82306 VITAMIN D 25 HYDROXY: CPT

## 2019-05-29 NOTE — LETTER
May 29, 2019      Leda Rodrigues, NP  [address]  [facility] [address]           [facility] [place] [address]  [facility] [address]  Phone: [phone]  Fax: [phone]          Patient: Marcus Watkins   MR Number: [id number]   YOB: 1955   Date of Visit: 5/29/2019       Dear Leda Rodrigues:    Thank you for referring Marcus Watkins to me for evaluation. Attached you will find relevant portions of my assessment and plan of care.    If you have questions, please do not hesitate to call me. I look forward to following Marcus Watkins along with you.    Sincerely,    Bridgett Mendoza MD PhD    Enclosure  CC:  No Recipients    If you would like to receive this communication electronically, please contact [email or web address] or [phone] to request more information on [employer] Link access.    For providers and/or their staff who would like to refer a patient to [facility], please contact us through our one-stop-shop provider referral line, [facility], at [phone].    If you feel you have received this communication in error or would no longer like to receive these types of communications, please e-mail [email or web address]

## 2019-05-29 NOTE — PATIENT INSTRUCTIONS
You came to neurology clinic because of sensory changes in your feet that are expanding up your legs. On exam, you have reduced sensation to pin prick at your feet. You also have some trouble knowing where your toe is in space. This indicates that you have some injury to the long sensory nerves in your legs. The main causes for you are likely the exposure to amiodarone and a long exposure of mildly elevated blood sugar. I will send some labs to check for other reversible causes. Please come back to clinic in 3 months. At that time, we will see how your symptoms have progressed or improved. Depending on how you are doing, we can order more labs and an EMG to check the nerves as well as give some medicine, gabapentin, if your symptoms are starting to become bothersome.       Bridgett Mendoza MD PhD  Neurology-Epilepsy  [facility].  [facility]

## 2019-05-29 NOTE — PROGRESS NOTES
"Name: Marcus Watkins  MRN:[id number]   CSN: [id number]  Date of service: 5/29/2019  Age:63 y.o.   Gender:male   Referring Physician/Service: Leda Rodrigues, RAFFI  [address] [place] [address]   The patient is here today with: self    Neurology Clinic: Initial Visit    CHIEF COMPLAINT:  Sensory changes in the feet    HPI:  63-year-old man previously with AFib on amiodarone now status post ablation, amiodarone stopped in March 2019.  He comes to Neurology Clinic for tingling in the feet. Started about 14 months ago. Both feet. Started at the big toes. Tingling, pins and needles, burning sensation. On the left migrated up to mid shin, not calf. On the right, up to top of ankle. Never been told that he has elevated blood sugar or elevated A1c, several borderline levels in OMR. No alcohol. Smoked in his teens and early twenties, Not after that. . No harsh chemicals. [employer] for 30 years.  AFIB ablation 12/2018, on amiodarone before this, started amiodarone in 10/2018 until march this year.     ROS:  Foot paresthesias as above.  Otherwise denies headache, loss of vision, blurred vision, diplopia, dysarthria, dysphagia, lightheadedness, vertigo, tinnitus or hearing difficulty. Denies difficulties producing or comprehending speech.  Denies focal weakness. No bowel or bladder incontinence or retention. Denies difficulty with gait, no falls.  Denies cough, shortness of breath.  Denies chest pain or tightness, palpitations.  Denies nausea, vomiting, diarrhea, constipation or abdominal pain.  No recent change in bowel or bladder habits.  No dysuria.  Denies arthralgias or myalgias. Denies rash.    EXAM:   - Vitals: /69   Pulse 76   Ht 5' 11" (1.803 m)   Wt 122.9 kg (270 lb 15.1 oz)   BMI 37.79 kg/m²    - General: Awake, cooperative, NAD.  - HEENT: NC/AT  - Neck: Supple  - Pulmonary: no increased WOB  - Cardiac: well perfused   - Abdomen: soft, nontender, nondistended  - Extremities: no edema  - " Skin: no rashes or lesions noted.     NEURO EXAM:   - Mental Status: Awake, alert, oriented x 3. Able to relate history without difficulty. Attentive to examiner. Language is fluent with intact repetition and comprehension. Normal prosody. There were no paraphasic errors. Able to name both high and low frequency objects. Speech was not dysarthric. Able to follow both midline and appendicular commands. Good knowledge of current events. There was no evidence of apraxia or neglect.    - Cranial Nerves:  PERRL 3 to 2mm and brisk. VFF to confrontation. EOMI without nystagmus. Normal saccades. Facial sensation intact to light touch. No facial droop. Hearing intact to finger-rub bilaterally. Palate elevates symmetrically. 5/5 strength in trapezii and SCM bilaterally. Tongue protrudes in midline and to either side with no evidence of atrophy or weakness.    - Motor: Normal bulk and tone throughout. No pronator drift bilaterally. No adventitious movements such as tremor or asterixis noted. 5/5 in all major muscle groups including bilateral Delt Bic Tri WrE WrF  FFl FE IO IP Quad Ham TA Gastroc EHL   - Sensory:  Stocking glove distribution of loss of light touch and pinprick.  Can feel pinprick increase up the arms bilaterally. Loss of pinprick at the toes, back to baseline between mid shin and just below knee, bilaterally. No proprioception at the toes even to big movements, intact at the ankles.   - DTRs:    Bi Tri Brach Pat Ach  R  1  1    1    1   0  L  1  1    1    1  0    - Coordination: No dysmetria on FNF.  - Gait: Good initiation. Narrow-based, normal stride and arm swing.  Able to toe/heel; tandem with missteps. Romberg with sway, no misstep with pull.     LABS:  01/2019  WBC 11, H/H 14.5/44 platelets 261  TSH 1.2 T3 86 T4 8.2    Hemoglobin A1c 07/2018 5.8:  Hemoglobin A1c 03/2016 6    IMAGING:  None    PLAN:   63-year-old man previously on amiodarone for AFib, ablation in December last year, stopped amiodarone  March this year.  He now has length-dependent sensory polyneuropathy most pronounced in the feet with stocking and glove loss of pinprick up to forearm/shins.  Proprioception is out at the toes, Romberg with sway, no weakness even at the toes.  Most likely multifactorial with contributions from elevated blood sugar (even mild elevations can result in polyneuropathy) and amiodarone.  However, differential is broad.  We talked about avoiding big sugar loads, no more sweet tea etc.  We discussed foot safety and the risk for injury to feet in the setting of sensory loss.  He needs to wear protective shoes at all times even at home.  Offered podiatry but right now his wife is taking care of his feet and clipping his toenails.  Will continue to offer this.  Basic paresthesia labs for treatable causes for now.  Emphasized need for surveillance in 3 months to repeat exam and check on symptoms.  If it progresses, will broaden lab evaluation + EMG.  Asked him to call or patient portal me with any changes or if he develops new weakness.  His symptoms are very tolerable right now.  Will consider gabapentin for symptom relief in the future only if needed.    INSTRUCTIONS:  You came to neurology clinic because of sensory changes in your feet that are expanding up your legs. On exam, you have reduced sensation to pin prick at your feet. You also have some trouble knowing where your toe is in space. This indicates that you have some injury to the long sensory nerves in your legs. The main causes for you are likely the exposure to amiodarone and a long exposure of mildly elevated blood sugar. I will send some labs to check for other reversible causes. Please come back to clinic in 3 months. At that time, we will see how your symptoms have progressed or improved. Depending on how you are doing, we can order more labs and an EMG to check the nerves as well as give some medicine, gabapentin, if your symptoms are starting to become  bothersome.     Bridgett Mendoza MD PhD  Neurology-Epilepsy  [facility].  [facility]    Problem List Items Addressed This Visit     Long term current use of amiodarone    Peripheral polyneuropathy - Primary    Paresthesia    Relevant Orders    C-reactive protein    Folate    Hemoglobin A1c    HIV 1/2 Ag/Ab (4th Gen)    RPR    Sedimentation rate    Vitamin D    Vitamin B6    Vitamin B2    Vitamin B12    Vitamin B1    TSH    CBC auto differential (Completed)    Comprehensive metabolic panel    PROTEIN ELECTROPHORESIS, SERUM    Protein electrophoresis, timed urine      Other Visit Diagnoses     Elevated blood sugar                More than 50% of the 60 minutes spent with the patient (as well as family/caregiver(s) was spent on face-to-face counseling.    PAST MEDICAL HISTORY:   Active Ambulatory Problems     Diagnosis Date Noted    Rectal bleeding 03/17/2016    History of colon polyps 03/17/2016    Screening 04/14/2016    Encounter for anticoagulation discussion and counseling 02/13/2017    Status post catheter ablation of atrial flutter 06/13/2017    Numbness of feet     Multinodular goiter 01/16/2019    Persistent atrial fibrillation 02/18/2019    S/P ablation of atrial fibrillation 02/18/2019    Long term current use of amiodarone 02/18/2019    Current use of long term anticoagulation 02/18/2019    Severe obesity (BMI 35.0-39.9) with comorbidity 05/02/2019    Peripheral polyneuropathy 05/02/2019    Paresthesia 05/29/2019     Resolved Ambulatory Problems     Diagnosis Date Noted    Atrial fibrillation 02/13/2017    Typical atrial flutter 03/27/2017    Atrial flutter 04/27/2017    Atrial fibrillation with RVR 06/12/2017    Encounter for monitoring sotalol therapy 09/10/2018    Atrial fibrillation, persistent 10/29/2018     Past Medical History:   Diagnosis Date    Atrial fibrillation         PAST SURGICAL HISTORY:   Past Surgical History:   Procedure Laterality Date     ABLATION N/A 12/27/2018    Performed [facility] at [facility] EP LAB    ABLATION N/A 4/27/2017    [facility]formed by [facility] CATH LAB    CARDIOVERSION N/A 11/29/2018    Performed by [facility] EP LAB    CARDIOVERSION N/A 10/29/2018    Performed by [facility] CATH LAB    CARDIOVERSION N/A 6/15/2017    Performed by [facility] CATH LAB    Cardioversion or Defibrillation  12/27/2018    Performed by [facility] EP LAB    COLONOSCOPY N/A 4/14/2016    Performed by Kade Wang MD at [facility] ENDO (4TH FLR)    ECHOCARDIOGRAM,TRANSESOPHAGEAL N/A 12/26/2018    [facility] EP LAB    ECHOCARDIOGRAM,TRANSESOPHAGEAL N/A 10/29/2018    Performed by [facility] at [facility] CATH LAB    RADIOFREQUENCY ABLATION  2017    TRANSESOPHAGEAL ECHOCARDIOGRAM (LOPEZ) N/A 6/15/2017    Performed by [facility] CATH LAB    TRANSESOPHAGEAL ECHOCARDIOGRAM (LOPEZ) N/A 4/27/2017    Performed by [facility] at [facility] CATH LAB        ALLERGIES: Patient has no known allergies.   CURRENT MEDICATIONS:   Current Outpatient Medications   Medication Sig Dispense Refill    apixaban (ELIQUIS) 5 mg Tab Take 1 tablet (5 mg total) by mouth 2 (two) times daily. 180 tablet 3    cranberry 500 mg Cap Take by mouth once daily.       ergocalciferol, vitamin D2, (VITAMIN D ORAL) Take 5,000 Units by mouth once daily.       ferrous sulfate (IRON ORAL) Take 1 tablet by mouth once daily.      fish oil-omega-3 fatty acids 300-1,000 mg capsule Take 2 g by mouth once daily.      furosemide (LASIX) 20 MG tablet Take 1 tablet (20 mg total) by mouth daily as needed (for weight gain or shortness of breath). 10 tablet 0    metoprolol succinate (TOPROL-XL) 25 MG 24 hr tablet Take 1 tablet (25 mg total) by mouth once daily. 30 tablet 11    multivit-min/folic/vit K/lycop (ONE-A-DAY MEN'S 50 PLUS ORAL) Take by mouth.      vitamin E,  dl,tocopheryl acet, (VITAMIN E, DL, ACETATE, ORAL) Take by mouth.      cartilage-collagen-bor-hyalur (MOVE FREE ULTRA, BORON,) 40-5-3.3 mg Tab Take by mouth.      L.acidophilus/B.bifidum,longum (HEALTHY COLON ORAL) Take by mouth.       No current facility-administered medications for this visit.         FAMILY HISTORY:   Family History   Problem Relation Age of Onset    Heart disease Mother     Diabetes Brother     Mental retardation Daughter          SOCIAL HISTORY:   Social History     Socioeconomic History    Marital status:      Spouse name: Not on file    Number of children: Not on file    Years of education: Not on file    Highest education level: Not on file   Occupational History    Not on file   Social Needs    Financial resource strain: Not on file    Food insecurity:     Worry: Not on file     Inability: Not on file    Transportation needs:     Medical: Not on file     Non-medical: Not on file   Tobacco Use    Smoking status: Former Smoker     Last attempt to quit: 1978     Years since quittin.9    Smokeless tobacco: Never Used   Substance and Sexual Activity    Alcohol use: No     Alcohol/week: 0.0 oz    Drug use: No    Sexual activity: Not on file   Lifestyle    Physical activity:     Days per week: Not on file     Minutes per session: Not on file    Stress: Not on file   Relationships    Social connections:     Talks on phone: Not on file     Gets together: Not on file     Attends Anabaptist service: Not on file     Active member of club or organization: Not on file     Attends meetings of clubs or organizations: Not on file     Relationship status: Not on file   Other Topics Concern    Not on file   Social History Narrative    Not on file         Questions and concerns raised by the patient and family/care-giver(s) were addressed and they indicated understanding of everything discussed and agreed to plans as above.    Bridgett Mendoza MD  PhD  Neurology-Epilepsy  [facility].  [facility]

## 2019-05-30 LAB
ALBUMIN SERPL ELPH-MCNC: 4.22 G/DL (ref 3.35–5.55)
ALPHA1 GLOB SERPL ELPH-MCNC: 0.3 G/DL (ref 0.17–0.41)
ALPHA2 GLOB SERPL ELPH-MCNC: 0.69 G/DL (ref 0.43–0.99)
B-GLOBULIN SERPL ELPH-MCNC: 0.78 G/DL (ref 0.5–1.1)
GAMMA GLOB SERPL ELPH-MCNC: 1.11 G/DL (ref 0.67–1.58)
PATHOLOGIST INTERPRETATION SPE: NORMAL
PROT SERPL-MCNC: 7.1 G/DL (ref 6–8.4)

## 2019-06-02 LAB — VIT B2 SERPL-MCNC: 21 MCG/L (ref 1–19)

## 2019-06-03 LAB — VIT B1 BLD-MCNC: 68 UG/L (ref 38–122)

## 2019-06-04 ENCOUNTER — CLINICAL SUPPORT (OUTPATIENT)
Dept: CARDIOLOGY | Facility: CLINIC | Age: 64
End: 2019-06-04
Attending: NURSE PRACTITIONER
Payer: COMMERCIAL

## 2019-06-04 DIAGNOSIS — Z13.6 SCREENING FOR AAA (ABDOMINAL AORTIC ANEURYSM): ICD-10-CM

## 2019-06-04 LAB
ABDOMINAL IMA AP: 1.64 CM
ABDOMINAL IMA ED VEL: 0 CM/S
ABDOMINAL IMA PS VEL: 126 CM/S
ABDOMINAL IMA TRANS: 1.64 CM
ABDOMINAL INFRARENAL AORTA AP: 1.69 CM
ABDOMINAL INFRARENAL AORTA ED VEL: 0 CM/S
ABDOMINAL INFRARENAL AORTA PS VEL: 133 CM/S
ABDOMINAL INFRARENAL AORTA TRANS: 1.67 CM
ABDOMINAL JUXTARENAL AORTA AP: 2.45 CM
ABDOMINAL JUXTARENAL AORTA ED VEL: 0 CM/S
ABDOMINAL JUXTARENAL AORTA PS VEL: 100 CM/S
ABDOMINAL JUXTARENAL AORTA TRANS: 2.45 CM
ABDOMINAL LT COM ILIAC AP: 1.04 CM
ABDOMINAL LT COM ILIAC TRANS: 1.01 CM
ABDOMINAL LT COM ILIAC VEL: 131 CM/S
ABDOMINAL LT COM ILLIAC ED VEL: 0 CM/S
ABDOMINAL RT COM ILIAC AP: 1.02 CM
ABDOMINAL RT COM ILIAC TRANS: 0.99 CM
ABDOMINAL RT COM ILIAC VEL: 124 CM/S
ABDOMINAL RT COM ILLIAC ED VEL: 0 CM/S
PYRIDOXAL SERPL-MCNC: 81 UG/L (ref 5–50)

## 2019-06-04 PROCEDURE — 93978 CV US ABDOMINAL AORTA EVALUATION (CUPID ONLY): ICD-10-PCS | Mod: S$GLB,,, | Performed by: INTERNAL MEDICINE

## 2019-06-04 PROCEDURE — 93978 VASCULAR STUDY: CPT | Mod: S$GLB,,, | Performed by: INTERNAL MEDICINE

## 2019-06-07 ENCOUNTER — PATIENT MESSAGE (OUTPATIENT)
Dept: NEUROLOGY | Facility: CLINIC | Age: 64
End: 2019-06-07

## 2019-06-08 NOTE — TELEPHONE ENCOUNTER
Call the patient about high B6.  He states that he has stopped taking this vitamin.  I told him that he should have this level rechecked in about 6 months to make sure that it normalizes.  Otherwise his labs look good. He states that he is doing quite well.     Bridgett Mendoza MD PhD  Neurology-Epilepsy  [facility].  [facility]

## 2019-09-13 DIAGNOSIS — I48.19 PERSISTENT ATRIAL FIBRILLATION: Primary | ICD-10-CM

## 2019-09-16 ENCOUNTER — HOSPITAL ENCOUNTER (OUTPATIENT)
Dept: CARDIOLOGY | Facility: CLINIC | Age: 64
Discharge: HOME | End: 2019-09-16
Payer: COMMERCIAL

## 2019-09-16 ENCOUNTER — OFFICE VISIT (OUTPATIENT)
Dept: ELECTROPHYSIOLOGY | Facility: CLINIC | Age: 64
End: 2019-09-16
Payer: COMMERCIAL

## 2019-09-16 VITALS
SYSTOLIC BLOOD PRESSURE: 124 MMHG | HEIGHT: 71 IN | WEIGHT: 263.25 LBS | BODY MASS INDEX: 36.85 KG/M2 | HEART RATE: 78 BPM | DIASTOLIC BLOOD PRESSURE: 86 MMHG

## 2019-09-16 DIAGNOSIS — I48.19 PERSISTENT ATRIAL FIBRILLATION: ICD-10-CM

## 2019-09-16 DIAGNOSIS — Z71.89 ENCOUNTER FOR ANTICOAGULATION DISCUSSION AND COUNSELING: ICD-10-CM

## 2019-09-16 DIAGNOSIS — Z86.79 S/P ABLATION OF ATRIAL FIBRILLATION: ICD-10-CM

## 2019-09-16 DIAGNOSIS — Z98.890 STATUS POST CATHETER ABLATION OF ATRIAL FLUTTER: ICD-10-CM

## 2019-09-16 DIAGNOSIS — Z98.890 S/P ABLATION OF ATRIAL FIBRILLATION: ICD-10-CM

## 2019-09-16 DIAGNOSIS — Z79.01 CURRENT USE OF LONG TERM ANTICOAGULATION: ICD-10-CM

## 2019-09-16 DIAGNOSIS — I48.19 PERSISTENT ATRIAL FIBRILLATION: Primary | ICD-10-CM

## 2019-09-16 DIAGNOSIS — E66.01 SEVERE OBESITY (BMI 35.0-39.9) WITH COMORBIDITY: ICD-10-CM

## 2019-09-16 PROBLEM — Z79.899 LONG TERM CURRENT USE OF AMIODARONE: Status: RESOLVED | Noted: 2019-02-18 | Resolved: 2019-09-16

## 2019-09-16 PROCEDURE — 99499 RISK ADDL DX/OHS AUDIT: ICD-10-PCS | Mod: S$GLB,,, | Performed by: INTERNAL MEDICINE

## 2019-09-16 PROCEDURE — 99999 PR PBB SHADOW E&M-EST. PATIENT-LVL III: ICD-10-PCS | Mod: PBBFAC,,, | Performed by: INTERNAL MEDICINE

## 2019-09-16 PROCEDURE — 93010 RHYTHM STRIP: ICD-10-PCS | Mod: S$GLB,,, | Performed by: INTERNAL MEDICINE

## 2019-09-16 PROCEDURE — 93010 ELECTROCARDIOGRAM REPORT: CPT | Mod: S$GLB,,, | Performed by: INTERNAL MEDICINE

## 2019-09-16 PROCEDURE — 99214 OFFICE O/P EST MOD 30 MIN: CPT | Mod: S$GLB,,, | Performed by: INTERNAL MEDICINE

## 2019-09-16 PROCEDURE — 1101F PT FALLS ASSESS-DOCD LE1/YR: CPT | Mod: CPTII,S$GLB,, | Performed by: INTERNAL MEDICINE

## 2019-09-16 PROCEDURE — 93005 RHYTHM STRIP: ICD-10-PCS | Mod: S$GLB,,, | Performed by: INTERNAL MEDICINE

## 2019-09-16 PROCEDURE — 99499 UNLISTED E&M SERVICE: CPT | Mod: S$GLB,,, | Performed by: INTERNAL MEDICINE

## 2019-09-16 PROCEDURE — 93005 ELECTROCARDIOGRAM TRACING: CPT | Mod: S$GLB,,, | Performed by: INTERNAL MEDICINE

## 2019-09-16 PROCEDURE — 1101F PR PT FALLS ASSESS DOC 0-1 FALLS W/OUT INJ PAST YR: ICD-10-PCS | Mod: CPTII,S$GLB,, | Performed by: INTERNAL MEDICINE

## 2019-09-16 PROCEDURE — 99999 PR PBB SHADOW E&M-EST. PATIENT-LVL III: CPT | Mod: PBBFAC,,, | Performed by: INTERNAL MEDICINE

## 2019-09-16 PROCEDURE — 99214 PR OFFICE/OUTPT VISIT, EST, LEVL IV, 30-39 MIN: ICD-10-PCS | Mod: S$GLB,,, | Performed by: INTERNAL MEDICINE

## 2019-09-16 NOTE — PROGRESS NOTES
Subjective:     I had the pleasure of seeing Marcus Watkins in follow-up today for his history of atrial arrhythmias. He is a 64-year old male who presented to the [facility] ED in early 2017 with a 24 hour history of palpitations associated with shortness of breath, nausea, and vomiting. He was found to be in typical atrial flutter with RVR, and was administered Diltiazem which converted him to AF. He then spontaneously reverted to sinus rhythm. He was discharged on a 30-day course of Eliquis, as well as Metoprolol.    In 4/2017, Mr. Watkins underwent EPS and CTI-line. In 6/2017, Mr. Watkins presented to [facility] in AF and underwent DCCV. He was started on Sotalol around this time. In fall 2018, AF recurred. A DCCV was unsuccessful, so he was switched to Amiodarone. A repeat DCCV done in 11/2018 was performed, but AF recurred again.    In 12/2018, Mr. Watkins underwent PVI. He was discharged on Amiodarone 200 mg daily, which was stopped in 4/2019. He checks his pulse regularly, with no recurrent AF noted.    My interpretation of today's ECG is NSR at 78 bpm.    Review of Systems   Constitution: Negative for decreased appetite, malaise/fatigue, weight gain and weight loss.   HENT: Negative for sore throat.    Eyes: Negative for blurred vision.   Cardiovascular: Negative for chest pain, dyspnea on exertion, irregular heartbeat, leg swelling, near-syncope, orthopnea, palpitations, paroxysmal nocturnal dyspnea and syncope.   Respiratory: Negative for shortness of breath.    Skin: Negative for rash.   Musculoskeletal: Negative for arthritis.   Gastrointestinal: Negative for abdominal pain.   Neurological: Negative for focal weakness and numbness.   Psychiatric/Behavioral: Negative for altered mental status.        Objective:    Physical Exam   Constitutional: He is oriented to person, place, and time. He appears well-developed and well-nourished. No distress.   HENT:   Head: Normocephalic and atraumatic.   Mouth/Throat: Oropharynx is clear  and moist.   Eyes: Pupils are equal, round, and reactive to light. No scleral icterus.   Neck: Neck supple. No thyromegaly present.   Cardiovascular: Regular rhythm, normal heart sounds and normal pulses. Exam reveals no gallop and no friction rub.   No murmur heard.  Pulmonary/Chest: Effort normal and breath sounds normal. He has no rales.   Abdominal: Soft. Bowel sounds are normal. He exhibits no distension. There is no tenderness.   Musculoskeletal: He exhibits no edema.   Neurological: He is alert and oriented to person, place, and time.   Skin: Skin is warm and dry. No rash noted.   Psychiatric: He has a normal mood and affect. His behavior is normal.         Assessment:       1. Persistent atrial fibrillation    2. S/P ablation of atrial fibrillation    3. Status post catheter ablation of atrial flutter    4. Encounter for anticoagulation discussion and counseling    5. Current use of long term anticoagulation    6. Severe obesity (BMI 35.0-39.9) with comorbidity         Plan:   In summary, Marcus Watkins is a 63-year old male with a history of symptomatic typical atrial flutter status-post CTI-line, who then developed AF refractory to Sotalol and Amiodarone. He is now 9 months post-PVI, and is maintaining sinus rhythm. He should continue Eliquis. He has been instructed to check his pulse daily, and call me if it is irregular or inappropriately fast.    He will see me again in 1 year.    Thank you for allowing me to participate in the care of this patient. Please do not hesitate to call me with any questions or concerns.

## 2019-11-15 RX ORDER — METOPROLOL SUCCINATE 25 MG/1
TABLET, EXTENDED RELEASE ORAL
Qty: 30 TABLET | Refills: 11 | Status: SHIPPED | OUTPATIENT
Start: 2019-11-15 | End: 2020-07-24 | Stop reason: SDUPTHER

## 2020-01-13 ENCOUNTER — TELEPHONE (OUTPATIENT)
Dept: PRIMARY CARE CLINIC | Facility: CLINIC | Age: 65
End: 2020-01-13

## 2020-01-13 NOTE — TELEPHONE ENCOUNTER
Spoke with patient to find out exactly what symptoms he was having and what the duration is.  States that a few weeks ago, he had chest discomfort and it felt like heartburn.  Reports over the weekend, he was awakened out of his sleep a couple of times with chest discomfort that feels like a pulled muscle; it is relieved once he's up and starts moving around.  Pinpoints the area as between the center of his chest and the nipple line.  Denies any SOB or radiating pain, no jaw pain, no sweating.  States that he did experience getting a little tired once yesterday.  Inquired if he was checking his pulse for any irregularities as instructed by Dr. Grey back in December.  States his heart rate is fine and his batteries were recently changed, so all is well there.  Appointment scheduled to see Dr. Dumont tomorrow morning at 9:20am but advised patient should he have any chest discomfort, with SOB, sweating, radiating pain, or any other accompanying symptoms to please call 911.  Verbalizes understanding.

## 2020-01-13 NOTE — TELEPHONE ENCOUNTER
----- Message from Baldomero Dunn sent at 1/13/2020  9:19 AM CST -----  Contact: Nicolasa Velazquez [phone]  Patient would like to get medical advice.  Symptoms (please be specific):  tightness in his chest on/off  How long has patient had these symptoms: few days  Pharmacy name and phone # (copy/paste from chart):    Any drug allergies (copy/paste from chart):      Would the patient rather a call back or a response via [facility]?:  Call back  Comments:  Patient wife States she will like to know if he need to be same by the cardiology doctor or to see her? Please advise. No On Call nurse due to pt is at work and wife call in.

## 2020-01-14 ENCOUNTER — OFFICE VISIT (OUTPATIENT)
Dept: PRIMARY CARE CLINIC | Facility: CLINIC | Age: 65
End: 2020-01-14
Payer: COMMERCIAL

## 2020-01-14 VITALS
HEIGHT: 71 IN | BODY MASS INDEX: 36.85 KG/M2 | TEMPERATURE: 98 F | HEART RATE: 84 BPM | DIASTOLIC BLOOD PRESSURE: 72 MMHG | SYSTOLIC BLOOD PRESSURE: 118 MMHG | WEIGHT: 263.25 LBS

## 2020-01-14 DIAGNOSIS — I48.19 PERSISTENT ATRIAL FIBRILLATION: ICD-10-CM

## 2020-01-14 DIAGNOSIS — R07.9 CHEST PAIN, UNSPECIFIED TYPE: ICD-10-CM

## 2020-01-14 DIAGNOSIS — Z79.01 CURRENT USE OF LONG TERM ANTICOAGULATION: ICD-10-CM

## 2020-01-14 DIAGNOSIS — K30 INDIGESTION: Primary | ICD-10-CM

## 2020-01-14 DIAGNOSIS — I48.91 ATRIAL FIBRILLATION WITH RVR: ICD-10-CM

## 2020-01-14 DIAGNOSIS — E66.01 SEVERE OBESITY (BMI 35.0-39.9) WITH COMORBIDITY: ICD-10-CM

## 2020-01-14 DIAGNOSIS — Z12.5 ENCOUNTER FOR PROSTATE CANCER SCREENING: Primary | ICD-10-CM

## 2020-01-14 PROCEDURE — 99499 UNLISTED E&M SERVICE: CPT | Mod: S$GLB,,, | Performed by: FAMILY MEDICINE

## 2020-01-14 PROCEDURE — 93005 ELECTROCARDIOGRAM TRACING: CPT | Mod: S$GLB,,, | Performed by: FAMILY MEDICINE

## 2020-01-14 PROCEDURE — 99214 PR OFFICE/OUTPT VISIT, EST, LEVL IV, 30-39 MIN: ICD-10-PCS | Mod: S$GLB,,, | Performed by: FAMILY MEDICINE

## 2020-01-14 PROCEDURE — 1159F MED LIST DOCD IN RCRD: CPT | Mod: S$GLB,,, | Performed by: FAMILY MEDICINE

## 2020-01-14 PROCEDURE — 1159F PR MEDICATION LIST DOCUMENTED IN MEDICAL RECORD: ICD-10-PCS | Mod: S$GLB,,, | Performed by: FAMILY MEDICINE

## 2020-01-14 PROCEDURE — 93005 EKG 12-LEAD: ICD-10-PCS | Mod: S$GLB,,, | Performed by: FAMILY MEDICINE

## 2020-01-14 PROCEDURE — 93010 EKG 12-LEAD: ICD-10-PCS | Mod: S$GLB,,, | Performed by: INTERNAL MEDICINE

## 2020-01-14 PROCEDURE — 1101F PT FALLS ASSESS-DOCD LE1/YR: CPT | Mod: CPTII,S$GLB,, | Performed by: FAMILY MEDICINE

## 2020-01-14 PROCEDURE — 93010 ELECTROCARDIOGRAM REPORT: CPT | Mod: S$GLB,,, | Performed by: INTERNAL MEDICINE

## 2020-01-14 PROCEDURE — 99999 PR PBB SHADOW E&M-EST. PATIENT-LVL III: ICD-10-PCS | Mod: PBBFAC,,, | Performed by: FAMILY MEDICINE

## 2020-01-14 PROCEDURE — 99214 OFFICE O/P EST MOD 30 MIN: CPT | Mod: S$GLB,,, | Performed by: FAMILY MEDICINE

## 2020-01-14 PROCEDURE — 1101F PR PT FALLS ASSESS DOC 0-1 FALLS W/OUT INJ PAST YR: ICD-10-PCS | Mod: CPTII,S$GLB,, | Performed by: FAMILY MEDICINE

## 2020-01-14 PROCEDURE — 99499 RISK ADDL DX/OHS AUDIT: ICD-10-PCS | Mod: S$GLB,,, | Performed by: FAMILY MEDICINE

## 2020-01-14 PROCEDURE — 99999 PR PBB SHADOW E&M-EST. PATIENT-LVL III: CPT | Mod: PBBFAC,,, | Performed by: FAMILY MEDICINE

## 2020-01-14 NOTE — PATIENT INSTRUCTIONS
He feels eating mor chocolate may be the culprit.     Try to decrease the  triggers of heartburn which include - alcohol,   Tobacco, caffeine, spicy foods, fatty foods, eating large meals before lying down.     Also taking an antiinflammatory ( like Aspirin, Advil (ibuprofen), Alleve (naproxen), Mobic, Aspirin 325mg )  daily can worsen Heartburn or Reflux (GERD)    You can try nonprescription supplements over the counter to see if they help - Papaya enzyme or Aloe Vera concentrate    Consider stopping DAIRY ( milk, yogurt, cheese) for a month to see if this is causing your symptoms.     Call  & let me know  if symptoms worsen or persist after taking prescription medication - OMEPRAZOLE (Prilosec)     If you are not better, we may consider referral to gastroenterology for further testing or  EGD     ======    Continue other meds    See Cardiologist if not better    He has appt for PHYS with his PCP in April

## 2020-01-14 NOTE — PROGRESS NOTES
Subjective:      Patient ID: Marcus Watkins is a 64 y.o. male.    Chief Complaint: Chest Pain (RE-OCCURING)    Here today for  chest discomfort that felt like heartburn.  Reports over the weekend, he was awakened out of his sleep a couple of times with chest discomfort that felt like indigestion.  It woke him in the middle the night over the weekend.  He did have talked put, but cannot tie it to any other foods.  No belching, did not take over-the-counter medications, resolved after an hour.  On Sunday it lasted several hours beginning at 5:00 a.m..  He states he had similar symptoms about 30 years ago and was diagnosed with indigestion.  He did not take any over-the-counter medications as his symptoms resolved.  He was mainly concerned because Sunday morning he was tired.  Every morning he awakened at 2:30 a.m. for work, he goes to bed 6:30 p.m..  He states the discomfort he felt did not feel like previous atrial fibrillation.  He does not take anti-inflammatories daily, denies any blood or dark stool.  He does not smoke or drink alcohol.   Denies any SOB or radiating pain, no jaw pain, no diaphoresis.     He saw his cardiologist Dr. Carter in September 16, 2019 for atrial fibrillation despite ablation 2/2019, he currently takes metoprolol and Eliquis (AF refractory to sotalol and amiodarone)    He also follows with neurology for peripheral polyneuropathy, paresthesias      Current Outpatient Medications:     apixaban (ELIQUIS) 5 mg Tab, Take 1 tablet (5 mg total) by mouth 2 (two) times daily., Disp: 180 tablet, Rfl: 3    cartilage-collagen-bor-hyalur (MOVE FREE ULTRA, BORON,) 40-5-3.3 mg Tab, Take by mouth., Disp: , Rfl:     cranberry 500 mg Cap, Take by mouth once daily. , Disp: , Rfl:     ferrous sulfate (IRON ORAL), Take 1 tablet by mouth once daily., Disp: , Rfl:     fish oil-omega-3 fatty acids 300-1,000 mg capsule, Take 2 g by mouth once daily., Disp: , Rfl:     L.acidophilus/B.bifidum,longum (HEALTHY  "COLON ORAL), Take by mouth., Disp: , Rfl:     metoprolol succinate (TOPROL-XL) 25 MG 24 hr tablet, TAKE 1 TABLET BY MOUTH ONCE DAILY, Disp: 30 tablet, Rfl: 11    multivit-min/folic/vit K/lycop (ONE-A-DAY MEN'S 50 PLUS ORAL), Take by mouth., Disp: , Rfl:     vitamin E, dl,tocopheryl acet, (VITAMIN E, DL, ACETATE, ORAL), Take by mouth., Disp: , Rfl:         Past Medical History:   Diagnosis Date    Atrial fibrillation      Past Surgical History:   Procedure Laterality Date    ABLATION N/A 12/27/2018    Procedure: ABLATION;  Surgeon: José Grey MD;  Location: [facility] EP LAB;  Service: Cardiology;  Laterality: N/A;  AF,PVI, RFA, CARTO, GEN, GP, 3 PREP    CARDIOVERSION N/A 10/29/2018    Procedure: CARDIOVERSION;  Surgeon: José Grey MD;  Location: [facility] CATH LAB;  Service: Cardiology;  Laterality: N/A;  AF, LOPEZ, DCCV, MAC, GP, 3 PREP    COLONOSCOPY N/A 4/14/2016    Procedure: COLONOSCOPY;  Surgeon: Kade Wang MD;  Location: [facility] ENDO (4TH FLR);  Service: Endoscopy;  Laterality: N/A;    RADIOFREQUENCY ABLATION  2017    TREATMENT OF CARDIAC ARRHYTHMIA N/A 11/29/2018    Procedure: CARDIOVERSION;  Surgeon: José Grey MD;  Location: [facility] EP LAB;  Service: Cardiology;  Laterality: N/A;  AF, DCCV, MAC, GP, 3 PREP    TREATMENT OF CARDIAC ARRHYTHMIA  12/27/2018    Procedure: Cardioversion or Defibrillation;  Surgeon: José Grey MD;  Location: [facility] EP LAB;  Service: Cardiology;;     Social History     Social History Narrative    [employer], wife Phyllis works for [employer], 4 children (youngest daughter with MR), nonsmoker, nondrinker     Family History   Problem Relation Age of Onset    Heart disease Mother     Diabetes Brother     Mental retardation Daughter      Vitals:    01/14/20 0848   BP: 118/72   Pulse: 84   Temp: 98.4 °F (36.9 °C)   Weight: 119.4 kg (263 lb 3.7 oz)   Height: 5' 11" (1.803 m)     Objective:   Physical Exam   Constitutional: He is oriented to person, " place, and time. He appears well-developed and well-nourished.   HENT:   Head: Normocephalic and atraumatic.   Right Ear: External ear normal.   Left Ear: External ear normal.   Nose: Nose normal.   Mouth/Throat: Oropharynx is clear and moist.   Eyes: Pupils are equal, round, and reactive to light. EOM are normal.   Neck: Neck supple. No thyromegaly present.   Cardiovascular: Normal rate, regular rhythm, normal heart sounds and intact distal pulses.   No murmur heard.  Pulmonary/Chest: Effort normal and breath sounds normal. He has no wheezes.   Abdominal: Soft. Bowel sounds are normal. He exhibits no distension and no mass. There is no tenderness. There is no rebound and no guarding.   Musculoskeletal: He exhibits no edema.   Lymphadenopathy:     He has no cervical adenopathy.   Neurological: He is alert and oriented to person, place, and time.   Skin: Skin is warm and dry.   Psychiatric: He has a normal mood and affect. His behavior is normal.     EKG NSR  Assessment:     1. Indigestion    2. Chest pain, unspecified type    3. Severe obesity (BMI 35.0-39.9) with comorbidity    4. Persistent atrial fibrillation    5. Current use of long term anticoagulation      Plan:     Orders Placed This Encounter    EKG 12-lead       Patient Instructions   He feels eating mor chocolate may be the culprit.     Try to decrease the  triggers of heartburn which include - alcohol,   Tobacco, caffeine, spicy foods, fatty foods, eating large meals before lying down.     Also taking an antiinflammatory ( like Aspirin, Advil (ibuprofen), Alleve (naproxen), Mobic, Aspirin 325mg )  daily can worsen Heartburn or Reflux (GERD)    You can try nonprescription supplements over the counter to see if they help - Papaya enzyme or Aloe Vera concentrate    Consider stopping DAIRY ( milk, yogurt, cheese) for a month to see if this is causing your symptoms.     Call  & let me know  if symptoms worsen or persist after taking prescription medication -  OMEPRAZOLE (Prilosec)     If you are not better, we may consider referral to gastroenterology for further testing or  EGD     ======    Continue other meds    See Cardiologist if not better    He has appt for PHYS with his PCP in April

## 2020-01-14 NOTE — TELEPHONE ENCOUNTER
----- Message from Concepción Chaudhari sent at 1/14/2020 11:07 AM CST -----  Contact: Self  .Rx Refill/Request     Is this a Refill or New Rx:  Refill- 90 day  Rx Name and Strength:  apixaban (ELIQUIS) 5 mg Tab  Preferred Pharm[facility] with phone number: [phone] Fax: [phone],   Communication Preference: [phone]  Additional Information: Thanks

## 2020-04-09 ENCOUNTER — PATIENT OUTREACH (OUTPATIENT)
Dept: ADMINISTRATIVE | Facility: HOSPITAL | Age: 65
End: 2020-04-09

## 2020-04-09 NOTE — PROGRESS NOTES
Pre-visit chart review completed.  Immunizations reviewed and updated.    Patient due for the following    Abdominal Aortic Aneurysm Screening     Influenza Vaccine (1)    Pneumococcal Vaccine (65+ Low/Medium Risk) (2 of 2 - PPSV23)

## 2020-07-17 ENCOUNTER — LAB VISIT (OUTPATIENT)
Dept: LAB | Facility: HOSPITAL | Age: 65
End: 2020-07-17
Attending: FAMILY MEDICINE
Payer: COMMERCIAL

## 2020-07-17 DIAGNOSIS — Z12.5 ENCOUNTER FOR PROSTATE CANCER SCREENING: ICD-10-CM

## 2020-07-17 DIAGNOSIS — I48.19 PERSISTENT ATRIAL FIBRILLATION: ICD-10-CM

## 2020-07-17 LAB
ALBUMIN SERPL BCP-MCNC: 3.9 G/DL (ref 3.5–5.2)
ALP SERPL-CCNC: 77 U/L (ref 55–135)
ALT SERPL W/O P-5'-P-CCNC: 15 U/L (ref 10–44)
ANION GAP SERPL CALC-SCNC: 9 MMOL/L (ref 8–16)
AST SERPL-CCNC: 18 U/L (ref 10–40)
BASOPHILS # BLD AUTO: 0.05 K/UL (ref 0–0.2)
BASOPHILS NFR BLD: 0.5 % (ref 0–1.9)
BILIRUB SERPL-MCNC: 0.5 MG/DL (ref 0.1–1)
BUN SERPL-MCNC: 12 MG/DL (ref 8–23)
CALCIUM SERPL-MCNC: 9.2 MG/DL (ref 8.7–10.5)
CHLORIDE SERPL-SCNC: 104 MMOL/L (ref 95–110)
CHOLEST SERPL-MCNC: 196 MG/DL (ref 120–199)
CHOLEST/HDLC SERPL: 4.9 {RATIO} (ref 2–5)
CO2 SERPL-SCNC: 28 MMOL/L (ref 23–29)
COMPLEXED PSA SERPL-MCNC: 0.91 NG/ML (ref 0–4)
CREAT SERPL-MCNC: 0.8 MG/DL (ref 0.5–1.4)
DIFFERENTIAL METHOD: NORMAL
EOSINOPHIL # BLD AUTO: 0.5 K/UL (ref 0–0.5)
EOSINOPHIL NFR BLD: 4.7 % (ref 0–8)
ERYTHROCYTE [DISTWIDTH] IN BLOOD BY AUTOMATED COUNT: 13 % (ref 11.5–14.5)
EST. GFR  (AFRICAN AMERICAN): >60 ML/MIN/1.73 M^2
EST. GFR  (NON AFRICAN AMERICAN): >60 ML/MIN/1.73 M^2
GLUCOSE SERPL-MCNC: 102 MG/DL (ref 70–110)
HCT VFR BLD AUTO: 44.4 % (ref 40–54)
HDLC SERPL-MCNC: 40 MG/DL (ref 40–75)
HDLC SERPL: 20.4 % (ref 20–50)
HGB BLD-MCNC: 14.6 G/DL (ref 14–18)
IMM GRANULOCYTES # BLD AUTO: 0.04 K/UL (ref 0–0.04)
IMM GRANULOCYTES NFR BLD AUTO: 0.4 % (ref 0–0.5)
LDLC SERPL CALC-MCNC: 119 MG/DL (ref 63–159)
LYMPHOCYTES # BLD AUTO: 2.4 K/UL (ref 1–4.8)
LYMPHOCYTES NFR BLD: 24.6 % (ref 18–48)
MCH RBC QN AUTO: 30.4 PG (ref 27–31)
MCHC RBC AUTO-ENTMCNC: 32.9 G/DL (ref 32–36)
MCV RBC AUTO: 92 FL (ref 82–98)
MONOCYTES # BLD AUTO: 0.8 K/UL (ref 0.3–1)
MONOCYTES NFR BLD: 8.1 % (ref 4–15)
NEUTROPHILS # BLD AUTO: 5.9 K/UL (ref 1.8–7.7)
NEUTROPHILS NFR BLD: 61.7 % (ref 38–73)
NONHDLC SERPL-MCNC: 156 MG/DL
NRBC BLD-RTO: 0 /100 WBC
PLATELET # BLD AUTO: 242 K/UL (ref 150–350)
PMV BLD AUTO: 11.6 FL (ref 9.2–12.9)
POTASSIUM SERPL-SCNC: 4.5 MMOL/L (ref 3.5–5.1)
PROT SERPL-MCNC: 7.1 G/DL (ref 6–8.4)
RBC # BLD AUTO: 4.81 M/UL (ref 4.6–6.2)
SODIUM SERPL-SCNC: 141 MMOL/L (ref 136–145)
TRIGL SERPL-MCNC: 185 MG/DL (ref 30–150)
TSH SERPL DL<=0.005 MIU/L-ACNC: 0.72 UIU/ML (ref 0.4–4)
WBC # BLD AUTO: 9.56 K/UL (ref 3.9–12.7)

## 2020-07-17 PROCEDURE — 80061 LIPID PANEL: CPT

## 2020-07-17 PROCEDURE — 84443 ASSAY THYROID STIM HORMONE: CPT

## 2020-07-17 PROCEDURE — 84153 ASSAY OF PSA TOTAL: CPT

## 2020-07-17 PROCEDURE — 36415 COLL VENOUS BLD VENIPUNCTURE: CPT | Mod: PN

## 2020-07-17 PROCEDURE — 85025 COMPLETE CBC W/AUTO DIFF WBC: CPT

## 2020-07-17 PROCEDURE — 80053 COMPREHEN METABOLIC PANEL: CPT

## 2020-07-24 ENCOUNTER — IMMUNIZATION (OUTPATIENT)
Dept: PHARMACY | Facility: CLINIC | Age: 65
End: 2020-07-24
Payer: COMMERCIAL

## 2020-07-24 ENCOUNTER — OFFICE VISIT (OUTPATIENT)
Dept: PRIMARY CARE CLINIC | Facility: CLINIC | Age: 65
End: 2020-07-24
Payer: COMMERCIAL

## 2020-07-24 VITALS
WEIGHT: 269.19 LBS | HEART RATE: 86 BPM | TEMPERATURE: 98 F | OXYGEN SATURATION: 98 % | DIASTOLIC BLOOD PRESSURE: 80 MMHG | SYSTOLIC BLOOD PRESSURE: 120 MMHG | BODY MASS INDEX: 37.69 KG/M2 | HEIGHT: 71 IN

## 2020-07-24 DIAGNOSIS — I48.91 ATRIAL FIBRILLATION WITH RVR: ICD-10-CM

## 2020-07-24 DIAGNOSIS — Z13.6 SCREENING FOR AAA (ABDOMINAL AORTIC ANEURYSM): Primary | ICD-10-CM

## 2020-07-24 DIAGNOSIS — R20.0 NUMBNESS: ICD-10-CM

## 2020-07-24 DIAGNOSIS — E04.9 GOITER: ICD-10-CM

## 2020-07-24 PROCEDURE — 99999 PR PBB SHADOW E&M-EST. PATIENT-LVL V: CPT | Mod: PBBFAC,,, | Performed by: FAMILY MEDICINE

## 2020-07-24 PROCEDURE — 99397 PR PREVENTIVE VISIT,EST,65 & OVER: ICD-10-PCS | Mod: S$GLB,,, | Performed by: FAMILY MEDICINE

## 2020-07-24 PROCEDURE — 99397 PER PM REEVAL EST PAT 65+ YR: CPT | Mod: S$GLB,,, | Performed by: FAMILY MEDICINE

## 2020-07-24 PROCEDURE — 99999 PR PBB SHADOW E&M-EST. PATIENT-LVL V: ICD-10-PCS | Mod: PBBFAC,,, | Performed by: FAMILY MEDICINE

## 2020-07-24 RX ORDER — METOPROLOL SUCCINATE 25 MG/1
25 TABLET, EXTENDED RELEASE ORAL DAILY
Qty: 90 TABLET | Refills: 3 | Status: SHIPPED | OUTPATIENT
Start: 2020-07-24 | End: 2021-07-02

## 2020-07-24 RX ORDER — AMOXICILLIN 500 MG
1 CAPSULE ORAL DAILY
COMMUNITY
Start: 2020-07-24

## 2020-07-24 RX ORDER — METOPROLOL SUCCINATE 25 MG/1
25 TABLET, EXTENDED RELEASE ORAL DAILY
Qty: 30 TABLET | Refills: 11 | Status: SHIPPED | OUTPATIENT
Start: 2020-07-24 | End: 2020-07-24

## 2020-07-24 NOTE — PROGRESS NOTES
Marcus Watkins is a 64 y.o. male     Chief Complaint:  Physical Exam  Source of history: Patient  Past Medical History:   Diagnosis Date    Atrial fibrillation      Patient  reports that he quit smoking about 42 years ago. He has never used smokeless tobacco. He reports that he does not drink alcohol or use drugs.  Family History   Problem Relation Age of Onset    Heart disease Mother     Diabetes Brother     Mental retardation Daughter      ROS:                                                                                GENERAL: No fever, chills, fatigability or weight loss. .  SKIN: No rashes, itching or changes in color or texture of skin.  HEAD: No headaches or recent head trauma.  EYES: Visual acuity fine. No photophobia, ocular pain or diplopia.  EARS: Denies ear pain, discharge or vertigo.  NOSE: No loss of smell, no epistaxis or postnasal drip.  MOUTH & THROAT: No hoarseness or change in voice. No excessive gum bleeding.  NODES: Denies swollen glands.  CHEST: Denies DAYL, cyanosis, wheezing, cough and sputum production.  CARDIOVASCULAR: Denies chest pain, PND, orthopnea or reduced exercise tolerance.  ABDOMEN: Appetite fine. No weight loss. Denies diarrhea, abdominal pain, hematemesis or blood in stool.  URINARY: No flank pain, dysuria or hematuria. Frequency of urination at nighttime.  PERIPHERAL VASCULAR: No claudication or cyanosis.  MUSCULOSKELETAL:  No complaints presently  NEUROLOGIC: No history of seizures, paralysis, alteration of gait or coordination.    OBJECTIVE:  APPEARANCE: Well nourished, well developed, in no acute distress.   VS:  see nurses notes 7/24/2020   SKIN: No lesions, good turgor, no rashes.    HEENT: TMs clear bilaterally, ear canals clear bilaterally, nasal mucosa clear bilaterally,   sinuses nontender to percussion, throat mild postnasal drip.  HEAD: Normocephalic, nontender to palpation.  NECK: Supple nontender, no carotid bruits, no thyromegaly.  NODES: Normal.  CHEST: Clear  bilaterally, with good respiratory excursion, no evidence of respiratory distress.  CARDIOVASCULAR: S1, S2, regular rate and rhythm without murmur.  BREASTS: No masses palpated in either breast area, or axillary area, symmetry is noted.  ABDOMEN: Soft nontender no hepatosplenomegaly, no guarding or rebound, no pulsatile mass.  PERIPHERAL VASCULAR: Distal pulses palpable throughout, normal capillary refill in all distal extremities, no edema.  MUSCULOSKELETAL: No abnormalities noted.  BACK: No scoliosis, no spasm, cervical, thoracic, lumbar spine nontender to palpation  NEUROLOGIC: Cranial nerves II through XII grossly intact, motor exam 5 out of 5 on distal extremities,   no gait disturbances, sensation intact in all distal extremities  MENTAL STATUS: Patient oriented x3, normal affect, normal mood    ASSESSMENT/PLAN:   Marcus was seen today for annual exam.    Diagnoses and all orders for this visit:    Screening for AAA (abdominal aortic aneurysm)  -     US Abdominal Aorta; Future    Atrial fibrillation with RVR  -     omega-3 fatty acids/fish oil (FISH OIL-OMEGA-3 FATTY ACIDS) 300-1,000 mg capsule; Take 1 capsule by mouth once daily.  -     Discontinue: metoprolol succinate (TOPROL-XL) 25 MG 24 hr tablet; Take 1 tablet (25 mg total) by mouth once daily.  -     Discontinue: apixaban (ELIQUIS) 5 mg Tab; Take 1 tablet (5 mg total) by mouth 2 (two) times daily.  -     metoprolol succinate (TOPROL-XL) 25 MG 24 hr tablet; Take 1 tablet (25 mg total) by mouth once daily.  -     apixaban (ELIQUIS) 5 mg Tab; Take 1 tablet (5 mg total) by mouth 2 (two) times daily.  -     Ambulatory referral/consult to Cardiac Electrophysiology; Future    Goiter  -     US Soft Tissue Head Neck Thyroid; Future    Numbness  -     Ambulatory referral/consult to Neurology; Future     Patient's labs reviewed no areas some contact patient with results of pending test when available

## 2020-07-27 ENCOUNTER — TELEPHONE (OUTPATIENT)
Dept: ELECTROPHYSIOLOGY | Facility: CLINIC | Age: 65
End: 2020-07-27

## 2020-07-27 NOTE — TELEPHONE ENCOUNTER
Confirmed pt has no cardio recs outside of [facility]   I need to find out about if pts have cardio recs outside of [facility] .

## 2020-07-30 NOTE — PROGRESS NOTES
Subjective:     PCP: Radha Vizcarra MD    I had the pleasure of seeing Marcus Watkins in follow-up today for his history of atrial arrhythmias. He last saw me in 9/2019. He is a 64-year old male who [place] [facility] [place] with a 24 hour history of palpitations associated with shortness of breath, nausea, and vomiting. He was found to be in typical atrial flutter with RVR, and was administered Diltiazem which converted him to AF. He then spontaneously reverted to sinus rhythm. He was discharged on a 30-day course of Eliquis, as well as Metoprolol.    In 4/2017, Mr. Watkins underwent EPS and CTI-line. In 6/2017, Mr. Watkins [place] [facility] [place] and underwent DCCV. He was started on Sotalol around this time. In fall 2018, AF recurred. A DCCV was unsuccessful, so he was switched to Amiodarone. A repeat DCCV done in 11/2018 was performed, but AF recurred again.    In 12/2018, Mr. Watkins underwent PVI. He was discharged on Amiodarone 200 mg daily, which was stopped in 4/2019. As of 9/2019, he had no recurrent AF. Today in the office Mr. Watkins feels well and denies recurrent arrhythmias.    My interpretation of today's ECG is NSR at 79 bpm.    Review of Systems   Constitution: Negative for decreased appetite, malaise/fatigue, weight gain and weight loss.   HENT: Negative for sore throat.    Eyes: Negative for blurred vision.   Cardiovascular: Negative for chest pain, dyspnea on exertion, irregular heartbeat, leg swelling, near-syncope, orthopnea, palpitations, paroxysmal nocturnal dyspnea and syncope.   Respiratory: Negative for shortness of breath.    Skin: Negative for rash.   Musculoskeletal: Negative for arthritis.   Gastrointestinal: Negative for abdominal pain.   Neurological: Negative for focal weakness and numbness.   Psychiatric/Behavioral: Negative for altered mental status.        Objective:    Physical Exam   Constitutional: He is oriented to person, place, and time. He appears well-developed  and well-nourished. No distress.   HENT:   Head: Normocephalic and atraumatic.   Mouth/Throat: Oropharynx is clear and moist.   Eyes: Pupils are equal, round, and reactive to light. No scleral icterus.   Neck: Neck supple. No thyromegaly present.   Cardiovascular: Regular rhythm, normal heart sounds and normal pulses. Exam reveals no gallop and no friction rub.   No murmur heard.  Pulmonary/Chest: Effort normal and breath sounds normal. He has no rales.   Abdominal: Soft. Bowel sounds are normal. He exhibits no distension. There is no abdominal tenderness.   Musculoskeletal:         General: No edema.   Neurological: He is alert and oriented to person, place, and time.   Skin: Skin is warm and dry. No rash noted.   Psychiatric: He has a normal mood and affect. His behavior is normal.         Assessment:       1. Persistent atrial fibrillation    2. S/P ablation of atrial fibrillation    3. Encounter for anticoagulation discussion and counseling    4. Current use of long term anticoagulation         Plan:   In summary, Marcus Watkins is a 64-year old male with a history of symptomatic typical atrial flutter status-post CTI-line, who then developed AF refractory to Sotalol and Amiodarone. He is now 1.5 years post-PVI, and is maintaining sinus rhythm. He should continue Eliquis.     He will see me again in 1 year.    Thank you for allowing me to participate in the care of this patient. Please do not hesitate to call me with any questions or concerns.

## 2020-08-03 ENCOUNTER — OFFICE VISIT (OUTPATIENT)
Dept: ELECTROPHYSIOLOGY | Facility: CLINIC | Age: 65
End: 2020-08-03
Payer: COMMERCIAL

## 2020-08-03 ENCOUNTER — HOSPITAL ENCOUNTER (OUTPATIENT)
Dept: RADIOLOGY | Facility: HOSPITAL | Age: 65
Discharge: HOME | End: 2020-08-03
Attending: FAMILY MEDICINE
Payer: COMMERCIAL

## 2020-08-03 ENCOUNTER — HOSPITAL ENCOUNTER (OUTPATIENT)
Dept: CARDIOLOGY | Facility: CLINIC | Age: 65
Discharge: HOME | End: 2020-08-03
Payer: COMMERCIAL

## 2020-08-03 VITALS
DIASTOLIC BLOOD PRESSURE: 84 MMHG | BODY MASS INDEX: 37.06 KG/M2 | SYSTOLIC BLOOD PRESSURE: 136 MMHG | HEART RATE: 79 BPM | WEIGHT: 264.75 LBS | HEIGHT: 71 IN

## 2020-08-03 DIAGNOSIS — Z86.79 S/P ABLATION OF ATRIAL FIBRILLATION: ICD-10-CM

## 2020-08-03 DIAGNOSIS — I48.19 PERSISTENT ATRIAL FIBRILLATION: Primary | ICD-10-CM

## 2020-08-03 DIAGNOSIS — Z13.6 SCREENING FOR AAA (ABDOMINAL AORTIC ANEURYSM): ICD-10-CM

## 2020-08-03 DIAGNOSIS — E04.9 GOITER: ICD-10-CM

## 2020-08-03 DIAGNOSIS — I48.91 ATRIAL FIBRILLATION WITH RVR: ICD-10-CM

## 2020-08-03 DIAGNOSIS — I48.19 PERSISTENT ATRIAL FIBRILLATION: ICD-10-CM

## 2020-08-03 DIAGNOSIS — Z98.890 S/P ABLATION OF ATRIAL FIBRILLATION: ICD-10-CM

## 2020-08-03 DIAGNOSIS — Z71.89 ENCOUNTER FOR ANTICOAGULATION DISCUSSION AND COUNSELING: ICD-10-CM

## 2020-08-03 DIAGNOSIS — Z79.01 CURRENT USE OF LONG TERM ANTICOAGULATION: ICD-10-CM

## 2020-08-03 PROCEDURE — 93005 RHYTHM STRIP: ICD-10-PCS | Mod: S$GLB,,, | Performed by: INTERNAL MEDICINE

## 2020-08-03 PROCEDURE — 76536 US EXAM OF HEAD AND NECK: CPT | Mod: 26,,, | Performed by: RADIOLOGY

## 2020-08-03 PROCEDURE — 1159F PR MEDICATION LIST DOCUMENTED IN MEDICAL RECORD: ICD-10-PCS | Mod: S$GLB,,, | Performed by: INTERNAL MEDICINE

## 2020-08-03 PROCEDURE — 3008F BODY MASS INDEX DOCD: CPT | Mod: CPTII,S$GLB,, | Performed by: INTERNAL MEDICINE

## 2020-08-03 PROCEDURE — 1101F PR PT FALLS ASSESS DOC 0-1 FALLS W/OUT INJ PAST YR: ICD-10-PCS | Mod: CPTII,S$GLB,, | Performed by: INTERNAL MEDICINE

## 2020-08-03 PROCEDURE — 1126F PR PAIN SEVERITY QUANTIFIED, NO PAIN PRESENT: ICD-10-PCS | Mod: S$GLB,,, | Performed by: INTERNAL MEDICINE

## 2020-08-03 PROCEDURE — 93010 ELECTROCARDIOGRAM REPORT: CPT | Mod: S$GLB,,, | Performed by: INTERNAL MEDICINE

## 2020-08-03 PROCEDURE — 76775 US EXAM ABDO BACK WALL LIM: CPT | Mod: 26,,, | Performed by: RADIOLOGY

## 2020-08-03 PROCEDURE — 1126F AMNT PAIN NOTED NONE PRSNT: CPT | Mod: S$GLB,,, | Performed by: INTERNAL MEDICINE

## 2020-08-03 PROCEDURE — 99999 PR PBB SHADOW E&M-EST. PATIENT-LVL III: ICD-10-PCS | Mod: PBBFAC,,, | Performed by: INTERNAL MEDICINE

## 2020-08-03 PROCEDURE — 99214 PR OFFICE/OUTPT VISIT, EST, LEVL IV, 30-39 MIN: ICD-10-PCS | Mod: S$GLB,,, | Performed by: INTERNAL MEDICINE

## 2020-08-03 PROCEDURE — 76775 US ABDOMINAL AORTA: ICD-10-PCS | Mod: 26,,, | Performed by: RADIOLOGY

## 2020-08-03 PROCEDURE — 99999 PR PBB SHADOW E&M-EST. PATIENT-LVL III: CPT | Mod: PBBFAC,,, | Performed by: INTERNAL MEDICINE

## 2020-08-03 PROCEDURE — 1159F MED LIST DOCD IN RCRD: CPT | Mod: S$GLB,,, | Performed by: INTERNAL MEDICINE

## 2020-08-03 PROCEDURE — 99214 OFFICE O/P EST MOD 30 MIN: CPT | Mod: S$GLB,,, | Performed by: INTERNAL MEDICINE

## 2020-08-03 PROCEDURE — 93010 RHYTHM STRIP: ICD-10-PCS | Mod: S$GLB,,, | Performed by: INTERNAL MEDICINE

## 2020-08-03 PROCEDURE — 76536 US SOFT TISSUE HEAD NECK THYROID: ICD-10-PCS | Mod: 26,,, | Performed by: RADIOLOGY

## 2020-08-03 PROCEDURE — 76536 US EXAM OF HEAD AND NECK: CPT | Mod: TC

## 2020-08-03 PROCEDURE — 93005 ELECTROCARDIOGRAM TRACING: CPT | Mod: S$GLB,,, | Performed by: INTERNAL MEDICINE

## 2020-08-03 PROCEDURE — 1101F PT FALLS ASSESS-DOCD LE1/YR: CPT | Mod: CPTII,S$GLB,, | Performed by: INTERNAL MEDICINE

## 2020-08-03 PROCEDURE — 3008F PR BODY MASS INDEX (BMI) DOCUMENTED: ICD-10-PCS | Mod: CPTII,S$GLB,, | Performed by: INTERNAL MEDICINE

## 2020-08-03 PROCEDURE — 76775 US EXAM ABDO BACK WALL LIM: CPT | Mod: TC

## 2020-08-03 NOTE — LETTER
August 3, 2020      Radha Brunson MD  [address]  [facility] [address]           [facility]e y - Arrhythmia  [address]  [facility] [address]  Phone: [phone]  Fax: [phone]          Patient: Marcus Watkins   MR Number: [id number]   YOB: 1955   Date of Visit: 8/3/2020       Dear Dr. Radha Brunson:    Thank you for referring Marcus Watkins to me for evaluation. Attached you will find relevant portions of my assessment and plan of care.    If you have questions, please do not hesitate to call me. I look forward to following Marcus Watkins along with you.    Sincerely,    José Grey MD    Enclosure  CC:  No Recipients    If you would like to receive this communication electronically, please contact [email or web address] or [phone] to request more information on [employer] Link access.    For providers and/or their staff who would like to refer a patient to [facility], please contact us through our one-stop-shop provider referral line, [facility], at [phone].    If you feel you have received this communication in error or would no longer like to receive these types of communications, please e-mail [email or web address]

## 2020-08-13 ENCOUNTER — TELEPHONE (OUTPATIENT)
Dept: PRIMARY CARE CLINIC | Facility: CLINIC | Age: 65
End: 2020-08-13

## 2020-08-13 DIAGNOSIS — E04.1 NON-FUNCTIONAL THYROID NODULE: ICD-10-CM

## 2020-08-13 DIAGNOSIS — E04.2 MULTIPLE THYROID NODULES: Primary | ICD-10-CM

## 2020-08-13 NOTE — TELEPHONE ENCOUNTER
Spoke to pt regarding need to biopsy nodules on the thyroid will refer to endocrine surgery   Dance, Carrie A  (RN)  2020 12:25:47 Susi Lorenzo  (RN)  2020 10:27:49

## 2020-08-13 NOTE — TELEPHONE ENCOUNTER
Attempted to call pt regarding his US of the thyroid  No answer, left a message for pt to call us.

## 2020-08-20 ENCOUNTER — INITIAL CONSULT (OUTPATIENT)
Dept: SURGERY | Facility: CLINIC | Age: 65
End: 2020-08-20
Payer: COMMERCIAL

## 2020-08-20 VITALS
WEIGHT: 265 LBS | HEIGHT: 71 IN | DIASTOLIC BLOOD PRESSURE: 80 MMHG | HEART RATE: 81 BPM | TEMPERATURE: 99 F | SYSTOLIC BLOOD PRESSURE: 139 MMHG | BODY MASS INDEX: 37.1 KG/M2

## 2020-08-20 DIAGNOSIS — E04.2 MULTIPLE THYROID NODULES: Primary | ICD-10-CM

## 2020-08-20 DIAGNOSIS — E04.1 NON-FUNCTIONAL THYROID NODULE: ICD-10-CM

## 2020-08-20 PROCEDURE — 99999 PR PBB SHADOW E&M-EST. PATIENT-LVL IV: ICD-10-PCS | Mod: PBBFAC,,, | Performed by: SURGERY

## 2020-08-20 PROCEDURE — 1126F AMNT PAIN NOTED NONE PRSNT: CPT | Mod: S$GLB,,, | Performed by: SURGERY

## 2020-08-20 PROCEDURE — 1101F PR PT FALLS ASSESS DOC 0-1 FALLS W/OUT INJ PAST YR: ICD-10-PCS | Mod: CPTII,S$GLB,, | Performed by: SURGERY

## 2020-08-20 PROCEDURE — 99203 PR OFFICE/OUTPT VISIT, NEW, LEVL III, 30-44 MIN: ICD-10-PCS | Mod: S$GLB,,, | Performed by: SURGERY

## 2020-08-20 PROCEDURE — 1126F PR PAIN SEVERITY QUANTIFIED, NO PAIN PRESENT: ICD-10-PCS | Mod: S$GLB,,, | Performed by: SURGERY

## 2020-08-20 PROCEDURE — 99999 PR PBB SHADOW E&M-EST. PATIENT-LVL IV: CPT | Mod: PBBFAC,,, | Performed by: SURGERY

## 2020-08-20 PROCEDURE — 3008F PR BODY MASS INDEX (BMI) DOCUMENTED: ICD-10-PCS | Mod: CPTII,S$GLB,, | Performed by: SURGERY

## 2020-08-20 PROCEDURE — 1101F PT FALLS ASSESS-DOCD LE1/YR: CPT | Mod: CPTII,S$GLB,, | Performed by: SURGERY

## 2020-08-20 PROCEDURE — 99499 UNLISTED E&M SERVICE: CPT | Mod: S$GLB,,, | Performed by: SURGERY

## 2020-08-20 PROCEDURE — 99203 OFFICE O/P NEW LOW 30 MIN: CPT | Mod: S$GLB,,, | Performed by: SURGERY

## 2020-08-20 PROCEDURE — 3008F BODY MASS INDEX DOCD: CPT | Mod: CPTII,S$GLB,, | Performed by: SURGERY

## 2020-08-20 PROCEDURE — 1159F PR MEDICATION LIST DOCUMENTED IN MEDICAL RECORD: ICD-10-PCS | Mod: S$GLB,,, | Performed by: SURGERY

## 2020-08-20 PROCEDURE — 99499 RISK ADDL DX/OHS AUDIT: ICD-10-PCS | Mod: S$GLB,,, | Performed by: SURGERY

## 2020-08-20 PROCEDURE — 1159F MED LIST DOCD IN RCRD: CPT | Mod: S$GLB,,, | Performed by: SURGERY

## 2020-08-20 NOTE — LETTER
August 22, 2020      Radha Brunson MD  [address]  [facility] [address]           [facility] - Multi Spec Surg 2nd Fl  [address]  [facility] [address]  Phone: [phone]          Patient: Marcus Watkins   MR Number: [id number]   YOB: 1955   Date of Visit: 8/20/2020       Dear Dr. Radha Brunson:    Thank you for referring Marcus Watkins to me for evaluation. Attached you will find relevant portions of my assessment and plan of care.    If you have questions, please do not hesitate to call me. I look forward to following Marcus Watkins along with you.    Sincerely,    Adilson Roberts MD    Enclosure  CC:  No Recipients    If you would like to receive this communication electronically, please contact [email or web address] or [phone] to request more information on [employer] Link access.    For providers and/or their staff who would like to refer a patient to [facility], please contact us through our one-stop-shop provider referral line, [facility], at [phone].    If you feel you have received this communication in error or would no longer like to receive these types of communications, please e-mail [email or web address]

## 2020-08-20 NOTE — PROGRESS NOTES
Consult Note  Endocrine Surgery    Visit DiagnosisNo primary diagnosis found.    SUBJECTIVE:     Marcus Watkins is a 64 y.o. male seen at the request of Dr. Radha Tavera* today in the Endocrine Surgery Clinic for evaluation of thyroid nodule(s). His history dates back to 12/20/2018 when patient was undergoing testing and three thyroid masses were identified as incidental findings in the left lobe. Subsequent evaluation included fine needle aspiration on 1/29/19, which resulted as benign. Subsequent ultrasound on 07/24/20 showed the previously identified three nodules as well as a new nodule in the right thyroid lobe. Marcus Watkins does not have any current complaints or symptoms. The patient denies hot/cold intolerance, fatigue, unexplained weight changes, difficulty with swallowing, difficulty with shortness of breath especially with postural changes, change in voice quality, palpable neck mass and growth of thyroid nodule over time. He does not have a history of head and neck radiation therapy. He does not have a family history of thyroid disease. He does not have a family history of endocrinopathies. He is not taking Ergocalciferol, OTC Vitamin D, OTC Calcium, Cinacalcet and thyroid hormone supplementation. He has not undergone radioactive iodine treatment.    Review of patient's allergies indicates:  No Known Allergies    Current Outpatient Medications   Medication Sig Dispense Refill    apixaban (ELIQUIS) 5 mg Tab Take 1 tablet (5 mg total) by mouth 2 (two) times daily. 180 tablet 3    cartilage-collagen-bor-hyalur (MOVE FREE ULTRA, BORON,) 40-5-3.3 mg Tab Take by mouth.      cranberry 500 mg Cap Take by mouth once daily.       ferrous sulfate (IRON ORAL) Take 1 tablet by mouth once daily.      L.acidophilus/B.bifidum,longum (HEALTHY COLON ORAL) Take by mouth.      metoprolol succinate (TOPROL-XL) 25 MG 24 hr tablet Take 1 tablet (25 mg total) by mouth once daily. 90 tablet 3     multivit-min/folic/vit K/lycop (ONE-A-DAY MEN'S 50 PLUS ORAL) Take by mouth.      omega-3 fatty acids/fish oil (FISH OIL-OMEGA-3 FATTY ACIDS) 300-1,000 mg capsule Take 1 capsule by mouth once daily.      vitamin E, dl,tocopheryl acet, (VITAMIN E, DL, ACETATE, ORAL) Take by mouth.       No current facility-administered medications for this visit.        Past Medical History:   Diagnosis Date    Atrial fibrillation      Past Surgical History:   Procedure Laterality Date    ABLATION N/A 2018    Procedure: ABLATION;  Surgeon: José Grey MD;  Location: [facility] EP LAB;  Service: Cardiology;  Laterality: N/A;  AF,PVI, RFA, CARTO, GEN, GP, 3 PREP    CARDIOVERSION N/A 10/29/2018    Procedure: CARDIOVERSION;  Surgeon: José Grey MD;  Location: [facility] CATH LAB;  Service: Cardiology;  Laterality: N/A;  AF, LOPEZ, DCCV, MAC, GP, 3 PREP    COLONOSCOPY N/A 2016    Procedure: COLONOSCOPY;  Surgeon: Kade aWng MD;  Location: [facility] ENDO ([facility]);  Service: Endoscopy;  Laterality: N/A;    RADIOFREQUENCY ABLATION  2017    TREATMENT OF CARDIAC ARRHYTHMIA N/A 2018    Procedure: CARDIOVERSION;  Surgeon: José Grey MD;  Location: [facility] EP LAB;  Service: Cardiology;  Laterality: N/A;  AF, DCCV, MAC, GP, 3 PREP    TREATMENT OF CARDIAC ARRHYTHMIA  2018    Procedure: Cardioversion or Defibrillation;  Surgeon: José Grey MD;  Location: [facility] EP LAB;  Service: Cardiology;;     Social History     Tobacco Use    Smoking status: Former Smoker     Quit date: 1978     Years since quittin.1    Smokeless tobacco: Never Used   Substance Use Topics    Alcohol use: No     Alcohol/week: 0.0 standard drinks    Drug use: No        Review of Systems:    Review of Systems   Constitutional: Negative for fever, chills, weight loss, weight gain, fatigue and night sweats.   HEENT: Negative for hoarse voice, voice change, vision changes and dysphasia.    Respiratory: Negative for shortness of breath and  dyspnea.    Cardiovascular: Negative for palpitations and arrhythmia.   Endocrine: Negative for appetite changes, diabetes, facial swelling and hair thinning.    Musculoskeletal: Negative for myalgias, arthralgias and muscle weakness.   Skin: Negative for thin skin and striae.   Gastrointestinal: Negative for nausea, vomiting, diarrhea, constipation, acid reflux and appetite change.   Neurological/Neuropsych: Negative for headaches and difficulty with concentration.         OBJECTIVE:     Vital Signs:  There were no vitals taken for this visit.   There is no height or weight on file to calculate BMI.      Physical Exam:    General:  no distress, see vitals for BMI    Eyes:  conjunctivae/corneas clear   Neck: trachea midline and symmetric, no adenopathy    Thyroid:  thyroid not enlarged   Lung: clear to auscultation bilaterally   Heart:  regular rate and rhythm   Abdomen: soft, non-tender; bowel sounds normal; no masses,  no organomegaly   Skin/Extremities: warm and well-perfused   Pulses: 2+ and symmetric   Neuro: normal without focal findings and mental status, speech normal, alert and oriented x3     Laboratory/Radiologic Studies    Studies:  Date:       Results:     TSH:   07/17/20 TSH = 0.717   Ultrasound:  07/24/20 Multiple bilateral thyroid nodules are present, with 4 index nodules as detailed below.     Nodule #1:   Size: 3.4 x 3.4 x 2.8 cm   Location: Left lobe lower pole   Composition: solid/almost completely solid (2)   Echogenicity: isoechoic (1)   Shape: taller-than-wide (3)   Margins: ill-defined (0)   Echogenic foci: none (0)   Change: No   TI-RADS category: TR4 (6 points) - follow up is recommended at 1, 2, 3, and 5 years if 1 cm or greater; FNA is recommended if 1.5 cm or greater.     Nodule #2:   Size: 2.3 x 1.7 x 1.5 cm   Location: Left lobe midportion   Composition: mixed cystic and solid (1)   Echogenicity: hypoechoic (2)   Shape: taller-than-wide (3)   Margins: ill-defined (0)   Echogenic foci:  none (0)   Change: N/A   TI-RADS category: TR4 (6 points) - follow up is recommended at 1, 2, 3, and 5 years if 1 cm or greater; FNA is recommended if 1.5 cm or greater.    Nodule #3:   Size: 1.7 x 1.3 x 1.6 cm   Location: Left lobe upper pole   Composition: mixed cystic and solid (1)   Echogenicity: hypoechoic (2)    Shape: wider-than-tall (0)   Margins: ill-defined (0)   Echogenic foci: none (0)   Change: N/A   TI-RADS category: TR3 (3 points) - follow up is recommended at 1, 3, and 5 years if 1.5 cm or greater; FNA is recommended if 2.5 cm or greater.     Nodule #4:   Size: 0.5 x 0.4 x 0.4 cm   Location: Right lobe lower pole   Composition: cystic/almost completely cystic (0)   Echogenicity: hypoechoic (2)   Shape: taller-than-wide (3)   Margins: smooth (0)   Echogenic foci: none (0)   Change: N/A   TI-RADS category: TR4 (5 points) - follow up is recommended at 1, 2, 3, and 5 years if 1 cm or greater; FNA is recommended if 1.5 cm or greater.   Pathology::  01/29/19 Thyroid, left, fine needle aspiration:  - [facility] thyroid cytology category: Benign.  - Benign follicular epithelial cells and colloid present.              Component Value Date    TSH 0.717 07/17/2020    Vit D, 25-Hydroxy 56 05/29/2019    Sodium 141 07/17/2020    Potassium 4.5 07/17/2020    Chloride 104 07/17/2020    CO2 28 07/17/2020    Glucose 102 07/17/2020    BUN, Bld 12 07/17/2020    Creatinine 0.8 07/17/2020    Calcium 9.2 07/17/2020    Anion Gap 9 07/17/2020    eGFR if African American >60.0 07/17/2020    eGFR if non African American >60.0 07/17/2020       ASSESSMENT/PLAN:     Assessment    Mr. Watkins is a 64 y.o. male with no current compressive or endocrine symptoms who presents for further evaluation of thyroid nodules found on ultrasound.    Plan    During this visit, lab and imaging results were reviewed with the patient.. Thyroid anatomy and physiology were reviewed and he was given a copy of our patient education booklet,  Understanding Thyroid Disease. The above testing and examination support the diagnosis of nodular thyroid.    Ultrasound-guided FNA of the 3.4cm and 2.3cm nodules in the left lobe will be scheduled with radiology per current guidelines.We will also ensure that the patient is medically optimized prior to procedure.     Cassidy Patel  MS3     I have personally taken the history and examined this patient, and I agree with the history, physical exam, assessment, and plan as written and outlined and stated above per the medical student.  Patient with a right small 5 mm tirads 4 nodule and 3 left-sided nodules with the superior nodule being tirads 3 and 1.7 cm in size and the middle and inferior nodules being tirds 4 at 2.3 and 3.4 cm in size, respectively.  He has no signs or symptoms of hypo or hyper thyroidism.  His TSH level is within normal limits  He has no compressive symptoms  He had had a benign biopsy in January of 2019    Ultrasound recommended biopsy of the 2.3 and 3.4 cm nodules of the left lobe given their size tirads 4 classification  We will set the patient up for ultrasound-guided fine-needle aspiration biopsy in the radiology department for the 2 left-sided thyroid nodules that meet criteria for fine-needle aspiration biopsy due to their size.  Follow-up with endocrine surgery p.r.n. for any fine-needle aspiration biopsy results that me criteria that would warrant surgery.    Otherwise if fine-needle aspiration biopsies are benign again we would recommend follow-up surveillance neck and thyroid ultrasound in 1 year.

## 2020-08-21 ENCOUNTER — TELEPHONE (OUTPATIENT)
Dept: NEUROLOGY | Facility: CLINIC | Age: 65
End: 2020-08-21

## 2020-08-24 ENCOUNTER — OFFICE VISIT (OUTPATIENT)
Dept: NEUROLOGY | Facility: CLINIC | Age: 65
End: 2020-08-24
Payer: COMMERCIAL

## 2020-08-24 ENCOUNTER — TELEPHONE (OUTPATIENT)
Dept: SURGERY | Facility: CLINIC | Age: 65
End: 2020-08-24

## 2020-08-24 VITALS
DIASTOLIC BLOOD PRESSURE: 67 MMHG | BODY MASS INDEX: 37.65 KG/M2 | WEIGHT: 268.94 LBS | HEART RATE: 85 BPM | SYSTOLIC BLOOD PRESSURE: 127 MMHG | HEIGHT: 71 IN

## 2020-08-24 DIAGNOSIS — R20.2 PARESTHESIA: ICD-10-CM

## 2020-08-24 DIAGNOSIS — G62.9 PERIPHERAL POLYNEUROPATHY: ICD-10-CM

## 2020-08-24 DIAGNOSIS — R20.0 NUMBNESS OF FEET: ICD-10-CM

## 2020-08-24 DIAGNOSIS — E04.2 MULTIPLE THYROID NODULES: Primary | ICD-10-CM

## 2020-08-24 DIAGNOSIS — Z98.890 STATUS POST CATHETER ABLATION OF ATRIAL FLUTTER: ICD-10-CM

## 2020-08-24 DIAGNOSIS — R20.0 NUMBNESS: Primary | ICD-10-CM

## 2020-08-24 DIAGNOSIS — E04.2 MULTIPLE THYROID NODULES: ICD-10-CM

## 2020-08-24 DIAGNOSIS — E66.01 SEVERE OBESITY (BMI 35.0-39.9) WITH COMORBIDITY: ICD-10-CM

## 2020-08-24 PROCEDURE — 99999 PR PBB SHADOW E&M-EST. PATIENT-LVL V: ICD-10-PCS | Mod: PBBFAC,,, | Performed by: PSYCHIATRY & NEUROLOGY

## 2020-08-24 PROCEDURE — 1126F AMNT PAIN NOTED NONE PRSNT: CPT | Mod: S$GLB,,, | Performed by: PSYCHIATRY & NEUROLOGY

## 2020-08-24 PROCEDURE — 99214 PR OFFICE/OUTPT VISIT, EST, LEVL IV, 30-39 MIN: ICD-10-PCS | Mod: S$GLB,,, | Performed by: PSYCHIATRY & NEUROLOGY

## 2020-08-24 PROCEDURE — 99214 OFFICE O/P EST MOD 30 MIN: CPT | Mod: S$GLB,,, | Performed by: PSYCHIATRY & NEUROLOGY

## 2020-08-24 PROCEDURE — 1126F PR PAIN SEVERITY QUANTIFIED, NO PAIN PRESENT: ICD-10-PCS | Mod: S$GLB,,, | Performed by: PSYCHIATRY & NEUROLOGY

## 2020-08-24 PROCEDURE — 1101F PR PT FALLS ASSESS DOC 0-1 FALLS W/OUT INJ PAST YR: ICD-10-PCS | Mod: CPTII,S$GLB,, | Performed by: PSYCHIATRY & NEUROLOGY

## 2020-08-24 PROCEDURE — 99999 PR PBB SHADOW E&M-EST. PATIENT-LVL V: CPT | Mod: PBBFAC,,, | Performed by: PSYCHIATRY & NEUROLOGY

## 2020-08-24 PROCEDURE — 3008F PR BODY MASS INDEX (BMI) DOCUMENTED: ICD-10-PCS | Mod: CPTII,S$GLB,, | Performed by: PSYCHIATRY & NEUROLOGY

## 2020-08-24 PROCEDURE — 3008F BODY MASS INDEX DOCD: CPT | Mod: CPTII,S$GLB,, | Performed by: PSYCHIATRY & NEUROLOGY

## 2020-08-24 PROCEDURE — 1101F PT FALLS ASSESS-DOCD LE1/YR: CPT | Mod: CPTII,S$GLB,, | Performed by: PSYCHIATRY & NEUROLOGY

## 2020-08-24 PROCEDURE — 1159F MED LIST DOCD IN RCRD: CPT | Mod: S$GLB,,, | Performed by: PSYCHIATRY & NEUROLOGY

## 2020-08-24 PROCEDURE — 1159F PR MEDICATION LIST DOCUMENTED IN MEDICAL RECORD: ICD-10-PCS | Mod: S$GLB,,, | Performed by: PSYCHIATRY & NEUROLOGY

## 2020-08-24 NOTE — PROGRESS NOTES
Name: Marcus Watkins  MRN:[id number]   CSN: [id number]  Date of service: 8/24/2020  Age:64 y.o.   Gender:male   Referring Physician/Service: Radha Brunson MD  [address] [place] [address]   The patient is here today with: self    Neurology Clinic:  Follow-up Visit    CHIEF COMPLAINT:  Sensory changes in the feet    Interval Events/ROS 8/24/2020:  Sensory changes are spreading, up the legs. Lost several toenails. Numbness only. No pain. Getting procedure to remove thyroid nodules.  Wearing still toed boots at work to protect feet.  However, not wearing shoes at home.    Otherwise, no fever, no cold symptoms, no headache, no changes in vision, no new weakness, no chest pain, no shortness of breath, no nausea, no vomiting, no diarrhea, no constipation, no mood issues.    HPI 5/29/2019:  63-year-old man previously with AFib on amiodarone now status post ablation, amiodarone stopped in March 2019.  He comes to Neurology Clinic for tingling in the feet. Started about 14 months ago. Both feet. Started at the big toes. Tingling, pins and needles, burning sensation. On the left migrated up to mid shin, not calf. On the right, up to top of ankle. Never been told that he has elevated blood sugar or elevated A1c, several borderline levels in OMR. No alcohol. Smoked in his teens and early twenties, Not after that. . No harsh chemicals. [employer] for 30 years.  AFIB ablation 12/2018, on amiodarone before this, started amiodarone in 10/2018 until march this year.     ROS:  Foot paresthesias as above.  Otherwise denies headache, loss of vision, blurred vision, diplopia, dysarthria, dysphagia, lightheadedness, vertigo, tinnitus or hearing difficulty. Denies difficulties producing or comprehending speech.  Denies focal weakness. No bowel or bladder incontinence or retention. Denies difficulty with gait, no falls.  Denies cough, shortness of breath.  Denies chest pain or tightness, palpitations.   "Denies nausea, vomiting, diarrhea, constipation or abdominal pain.  No recent change in bowel or bladder habits.  No dysuria.  Denies arthralgias or myalgias. Denies rash.    EXAM:   - Vitals: /67   Pulse 85   Ht 5' 11" (1.803 m)   Wt 122 kg (268 lb 15.4 oz)   BMI 37.51 kg/m²    - General: Awake, cooperative, NAD.  - HEENT: NC/AT  - Neck:  Decreased range of motion  - Pulmonary: no increased WOB  - Cardiac: well perfused   - Abdomen: soft, nontender, nondistended  - Extremities: no edema  - Skin: no rashes or lesions noted.     NEURO EXAM:   - Mental Status: Awake, alert, oriented x 3. Able to relate history without difficulty. Attentive to examiner. Language is fluent with intact repetition and comprehension. Normal prosody. There were no paraphasic errors. Able to name both high and low frequency objects. Speech was not dysarthric. Able to follow both midline and appendicular commands. Good knowledge of current events. There was no evidence of apraxia or neglect.    - Cranial Nerves:  PERRL 3 to 2mm and brisk. VFF to confrontation. EOMI without nystagmus. Normal saccades. Facial sensation intact to light touch. No facial droop. Hearing intact to finger-rub bilaterally. Palate elevates symmetrically. 5/5 strength in trapezii and SCM bilaterally. Tongue protrudes in midline and to either side with no evidence of atrophy or weakness.    - Motor: Normal bulk and tone throughout. No pronator drift bilaterally. No adventitious movements such as tremor or asterixis noted. 5/5 in all major muscle groups including bilateral Delt Bic Tri WrE WrF  FFl FE IO IP Quad Ham TA Gastroc EHL   - Sensory:  Stocking glove distribution of loss of light touch and pinprick.  Can feel pinprick increase up the arms bilaterally. Loss of pinprick at the toes, back to baseline between mid shin and just below knee, bilaterally.  Difficulty with proprioception at the toes even to big movements, intact at the ankles.   - DTRs:    Bi " Tri Brach Pat Ach  R  1  1    1    1   0  L  1  1    1    1  0    - Coordination: No dysmetria on FNF.  - Gait: Good initiation. Narrow-based, normal stride and arm swing.  Able to toe/heel; tandem with missteps. Romberg with sway, no misstep with pull.     PLAN:   64-year-old man previously on amiodarone for AFib, ablation in December last year, stopped amiodarone March 2019.  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  Proprioception is out at the toes, Romberg with sway, no weakness even at the toes.  Lab workup unrevealing.  Most likely multifactorial with contributions from ?Amiodarone ?mildly elevated blood sugar ?Unclear toxic exposures as a .  Podiatry.  EMG.  Shoes in the house and outside. His symptoms are very tolerable right now.  Will consider gabapentin for symptom relief in the future only if needed. Follow up in about 3 months (around 11/24/2020).    Patient Instructions   You returned to neurology clinic because of numbness in your feet extending up to your shins. I have ordered a podiatry consult to help protect your high risk feet. Let's go ahead and get the EMG to see which part of the nerve is injured. This will give us a little more of a clue as to the cause. We can also see how severe the injury is and if it seems to be progressing. Please be very careful of your feet. Wear stable supportive shoes in and out of the house.     Return to clinic after the EMG.     Bridgett Mendoza MD PhD  Neurology-Epilepsy   [facility].  [facility]          Problem List Items Addressed This Visit     Status post catheter ablation of atrial flutter    Numbness of feet    Multiple thyroid nodules    Severe obesity (BMI 35.0-39.9) with comorbidity    Peripheral polyneuropathy    Paresthesia    Numbness - Primary    Relevant Orders    Ambulatory consult to Podiatry    EMG W/ ULTRASOUND AND NERVE CONDUCTION  TEST 1 Extremity          More than 50% of the 30 minutes spent with the patient (as well as family/caregiver(s) was spent on face-to-face counseling.    Recent Labs   Lab 06/27/18  0805 07/02/18  0902  01/03/19  0945 05/29/19  0920 07/17/20  0700   WBC 8.57  --    < > 10.63 8.25 9.56   Hemoglobin 14.8  --    < > 14.5 14.8 14.6   Hematocrit 44.9  --    < > 43.7 43.7 44.4   Platelets 244  --    < > 261 244 242   Sodium 140  --    < >  --  136 141   Potassium 4.4  --    < >  --  3.9 4.5   BUN, Bld 16  --    < >  --  12 12   Creatinine 0.9  --    < >  --  0.9 0.8   eGFR if  >60.0  --    < >  --  >60 >60.0   eGFR if non  >60.0  --    < >  --  >60 >60.0   Hemoglobin A1C  --  5.8 H  --   --  5.6  --    TSH 0.913  --   --  1.177 0.755 0.717   Thiamine  --   --   --   --  68  --    Folate  --   --   --   --  15.2  --    Vitamin B-12  --   --   --   --  1118 H  --    RPR  --   --   --   --  Non-reactive  --     < > = values in this interval not displayed.       Imaging:  None in the system    PAST MEDICAL HISTORY:   Active Ambulatory Problems     Diagnosis Date Noted    History of colon polyps 03/17/2016    Encounter for anticoagulation discussion and counseling 02/13/2017    Status post catheter ablation of atrial flutter 06/13/2017    Numbness of feet     Multiple thyroid nodules 01/16/2019    Persistent atrial fibrillation 02/18/2019    S/P ablation of atrial fibrillation 02/18/2019    Current use of long term anticoagulation 02/18/2019    Severe obesity (BMI 35.0-39.9) with comorbidity 05/02/2019    Peripheral polyneuropathy 05/02/2019    Paresthesia 05/29/2019    Numbness 08/24/2020     Resolved Ambulatory Problems     Diagnosis Date Noted    Rectal bleeding 03/17/2016    Screening 04/14/2016    Atrial fibrillation 02/13/2017    Typical atrial flutter 03/27/2017    Atrial flutter 04/27/2017    Atrial fibrillation with RVR 06/12/2017    Encounter for monitoring sotalol  therapy 09/10/2018    Atrial fibrillation, persistent 10/29/2018    Long term current use of amiodarone 02/18/2019     No Additional Past Medical History        PAST SURGICAL HISTORY:   Past Surgical History:   Procedure Laterality Date    ABLATION N/A 12/27/2018    Procedure: ABLATION;  Surgeon: José Grey MD;  Location: [facility] EP LAB;  Service: Cardiology;  Laterality: N/A;  AF,PVI, RFA, CARTO, GEN, GP, 3 PREP    CARDIOVERSION N/A 10/29/2018    Procedure: CARDIOVERSION;  Surgeon: José Grey MD;  Location: [facility] CATH LAB;  Service: Cardiology;  Laterality: N/A;  AF, LOPEZ, DCCV, MAC, GP, 3 PREP    COLONOSCOPY N/A 4/14/2016    Procedure: COLONOSCOPY;  Surgeon: Kade Wang MD;  [facility]  Service: Endoscopy;  Laterality: N/A;    RADIOFREQUENCY ABLATION  2017    TREATMENT OF CARDIAC ARRHYTHMIA N/A 11/29/2018    Procedure: CARDIOVERSION;  Surgeon: José Grey MD;  Location: [facility] EP LAB;  Service: Cardiology;  Laterality: N/A;  AF, DCCV, MAC, GP, 3 PREP    TREATMENT OF CARDIAC ARRHYTHMIA  12/27/2018    Procedure: Cardioversion or Defibrillation;  Surgeon: José Grey MD;  Location: [facility] EP LAB;  Service: Cardiology;;        ALLERGIES: Patient has no known allergies.   CURRENT MEDICATIONS:   Current Outpatient Medications   Medication Sig Dispense Refill    apixaban (ELIQUIS) 5 mg Tab Take 1 tablet (5 mg total) by mouth 2 (two) times daily. 180 tablet 3    cranberry 500 mg Cap Take by mouth once daily.       L.acidophilus/B.bifidum,longum (HEALTHY COLON ORAL) Take by mouth.      metoprolol succinate (TOPROL-XL) 25 MG 24 hr tablet Take 1 tablet (25 mg total) by mouth once daily. 90 tablet 3    multivit-min/folic/vit K/lycop (ONE-A-DAY MEN'S 50 PLUS ORAL) Take by mouth.      omega-3 fatty acids/fish oil (FISH OIL-OMEGA-3 FATTY ACIDS) 300-1,000 mg capsule Take 1 capsule by mouth once daily.      vitamin E, dl,tocopheryl acet, (VITAMIN E, DL, ACETATE, ORAL) Take by mouth.        No current facility-administered medications for this visit.         FAMILY HISTORY:   Family History   Problem Relation Age of Onset    Heart disease Mother     Diabetes Brother     Mental retardation Daughter          SOCIAL HISTORY:   Social History     Socioeconomic History    Marital status:      Spouse name: Not on file    Number of children: Not on file    Years of education: Not on file    Highest education level: Not on file   Occupational History    Not on file   Social Needs    Financial resource strain: Not on file    Food insecurity     Worry: Not on file     Inability: Not on file    Transportation needs     Medical: Not on file     Non-medical: Not on file   Tobacco Use    Smoking status: Former Smoker     Quit date: 1978     Years since quittin.1    Smokeless tobacco: Never Used   Substance and Sexual Activity    Alcohol use: No     Alcohol/week: 0.0 standard drinks    Drug use: No    Sexual activity: Not on file   Lifestyle    Physical activity     Days per week: Not on file     Minutes per session: Not on file    Stress: Not on file   Relationships    Social connections     Talks on phone: Not on file     Gets together: Not on file     Attends Worship service: Not on file     Active member of club or organization: Not on file     Attends meetings of clubs or organizations: Not on file     Relationship status: Not on file   Other Topics Concern    Not on file   Social History Narrative     Worship [place], wife Pat works for [employer], 4 children (youngest daughter with MR), nonsmoker, nondrinker         Questions and concerns raised by the patient and family/care-giver(s) were addressed and they indicated understanding of everything discussed and agreed to plans as above.    Bridgett Mendoza MD PhD  Neurology-Epilepsy  [facility].  [facility]

## 2020-08-24 NOTE — LETTER
August 24, 2020      Radha Brunson MD  [address] [place] [facility] [address]           [facility]  [address]  [facility] [address]  Phone: [phone]  Fax: [phone]          Patient: Marcus Watkins   MR Number: [id number]   YOB: 1955   Date of Visit: 8/24/2020       Dear Dr. Radha Brunson:    Thank you for referring Marcus Watkins to me for evaluation. Attached you will find relevant portions of my assessment and plan of care.    If you have questions, please do not hesitate to call me. I look forward to following Marcus Watkins along with you.    Sincerely,    Bridgett Mendoza MD PhD    Enclosure  CC:  No Recipients    If you would like to receive this communication electronically, please contact [email or web address] or [phone] to request more information on [employer] access.    For providers and/or their staff who would like to refer a patient to [facility], please contact us through our one-stop-shop provider referral line, [facility], at [phone].    If you feel you have received this communication in error or would no longer like to receive these types of communications, please e-mail [email or web address]

## 2020-08-24 NOTE — PATIENT INSTRUCTIONS
You returned to neurology clinic because of numbness in your feet extending up to your shins. I have ordered a podiatry consult to help protect your high risk feet. Let's go ahead and get the EMG to see which part of the nerve is injured. This will give us a little more of a clue as to the cause. We can also see how severe the injury is and if it seems to be progressing. Please be very careful of your feet. Wear stable supportive shoes in and out of the house.     Return to clinic after the EMG.     Bridgett Mendoza MD PhD  Neurology-Epilepsy   [facility].  [facility]

## 2020-08-24 NOTE — TELEPHONE ENCOUNTER
Called patient to inform him of the FNA ordered by Dr. Roberts scheduled for Tuesday 9/1/20 at 0900. Informed him of where to check-in and that a nurse will call him one to two days prior with further instructions. Informed patient to stop taking his PO Eliquis and fish oil five days prior to the procedure. Patient verbalized understanding.

## 2020-08-25 ENCOUNTER — OFFICE VISIT (OUTPATIENT)
Dept: PODIATRY | Facility: CLINIC | Age: 65
End: 2020-08-25
Payer: COMMERCIAL

## 2020-08-25 VITALS
HEART RATE: 81 BPM | SYSTOLIC BLOOD PRESSURE: 145 MMHG | HEIGHT: 71 IN | BODY MASS INDEX: 37.66 KG/M2 | DIASTOLIC BLOOD PRESSURE: 76 MMHG | WEIGHT: 269 LBS

## 2020-08-25 DIAGNOSIS — R20.0 NUMBNESS: ICD-10-CM

## 2020-08-25 DIAGNOSIS — G60.9 IDIOPATHIC PERIPHERAL NEUROPATHY: Primary | ICD-10-CM

## 2020-08-25 PROCEDURE — 99999 PR PBB SHADOW E&M-EST. PATIENT-LVL IV: ICD-10-PCS | Mod: PBBFAC,,, | Performed by: PODIATRIST

## 2020-08-25 PROCEDURE — 1126F PR PAIN SEVERITY QUANTIFIED, NO PAIN PRESENT: ICD-10-PCS | Mod: S$GLB,,, | Performed by: PODIATRIST

## 2020-08-25 PROCEDURE — 99999 PR PBB SHADOW E&M-EST. PATIENT-LVL IV: CPT | Mod: PBBFAC,,, | Performed by: PODIATRIST

## 2020-08-25 PROCEDURE — 1159F MED LIST DOCD IN RCRD: CPT | Mod: S$GLB,,, | Performed by: PODIATRIST

## 2020-08-25 PROCEDURE — 1126F AMNT PAIN NOTED NONE PRSNT: CPT | Mod: S$GLB,,, | Performed by: PODIATRIST

## 2020-08-25 PROCEDURE — 3008F PR BODY MASS INDEX (BMI) DOCUMENTED: ICD-10-PCS | Mod: CPTII,S$GLB,, | Performed by: PODIATRIST

## 2020-08-25 PROCEDURE — 99202 OFFICE O/P NEW SF 15 MIN: CPT | Mod: S$GLB,,, | Performed by: PODIATRIST

## 2020-08-25 PROCEDURE — 1101F PT FALLS ASSESS-DOCD LE1/YR: CPT | Mod: CPTII,S$GLB,, | Performed by: PODIATRIST

## 2020-08-25 PROCEDURE — 3008F BODY MASS INDEX DOCD: CPT | Mod: CPTII,S$GLB,, | Performed by: PODIATRIST

## 2020-08-25 PROCEDURE — 99202 PR OFFICE/OUTPT VISIT, NEW, LEVL II, 15-29 MIN: ICD-10-PCS | Mod: S$GLB,,, | Performed by: PODIATRIST

## 2020-08-25 PROCEDURE — 1101F PR PT FALLS ASSESS DOC 0-1 FALLS W/OUT INJ PAST YR: ICD-10-PCS | Mod: CPTII,S$GLB,, | Performed by: PODIATRIST

## 2020-08-25 PROCEDURE — 99499 UNLISTED E&M SERVICE: CPT | Mod: S$GLB,,, | Performed by: PODIATRIST

## 2020-08-25 PROCEDURE — 1159F PR MEDICATION LIST DOCUMENTED IN MEDICAL RECORD: ICD-10-PCS | Mod: S$GLB,,, | Performed by: PODIATRIST

## 2020-08-25 PROCEDURE — 99499 RISK ADDL DX/OHS AUDIT: ICD-10-PCS | Mod: S$GLB,,, | Performed by: PODIATRIST

## 2020-08-25 NOTE — PROGRESS NOTES
Subjective:      Patient ID: Marcus Watkins is a 64 y.o. male.    Chief Complaint: Numbness (bilateral)    Numbness both feet.  Gradual onset, stable over past 2 years, aggravated by increased weight bearing, shoe gear, pressure.  Being worked up for potential neuropathy treatments.  No self treatment.     Review of Systems   Constitution: Negative for chills, diaphoresis, fever, malaise/fatigue and night sweats.   Cardiovascular: Negative for claudication, cyanosis, leg swelling and syncope.   Skin: Negative for color change, dry skin, nail changes, rash, suspicious lesions and unusual hair distribution.   Musculoskeletal: Negative for falls, joint pain, joint swelling, muscle cramps, muscle weakness and stiffness.   Gastrointestinal: Negative for constipation, diarrhea, nausea and vomiting.   Neurological: Positive for numbness and sensory change. Negative for brief paralysis, disturbances in coordination, focal weakness, paresthesias and tremors.           Objective:      Physical Exam  Constitutional:       General: He is not in acute distress.     Appearance: He is well-developed. He is not diaphoretic.   Cardiovascular:      Pulses:           Popliteal pulses are 2+ on the right side and 2+ on the left side.        Dorsalis pedis pulses are 2+ on the right side and 2+ on the left side.        Posterior tibial pulses are 2+ on the right side and 2+ on the left side.      Comments: Capillary refill 3 seconds all toes/distal feet, all toes/both feet warm to touch.      Negative lymphadenopathy bilateral popliteal fossa and tarsal tunnel.      Negavie lower extremity edema bilateral.    Musculoskeletal:      Right ankle: He exhibits normal range of motion, no swelling, no ecchymosis, no deformity, no laceration and normal pulse. Achilles tendon normal. Achilles tendon exhibits no pain, no defect and normal Casillas's test results.      Comments: Normal angle, base, station of gait. All ten toes without clubbing,  cyanosis, or signs of ischemia.  No pain to palpation bilateral lower extremities.  Range of motion, stability, muscle strength, and muscle tone normal bilateral feet and legs.    Lymphadenopathy:      Lower Body: No right inguinal adenopathy. No left inguinal adenopathy.      Comments: Negative lymphadenopathy bilateral popliteal fossa and tarsal tunnel.    Negative lymphangitic streaking bilateral feet/ankles/legs.   Skin:     General: Skin is warm and dry.      Capillary Refill: Capillary refill takes 2 to 3 seconds.      Coloration: Skin is not pale.      Findings: No abrasion, bruising, burn, ecchymosis, erythema, laceration, lesion or rash.      Nails: There is no clubbing.        Comments:   Skin is normal age and health appropriate color, turgor, texture, and temperature bilateral lower extremities without ulceration, hyperpigmentation, discoloration, masses nodules or cords palpated.  No ecchymosis, erythema, edema, or cardinal signs of infection bilateral lower extremities.    Left hallux nail regrowing from old accidental injury.    Otherwise, normal color and trophic qualities.    Neurological:      Mental Status: He is alert and oriented to person, place, and time.      Sensory: Sensory deficit present.      Motor: No tremor, atrophy or abnormal muscle tone.      Gait: Gait normal.      Deep Tendon Reflexes:      Reflex Scores:       Patellar reflexes are 2+ on the right side and 2+ on the left side.       Achilles reflexes are 2+ on the right side and 2+ on the left side.     Comments: Diminished/loss of protective sensation all toes bilateral to 10 gram monofilament.      Psychiatric:         Behavior: Behavior is cooperative.               Assessment:       Encounter Diagnoses   Name Primary?    Numbness     Idiopathic peripheral neuropathy Yes         Plan:       Marcus was seen today for numbness.    Diagnoses and all orders for this visit:    Idiopathic peripheral neuropathy    Numbness  -      Ambulatory consult to Podiatry      I counseled the patient on his conditions, their implications and medical management.        - Shoe inspection. Patient instructed on proper foot hygeine. We discussed wearing proper shoe gear, daily foot inspections, never walking without protective shoe gear, never putting sharp instruments to feet, routine podiatric visits any time needed.    Discussed conservative treatment with shoes of adequate dimensions, material, and style to alleviate symptoms and delay or prevent surgical intervention.    Inspect feet multiple times daily for signs of occurrence/recurrence ulceration/signs infection.       Follow up if symptoms worsen or fail to improve.

## 2020-08-25 NOTE — LETTER
August 25, 2020      Bridgett Mendoza MD PhD  [address]  [address]  [facility] [address]           [facility]  [address] [address]  Phone: [phone]  Fax: [phone]          Patient: Marcus Watkins   MR Number: [id number]   YOB: 1955   Date of Visit: 8/25/2020       Dear Dr. Bridgett Mendoza:    Thank you for referring Marcus Watkins to me for evaluation. Attached you will find relevant portions of my assessment and plan of care.    If you have questions, please do not hesitate to call me. I look forward to following Marcus Watkins along with you.    Sincerely,    Alonzo Pena, DPCHRISTINE    Enclosure  CC:  No Recipients    If you would like to receive this communication electronically, please contact [email or web address] or [phone] to request more information on [employer] Link access.    For providers and/or their staff who would like to refer a patient to [facility], please contact us through our one-stop-shop provider referral line, [facility], at [phone].    If you feel you have received this communication in error or would no longer like to receive these types of communications, please e-mail [email or web address]

## 2020-09-01 ENCOUNTER — HOSPITAL ENCOUNTER (OUTPATIENT)
Dept: INTERVENTIONAL RADIOLOGY/VASCULAR | Facility: HOSPITAL | Age: 65
Discharge: HOME | End: 2020-09-01
Attending: SURGERY
Payer: COMMERCIAL

## 2020-09-01 VITALS
DIASTOLIC BLOOD PRESSURE: 76 MMHG | SYSTOLIC BLOOD PRESSURE: 126 MMHG | OXYGEN SATURATION: 100 % | HEART RATE: 87 BPM | RESPIRATION RATE: 18 BRPM

## 2020-09-01 DIAGNOSIS — E04.2 MULTIPLE THYROID NODULES: ICD-10-CM

## 2020-09-01 LAB — FINAL PATHOLOGIC DIAGNOSIS: NORMAL

## 2020-09-01 PROCEDURE — 88172 CYTP DX EVAL FNA 1ST EA SITE: CPT | Performed by: PATHOLOGY

## 2020-09-01 PROCEDURE — 10006 FNA BX W/US GDN EA ADDL: CPT | Mod: ,,, | Performed by: STUDENT IN AN ORGANIZED HEALTH CARE EDUCATION/TRAINING PROGRAM

## 2020-09-01 PROCEDURE — 88172 PR  EVALUATION OF FNA SMEAR TO DETERMINE ADEQUACY, FIRST EVAL: ICD-10-PCS | Mod: 26,,, | Performed by: PATHOLOGY

## 2020-09-01 PROCEDURE — 88172 CYTP DX EVAL FNA 1ST EA SITE: CPT | Mod: 26,,, | Performed by: PATHOLOGY

## 2020-09-01 PROCEDURE — 88177 CYTP FNA EVAL EA ADDL: CPT | Performed by: PATHOLOGY

## 2020-09-01 PROCEDURE — 10006 IR US FINE NEEDLE ASPIRATION BIOPSY , EA ADDL LESION: ICD-10-PCS | Mod: ,,, | Performed by: STUDENT IN AN ORGANIZED HEALTH CARE EDUCATION/TRAINING PROGRAM

## 2020-09-01 PROCEDURE — 88173 CYTOPATH EVAL FNA REPORT: CPT | Mod: 59 | Performed by: PATHOLOGY

## 2020-09-01 PROCEDURE — 88173 CYTOPATH EVAL FNA REPORT: CPT | Mod: 26,,, | Performed by: PATHOLOGY

## 2020-09-01 PROCEDURE — 88172 PR  EVALUATION OF FNA SMEAR TO DETERMINE ADEQUACY, FIRST EVAL: ICD-10-PCS | Mod: 26,59,, | Performed by: PATHOLOGY

## 2020-09-01 PROCEDURE — 10005 IR US FINE NEEDLE ASPIRATION BIOPSY, FIRST LESION: ICD-10-PCS | Mod: ,,, | Performed by: STUDENT IN AN ORGANIZED HEALTH CARE EDUCATION/TRAINING PROGRAM

## 2020-09-01 PROCEDURE — 88172 CYTP DX EVAL FNA 1ST EA SITE: CPT | Mod: 59 | Performed by: PATHOLOGY

## 2020-09-01 PROCEDURE — 88173 CYTOPATH EVAL FNA REPORT: CPT | Mod: 26,59,, | Performed by: PATHOLOGY

## 2020-09-01 PROCEDURE — 88173 PR  INTERPRETATION OF FNA SMEAR: ICD-10-PCS | Mod: 26,59,, | Performed by: PATHOLOGY

## 2020-09-01 PROCEDURE — 88177 CYTP FNA EVAL EA ADDL: CPT | Mod: 59 | Performed by: PATHOLOGY

## 2020-09-01 PROCEDURE — 10006 FNA BX W/US GDN EA ADDL: CPT

## 2020-09-01 PROCEDURE — 10005 FNA BX W/US GDN 1ST LES: CPT | Mod: ,,, | Performed by: STUDENT IN AN ORGANIZED HEALTH CARE EDUCATION/TRAINING PROGRAM

## 2020-09-01 PROCEDURE — 88173 CYTOPATH EVAL FNA REPORT: CPT | Performed by: PATHOLOGY

## 2020-09-01 PROCEDURE — 10005 FNA BX W/US GDN 1ST LES: CPT

## 2020-09-01 PROCEDURE — 88172 CYTP DX EVAL FNA 1ST EA SITE: CPT | Mod: 26,59,, | Performed by: PATHOLOGY

## 2020-09-01 PROCEDURE — 88173 PR  INTERPRETATION OF FNA SMEAR: ICD-10-PCS | Mod: 26,,, | Performed by: PATHOLOGY

## 2020-09-01 NOTE — DISCHARGE SUMMARY
"Radiology Discharge Summary      Hospital Course: No complications    Admit Date: 9/1/2020  Discharge Date: 9/1/2020     Instructions Given to Patient: Yes  Diet: Resume prior diet  Activity: activity as tolerated    Description of Condition on Discharge: Stable  Vital Signs (Most Recent): Pulse: 87 (09/01/20 0917)  Resp: 18 (09/01/20 0917)  BP: 126/76 (09/01/20 0917)  SpO2: 100 % (09/01/20 0917)    Discharge Disposition: Home    Discharge Diagnosis: thyroid nodules     Follow-up: as needed    Jeff De Santiago MD ([facility])  Interventional Radiology  [phone]        "

## 2020-09-01 NOTE — H&P
Radiology History & Physical      SUBJECTIVE:     Chief Complaint: FNA thyroid nodules    History of Present Illness:  Marcus Watkins is a 64 y.o. male with history of thyroid nodules presenting for left thyroid lobe nodules biopsy, with TR4 nodules in left lower pole and left midpole measuring 3.4 cm and 2.3 cm respectively.  Past Medical History:   Diagnosis Date    Atrial fibrillation      Past Surgical History:   Procedure Laterality Date    ABLATION N/A 12/27/2018    Procedure: ABLATION;  Surgeon: José Grey MD;  Location: [facility] EP LAB;  Service: Cardiology;  Laterality: N/A;  AF,PVI, RFA, CARTO, GEN, GP, 3 PREP    CARDIOVERSION N/A 10/29/2018    Procedure: CARDIOVERSION;  Surgeon: José Grye MD;  Location: [facility] CATH LAB;  Service: Cardiology;  Laterality: N/A;  AF, LOPEZ, DCCV, MAC, GP, 3 PREP    COLONOSCOPY N/A 4/14/2016    Procedure: COLONOSCOPY;  Surgeon: Kade Wang MD;  Location: [facility] ENDO ([facility] FLR);  Service: Endoscopy;  Laterality: N/A;    RADIOFREQUENCY ABLATION  2017    TREATMENT OF CARDIAC ARRHYTHMIA N/A 11/29/2018    Procedure: CARDIOVERSION;  Surgeon: José Grey MD;  Location: [facility] EP LAB;  Service: Cardiology;  Laterality: N/A;  AF, DCCV, MAC, GP, 3 PREP    TREATMENT OF CARDIAC ARRHYTHMIA  12/27/2018    Procedure: Cardioversion or Defibrillation;  Surgeon: José Grey MD;  Location: [facility] EP LAB;  Service: Cardiology;;       Home Meds:   Prior to Admission medications    Medication Sig Start Date End Date Taking? Authorizing Provider   apixaban (ELIQUIS) 5 mg Tab Take 1 tablet (5 mg total) by mouth 2 (two) times daily. 7/24/20   Radha Brunson MD   cranberry 500 mg Cap Take by mouth once daily.     Historical Provider, MD   L.acidophilus/B.bifidum,longum (HEALTHY COLON ORAL) Take by mouth.    Historical Provider, MD   metoprolol succinate (TOPROL-XL) 25 MG 24 hr tablet Take 1 tablet (25 mg total) by mouth once daily. 7/24/20   Radha Brunson MD    multivit-min/folic/vit K/lycop (ONE-A-DAY MEN'S 50 PLUS ORAL) Take by mouth.    Historical Provider, MD   omega-3 fatty acids/fish oil (FISH OIL-OMEGA-3 FATTY ACIDS) 300-1,000 mg capsule Take 1 capsule by mouth once daily. 7/24/20   Radha Brunson MD   vitamin E, dl,tocopheryl acet, (VITAMIN E, DL, ACETATE, ORAL) Take by mouth.    Historical Provider, MD     Anticoagulants/Antiplatelets: no anticoagulation    Allergies: Review of patient's allergies indicates:  No Known Allergies  Sedation History:  have not been any systemic reactions    Review of Systems:   Hematological: no known coagulopathies  Respiratory: no shortness of breath  Cardiovascular: no chest pain  Gastrointestinal: no abdominal pain  Genito-Urinary: no dysuria  Musculoskeletal: negative  Neurological: no TIA or stroke symptoms         OBJECTIVE:     Vital Signs (Most Recent)  Pulse: 87 (09/01/20 0917)  Resp: 18 (09/01/20 0917)  BP: 126/76 (09/01/20 0917)  SpO2: 100 % (09/01/20 0917)    Physical Exam:  ASA: I  Mallampati: II    General: no acute distress  Mental Status: alert and oriented to person, place and time  HEENT: normocephalic, atraumatic  Chest: unlabored breathing  Heart: regular heart rate  Abdomen: nondistended  Extremity: moves all extremities    Laboratory  Lab Results   Component Value Date    INR 1.0 12/20/2018       Lab Results   Component Value Date    WBC 9.56 07/17/2020    HGB 14.6 07/17/2020    HCT 44.4 07/17/2020    MCV 92 07/17/2020     07/17/2020      Lab Results   Component Value Date     07/17/2020     07/17/2020    K 4.5 07/17/2020     07/17/2020    CO2 28 07/17/2020    BUN 12 07/17/2020    CREATININE 0.8 07/17/2020    CALCIUM 9.2 07/17/2020    MG 2.2 02/13/2017    ALT 15 07/17/2020    AST 18 07/17/2020    ALBUMIN 3.9 07/17/2020    BILITOT 0.5 07/17/2020       ASSESSMENT/PLAN:     Sedation Plan: local  Patient will undergo biopsy of left thyroid nodules in left midpole and left lower  pole.    Lawrence Alvares, DO  PGY-II  Radiology

## 2020-09-01 NOTE — PROCEDURES
"  Pre Op Diagnosis: thyroid nodules  Post Op Diagnosis: Same    Procedure: FNA of two thyroid nodules    Procedure performed by: Anyi    Written Informed Consent Obtained: Yes  Specimen Removed: YES 3 passes from lower lobe and 4 from middle lobe  Estimated Blood Loss: Minimal    Findings:   Successful FNA of two left sided thyroid nodules    Patient tolerated procedure well.    Jeff De Santiago MD ([facility])  Interventional Radiology  [phone]        "

## 2020-09-02 ENCOUNTER — PROCEDURE VISIT (OUTPATIENT)
Dept: NEUROLOGY | Facility: CLINIC | Age: 65
End: 2020-09-02
Payer: COMMERCIAL

## 2020-09-02 DIAGNOSIS — R20.0 NUMBNESS: ICD-10-CM

## 2020-09-02 LAB — FINAL PATHOLOGIC DIAGNOSIS: NORMAL

## 2020-09-02 PROCEDURE — 95908 PR NERVE CONDUCTION STUDY; 3-4 STUDIES: ICD-10-PCS | Mod: S$GLB,,, | Performed by: PSYCHIATRY & NEUROLOGY

## 2020-09-02 PROCEDURE — 95908 NRV CNDJ TST 3-4 STUDIES: CPT | Mod: S$GLB,,, | Performed by: PSYCHIATRY & NEUROLOGY

## 2020-09-02 PROCEDURE — 95886 MUSC TEST DONE W/N TEST COMP: CPT | Mod: S$GLB,,, | Performed by: PSYCHIATRY & NEUROLOGY

## 2020-09-02 PROCEDURE — 95886 PR EMG COMPLETE, W/ NERVE CONDUCTION STUDIES, 5+ MUSCLES: ICD-10-PCS | Mod: S$GLB,,, | Performed by: PSYCHIATRY & NEUROLOGY

## 2020-09-08 ENCOUNTER — TELEPHONE (OUTPATIENT)
Dept: PRIMARY CARE CLINIC | Facility: CLINIC | Age: 65
End: 2020-09-08

## 2020-09-08 DIAGNOSIS — G62.9 NEUROPATHY: Primary | ICD-10-CM

## 2020-09-08 NOTE — TELEPHONE ENCOUNTER
Letter sent is for patient to schedule a neurology appointment (referral in system 07/24/2020).      Please assist in scheduling.
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
## 2020-09-08 NOTE — TELEPHONE ENCOUNTER
----- Message from Rosario Orona sent at 9/8/2020 11:05 AM CDT -----  Regarding: Neurology Referral  A new referral will have to be placed for Neurology. Order is placed under finalized orders, referral cannot be scheduled from finalized orders.    Thank You

## 2020-09-09 ENCOUNTER — TELEPHONE (OUTPATIENT)
Dept: NEUROLOGY | Facility: CLINIC | Age: 65
End: 2020-09-09

## 2020-09-10 ENCOUNTER — TELEPHONE (OUTPATIENT)
Dept: NEUROLOGY | Facility: CLINIC | Age: 65
End: 2020-09-10

## 2020-09-10 NOTE — TELEPHONE ENCOUNTER
----- Message from Lindy Garcia sent at 9/10/2020  9:07 AM CDT -----  Contact: [phone]  Pt called in states he is awaiting someone to call back for appt. Please call back

## 2020-09-16 ENCOUNTER — TELEPHONE (OUTPATIENT)
Dept: PRIMARY CARE CLINIC | Facility: CLINIC | Age: 65
End: 2020-09-16

## 2020-09-16 NOTE — TELEPHONE ENCOUNTER
----- Message from Kristina Davies sent at 9/16/2020  2:05 PM CDT -----  Contact: Self   Pt states he is still waiting on call to schedule neurology appt. Pt is requesting to speak to nurse. Please advise

## 2020-09-16 NOTE — TELEPHONE ENCOUNTER
Spoke with pt and advised I looks like someone from Neurology reach out to pt to schedule. Gave pt number to call them to schedule.

## 2020-09-22 ENCOUNTER — TELEPHONE (OUTPATIENT)
Dept: NEUROLOGY | Facility: CLINIC | Age: 65
End: 2020-09-22

## 2020-09-22 NOTE — TELEPHONE ENCOUNTER
----- Message from Tameka Dong sent at 9/22/2020  3:06 PM CDT -----  Contact: ROSA ELENA BLACKMAN [[id number]]  Type:  Patient Returning Call    Who Called: ROSA ELENA BLACKMAN [[id number]]    Who Left Message for Patient: Thea    Does the patient know what this is regarding?: YES    Can the clinic reply in [facility]: No    Best Call Back Number: ..[phone]     Additional Information: N/A

## 2020-09-23 ENCOUNTER — TELEPHONE (OUTPATIENT)
Dept: NEUROLOGY | Facility: CLINIC | Age: 65
End: 2020-09-23

## 2020-09-23 NOTE — TELEPHONE ENCOUNTER
----- Message from Tameka Dong sent at 9/22/2020  3:06 PM CDT -----  Contact: ROSA ELENA BLACKMAN [[id number]]  Type:  Patient Returning Call    Who Called: ROSA ELENA BLACKMAN [[id number]]    Who Left Message for Patient: Thea    Does the patient know what this is regarding?: YES    Can the clinic reply in [facility]: No    Best Call Back Number: ..[phone]     Additional Information: N/A

## 2020-10-16 ENCOUNTER — OFFICE VISIT (OUTPATIENT)
Dept: URGENT CARE | Facility: CLINIC | Age: 65
End: 2020-10-16
Payer: OTHER MISCELLANEOUS

## 2020-10-16 ENCOUNTER — HOSPITAL ENCOUNTER (EMERGENCY)
Facility: HOSPITAL | Age: 65
Discharge: ANOTHER HOSPITAL | End: 2020-10-16
Attending: EMERGENCY MEDICINE
Payer: OTHER MISCELLANEOUS

## 2020-10-16 VITALS
TEMPERATURE: 98 F | OXYGEN SATURATION: 97 % | SYSTOLIC BLOOD PRESSURE: 147 MMHG | RESPIRATION RATE: 16 BRPM | WEIGHT: 272 LBS | DIASTOLIC BLOOD PRESSURE: 76 MMHG | HEIGHT: 71 IN | BODY MASS INDEX: 38.08 KG/M2 | HEART RATE: 89 BPM

## 2020-10-16 VITALS — OXYGEN SATURATION: 97 % | HEART RATE: 102 BPM | TEMPERATURE: 97 F

## 2020-10-16 DIAGNOSIS — T25.422A: Primary | ICD-10-CM

## 2020-10-16 DIAGNOSIS — T25.022A: ICD-10-CM

## 2020-10-16 LAB
ABO + RH BLD: NORMAL
ANION GAP SERPL CALC-SCNC: 11 MMOL/L (ref 8–16)
APTT BLDCRRT: 24 SEC (ref 21–32)
BASOPHILS # BLD AUTO: 0.05 K/UL (ref 0–0.2)
BASOPHILS NFR BLD: 0.4 % (ref 0–1.9)
BLD GP AB SCN CELLS X3 SERPL QL: NORMAL
BUN SERPL-MCNC: 14 MG/DL (ref 8–23)
CALCIUM SERPL-MCNC: 9.5 MG/DL (ref 8.7–10.5)
CHLORIDE SERPL-SCNC: 103 MMOL/L (ref 95–110)
CO2 SERPL-SCNC: 24 MMOL/L (ref 23–29)
CREAT SERPL-MCNC: 0.9 MG/DL (ref 0.5–1.4)
CTP QC/QA: YES
DIFFERENTIAL METHOD: ABNORMAL
EOSINOPHIL # BLD AUTO: 0.3 K/UL (ref 0–0.5)
EOSINOPHIL NFR BLD: 2.4 % (ref 0–8)
ERYTHROCYTE [DISTWIDTH] IN BLOOD BY AUTOMATED COUNT: 13.1 % (ref 11.5–14.5)
EST. GFR  (AFRICAN AMERICAN): >60 ML/MIN/1.73 M^2
EST. GFR  (NON AFRICAN AMERICAN): >60 ML/MIN/1.73 M^2
GLUCOSE SERPL-MCNC: 101 MG/DL (ref 70–110)
HCT VFR BLD AUTO: 42 % (ref 40–54)
HGB BLD-MCNC: 14 G/DL (ref 14–18)
IMM GRANULOCYTES # BLD AUTO: 0.05 K/UL (ref 0–0.04)
IMM GRANULOCYTES NFR BLD AUTO: 0.4 % (ref 0–0.5)
INR PPP: 0.9 (ref 0.8–1.2)
LYMPHOCYTES # BLD AUTO: 1.7 K/UL (ref 1–4.8)
LYMPHOCYTES NFR BLD: 13.2 % (ref 18–48)
MCH RBC QN AUTO: 30.4 PG (ref 27–31)
MCHC RBC AUTO-ENTMCNC: 33.3 G/DL (ref 32–36)
MCV RBC AUTO: 91 FL (ref 82–98)
MONOCYTES # BLD AUTO: 0.9 K/UL (ref 0.3–1)
MONOCYTES NFR BLD: 6.8 % (ref 4–15)
NEUTROPHILS # BLD AUTO: 10 K/UL (ref 1.8–7.7)
NEUTROPHILS NFR BLD: 76.8 % (ref 38–73)
NRBC BLD-RTO: 0 /100 WBC
PLATELET # BLD AUTO: 224 K/UL (ref 150–350)
PMV BLD AUTO: 11.3 FL (ref 9.2–12.9)
POTASSIUM SERPL-SCNC: 3.9 MMOL/L (ref 3.5–5.1)
PROTHROMBIN TIME: 10.4 SEC (ref 9–12.5)
RBC # BLD AUTO: 4.6 M/UL (ref 4.6–6.2)
SARS-COV-2 RDRP RESP QL NAA+PROBE: NEGATIVE
SODIUM SERPL-SCNC: 138 MMOL/L (ref 136–145)
WBC # BLD AUTO: 13.04 K/UL (ref 3.9–12.7)

## 2020-10-16 PROCEDURE — 86900 BLOOD TYPING SEROLOGIC ABO: CPT

## 2020-10-16 PROCEDURE — 93005 ELECTROCARDIOGRAM TRACING: CPT

## 2020-10-16 PROCEDURE — 99213 OFFICE O/P EST LOW 20 MIN: CPT | Mod: S$GLB,,, | Performed by: NURSE PRACTITIONER

## 2020-10-16 PROCEDURE — 99285 EMERGENCY DEPT VISIT HI MDM: CPT | Mod: CS,,, | Performed by: EMERGENCY MEDICINE

## 2020-10-16 PROCEDURE — 99285 PR EMERGENCY DEPT VISIT,LEVEL V: ICD-10-PCS | Mod: CS,,, | Performed by: EMERGENCY MEDICINE

## 2020-10-16 PROCEDURE — 85025 COMPLETE CBC W/AUTO DIFF WBC: CPT

## 2020-10-16 PROCEDURE — 93010 EKG 12-LEAD: ICD-10-PCS | Mod: ,,, | Performed by: INTERNAL MEDICINE

## 2020-10-16 PROCEDURE — 99213 PR OFFICE/OUTPT VISIT, EST, LEVL III, 20-29 MIN: ICD-10-PCS | Mod: S$GLB,,, | Performed by: NURSE PRACTITIONER

## 2020-10-16 PROCEDURE — 80048 BASIC METABOLIC PNL TOTAL CA: CPT

## 2020-10-16 PROCEDURE — U0002 COVID-19 LAB TEST NON-CDC: HCPCS | Performed by: STUDENT IN AN ORGANIZED HEALTH CARE EDUCATION/TRAINING PROGRAM

## 2020-10-16 PROCEDURE — 85610 PROTHROMBIN TIME: CPT

## 2020-10-16 PROCEDURE — 85730 THROMBOPLASTIN TIME PARTIAL: CPT

## 2020-10-16 PROCEDURE — 99285 EMERGENCY DEPT VISIT HI MDM: CPT | Mod: 25

## 2020-10-16 PROCEDURE — 93010 ELECTROCARDIOGRAM REPORT: CPT | Mod: ,,, | Performed by: INTERNAL MEDICINE
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
## 2020-10-17 ENCOUNTER — TELEPHONE (OUTPATIENT)
Dept: URGENT CARE | Facility: CLINIC | Age: 65
End: 2020-10-17

## 2020-10-17 NOTE — TELEPHONE ENCOUNTER
Attempted to contact patient for follow up on yesterday. Left vm for wife to return call to clinic.

## 2020-10-22 ENCOUNTER — PATIENT OUTREACH (OUTPATIENT)
Dept: ADMINISTRATIVE | Facility: CLINIC | Age: 65
End: 2020-10-22

## 2020-10-22 ENCOUNTER — EXTERNAL HOSPITAL ADMISSION (OUTPATIENT)
Dept: ADMINISTRATIVE | Facility: CLINIC | Age: 65
End: 2020-10-22

## 2020-10-22 NOTE — PATIENT INSTRUCTIONS
Vacuum-Assisted Closure of a Wound  Vacuum-assisted closure (VAC) of a wound is a type of treatment to help wounds heal. Its also known as negative pressure wound therapy. During the treatment, a device lowers air pressure on the wound. This can help the wound heal more quickly.  Understanding the wound VAC system  A wound VAC system has several parts. A foam or gauze dressing is put directly on the wound. The dressing is changed every 24 to 72 hours. An adhesive film covers and seals the dressing and wound. A drainage tube leads from under the adhesive film and connects to a portable vacuum pump. This pump removes air pressure over the wound. It may do this constantly. Or it may do it in cycles. During the treatment, youll need to carry the portable pump everywhere you go.  Why wound VAC is used  You might need this therapy for a recent traumatic wound. Or you may need it for a chronic wound. This is a wound that does not heal the way it should over time. This can happen with wounds in people who have diabetes. You may need a wound VAC if youve had a recent skin graft. And you may need a wound VAC for a large wound. Large wounds can take a longer time to heal.  A wound vacuum system may help your wound heal more quickly by:  · Draining extra fluid from the wound  · Reducing swelling  · Reducing bacteria in the wound  · Keeping your wound moist and warm  · Helping draw together wound edges  · Increasing blood flow to your wound  · Decreasing inflammation  Wound VAC offers some other advantages over other types of wound care. It may decrease your overall discomfort. The dressings usually need to be changed less often. And they may be easier to keep in place.  Risks of wound VAC  Wound VAC has some rare risks, such as:  · Bleeding (which may be severe)  · Wound infection  · An abnormal connection between the intestinal tract and the skin (enteric fistula)  Proper training in dressing changes can help reduce the  risk for these complications. Also, your healthcare provider will carefully evaluate you to make sure you are a good candidate for the therapy. Certain problems can increase your risk for complications. These include:  · Exposed organs or blood vessels  · High risk of bleeding from another medical problem  · Wound infection  · Nearby bone infection  · Dead wound tissue  · Cancer tissue  · Fragile skin, such as from aging or longtime use of topical steroids  · Allergy to adhesive  · Very poor blood flow to your wound  · Wounds close to joints that may reopen because of movement  Your healthcare provider will discuss the risks that apply to you. Make sure to talk with him or her about all of your questions and concerns.  Getting ready for wound VAC  You likely wont need to do much to get ready for wound VAC. In some cases, you may need to wait a while before having this therapy. For example, your healthcare provider may first need to treat an infection in your wound. Dead or damaged tissue may also need to be removed from your wound.  You or a caregiver may need training on how to use the wound VAC device. This is done if you will be able to have your wound vacuum therapy at home. In other cases, you may need to have your wound vacuum therapy in a healthcare facility.  On the day of your procedure  A healthcare provider will cover your wound with foam or gauze wound dressing. An adhesive film will be put over the dressing and wound. This seals the wound. The foam connects to a drainage tube, which leads to a vacuum pump. This pump is portable. When the pump is turned on, it draws fluid through the foam and out the drainage tubing. The pump may run constantly, or it may cycle off and on. Your exact setup will depend on the specific type of wound vacuum system that you use.  Managing your wound  You may need the dressing changed about once a day. You may need it changed more or less often, depending on your wound. You  or your caregiver may be trained to do this at home. Or it may be done by a visiting healthcare provider. Your provider may prescribe a pain medicine. This is to prevent or reduce pain during the dressing change.  You will likely need to use the wound VAC system for several weeks or months. During this time, youll carry the portable pump everywhere you go.  Nutrition for wound healing  During this time, make sure you follow a healthy diet. This is needed so the wound can heal and to prevent infection. Your healthcare provider can tell you more about what to include in your diet during this time.  Follow up with your healthcare provider if you have a medical condition that led to your wound, such as diabetes. Your provider can help you prevent future wounds.  Follow-up care  Your healthcare provider will carefully keep track of your healing. Make sure to keep all follow-up appointments.  When to call your healthcare provider  Call your healthcare provider right away if you have any of these:  · Fever of 100.4°F (38.0°C) or higher  · Increased redness, swelling, or warmth around wound  · Increased pain  · Bright red blood or blood clots in tubing or the collection chamber of the vacuum   Date Last Reviewed: 2/1/2017  © 7111-7160 [employer]. [address] [place] [address], [place] [address]. All rights reserved. This information is not intended as a substitute for professional medical care. Always follow your healthcare professional's instructions.

## 2020-10-28 ENCOUNTER — OFFICE VISIT (OUTPATIENT)
Dept: PRIMARY CARE CLINIC | Facility: CLINIC | Age: 65
End: 2020-10-28
Payer: COMMERCIAL

## 2020-10-28 VITALS
HEIGHT: 71 IN | WEIGHT: 273.56 LBS | OXYGEN SATURATION: 97 % | DIASTOLIC BLOOD PRESSURE: 77 MMHG | RESPIRATION RATE: 18 BRPM | BODY MASS INDEX: 38.3 KG/M2 | SYSTOLIC BLOOD PRESSURE: 123 MMHG | TEMPERATURE: 98 F | HEART RATE: 88 BPM

## 2020-10-28 DIAGNOSIS — T30.0 BURN: ICD-10-CM

## 2020-10-28 DIAGNOSIS — S98.132A AMPUTATION OF TOE OF LEFT FOOT: ICD-10-CM

## 2020-10-28 DIAGNOSIS — T30.4 CHEMICAL BURN: Primary | ICD-10-CM

## 2020-10-28 PROCEDURE — 99499 UNLISTED E&M SERVICE: CPT | Mod: S$GLB,,, | Performed by: FAMILY MEDICINE

## 2020-10-28 PROCEDURE — 99496 TRANSITIONAL CARE MANAGE SERVICE 7 DAY DISCHARGE: ICD-10-PCS | Mod: S$GLB,,, | Performed by: FAMILY MEDICINE

## 2020-10-28 PROCEDURE — 99999 PR PBB SHADOW E&M-EST. PATIENT-LVL IV: ICD-10-PCS | Mod: PBBFAC,,, | Performed by: FAMILY MEDICINE

## 2020-10-28 PROCEDURE — 99496 TRANSJ CARE MGMT HIGH F2F 7D: CPT | Mod: S$GLB,,, | Performed by: FAMILY MEDICINE

## 2020-10-28 PROCEDURE — 99999 PR PBB SHADOW E&M-EST. PATIENT-LVL IV: CPT | Mod: PBBFAC,,, | Performed by: FAMILY MEDICINE

## 2020-10-28 PROCEDURE — 99499 RISK ADDL DX/OHS AUDIT: ICD-10-PCS | Mod: S$GLB,,, | Performed by: FAMILY MEDICINE

## 2020-10-28 RX ORDER — METOPROLOL TARTRATE 25 MG/1
25 TABLET, FILM COATED ORAL
COMMUNITY
End: 2022-02-25 | Stop reason: SDUPTHER

## 2020-11-01 PROBLEM — S98.139A: Status: ACTIVE | Noted: 2020-11-01

## 2020-11-01 PROBLEM — T30.0 BURN: Status: ACTIVE | Noted: 2020-11-01
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
## 2020-11-02 ENCOUNTER — LAB VISIT (OUTPATIENT)
Dept: LAB | Facility: HOSPITAL | Age: 65
End: 2020-11-02
Attending: FAMILY MEDICINE
Payer: OTHER MISCELLANEOUS

## 2020-11-02 DIAGNOSIS — T30.4 CHEMICAL BURN: ICD-10-CM

## 2020-11-02 LAB
ANION GAP SERPL CALC-SCNC: 11 MMOL/L (ref 8–16)
BASOPHILS # BLD AUTO: 0.06 K/UL (ref 0–0.2)
BASOPHILS NFR BLD: 0.5 % (ref 0–1.9)
BUN SERPL-MCNC: 19 MG/DL (ref 8–23)
CALCIUM SERPL-MCNC: 9.7 MG/DL (ref 8.7–10.5)
CHLORIDE SERPL-SCNC: 100 MMOL/L (ref 95–110)
CO2 SERPL-SCNC: 26 MMOL/L (ref 23–29)
CREAT SERPL-MCNC: 0.9 MG/DL (ref 0.5–1.4)
DIFFERENTIAL METHOD: ABNORMAL
EOSINOPHIL # BLD AUTO: 0.2 K/UL (ref 0–0.5)
EOSINOPHIL NFR BLD: 1.6 % (ref 0–8)
ERYTHROCYTE [DISTWIDTH] IN BLOOD BY AUTOMATED COUNT: 13.1 % (ref 11.5–14.5)
EST. GFR  (AFRICAN AMERICAN): >60 ML/MIN/1.73 M^2
EST. GFR  (NON AFRICAN AMERICAN): >60 ML/MIN/1.73 M^2
GLUCOSE SERPL-MCNC: 98 MG/DL (ref 70–110)
HCT VFR BLD AUTO: 43.8 % (ref 40–54)
HGB BLD-MCNC: 13.6 G/DL (ref 14–18)
IMM GRANULOCYTES # BLD AUTO: 0.06 K/UL (ref 0–0.04)
IMM GRANULOCYTES NFR BLD AUTO: 0.5 % (ref 0–0.5)
LYMPHOCYTES # BLD AUTO: 2.6 K/UL (ref 1–4.8)
LYMPHOCYTES NFR BLD: 21.2 % (ref 18–48)
MCH RBC QN AUTO: 30.2 PG (ref 27–31)
MCHC RBC AUTO-ENTMCNC: 31.1 G/DL (ref 32–36)
MCV RBC AUTO: 97 FL (ref 82–98)
MONOCYTES # BLD AUTO: 0.9 K/UL (ref 0.3–1)
MONOCYTES NFR BLD: 7.5 % (ref 4–15)
NEUTROPHILS # BLD AUTO: 8.4 K/UL (ref 1.8–7.7)
NEUTROPHILS NFR BLD: 68.7 % (ref 38–73)
NRBC BLD-RTO: 0 /100 WBC
PLATELET # BLD AUTO: 371 K/UL (ref 150–350)
PMV BLD AUTO: 10.5 FL (ref 9.2–12.9)
POTASSIUM SERPL-SCNC: 4.5 MMOL/L (ref 3.5–5.1)
RBC # BLD AUTO: 4.51 M/UL (ref 4.6–6.2)
SODIUM SERPL-SCNC: 137 MMOL/L (ref 136–145)
WBC # BLD AUTO: 12.16 K/UL (ref 3.9–12.7)

## 2020-11-02 PROCEDURE — 80048 BASIC METABOLIC PNL TOTAL CA: CPT

## 2020-11-02 PROCEDURE — 36415 COLL VENOUS BLD VENIPUNCTURE: CPT | Mod: PN

## 2020-11-02 PROCEDURE — 85025 COMPLETE CBC W/AUTO DIFF WBC: CPT

## 2020-12-22 DIAGNOSIS — I48.91 ATRIAL FIBRILLATION WITH RVR: ICD-10-CM

## 2020-12-22 NOTE — TELEPHONE ENCOUNTER
----- Message from Chata Deluna sent at 12/22/2020  3:47 PM CST -----  Contact: Patient @ [phone]  Requesting an RX refill or new RX.  Is this a refill or new RX:   RX name and strength: apixaban (ELIQUIS) 5 mg Tab  Is this a 30 day or 90 day RX:   Pharmacy name and phone #[facility] - [address]  Comments:

## 2020-12-29 ENCOUNTER — TELEPHONE (OUTPATIENT)
Dept: PRIMARY CARE CLINIC | Facility: CLINIC | Age: 65
End: 2020-12-29

## 2020-12-30 ENCOUNTER — TELEPHONE (OUTPATIENT)
Dept: PRIMARY CARE CLINIC | Facility: CLINIC | Age: 65
End: 2020-12-30

## 2021-04-16 ENCOUNTER — PATIENT MESSAGE (OUTPATIENT)
Dept: RESEARCH | Facility: HOSPITAL | Age: 66
End: 2021-04-16

## 2021-06-22 DIAGNOSIS — I49.8 OTHER SPECIFIED CARDIAC ARRHYTHMIAS: Primary | ICD-10-CM

## 2021-07-01 ENCOUNTER — PATIENT MESSAGE (OUTPATIENT)
Dept: ADMINISTRATIVE | Facility: OTHER | Age: 66
End: 2021-07-01

## 2021-09-11 ENCOUNTER — PATIENT OUTREACH (OUTPATIENT)
Dept: ADMINISTRATIVE | Facility: OTHER | Age: 66
End: 2021-09-11

## 2021-09-13 ENCOUNTER — HOSPITAL ENCOUNTER (OUTPATIENT)
Dept: CARDIOLOGY | Facility: CLINIC | Age: 66
Discharge: HOME | End: 2021-09-13
Payer: COMMERCIAL

## 2021-09-13 ENCOUNTER — OFFICE VISIT (OUTPATIENT)
Dept: ELECTROPHYSIOLOGY | Facility: CLINIC | Age: 66
End: 2021-09-13
Payer: COMMERCIAL

## 2021-09-13 VITALS
WEIGHT: 265.19 LBS | HEART RATE: 84 BPM | SYSTOLIC BLOOD PRESSURE: 120 MMHG | HEIGHT: 71 IN | BODY MASS INDEX: 37.13 KG/M2 | DIASTOLIC BLOOD PRESSURE: 82 MMHG

## 2021-09-13 DIAGNOSIS — I48.19 PERSISTENT ATRIAL FIBRILLATION: Primary | ICD-10-CM

## 2021-09-13 DIAGNOSIS — Z79.01 CURRENT USE OF LONG TERM ANTICOAGULATION: ICD-10-CM

## 2021-09-13 DIAGNOSIS — Z98.890 S/P ABLATION OF ATRIAL FIBRILLATION: ICD-10-CM

## 2021-09-13 DIAGNOSIS — Z86.79 S/P ABLATION OF ATRIAL FIBRILLATION: ICD-10-CM

## 2021-09-13 DIAGNOSIS — I49.8 OTHER SPECIFIED CARDIAC ARRHYTHMIAS: ICD-10-CM

## 2021-09-13 PROCEDURE — 1101F PT FALLS ASSESS-DOCD LE1/YR: CPT | Mod: CPTII,S$GLB,, | Performed by: INTERNAL MEDICINE

## 2021-09-13 PROCEDURE — 93005 RHYTHM STRIP: ICD-10-PCS | Mod: S$GLB,,, | Performed by: INTERNAL MEDICINE

## 2021-09-13 PROCEDURE — 93005 ELECTROCARDIOGRAM TRACING: CPT | Mod: S$GLB,,, | Performed by: INTERNAL MEDICINE

## 2021-09-13 PROCEDURE — 99499 RISK ADDL DX/OHS AUDIT: ICD-10-PCS | Mod: S$GLB,,, | Performed by: INTERNAL MEDICINE

## 2021-09-13 PROCEDURE — 3074F PR MOST RECENT SYSTOLIC BLOOD PRESSURE < 130 MM HG: ICD-10-PCS | Mod: CPTII,S$GLB,, | Performed by: INTERNAL MEDICINE

## 2021-09-13 PROCEDURE — 3079F DIAST BP 80-89 MM HG: CPT | Mod: CPTII,S$GLB,, | Performed by: INTERNAL MEDICINE

## 2021-09-13 PROCEDURE — 99999 PR PBB SHADOW E&M-EST. PATIENT-LVL III: ICD-10-PCS | Mod: PBBFAC,,, | Performed by: INTERNAL MEDICINE

## 2021-09-13 PROCEDURE — 99999 PR PBB SHADOW E&M-EST. PATIENT-LVL III: CPT | Mod: PBBFAC,,, | Performed by: INTERNAL MEDICINE

## 2021-09-13 PROCEDURE — 1101F PR PT FALLS ASSESS DOC 0-1 FALLS W/OUT INJ PAST YR: ICD-10-PCS | Mod: CPTII,S$GLB,, | Performed by: INTERNAL MEDICINE

## 2021-09-13 PROCEDURE — 3079F PR MOST RECENT DIASTOLIC BLOOD PRESSURE 80-89 MM HG: ICD-10-PCS | Mod: CPTII,S$GLB,, | Performed by: INTERNAL MEDICINE

## 2021-09-13 PROCEDURE — 99499 UNLISTED E&M SERVICE: CPT | Mod: S$GLB,,, | Performed by: INTERNAL MEDICINE

## 2021-09-13 PROCEDURE — 3074F SYST BP LT 130 MM HG: CPT | Mod: CPTII,S$GLB,, | Performed by: INTERNAL MEDICINE

## 2021-09-13 PROCEDURE — 1126F AMNT PAIN NOTED NONE PRSNT: CPT | Mod: CPTII,S$GLB,, | Performed by: INTERNAL MEDICINE

## 2021-09-13 PROCEDURE — 1126F PR PAIN SEVERITY QUANTIFIED, NO PAIN PRESENT: ICD-10-PCS | Mod: CPTII,S$GLB,, | Performed by: INTERNAL MEDICINE

## 2021-09-13 PROCEDURE — 3008F PR BODY MASS INDEX (BMI) DOCUMENTED: ICD-10-PCS | Mod: CPTII,S$GLB,, | Performed by: INTERNAL MEDICINE

## 2021-09-13 PROCEDURE — 93010 RHYTHM STRIP: ICD-10-PCS | Mod: S$GLB,,, | Performed by: INTERNAL MEDICINE

## 2021-09-13 PROCEDURE — 3288F FALL RISK ASSESSMENT DOCD: CPT | Mod: CPTII,S$GLB,, | Performed by: INTERNAL MEDICINE

## 2021-09-13 PROCEDURE — 1159F PR MEDICATION LIST DOCUMENTED IN MEDICAL RECORD: ICD-10-PCS | Mod: CPTII,S$GLB,, | Performed by: INTERNAL MEDICINE

## 2021-09-13 PROCEDURE — 93010 ELECTROCARDIOGRAM REPORT: CPT | Mod: S$GLB,,, | Performed by: INTERNAL MEDICINE

## 2021-09-13 PROCEDURE — 99214 PR OFFICE/OUTPT VISIT, EST, LEVL IV, 30-39 MIN: ICD-10-PCS | Mod: S$GLB,,, | Performed by: INTERNAL MEDICINE

## 2021-09-13 PROCEDURE — 99214 OFFICE O/P EST MOD 30 MIN: CPT | Mod: S$GLB,,, | Performed by: INTERNAL MEDICINE

## 2021-09-13 PROCEDURE — 3008F BODY MASS INDEX DOCD: CPT | Mod: CPTII,S$GLB,, | Performed by: INTERNAL MEDICINE

## 2021-09-13 PROCEDURE — 3288F PR FALLS RISK ASSESSMENT DOCUMENTED: ICD-10-PCS | Mod: CPTII,S$GLB,, | Performed by: INTERNAL MEDICINE

## 2021-09-13 PROCEDURE — 1159F MED LIST DOCD IN RCRD: CPT | Mod: CPTII,S$GLB,, | Performed by: INTERNAL MEDICINE

## 2021-09-13 RX ORDER — SULFAMETHOXAZOLE AND TRIMETHOPRIM 800; 160 MG/1; MG/1
1 TABLET ORAL
COMMUNITY
Start: 2021-09-10 | End: 2022-02-25 | Stop reason: ALTCHOICE

## 2021-09-28 DIAGNOSIS — I48.91 ATRIAL FIBRILLATION WITH RVR: ICD-10-CM

## 2021-09-30 RX ORDER — METOPROLOL SUCCINATE 25 MG/1
TABLET, EXTENDED RELEASE ORAL
Qty: 90 TABLET | Refills: 3 | Status: SHIPPED | OUTPATIENT
Start: 2021-09-30 | End: 2022-02-25 | Stop reason: ALTCHOICE

## 2021-10-08 ENCOUNTER — PES CALL (OUTPATIENT)
Dept: ADMINISTRATIVE | Facility: CLINIC | Age: 66
End: 2021-10-08

## 2021-11-19 ENCOUNTER — PES CALL (OUTPATIENT)
Dept: ADMINISTRATIVE | Facility: CLINIC | Age: 66
End: 2021-11-19
Payer: COMMERCIAL

## 2021-12-07 ENCOUNTER — PES CALL (OUTPATIENT)
Dept: ADMINISTRATIVE | Facility: CLINIC | Age: 66
End: 2021-12-07
Payer: COMMERCIAL

## 2021-12-08 ENCOUNTER — PES CALL (OUTPATIENT)
Dept: ADMINISTRATIVE | Facility: CLINIC | Age: 66
End: 2021-12-08
Payer: COMMERCIAL

## 2021-12-12 DIAGNOSIS — I48.91 ATRIAL FIBRILLATION WITH RVR: ICD-10-CM

## 2021-12-12 RX ORDER — APIXABAN 5 MG/1
TABLET, FILM COATED ORAL
Qty: 180 TABLET | Refills: 3 | OUTPATIENT
Start: 2021-12-12

## 2021-12-14 ENCOUNTER — TELEPHONE (OUTPATIENT)
Dept: PRIMARY CARE CLINIC | Facility: CLINIC | Age: 66
End: 2021-12-14
Payer: COMMERCIAL

## 2021-12-14 DIAGNOSIS — Z00.00 ROUTINE GENERAL MEDICAL EXAMINATION AT A HEALTH CARE FACILITY: Primary | ICD-10-CM

## 2021-12-14 DIAGNOSIS — I48.91 ATRIAL FIBRILLATION WITH RVR: ICD-10-CM

## 2021-12-22 ENCOUNTER — PES CALL (OUTPATIENT)
Dept: ADMINISTRATIVE | Facility: CLINIC | Age: 66
End: 2021-12-22
Payer: COMMERCIAL

## 2022-02-18 ENCOUNTER — PES CALL (OUTPATIENT)
Dept: ADMINISTRATIVE | Facility: CLINIC | Age: 67
End: 2022-02-18
Payer: COMMERCIAL

## 2022-02-25 ENCOUNTER — OFFICE VISIT (OUTPATIENT)
Dept: PRIMARY CARE CLINIC | Facility: CLINIC | Age: 67
End: 2022-02-25
Payer: COMMERCIAL

## 2022-02-25 VITALS
BODY MASS INDEX: 36.99 KG/M2 | SYSTOLIC BLOOD PRESSURE: 136 MMHG | DIASTOLIC BLOOD PRESSURE: 74 MMHG | OXYGEN SATURATION: 99 % | HEART RATE: 72 BPM | TEMPERATURE: 98 F | HEIGHT: 71 IN

## 2022-02-25 DIAGNOSIS — Z12.11 SCREENING FOR COLON CANCER: ICD-10-CM

## 2022-02-25 DIAGNOSIS — R73.03 PREDIABETES: ICD-10-CM

## 2022-02-25 DIAGNOSIS — I48.91 ATRIAL FIBRILLATION WITH RVR: ICD-10-CM

## 2022-02-25 DIAGNOSIS — Z01.00 ROUTINE EYE EXAM: ICD-10-CM

## 2022-02-25 DIAGNOSIS — G62.9 NEUROPATHY: Primary | ICD-10-CM

## 2022-02-25 DIAGNOSIS — E07.9 THYROID DISEASE: ICD-10-CM

## 2022-02-25 DIAGNOSIS — Z12.5 SCREENING FOR PROSTATE CANCER: ICD-10-CM

## 2022-02-25 PROCEDURE — 99999 PR PBB SHADOW E&M-EST. PATIENT-LVL V: ICD-10-PCS | Mod: PBBFAC,,, | Performed by: FAMILY MEDICINE

## 2022-02-25 PROCEDURE — 1126F PR PAIN SEVERITY QUANTIFIED, NO PAIN PRESENT: ICD-10-PCS | Mod: CPTII,S$GLB,, | Performed by: FAMILY MEDICINE

## 2022-02-25 PROCEDURE — 3008F PR BODY MASS INDEX (BMI) DOCUMENTED: ICD-10-PCS | Mod: CPTII,S$GLB,, | Performed by: FAMILY MEDICINE

## 2022-02-25 PROCEDURE — 1159F PR MEDICATION LIST DOCUMENTED IN MEDICAL RECORD: ICD-10-PCS | Mod: CPTII,S$GLB,, | Performed by: FAMILY MEDICINE

## 2022-02-25 PROCEDURE — 3075F PR MOST RECENT SYSTOLIC BLOOD PRESS GE 130-139MM HG: ICD-10-PCS | Mod: CPTII,S$GLB,, | Performed by: FAMILY MEDICINE

## 2022-02-25 PROCEDURE — 3078F PR MOST RECENT DIASTOLIC BLOOD PRESSURE < 80 MM HG: ICD-10-PCS | Mod: CPTII,S$GLB,, | Performed by: FAMILY MEDICINE

## 2022-02-25 PROCEDURE — 1101F PR PT FALLS ASSESS DOC 0-1 FALLS W/OUT INJ PAST YR: ICD-10-PCS | Mod: CPTII,S$GLB,, | Performed by: FAMILY MEDICINE

## 2022-02-25 PROCEDURE — 1101F PT FALLS ASSESS-DOCD LE1/YR: CPT | Mod: CPTII,S$GLB,, | Performed by: FAMILY MEDICINE

## 2022-02-25 PROCEDURE — 3075F SYST BP GE 130 - 139MM HG: CPT | Mod: CPTII,S$GLB,, | Performed by: FAMILY MEDICINE

## 2022-02-25 PROCEDURE — 3288F PR FALLS RISK ASSESSMENT DOCUMENTED: ICD-10-PCS | Mod: CPTII,S$GLB,, | Performed by: FAMILY MEDICINE

## 2022-02-25 PROCEDURE — 1160F PR REVIEW ALL MEDS BY PRESCRIBER/CLIN PHARMACIST DOCUMENTED: ICD-10-PCS | Mod: CPTII,S$GLB,, | Performed by: FAMILY MEDICINE

## 2022-02-25 PROCEDURE — 1159F MED LIST DOCD IN RCRD: CPT | Mod: CPTII,S$GLB,, | Performed by: FAMILY MEDICINE

## 2022-02-25 PROCEDURE — 3288F FALL RISK ASSESSMENT DOCD: CPT | Mod: CPTII,S$GLB,, | Performed by: FAMILY MEDICINE

## 2022-02-25 PROCEDURE — 1160F RVW MEDS BY RX/DR IN RCRD: CPT | Mod: CPTII,S$GLB,, | Performed by: FAMILY MEDICINE

## 2022-02-25 PROCEDURE — 99999 PR PBB SHADOW E&M-EST. PATIENT-LVL V: CPT | Mod: PBBFAC,,, | Performed by: FAMILY MEDICINE

## 2022-02-25 PROCEDURE — 99397 PR PREVENTIVE VISIT,EST,65 & OVER: ICD-10-PCS | Mod: S$GLB,,, | Performed by: FAMILY MEDICINE

## 2022-02-25 PROCEDURE — 3078F DIAST BP <80 MM HG: CPT | Mod: CPTII,S$GLB,, | Performed by: FAMILY MEDICINE

## 2022-02-25 PROCEDURE — 3008F BODY MASS INDEX DOCD: CPT | Mod: CPTII,S$GLB,, | Performed by: FAMILY MEDICINE

## 2022-02-25 PROCEDURE — 1126F AMNT PAIN NOTED NONE PRSNT: CPT | Mod: CPTII,S$GLB,, | Performed by: FAMILY MEDICINE

## 2022-02-25 PROCEDURE — 99397 PER PM REEVAL EST PAT 65+ YR: CPT | Mod: S$GLB,,, | Performed by: FAMILY MEDICINE

## 2022-02-25 RX ORDER — METOPROLOL TARTRATE 25 MG/1
25 TABLET, FILM COATED ORAL DAILY
Qty: 90 TABLET | Refills: 4 | Status: SHIPPED | OUTPATIENT
Start: 2022-02-25 | End: 2022-08-18

## 2022-02-25 NOTE — PROGRESS NOTES
Marcus Watkins is a 66 y.o. male     Chief Complaint:  Physical Exam  Source of history: Patient  Past Medical History:   Diagnosis Date    Atrial fibrillation      Patient  reports that he quit smoking about 43 years ago. He has never used smokeless tobacco. He reports that he does not drink alcohol and does not use drugs.  Family History   Problem Relation Age of Onset    Heart disease Mother     Diabetes Brother     Mental retardation Daughter      ROS:                                                                                GENERAL: No fever, chills, fatigability or weight loss. .  SKIN: No rashes, itching or changes in color or texture of skin.  HEAD: No headaches or recent head trauma.  EYES: Visual acuity fine. No photophobia, ocular pain or diplopia.  EARS: Denies ear pain, discharge or vertigo.  NOSE: No loss of smell, no epistaxis or postnasal drip.  MOUTH & THROAT: No hoarseness or change in voice. No excessive gum bleeding.  NODES: Denies swollen glands.  CHEST: Denies DALY, cyanosis, wheezing, cough and sputum production.  CARDIOVASCULAR: Denies chest pain, PND, orthopnea or reduced exercise tolerance.  ABDOMEN: Appetite fine. No weight loss. Denies diarrhea, abdominal pain, hematemesis or blood in stool.  URINARY: No flank pain, dysuria or hematuria. Frequency of urination at nighttime.  PERIPHERAL VASCULAR: No claudication or cyanosis.  MUSCULOSKELETAL: foot issues  NEUROLOGIC: No history of seizures, paralysis, alteration of gait or coordination.    OBJECTIVE:  APPEARANCE: Well nourished, well developed, in no acute distress.   VS:  see nurses notes 2/26/2022   SKIN: No lesions, good turgor, no rashes.   HEENT: TMs clear bilaterally, ear canals clear bilaterally, nasal mucosa clear bilaterally,   sinuses nontender to percussion, throat mild postnasal drip.  HEAD: Normocephalic, nontender to palpation.  NECK: Supple nontender, no carotid bruits, no thyromegaly.  CHEST: Clear bilaterally, with  good respiratory excursion, no evidence of respiratory distress.  CARDIOVASCULAR: S1, S2, regular rate and rhythm without murmur.  BREASTS: No masses palpated in either breast area, or axillary area, symmetry is noted.  ABDOMEN: Soft nontender no hepatosplenomegaly, no guarding or rebound, no pulsatile mass.  PERIPHERAL VASCULAR: Distal pulses palpable throughout, normal capillary refill in all distal extremities, no edema.  MUSCULOSKELETAL: amputation of all toes left foot.  BACK: No scoliosis, no spasm, cervical, thoracic, lumbar spine nontender to palpation  NEUROLOGIC: Cranial nerves II through XII grossly intact, motor exam 5 out of 5 on distal extremities,   no gait disturbances, sensation intact in all distal extremities  MENTAL STATUS: Patient oriented x3, normal affect, normal mood    ASSESSMENT/PLAN:   Marcus was seen today for annual exam.    Diagnoses and all orders for this visit:    Neuropathy  -     CBC Auto Differential; Future    Thyroid disease  -     TSH; Future  -     Comprehensive Metabolic Panel; Future  -     Lipid Panel; Future    Prediabetes  -     Hemoglobin A1C; Future    Screening for colon cancer  -     Case Request Endoscopy: COLONOSCOPY    Routine eye exam  -     Ambulatory referral/consult to Optometry; Future    Screening for prostate cancer  -     PSA, Screening; Future    Atrial fibrillation with RVR  -     metoprolol tartrate (LOPRESSOR) 25 MG tablet; Take 1 tablet (25 mg total) by mouth once daily.  -     apixaban (ELIQUIS) 5 mg Tab; Take 1 tablet (5 mg total) by mouth 2 (two) times daily.  Will contact pt with results when available

## 2022-03-02 ENCOUNTER — TELEPHONE (OUTPATIENT)
Dept: PRIMARY CARE CLINIC | Facility: CLINIC | Age: 67
End: 2022-03-02
Payer: COMMERCIAL

## 2022-03-02 ENCOUNTER — PES CALL (OUTPATIENT)
Dept: ADMINISTRATIVE | Facility: CLINIC | Age: 67
End: 2022-03-02
Payer: COMMERCIAL

## 2022-03-02 NOTE — TELEPHONE ENCOUNTER
----- Message from Inés Chatman sent at 3/2/2022 12:18 PM CST -----  Contact: [phone]  Patient states he received a text message from the pharmacy stating his b/p medicine was ready for . Patient states he has never taken b/p medicine before. Please call and advise.

## 2022-03-02 NOTE — TELEPHONE ENCOUNTER
Spoke with patient, explained the medications sent to pharmacy and what they are for.  Patient verbalized understanding.

## 2022-03-02 NOTE — TELEPHONE ENCOUNTER
----- Message from Ruby Pate sent at 3/2/2022  3:46 PM CST -----  Contact: [phone]  Patient is returning a phone call.  Who left a message for the patient: Dr Luke's office  Does patient know what this is regarding:  no  Would you like a call back, or a response through your [facility] portal?:   phone  Comments:

## 2022-03-02 NOTE — TELEPHONE ENCOUNTER
----- Message from Arlet Garcia sent at 3/2/2022  4:18 PM CST -----  Contact: Marcus   Patient is returning a phone call.  Who left a message for the patient: Zhang   Does patient know what this is regarding: script for meds    Would you like a call back, or a response through your [facility] portal?: Call back   Comments:

## 2022-03-04 ENCOUNTER — LAB VISIT (OUTPATIENT)
Dept: LAB | Facility: HOSPITAL | Age: 67
End: 2022-03-04
Attending: FAMILY MEDICINE
Payer: COMMERCIAL

## 2022-03-04 DIAGNOSIS — E07.9 THYROID DISEASE: ICD-10-CM

## 2022-03-04 DIAGNOSIS — R73.03 PREDIABETES: ICD-10-CM

## 2022-03-04 DIAGNOSIS — G62.9 NEUROPATHY: ICD-10-CM

## 2022-03-04 DIAGNOSIS — Z12.5 SCREENING FOR PROSTATE CANCER: ICD-10-CM

## 2022-03-04 LAB
ALBUMIN SERPL BCP-MCNC: 3.9 G/DL (ref 3.5–5.2)
ALP SERPL-CCNC: 79 U/L (ref 55–135)
ALT SERPL W/O P-5'-P-CCNC: 15 U/L (ref 10–44)
ANION GAP SERPL CALC-SCNC: 12 MMOL/L (ref 8–16)
AST SERPL-CCNC: 18 U/L (ref 10–40)
BASOPHILS # BLD AUTO: 0.06 K/UL (ref 0–0.2)
BASOPHILS NFR BLD: 0.7 % (ref 0–1.9)
BILIRUB SERPL-MCNC: 0.4 MG/DL (ref 0.1–1)
BUN SERPL-MCNC: 14 MG/DL (ref 8–23)
CALCIUM SERPL-MCNC: 9.8 MG/DL (ref 8.7–10.5)
CHLORIDE SERPL-SCNC: 103 MMOL/L (ref 95–110)
CHOLEST SERPL-MCNC: 222 MG/DL (ref 120–199)
CHOLEST/HDLC SERPL: 5.7 {RATIO} (ref 2–5)
CO2 SERPL-SCNC: 23 MMOL/L (ref 23–29)
COMPLEXED PSA SERPL-MCNC: 2.1 NG/ML (ref 0–4)
CREAT SERPL-MCNC: 0.7 MG/DL (ref 0.5–1.4)
DIFFERENTIAL METHOD: NORMAL
EOSINOPHIL # BLD AUTO: 0.3 K/UL (ref 0–0.5)
EOSINOPHIL NFR BLD: 3.9 % (ref 0–8)
ERYTHROCYTE [DISTWIDTH] IN BLOOD BY AUTOMATED COUNT: 12.9 % (ref 11.5–14.5)
EST. GFR  (AFRICAN AMERICAN): >60 ML/MIN/1.73 M^2
EST. GFR  (NON AFRICAN AMERICAN): >60 ML/MIN/1.73 M^2
ESTIMATED AVG GLUCOSE: 123 MG/DL (ref 68–131)
GLUCOSE SERPL-MCNC: 115 MG/DL (ref 70–110)
HBA1C MFR BLD: 5.9 % (ref 4–5.6)
HCT VFR BLD AUTO: 46.3 % (ref 40–54)
HDLC SERPL-MCNC: 39 MG/DL (ref 40–75)
HDLC SERPL: 17.6 % (ref 20–50)
HGB BLD-MCNC: 15.4 G/DL (ref 14–18)
IMM GRANULOCYTES # BLD AUTO: 0.04 K/UL (ref 0–0.04)
IMM GRANULOCYTES NFR BLD AUTO: 0.5 % (ref 0–0.5)
LDLC SERPL CALC-MCNC: 159.6 MG/DL (ref 63–159)
LYMPHOCYTES # BLD AUTO: 2.5 K/UL (ref 1–4.8)
LYMPHOCYTES NFR BLD: 28.4 % (ref 18–48)
MCH RBC QN AUTO: 29.7 PG (ref 27–31)
MCHC RBC AUTO-ENTMCNC: 33.3 G/DL (ref 32–36)
MCV RBC AUTO: 89 FL (ref 82–98)
MONOCYTES # BLD AUTO: 0.6 K/UL (ref 0.3–1)
MONOCYTES NFR BLD: 7.2 % (ref 4–15)
NEUTROPHILS # BLD AUTO: 5.2 K/UL (ref 1.8–7.7)
NEUTROPHILS NFR BLD: 59.3 % (ref 38–73)
NONHDLC SERPL-MCNC: 183 MG/DL
NRBC BLD-RTO: 0 /100 WBC
PLATELET # BLD AUTO: 273 K/UL (ref 150–450)
PMV BLD AUTO: 11 FL (ref 9.2–12.9)
POTASSIUM SERPL-SCNC: 4.2 MMOL/L (ref 3.5–5.1)
PROT SERPL-MCNC: 7.6 G/DL (ref 6–8.4)
RBC # BLD AUTO: 5.19 M/UL (ref 4.6–6.2)
SODIUM SERPL-SCNC: 138 MMOL/L (ref 136–145)
TRIGL SERPL-MCNC: 117 MG/DL (ref 30–150)
TSH SERPL DL<=0.005 MIU/L-ACNC: 1.03 UIU/ML (ref 0.4–4)
WBC # BLD AUTO: 8.81 K/UL (ref 3.9–12.7)

## 2022-03-04 PROCEDURE — 80053 COMPREHEN METABOLIC PANEL: CPT | Performed by: FAMILY MEDICINE

## 2022-03-04 PROCEDURE — 83036 HEMOGLOBIN GLYCOSYLATED A1C: CPT | Performed by: FAMILY MEDICINE

## 2022-03-04 PROCEDURE — 84153 ASSAY OF PSA TOTAL: CPT | Performed by: FAMILY MEDICINE

## 2022-03-04 PROCEDURE — 80061 LIPID PANEL: CPT | Performed by: FAMILY MEDICINE

## 2022-03-04 PROCEDURE — 36415 COLL VENOUS BLD VENIPUNCTURE: CPT | Mod: PN | Performed by: FAMILY MEDICINE

## 2022-03-04 PROCEDURE — 84443 ASSAY THYROID STIM HORMONE: CPT | Performed by: FAMILY MEDICINE

## 2022-03-04 PROCEDURE — 85025 COMPLETE CBC W/AUTO DIFF WBC: CPT | Performed by: FAMILY MEDICINE

## 2022-03-16 DIAGNOSIS — R73.03 PREDIABETES: Primary | ICD-10-CM

## 2022-03-24 ENCOUNTER — TELEPHONE (OUTPATIENT)
Dept: PRIMARY CARE CLINIC | Facility: CLINIC | Age: 67
End: 2022-03-24
Payer: COMMERCIAL

## 2022-03-24 NOTE — TELEPHONE ENCOUNTER
----- Message from Raghavendra Lazo sent at 3/24/2022  4:51 PM CDT -----  Contact: Pt @ [phone]  Calling to get test results.  Name of test (lab, x-ray): blood work  Date of test: 3/4/22  Where was the test performed: [facility]  Would you like a call back, or a response through your [facility] portal?:   call back  Comments:     Please call and advise.    Thank you and have a great day.

## 2022-03-24 NOTE — TELEPHONE ENCOUNTER
Spoke with patient, informed of lab results and provider recommendation.  Patient verbalized understanding.

## 2022-03-28 ENCOUNTER — TELEPHONE (OUTPATIENT)
Dept: PRIMARY CARE CLINIC | Facility: CLINIC | Age: 67
End: 2022-03-28
Payer: COMMERCIAL

## 2022-03-28 NOTE — TELEPHONE ENCOUNTER
----- Message from Zhang Garcia LPN sent at 3/25/2022  9:14 AM CDT -----  Need to change code on referral  ----- Message -----  From: Ally Wilhelm  Sent: 3/25/2022   8:50 AM CDT  To: Zhang Garcia LPN    For the nutrition services referral code CPT R 02341 is not covered. Noncovered dx list R73.03 ([facility]) (Clinic)    Thanks  ----- Message -----  From: Zhang Garcia LPN  Sent: 3/24/2022   5:00 PM CDT  To: Ally Wilhelm    Yes, patient was informed.  ----- Message -----  From: Ally Wilhelm  Sent: 3/23/2022  10:08 AM CDT  To: Nannette Garcia Staff     Have pt been notified of the Nutrition Services referral?    Thanks

## 2022-03-30 DIAGNOSIS — R73.03 PREDIABETES: Primary | ICD-10-CM

## 2022-04-01 ENCOUNTER — OFFICE VISIT (OUTPATIENT)
Dept: OPTOMETRY | Facility: CLINIC | Age: 67
End: 2022-04-01
Payer: COMMERCIAL

## 2022-04-01 DIAGNOSIS — H25.13 NS (NUCLEAR SCLEROSIS), BILATERAL: Primary | ICD-10-CM

## 2022-04-01 DIAGNOSIS — Z01.00 ROUTINE EYE EXAM: ICD-10-CM

## 2022-04-01 DIAGNOSIS — H26.9 CORTICAL CATARACT OF BOTH EYES: ICD-10-CM

## 2022-04-01 DIAGNOSIS — H52.4 PRESBYOPIA: ICD-10-CM

## 2022-04-01 PROCEDURE — 1101F PT FALLS ASSESS-DOCD LE1/YR: CPT | Mod: CPTII,S$GLB,, | Performed by: OPTOMETRIST

## 2022-04-01 PROCEDURE — 1160F PR REVIEW ALL MEDS BY PRESCRIBER/CLIN PHARMACIST DOCUMENTED: ICD-10-PCS | Mod: CPTII,S$GLB,, | Performed by: OPTOMETRIST

## 2022-04-01 PROCEDURE — 1160F RVW MEDS BY RX/DR IN RCRD: CPT | Mod: CPTII,S$GLB,, | Performed by: OPTOMETRIST

## 2022-04-01 PROCEDURE — 99999 PR PBB SHADOW E&M-EST. PATIENT-LVL III: ICD-10-PCS | Mod: PBBFAC,,, | Performed by: OPTOMETRIST

## 2022-04-01 PROCEDURE — 1159F MED LIST DOCD IN RCRD: CPT | Mod: CPTII,S$GLB,, | Performed by: OPTOMETRIST

## 2022-04-01 PROCEDURE — 3044F PR MOST RECENT HEMOGLOBIN A1C LEVEL <7.0%: ICD-10-PCS | Mod: CPTII,S$GLB,, | Performed by: OPTOMETRIST

## 2022-04-01 PROCEDURE — 92004 COMPRE OPH EXAM NEW PT 1/>: CPT | Mod: S$GLB,,, | Performed by: OPTOMETRIST

## 2022-04-01 PROCEDURE — 1126F PR PAIN SEVERITY QUANTIFIED, NO PAIN PRESENT: ICD-10-PCS | Mod: CPTII,S$GLB,, | Performed by: OPTOMETRIST

## 2022-04-01 PROCEDURE — 3288F PR FALLS RISK ASSESSMENT DOCUMENTED: ICD-10-PCS | Mod: CPTII,S$GLB,, | Performed by: OPTOMETRIST

## 2022-04-01 PROCEDURE — 1159F PR MEDICATION LIST DOCUMENTED IN MEDICAL RECORD: ICD-10-PCS | Mod: CPTII,S$GLB,, | Performed by: OPTOMETRIST

## 2022-04-01 PROCEDURE — 3044F HG A1C LEVEL LT 7.0%: CPT | Mod: CPTII,S$GLB,, | Performed by: OPTOMETRIST

## 2022-04-01 PROCEDURE — 92004 PR EYE EXAM, NEW PATIENT,COMPREHESV: ICD-10-PCS | Mod: S$GLB,,, | Performed by: OPTOMETRIST

## 2022-04-01 PROCEDURE — 3288F FALL RISK ASSESSMENT DOCD: CPT | Mod: CPTII,S$GLB,, | Performed by: OPTOMETRIST

## 2022-04-01 PROCEDURE — 1101F PR PT FALLS ASSESS DOC 0-1 FALLS W/OUT INJ PAST YR: ICD-10-PCS | Mod: CPTII,S$GLB,, | Performed by: OPTOMETRIST

## 2022-04-01 PROCEDURE — 99999 PR PBB SHADOW E&M-EST. PATIENT-LVL III: CPT | Mod: PBBFAC,,, | Performed by: OPTOMETRIST

## 2022-04-01 PROCEDURE — 1126F AMNT PAIN NOTED NONE PRSNT: CPT | Mod: CPTII,S$GLB,, | Performed by: OPTOMETRIST

## 2022-04-06 DIAGNOSIS — E66.01 CLASS 2 SEVERE OBESITY DUE TO EXCESS CALORIES WITH SERIOUS COMORBIDITY IN ADULT, UNSPECIFIED BMI: Primary | ICD-10-CM

## 2022-04-11 ENCOUNTER — TELEPHONE (OUTPATIENT)
Dept: ENDOSCOPY | Facility: HOSPITAL | Age: 67
End: 2022-04-11
Payer: COMMERCIAL

## 2022-04-11 NOTE — TELEPHONE ENCOUNTER
Per protocol the patient is cleared to hold ELIQUIS for 2 days prior to procedure and resume when physician performing procedure deems safe.

## 2022-04-11 NOTE — TELEPHONE ENCOUNTER
Hi,    Patient needs to be scheduled for a Colonoscopy. He is on Eliquis. Can he hold his Eliquis for 2 days prior per our Endoscopy Protocol? Please advise.    Sincerely,  ÁNGEL Tompkins

## 2022-04-12 ENCOUNTER — TELEPHONE (OUTPATIENT)
Dept: ENDOSCOPY | Facility: HOSPITAL | Age: 67
End: 2022-04-12
Payer: COMMERCIAL

## 2022-04-12 DIAGNOSIS — Z12.11 SPECIAL SCREENING FOR MALIGNANT NEOPLASMS, COLON: Primary | ICD-10-CM

## 2022-04-12 RX ORDER — SODIUM, POTASSIUM,MAG SULFATES 17.5-3.13G
1 SOLUTION, RECONSTITUTED, ORAL ORAL DAILY
Qty: 1 KIT | Refills: 0 | Status: SHIPPED | OUTPATIENT
Start: 2022-04-12 | End: 2022-04-14

## 2022-04-12 NOTE — TELEPHONE ENCOUNTER
Per protocol the patient is cleared to hold ELIQUIS for 2 days prior to procedure and resume when physician performing procedure deems safe.             Kenia Still RN  to Leda Patel RN    MA      4/11/22 2:11 PM  Per protocol the patient is cleared to hold ELIQUIS for 2 days prior to procedure and resume when physician performing procedure deems safe.            TJ    4/11/22 1:20 PM  Scott Mar MA routed this conversation to Kenia Still RN                 4/11/22 1:15 PM  Leda Patel RN routed this conversation to MD Bernadette Tucker Staff        Leda Patel RN         4/11/22 1:15 PM  Note  Hi,     Patient needs to be scheduled for a Colonoscopy. He is on Eliquis. Can he hold his Eliquis for 2 days prior per our Endoscopy Protocol? Please advise.     Sincerely,  ÁNGEL Tompkins

## 2022-05-04 ENCOUNTER — OFFICE VISIT (OUTPATIENT)
Dept: INTERNAL MEDICINE | Facility: CLINIC | Age: 67
End: 2022-05-04
Payer: COMMERCIAL

## 2022-05-04 VITALS
OXYGEN SATURATION: 98 % | HEIGHT: 72 IN | SYSTOLIC BLOOD PRESSURE: 118 MMHG | HEART RATE: 80 BPM | WEIGHT: 265 LBS | BODY MASS INDEX: 35.89 KG/M2 | DIASTOLIC BLOOD PRESSURE: 68 MMHG | RESPIRATION RATE: 15 BRPM

## 2022-05-04 DIAGNOSIS — E66.01 SEVERE OBESITY (BMI 35.0-39.9) WITH COMORBIDITY: ICD-10-CM

## 2022-05-04 DIAGNOSIS — S98.139A AMPUTATED TOE, UNSPECIFIED LATERALITY: ICD-10-CM

## 2022-05-04 DIAGNOSIS — Z86.79 S/P ABLATION OF ATRIAL FIBRILLATION: ICD-10-CM

## 2022-05-04 DIAGNOSIS — Z74.09 OTHER REDUCED MOBILITY: ICD-10-CM

## 2022-05-04 DIAGNOSIS — Z98.890 STATUS POST CATHETER ABLATION OF ATRIAL FLUTTER: ICD-10-CM

## 2022-05-04 DIAGNOSIS — G62.9 PERIPHERAL POLYNEUROPATHY: ICD-10-CM

## 2022-05-04 DIAGNOSIS — Z98.890 S/P ABLATION OF ATRIAL FIBRILLATION: ICD-10-CM

## 2022-05-04 DIAGNOSIS — E04.2 MULTIPLE THYROID NODULES: ICD-10-CM

## 2022-05-04 DIAGNOSIS — I48.19 PERSISTENT ATRIAL FIBRILLATION: ICD-10-CM

## 2022-05-04 DIAGNOSIS — Z00.00 ENCOUNTER FOR PREVENTIVE HEALTH EXAMINATION: Primary | ICD-10-CM

## 2022-05-04 DIAGNOSIS — Z79.01 CURRENT USE OF LONG TERM ANTICOAGULATION: ICD-10-CM

## 2022-05-04 DIAGNOSIS — Z86.010 HISTORY OF COLON POLYPS: ICD-10-CM

## 2022-05-04 DIAGNOSIS — Z91.81 RISK FOR FALLS: ICD-10-CM

## 2022-05-04 DIAGNOSIS — R73.03 PREDIABETES: ICD-10-CM

## 2022-05-04 DIAGNOSIS — R26.9 ABNORMALITY OF GAIT AND MOBILITY: ICD-10-CM

## 2022-05-04 DIAGNOSIS — R20.2 PARESTHESIA: ICD-10-CM

## 2022-05-04 DIAGNOSIS — R20.0 NUMBNESS OF FEET: ICD-10-CM

## 2022-05-04 PROBLEM — Z71.89 ENCOUNTER FOR ANTICOAGULATION DISCUSSION AND COUNSELING: Status: RESOLVED | Noted: 2017-02-13 | Resolved: 2022-05-04

## 2022-05-04 PROBLEM — T30.0 BURN: Status: RESOLVED | Noted: 2020-11-01 | Resolved: 2022-05-04

## 2022-05-04 PROCEDURE — 1160F RVW MEDS BY RX/DR IN RCRD: CPT | Mod: CPTII,S$GLB,, | Performed by: NURSE PRACTITIONER

## 2022-05-04 PROCEDURE — 1126F PR PAIN SEVERITY QUANTIFIED, NO PAIN PRESENT: ICD-10-PCS | Mod: CPTII,S$GLB,, | Performed by: NURSE PRACTITIONER

## 2022-05-04 PROCEDURE — 3044F PR MOST RECENT HEMOGLOBIN A1C LEVEL <7.0%: ICD-10-PCS | Mod: CPTII,S$GLB,, | Performed by: NURSE PRACTITIONER

## 2022-05-04 PROCEDURE — 3078F PR MOST RECENT DIASTOLIC BLOOD PRESSURE < 80 MM HG: ICD-10-PCS | Mod: CPTII,S$GLB,, | Performed by: NURSE PRACTITIONER

## 2022-05-04 PROCEDURE — 99499 RISK ADDL DX/OHS AUDIT: ICD-10-PCS | Mod: S$GLB,,, | Performed by: NURSE PRACTITIONER

## 2022-05-04 PROCEDURE — 99999 PR PBB SHADOW E&M-EST. PATIENT-LVL IV: CPT | Mod: PBBFAC,,, | Performed by: NURSE PRACTITIONER

## 2022-05-04 PROCEDURE — 1170F PR FUNCTIONAL STATUS ASSESSED: ICD-10-PCS | Mod: CPTII,S$GLB,, | Performed by: NURSE PRACTITIONER

## 2022-05-04 PROCEDURE — 1160F PR REVIEW ALL MEDS BY PRESCRIBER/CLIN PHARMACIST DOCUMENTED: ICD-10-PCS | Mod: CPTII,S$GLB,, | Performed by: NURSE PRACTITIONER

## 2022-05-04 PROCEDURE — 3074F PR MOST RECENT SYSTOLIC BLOOD PRESSURE < 130 MM HG: ICD-10-PCS | Mod: CPTII,S$GLB,, | Performed by: NURSE PRACTITIONER

## 2022-05-04 PROCEDURE — 99999 PR PBB SHADOW E&M-EST. PATIENT-LVL IV: ICD-10-PCS | Mod: PBBFAC,,, | Performed by: NURSE PRACTITIONER

## 2022-05-04 PROCEDURE — 1159F MED LIST DOCD IN RCRD: CPT | Mod: CPTII,S$GLB,, | Performed by: NURSE PRACTITIONER

## 2022-05-04 PROCEDURE — 3044F HG A1C LEVEL LT 7.0%: CPT | Mod: CPTII,S$GLB,, | Performed by: NURSE PRACTITIONER

## 2022-05-04 PROCEDURE — 3288F PR FALLS RISK ASSESSMENT DOCUMENTED: ICD-10-PCS | Mod: CPTII,S$GLB,, | Performed by: NURSE PRACTITIONER

## 2022-05-04 PROCEDURE — 3078F DIAST BP <80 MM HG: CPT | Mod: CPTII,S$GLB,, | Performed by: NURSE PRACTITIONER

## 2022-05-04 PROCEDURE — 3074F SYST BP LT 130 MM HG: CPT | Mod: CPTII,S$GLB,, | Performed by: NURSE PRACTITIONER

## 2022-05-04 PROCEDURE — G0439 PPPS, SUBSEQ VISIT: HCPCS | Mod: S$GLB,,, | Performed by: NURSE PRACTITIONER

## 2022-05-04 PROCEDURE — 3288F FALL RISK ASSESSMENT DOCD: CPT | Mod: CPTII,S$GLB,, | Performed by: NURSE PRACTITIONER

## 2022-05-04 PROCEDURE — 99499 UNLISTED E&M SERVICE: CPT | Mod: S$GLB,,, | Performed by: NURSE PRACTITIONER

## 2022-05-04 PROCEDURE — 1159F PR MEDICATION LIST DOCUMENTED IN MEDICAL RECORD: ICD-10-PCS | Mod: CPTII,S$GLB,, | Performed by: NURSE PRACTITIONER

## 2022-05-04 PROCEDURE — 3008F PR BODY MASS INDEX (BMI) DOCUMENTED: ICD-10-PCS | Mod: CPTII,S$GLB,, | Performed by: NURSE PRACTITIONER

## 2022-05-04 PROCEDURE — 1170F FXNL STATUS ASSESSED: CPT | Mod: CPTII,S$GLB,, | Performed by: NURSE PRACTITIONER

## 2022-05-04 PROCEDURE — 1101F PR PT FALLS ASSESS DOC 0-1 FALLS W/OUT INJ PAST YR: ICD-10-PCS | Mod: CPTII,S$GLB,, | Performed by: NURSE PRACTITIONER

## 2022-05-04 PROCEDURE — 1101F PT FALLS ASSESS-DOCD LE1/YR: CPT | Mod: CPTII,S$GLB,, | Performed by: NURSE PRACTITIONER

## 2022-05-04 PROCEDURE — G0439 PR MEDICARE ANNUAL WELLNESS SUBSEQUENT VISIT: ICD-10-PCS | Mod: S$GLB,,, | Performed by: NURSE PRACTITIONER

## 2022-05-04 PROCEDURE — 3008F BODY MASS INDEX DOCD: CPT | Mod: CPTII,S$GLB,, | Performed by: NURSE PRACTITIONER

## 2022-05-04 PROCEDURE — 1126F AMNT PAIN NOTED NONE PRSNT: CPT | Mod: CPTII,S$GLB,, | Performed by: NURSE PRACTITIONER
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
## 2022-05-04 NOTE — PATIENT INSTRUCTIONS
Counseling and Referral of Other Preventative  (Italic type indicates deductible and co-insurance are waived)    Patient Name: Marcus Watkins  Today's Date: 5/4/2022    Health Maintenance       Date Due Completion Date    COVID-19 Vaccine (4 - Booster for Moderna series) 05/24/2022 (Originally 5/1/2022)   1/1/2022    Colorectal Cancer Screening Scheduled   4/14/2016    Lipid Panel 03/04/2023   3/4/2022    TETANUS VACCINE    Thyroid U/S      Gradual weight loss- check with insurance on options for obesity management- wt loss programs    Mediterranean diet (see handouts)    Investigate options to burn calories- stationary bike,  Water aerobics    Prevention of falls- prolonged timed get up & go test- gait - maintain balance & muscle strength     07/02/2028    Check w PCP on need to repeat       7/2/2018    8/3/2020        No orders of the defined types were placed in this encounter.    The following information is provided to all patients.  This information is to help you find resources for any of the problems found today that may be affecting your health:                Living healthy guide: www.[facility].[place].gov      Understanding Diabetes: www.diabetes.org      Eating healthy: www.cdc.gov/healthyweight      CDC home safety checklist: www.cdc.gov/steadi/patient.html      Agency on Aging: www.goea.[place].gov      Alcoholics anonymous (AA): www.aa.org      Physical Activity: www.devin.nih.gov/gm4ksxw      Tobacco use: www.quitwithusla.org

## 2022-05-11 DIAGNOSIS — I48.91 ATRIAL FIBRILLATION WITH RVR: ICD-10-CM

## 2022-05-16 ENCOUNTER — NUTRITION (OUTPATIENT)
Dept: NUTRITION | Facility: CLINIC | Age: 67
End: 2022-05-16
Payer: COMMERCIAL

## 2022-05-16 VITALS — HEIGHT: 72 IN | BODY MASS INDEX: 35.89 KG/M2 | WEIGHT: 265 LBS

## 2022-05-16 DIAGNOSIS — E66.01 CLASS 2 SEVERE OBESITY DUE TO EXCESS CALORIES WITH SERIOUS COMORBIDITY IN ADULT, UNSPECIFIED BMI: Primary | ICD-10-CM

## 2022-05-16 DIAGNOSIS — R73.03 PREDIABETES: ICD-10-CM

## 2022-05-16 DIAGNOSIS — E78.5 DYSLIPIDEMIA: ICD-10-CM

## 2022-05-16 DIAGNOSIS — Z71.3 DIETARY COUNSELING: ICD-10-CM

## 2022-05-16 PROCEDURE — 97802 PR MED NUTR THER, 1ST, INDIV, EA 15 MIN: ICD-10-PCS | Mod: S$GLB,,, | Performed by: DIETITIAN, REGISTERED

## 2022-05-16 PROCEDURE — 97802 MEDICAL NUTRITION INDIV IN: CPT | Mod: S$GLB,,, | Performed by: DIETITIAN, REGISTERED

## 2022-05-16 PROCEDURE — 99999 PR PBB SHADOW E&M-EST. PATIENT-LVL III: ICD-10-PCS | Mod: PBBFAC,,,

## 2022-05-16 PROCEDURE — 99999 PR PBB SHADOW E&M-EST. PATIENT-LVL III: CPT | Mod: PBBFAC,,,

## 2022-05-16 NOTE — PROGRESS NOTES
"Referring Physician:Toshia Brunson*     Reason for visit:  Chief Complaint   Patient presents with    Obesity    Hyperlipidemia    prediabetes    Nutrition Counseling      Initial Visit    :1955     Allergies Reviewed  Meds Reviewed    Anthropometrics  Weight:120.2 kg (264 lb 15.9 oz)  Height:5' 11.5" (1.816 m)  BMI:Body mass index is 36.44 kg/m².   IBW:   76.5 kg  +/-10%    Meds:  Outpatient Medications Prior to Visit   Medication Sig Dispense Refill    apixaban (ELIQUIS) 5 mg Tab Take 1 tablet (5 mg total) by mouth 2 (two) times daily. 180 tablet 3    cranberry 500 mg Cap Take by mouth once daily.       L.acidophilus/B.bifidum,longum (HEALTHY COLON ORAL) Take by mouth.      metoprolol tartrate (LOPRESSOR) 25 MG tablet Take 1 tablet (25 mg total) by mouth once daily. 90 tablet 4    multivit-min/folic/vit K/lycop (ONE-A-DAY MEN'S 50 PLUS ORAL) Take by mouth.      multivitamin capsule Take by mouth.      omega-3 fatty acids/fish oil (FISH OIL-OMEGA-3 FATTY ACIDS) 300-1,000 mg capsule Take 1 capsule by mouth once daily.      vitamin E 1,000 unit/112 gram Crea Take by mouth.      vitamin E, dl,tocopheryl acet, (VITAMIN E, DL, ACETATE, ORAL) Take by mouth.       No facility-administered medications prior to visit.       Food/Drug Interactions Noted:  n/a    Vitamins/Supplements/Herbs:   See med list    Labs:   HgbA1c  5.9   Chol  222  LDL Chol  159.6   HDL  39     Nutrition Prescription:   1912 Kcals/day( 25 kcal/kg IBW),  76 g protein( 1.0 g/kg IBW)     Support System:   Pt does the grocery shopping and cooking for himself, his wife and his adult daughter    Diet Hx:   For the past 3 weeks pt has decreased his portion sizes and reduced his intake of bread.  Doesn't usually eat breakfast.  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  Snacks:  At work eats a jelly roll snack cake, and at night eats a Fat Boy ice cream " sandwich.  Likes vegetables of all kinds.     Breakfast: this morning had a herrera, egg, cheese croissant.  Coffee with Sweet Cream flavored creamer  Lunch:   Ham & cheese sandwich on rye with collier, Snapps crackers, water.  Dinner:   Last night:  Elsa's preeti steak and mac & cheese, frozen vegetable medley.  Water.    Current activity level and/or physical limitations:  Wearing special boot on left food (causing noticeable limp) 2/2 multiple toe amputation.  This occurred in October 2020 and has been nonhealing until recently.  Pt has difficulty walking long distances; has been at a desk job.    Motivation to make changes/anticipated barriers and/or expected adherence:   No barriers to adherence identified    Nutrition-Focus Physical Findings:  Pt wearing face covering per COVID19 protocol; pt appears well nourished      Assessment:  Pt attentive and asked relevant questions about foods/beverages recommended and to avoid; reading food labels; portion control; sample meal plan and menus; healthy snacking; grocery shopping and cooking tips.  All questions answered, and he verbalized understanding of information.    Nutrition Diagnosis:  Food- and nutrition-related knowledge deficit RT lack of prior exposure to information AEB dx prediabetes/obesity      Recommendations:  1800 calorie, low fat, high fiber diet.  Handouts provided and reviewed:  Cardiac-TLC Nutrition Therapy; 1800 Calorie Sample 5-Day Menus; Weight Loss Tips; Cooking Tips for Weight Management; Get Fit Shopping List; Eat Fit Plan...Anytime/Anywhere;  Servings of Carbohydrates for Meal Planning; Walking Works; My Plate Planner;  Heart Healthy Eating:  Shopping Tips and Label Reading Tips; Tips to Support Weight Loss; [facility] Snack List for Diabetes      Strategies Implemented:  Portion control    Consultation Time:20 minutes.  Communicated with referring healthcare provider:  Consult note available in pt's Epic chart per MD discretion  Follow Up:  Pt  provided with dietitian contact number and advised to call with questions or make future appointment if further intervention needed.

## 2022-05-17 DIAGNOSIS — Z12.11 SPECIAL SCREENING FOR MALIGNANT NEOPLASMS, COLON: Primary | ICD-10-CM

## 2022-05-17 RX ORDER — SODIUM, POTASSIUM,MAG SULFATES 17.5-3.13G
1 SOLUTION, RECONSTITUTED, ORAL ORAL DAILY
Qty: 1 KIT | Refills: 0 | Status: SHIPPED | OUTPATIENT
Start: 2022-05-17 | End: 2022-05-19

## 2022-06-03 ENCOUNTER — ANESTHESIA (OUTPATIENT)
Dept: ENDOSCOPY | Facility: HOSPITAL | Age: 67
End: 2022-06-03
Payer: COMMERCIAL

## 2022-06-03 ENCOUNTER — ANESTHESIA EVENT (OUTPATIENT)
Dept: ENDOSCOPY | Facility: HOSPITAL | Age: 67
End: 2022-06-03
Payer: COMMERCIAL

## 2022-06-03 ENCOUNTER — HOSPITAL ENCOUNTER (OUTPATIENT)
Facility: HOSPITAL | Age: 67
Discharge: HOME | End: 2022-06-03
Attending: INTERNAL MEDICINE | Admitting: INTERNAL MEDICINE
Payer: COMMERCIAL

## 2022-06-03 VITALS
TEMPERATURE: 98 F | RESPIRATION RATE: 17 BRPM | SYSTOLIC BLOOD PRESSURE: 148 MMHG | BODY MASS INDEX: 37.1 KG/M2 | HEART RATE: 69 BPM | OXYGEN SATURATION: 99 % | DIASTOLIC BLOOD PRESSURE: 77 MMHG | WEIGHT: 265 LBS | HEIGHT: 71 IN

## 2022-06-03 DIAGNOSIS — Z86.010 HISTORY OF COLON POLYPS: ICD-10-CM

## 2022-06-03 PROCEDURE — 25000003 PHARM REV CODE 250: Performed by: NURSE ANESTHETIST, CERTIFIED REGISTERED

## 2022-06-03 PROCEDURE — 45385 COLONOSCOPY W/LESION REMOVAL: CPT | Mod: PT | Performed by: INTERNAL MEDICINE

## 2022-06-03 PROCEDURE — E9220 PRA ENDO ANESTHESIA: ICD-10-PCS | Mod: 33,,, | Performed by: NURSE ANESTHETIST, CERTIFIED REGISTERED

## 2022-06-03 PROCEDURE — 63600175 PHARM REV CODE 636 W HCPCS: Performed by: NURSE ANESTHETIST, CERTIFIED REGISTERED

## 2022-06-03 PROCEDURE — 27201089 HC SNARE, DISP (ANY): Performed by: INTERNAL MEDICINE

## 2022-06-03 PROCEDURE — 45385 PR COLONOSCOPY,REMV LESN,SNARE: ICD-10-PCS | Mod: PT,,, | Performed by: INTERNAL MEDICINE

## 2022-06-03 PROCEDURE — E9220 PRA ENDO ANESTHESIA: HCPCS | Mod: 33,,, | Performed by: NURSE ANESTHETIST, CERTIFIED REGISTERED

## 2022-06-03 PROCEDURE — 37000009 HC ANESTHESIA EA ADD 15 MINS: Performed by: INTERNAL MEDICINE

## 2022-06-03 PROCEDURE — 88305 TISSUE EXAM BY PATHOLOGIST: ICD-10-PCS | Mod: 26,,, | Performed by: PATHOLOGY

## 2022-06-03 PROCEDURE — 45385 COLONOSCOPY W/LESION REMOVAL: CPT | Mod: PT,,, | Performed by: INTERNAL MEDICINE

## 2022-06-03 PROCEDURE — 37000008 HC ANESTHESIA 1ST 15 MINUTES: Performed by: INTERNAL MEDICINE

## 2022-06-03 PROCEDURE — 88305 TISSUE EXAM BY PATHOLOGIST: CPT | Mod: 26,,, | Performed by: PATHOLOGY

## 2022-06-03 PROCEDURE — 88305 TISSUE EXAM BY PATHOLOGIST: CPT | Performed by: PATHOLOGY

## 2022-06-03 RX ORDER — SODIUM CHLORIDE 9 MG/ML
INJECTION, SOLUTION INTRAVENOUS CONTINUOUS
Status: DISCONTINUED | OUTPATIENT
Start: 2022-06-03 | End: 2022-06-03 | Stop reason: HOSPADM

## 2022-06-03 RX ORDER — PROPOFOL 10 MG/ML
VIAL (ML) INTRAVENOUS CONTINUOUS PRN
Status: DISCONTINUED | OUTPATIENT
Start: 2022-06-03 | End: 2022-06-03

## 2022-06-03 RX ORDER — LIDOCAINE HCL/PF 100 MG/5ML
SYRINGE (ML) INTRAVENOUS
Status: DISCONTINUED | OUTPATIENT
Start: 2022-06-03 | End: 2022-06-03

## 2022-06-03 RX ORDER — PROPOFOL 10 MG/ML
VIAL (ML) INTRAVENOUS
Status: DISCONTINUED | OUTPATIENT
Start: 2022-06-03 | End: 2022-06-03

## 2022-06-03 RX ADMIN — Medication 100 MG: at 07:06

## 2022-06-03 RX ADMIN — PROPOFOL 70 MG: 10 INJECTION, EMULSION INTRAVENOUS at 07:06

## 2022-06-03 RX ADMIN — SODIUM CHLORIDE: 9 INJECTION, SOLUTION INTRAVENOUS at 07:06

## 2022-06-03 RX ADMIN — PROPOFOL 150 MCG/KG/MIN: 10 INJECTION, EMULSION INTRAVENOUS at 07:06

## 2022-06-03 NOTE — ANESTHESIA POSTPROCEDURE EVALUATION
Anesthesia Post Evaluation    Patient: Marcus Watkins    Procedure(s) Performed: Procedure(s) (LRB):  COLONOSCOPY (N/A)    Final Anesthesia Type: general      Patient location during evaluation: PACU  Patient participation: Yes- Able to Participate  Level of consciousness: awake and alert and oriented  Post-procedure vital signs: reviewed and stable  Pain management: adequate  Airway patency: patent    PONV status at discharge: No PONV  Anesthetic complications: no      Cardiovascular status: hemodynamically stable  Respiratory status: unassisted, spontaneous ventilation and room air  Hydration status: euvolemic  Follow-up not needed.          Vitals Value Taken Time   /76 06/03/22 0810   Temp 36.6 °C (97.9 °F) 06/03/22 0755   Pulse 72 06/03/22 0810   Resp 18 06/03/22 0810   SpO2 98 % 06/03/22 0810         No case tracking events are documented in the log.      Pain/Monika Score: Monika Score: 9 (6/3/2022  7:55 AM)
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
## 2022-06-03 NOTE — TRANSFER OF CARE
"Anesthesia Transfer of Care Note    Patient: Marcus Watkins    Procedure(s) Performed: Procedure(s) (LRB):  COLONOSCOPY (N/A)    Patient location: GI    Anesthesia Type: MAC    Transport from OR: Transported from OR on room air with adequate spontaneous ventilation    Post pain: adequate analgesia    Post assessment: no apparent anesthetic complications    Post vital signs: stable    Level of consciousness: awake    Nausea/Vomiting: no nausea/vomiting    Complications: none    Transfer of care protocol was followed      Last vitals:   Visit Vitals  BP (!) 105/57 (BP Location: Left arm, Patient Position: Lying)   Pulse 89   Temp 36.6 °C (97.9 °F) (Temporal)   Resp 17   Ht 5' 11" (1.803 m)   Wt 120.2 kg (265 lb)   SpO2 (!) 94%   BMI 36.96 kg/m²     "
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
## 2022-06-03 NOTE — PROVATION PATIENT INSTRUCTIONS
Discharge Summary/Instructions after an Endoscopic Procedure  Patient Name: Marcus Watkins  Patient MRN: [id number]  Patient YOB: 1955     Friday, Shantelle 3, 2022  Jerrod Tijerina MD  Dear patient,  As a result of recent federal legislation (The Federal Cures Act), you may   receive lab or pathology results from your procedure in your [facility]   account before your physician is able to contact you. Your physician or   their representative will relay the results to you with their   recommendations at their soonest availability.  Thank you,  RESTRICTIONS:  During your procedure today, you received medications for sedation.  These   medications may affect your judgment, balance and coordination.  Therefore,   for 24 hours, you have the following restrictions:   - DO NOT drive a car, operate machinery, make legal/financial decisions,   sign important papers or drink alcohol.    ACTIVITY:  Today: no heavy lifting, straining or running due to procedural   sedation/anesthesia.  The following day: return to full activity including work.  DIET:  Eat and drink normally unless instructed otherwise.     TREATMENT FOR COMMON SIDE EFFECTS:  - Mild abdominal pain, nausea, belching, bloating or excessive gas:  rest,   eat lightly and use a heating pad.  - Sore Throat: treat with throat lozenges and/or gargle with warm salt   water.  - Because air was used during the procedure, expelling large amounts of air   from your rectum or belching is normal.  - If a bowel prep was taken, you may not have a bowel movement for 1-3 days.    This is normal.  SYMPTOMS TO WATCH FOR AND REPORT TO YOUR PHYSICIAN:  1. Abdominal pain or bloating, other than gas cramps.  2. Chest pain.  3. Back pain.  4. Signs of infection such as: chills or fever occurring within 24 hours   after the procedure.  5. Rectal bleeding, which would show as bright red, maroon, or black stools.   (A tablespoon of blood from the rectum is not serious, especially if    hemorrhoids are present.)  6. Vomiting.  7. Weakness or dizziness.  GO DIRECTLY TO THE NEAREST EMERGENCY ROOM IF YOU HAVE ANY OF THE FOLLOWING:      Difficulty breathing              Chills and/or fever over 101 F   Persistent vomiting and/or vomiting blood   Severe abdominal pain   Severe chest pain   Black, tarry stools   Bleeding- more than one tablespoon   Any other symptom or condition that you feel may need urgent attention  Your doctor recommends these additional instructions:  If any biopsies were taken, your doctors clinic will contact you in 1 to 2   weeks with any results.  - Discharge patient to home.   - High fiber diet indefinitely.   - Continue present medications.   - Use fiber, for example Citrucel, Fibercon, Konsyl or Metamucil.   - Await pathology results.   - Repeat colonoscopy in 3 years for surveillance.   - Return to referring physician as previously scheduled.   - Resume Eliquis (apixaban) at prior dose tomorrow.   - Patient has a contact number available for emergencies.  The signs and   symptoms of potential delayed complications were discussed with the   patient.  Return to normal activities tomorrow.  Written discharge   instructions were provided to the patient.  For questions, problems or results please call your physician - Jerrod Tijerina MD at Work:  [phone].  [facility] [place], EMERGENCY ROOM PHONE NUMBER: [phone]  IF A COMPLICATION OR EMERGENCY SITUATION ARISES AND YOU ARE UNABLE TO REACH   YOUR PHYSICIAN - GO DIRECTLY TO THE EMERGENCY ROOM.  Jerrod Tijerina MD  6/3/2022 7:51:32 AM  This report has been verified and signed electronically.  Dear patient,  As a result of recent federal legislation (The Federal Cures Act), you may   receive lab or pathology results from your procedure in your [facility]   account before your physician is able to contact you. Your physician or   their representative will relay the results to you with their   recommendations at  their soonest availability.  Thank you,  PROVATION
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
## 2022-06-09 LAB
FINAL PATHOLOGIC DIAGNOSIS: NORMAL
Lab: NORMAL

## 2022-07-15 ENCOUNTER — TELEPHONE (OUTPATIENT)
Dept: PRIMARY CARE CLINIC | Facility: CLINIC | Age: 67
End: 2022-07-15
Payer: COMMERCIAL

## 2022-07-15 NOTE — TELEPHONE ENCOUNTER
----- Message from Shannan Guerrero sent at 7/15/2022  1:39 PM CDT -----  Contact: Marcus chase [phone]  [email or web address]     Patient is calling for Medical Advice regarding:    How long has patient had these symptoms:    Pharmacy name and phone#:    Would like response via alphacityguidest:call back    Comments: Pt is requesting a call back from the nurse because he needs a referral for podiatry because of the pressure points on the bottom of his foot. Pt also wants to speak with the nurse about some paperwork that he received from [facility]
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
## 2022-07-18 ENCOUNTER — TELEPHONE (OUTPATIENT)
Dept: URGENT CARE | Facility: CLINIC | Age: 67
End: 2022-07-18
Payer: COMMERCIAL
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
## 2022-07-18 NOTE — TELEPHONE ENCOUNTER
Provider message:  I do not think they will accept workers' comp. He will need to go through his employer to definitely assure workers comp coverage     Spoke with patient, informed of provider message and recommendation.  Patient verbalized understanding.

## 2022-08-01 ENCOUNTER — OFFICE VISIT (OUTPATIENT)
Dept: URGENT CARE | Facility: CLINIC | Age: 67
End: 2022-08-01
Payer: OTHER MISCELLANEOUS

## 2022-08-01 VITALS
RESPIRATION RATE: 16 BRPM | OXYGEN SATURATION: 98 % | HEART RATE: 74 BPM | DIASTOLIC BLOOD PRESSURE: 49 MMHG | SYSTOLIC BLOOD PRESSURE: 115 MMHG

## 2022-08-01 DIAGNOSIS — Z02.6 ENCOUNTER FOR ASSESSMENT OF WORK-RELATED CAUSATION OF INJURY: ICD-10-CM

## 2022-08-01 DIAGNOSIS — L97.529 ULCER OF LEFT FOOT, UNSPECIFIED ULCER STAGE: Primary | ICD-10-CM

## 2022-08-01 PROCEDURE — 99203 OFFICE O/P NEW LOW 30 MIN: CPT | Mod: S$GLB,,, | Performed by: PHYSICIAN ASSISTANT

## 2022-08-01 PROCEDURE — 99203 PR OFFICE/OUTPT VISIT, NEW, LEVL III, 30-44 MIN: ICD-10-PCS | Mod: S$GLB,,, | Performed by: PHYSICIAN ASSISTANT

## 2022-08-01 NOTE — LETTER
Urgent Care - [address] [address]  Phone: [phone]  Fax: [phone]  [facility]: [email or web address]    Pt Name: Marcus Cosme  Injury Date: 10/16/2020   Employee ID:  Date of First Treatment: 08/01/2022   Company: [employer]      Appointment Time: 10:30 AM Arrived: 09:33 AM   Provider: Dank Herrera PA-C Time Out: 11:50 AM     Office Treatment:   1. Ulcer of left foot, unspecified ulcer stage    2. Encounter for assessment of work-related causation of injury          Patient Instructions: Keep dressing clean/dry/covered, Referral to specialist to be scheduled, once authorized    Restrictions:  (Duty as tolerated)     Return Appointment: if symptoms worsen or fail to improve
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
## 2022-08-03 ENCOUNTER — TELEPHONE (OUTPATIENT)
Dept: PODIATRY | Facility: CLINIC | Age: 67
End: 2022-08-03
Payer: COMMERCIAL

## 2022-08-09 ENCOUNTER — OFFICE VISIT (OUTPATIENT)
Dept: PODIATRY | Facility: CLINIC | Age: 67
End: 2022-08-09
Payer: OTHER MISCELLANEOUS

## 2022-08-09 ENCOUNTER — TELEPHONE (OUTPATIENT)
Dept: PODIATRY | Facility: CLINIC | Age: 67
End: 2022-08-09
Payer: COMMERCIAL

## 2022-08-09 ENCOUNTER — HOSPITAL ENCOUNTER (OUTPATIENT)
Dept: RADIOLOGY | Facility: HOSPITAL | Age: 67
Discharge: HOME | End: 2022-08-09
Attending: PODIATRIST
Payer: COMMERCIAL

## 2022-08-09 VITALS
WEIGHT: 265 LBS | SYSTOLIC BLOOD PRESSURE: 124 MMHG | HEART RATE: 88 BPM | HEIGHT: 71 IN | DIASTOLIC BLOOD PRESSURE: 75 MMHG | BODY MASS INDEX: 37.1 KG/M2

## 2022-08-09 DIAGNOSIS — Z79.01 CURRENT USE OF LONG TERM ANTICOAGULATION: ICD-10-CM

## 2022-08-09 DIAGNOSIS — Z89.432 HISTORY OF TRANSMETATARSAL AMPUTATION OF LEFT FOOT: ICD-10-CM

## 2022-08-09 DIAGNOSIS — M86.179 OTHER ACUTE OSTEOMYELITIS, UNSPECIFIED ANKLE AND FOOT: ICD-10-CM

## 2022-08-09 DIAGNOSIS — L97.525 ULCER OF LEFT FOOT WITH MUSCLE INVOLVEMENT WITHOUT EVIDENCE OF NECROSIS: ICD-10-CM

## 2022-08-09 DIAGNOSIS — L97.522 ULCER OF LEFT FOOT WITH FAT LAYER EXPOSED: ICD-10-CM

## 2022-08-09 DIAGNOSIS — G60.9 IDIOPATHIC PERIPHERAL NEUROPATHY: Primary | ICD-10-CM

## 2022-08-09 PROCEDURE — 88305 TISSUE EXAM BY PATHOLOGIST: ICD-10-PCS | Mod: 26,,, | Performed by: STUDENT IN AN ORGANIZED HEALTH CARE EDUCATION/TRAINING PROGRAM

## 2022-08-09 PROCEDURE — 73630 XR FOOT COMPLETE 3 VIEW LEFT: ICD-10-PCS | Mod: 26,LT,, | Performed by: RADIOLOGY

## 2022-08-09 PROCEDURE — 99214 PR OFFICE/OUTPT VISIT, EST, LEVL IV, 30-39 MIN: ICD-10-PCS | Mod: 25,S$GLB,, | Performed by: PODIATRIST

## 2022-08-09 PROCEDURE — 11043 DBRDMT MUSC&/FSCA 1ST 20/<: CPT | Mod: S$GLB,,, | Performed by: PODIATRIST

## 2022-08-09 PROCEDURE — 88305 TISSUE EXAM BY PATHOLOGIST: CPT | Mod: 26,,, | Performed by: STUDENT IN AN ORGANIZED HEALTH CARE EDUCATION/TRAINING PROGRAM

## 2022-08-09 PROCEDURE — 88305 TISSUE EXAM BY PATHOLOGIST: CPT | Performed by: STUDENT IN AN ORGANIZED HEALTH CARE EDUCATION/TRAINING PROGRAM

## 2022-08-09 PROCEDURE — 99999 PR PBB SHADOW E&M-EST. PATIENT-LVL III: ICD-10-PCS | Mod: PBBFAC,,, | Performed by: PODIATRIST

## 2022-08-09 PROCEDURE — 99499 RISK ADDL DX/OHS AUDIT: ICD-10-PCS | Mod: S$GLB,,, | Performed by: PODIATRIST

## 2022-08-09 PROCEDURE — 73630 X-RAY EXAM OF FOOT: CPT | Mod: TC,PN,LT

## 2022-08-09 PROCEDURE — 99214 OFFICE O/P EST MOD 30 MIN: CPT | Mod: 25,S$GLB,, | Performed by: PODIATRIST

## 2022-08-09 PROCEDURE — 11043 PR DEBRIDEMENT, SKIN, SUB-Q TISSUE,MUSCLE,=<20 SQ CM: ICD-10-PCS | Mod: S$GLB,,, | Performed by: PODIATRIST

## 2022-08-09 PROCEDURE — 99999 PR PBB SHADOW E&M-EST. PATIENT-LVL III: CPT | Mod: PBBFAC,,, | Performed by: PODIATRIST

## 2022-08-09 PROCEDURE — 73630 X-RAY EXAM OF FOOT: CPT | Mod: 26,LT,, | Performed by: RADIOLOGY

## 2022-08-09 PROCEDURE — 99499 UNLISTED E&M SERVICE: CPT | Mod: S$GLB,,, | Performed by: PODIATRIST
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
## 2022-08-09 NOTE — TELEPHONE ENCOUNTER
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

## 2022-08-11 ENCOUNTER — HOSPITAL ENCOUNTER (INPATIENT)
Facility: HOSPITAL | Age: 67
LOS: 8 days | Discharge: HOME HEALTH | DRG: 475 | End: 2022-08-19
Attending: EMERGENCY MEDICINE | Admitting: INTERNAL MEDICINE
Payer: OTHER MISCELLANEOUS

## 2022-08-11 ENCOUNTER — TELEPHONE (OUTPATIENT)
Dept: PODIATRY | Facility: CLINIC | Age: 67
End: 2022-08-11
Payer: COMMERCIAL

## 2022-08-11 DIAGNOSIS — M86.9 OSTEOMYELITIS, UNSPECIFIED SITE, UNSPECIFIED TYPE: ICD-10-CM

## 2022-08-11 DIAGNOSIS — R23.8 LOCALIZED WARMTH OF SKIN: ICD-10-CM

## 2022-08-11 DIAGNOSIS — L97.509: ICD-10-CM

## 2022-08-11 DIAGNOSIS — M86.172 ACUTE OSTEOMYELITIS OF METATARSAL BONE OF LEFT FOOT: Primary | ICD-10-CM

## 2022-08-11 DIAGNOSIS — R07.9 CHEST PAIN: ICD-10-CM

## 2022-08-11 DIAGNOSIS — M86.9 OSTEOMYELITIS OF LEFT FOOT, UNSPECIFIED TYPE: ICD-10-CM

## 2022-08-11 DIAGNOSIS — T87.89: ICD-10-CM

## 2022-08-11 LAB
ALBUMIN SERPL BCP-MCNC: 3.6 G/DL (ref 3.5–5.2)
ALP SERPL-CCNC: 65 U/L (ref 55–135)
ALT SERPL W/O P-5'-P-CCNC: 13 U/L (ref 10–44)
ANION GAP SERPL CALC-SCNC: 12 MMOL/L (ref 8–16)
AST SERPL-CCNC: 14 U/L (ref 10–40)
BASOPHILS # BLD AUTO: 0.05 K/UL (ref 0–0.2)
BASOPHILS NFR BLD: 0.4 % (ref 0–1.9)
BILIRUB SERPL-MCNC: 0.6 MG/DL (ref 0.1–1)
BILIRUB UR QL STRIP: NEGATIVE
BUN SERPL-MCNC: 10 MG/DL (ref 8–23)
CALCIUM SERPL-MCNC: 9.3 MG/DL (ref 8.7–10.5)
CHLORIDE SERPL-SCNC: 100 MMOL/L (ref 95–110)
CLARITY UR REFRACT.AUTO: CLEAR
CO2 SERPL-SCNC: 22 MMOL/L (ref 23–29)
COLOR UR AUTO: YELLOW
CREAT SERPL-MCNC: 0.8 MG/DL (ref 0.5–1.4)
CRP SERPL-MCNC: 89.7 MG/L (ref 0–8.2)
DIFFERENTIAL METHOD: ABNORMAL
EOSINOPHIL # BLD AUTO: 0.1 K/UL (ref 0–0.5)
EOSINOPHIL NFR BLD: 1.1 % (ref 0–8)
ERYTHROCYTE [DISTWIDTH] IN BLOOD BY AUTOMATED COUNT: 13.3 % (ref 11.5–14.5)
ERYTHROCYTE [SEDIMENTATION RATE] IN BLOOD BY PHOTOMETRIC METHOD: 42 MM/HR (ref 0–23)
EST. GFR  (NO RACE VARIABLE): >60 ML/MIN/1.73 M^2
ESTIMATED AVG GLUCOSE: 117 MG/DL (ref 68–131)
GLUCOSE SERPL-MCNC: 98 MG/DL (ref 70–110)
GLUCOSE UR QL STRIP: NEGATIVE
HBA1C MFR BLD: 5.7 % (ref 4–5.6)
HCT VFR BLD AUTO: 39.8 % (ref 40–54)
HCV AB SERPL QL IA: NEGATIVE
HGB BLD-MCNC: 13.9 G/DL (ref 14–18)
HGB UR QL STRIP: NEGATIVE
IMM GRANULOCYTES # BLD AUTO: 0.05 K/UL (ref 0–0.04)
IMM GRANULOCYTES NFR BLD AUTO: 0.4 % (ref 0–0.5)
KETONES UR QL STRIP: ABNORMAL
LEUKOCYTE ESTERASE UR QL STRIP: NEGATIVE
LYMPHOCYTES # BLD AUTO: 2.9 K/UL (ref 1–4.8)
LYMPHOCYTES NFR BLD: 22.9 % (ref 18–48)
MCH RBC QN AUTO: 29.8 PG (ref 27–31)
MCHC RBC AUTO-ENTMCNC: 34.9 G/DL (ref 32–36)
MCV RBC AUTO: 85 FL (ref 82–98)
MONOCYTES # BLD AUTO: 1.1 K/UL (ref 0.3–1)
MONOCYTES NFR BLD: 8.5 % (ref 4–15)
NEUTROPHILS # BLD AUTO: 8.5 K/UL (ref 1.8–7.7)
NEUTROPHILS NFR BLD: 66.7 % (ref 38–73)
NITRITE UR QL STRIP: NEGATIVE
NRBC BLD-RTO: 0 /100 WBC
PH UR STRIP: 6 [PH] (ref 5–8)
PLATELET # BLD AUTO: 259 K/UL (ref 150–450)
PMV BLD AUTO: 11.2 FL (ref 9.2–12.9)
POTASSIUM SERPL-SCNC: 3.6 MMOL/L (ref 3.5–5.1)
PROT SERPL-MCNC: 7.4 G/DL (ref 6–8.4)
PROT UR QL STRIP: NEGATIVE
RBC # BLD AUTO: 4.66 M/UL (ref 4.6–6.2)
SODIUM SERPL-SCNC: 134 MMOL/L (ref 136–145)
SP GR UR STRIP: 1.01 (ref 1–1.03)
TSH SERPL DL<=0.005 MIU/L-ACNC: 1.22 UIU/ML (ref 0.4–4)
URN SPEC COLLECT METH UR: ABNORMAL
WBC # BLD AUTO: 12.8 K/UL (ref 3.9–12.7)

## 2022-08-11 PROCEDURE — 11000001 HC ACUTE MED/SURG PRIVATE ROOM

## 2022-08-11 PROCEDURE — 99285 PR EMERGENCY DEPT VISIT,LEVEL V: ICD-10-PCS | Mod: ,,, | Performed by: PHYSICIAN ASSISTANT

## 2022-08-11 PROCEDURE — 99223 1ST HOSP IP/OBS HIGH 75: CPT | Mod: AI,,, | Performed by: INTERNAL MEDICINE

## 2022-08-11 PROCEDURE — 99285 EMERGENCY DEPT VISIT HI MDM: CPT | Mod: 25

## 2022-08-11 PROCEDURE — 99223 PR INITIAL HOSPITAL CARE,LEVL III: ICD-10-PCS | Mod: AI,,, | Performed by: INTERNAL MEDICINE

## 2022-08-11 PROCEDURE — 99285 EMERGENCY DEPT VISIT HI MDM: CPT | Mod: ,,, | Performed by: PHYSICIAN ASSISTANT

## 2022-08-11 PROCEDURE — 63600175 PHARM REV CODE 636 W HCPCS: Performed by: INTERNAL MEDICINE

## 2022-08-11 PROCEDURE — 87040 BLOOD CULTURE FOR BACTERIA: CPT | Mod: 59 | Performed by: PHYSICIAN ASSISTANT

## 2022-08-11 PROCEDURE — 86803 HEPATITIS C AB TEST: CPT | Performed by: PHYSICIAN ASSISTANT

## 2022-08-11 PROCEDURE — 25000003 PHARM REV CODE 250: Performed by: PHYSICIAN ASSISTANT

## 2022-08-11 PROCEDURE — 83036 HEMOGLOBIN GLYCOSYLATED A1C: CPT | Performed by: PHYSICIAN ASSISTANT

## 2022-08-11 PROCEDURE — 85652 RBC SED RATE AUTOMATED: CPT | Performed by: PHYSICIAN ASSISTANT

## 2022-08-11 PROCEDURE — 84443 ASSAY THYROID STIM HORMONE: CPT | Performed by: PHYSICIAN ASSISTANT

## 2022-08-11 PROCEDURE — 81003 URINALYSIS AUTO W/O SCOPE: CPT

## 2022-08-11 PROCEDURE — 86140 C-REACTIVE PROTEIN: CPT | Performed by: PHYSICIAN ASSISTANT

## 2022-08-11 PROCEDURE — 63600175 PHARM REV CODE 636 W HCPCS: Performed by: PHYSICIAN ASSISTANT

## 2022-08-11 PROCEDURE — 85025 COMPLETE CBC W/AUTO DIFF WBC: CPT | Performed by: PHYSICIAN ASSISTANT

## 2022-08-11 PROCEDURE — 80053 COMPREHEN METABOLIC PANEL: CPT | Performed by: PHYSICIAN ASSISTANT

## 2022-08-11 RX ORDER — SODIUM CHLORIDE 0.9 % (FLUSH) 0.9 %
10 SYRINGE (ML) INJECTION EVERY 12 HOURS PRN
Status: DISCONTINUED | OUTPATIENT
Start: 2022-08-11 | End: 2022-08-19 | Stop reason: HOSPADM

## 2022-08-11 RX ORDER — IBUPROFEN 200 MG
16 TABLET ORAL
Status: DISCONTINUED | OUTPATIENT
Start: 2022-08-11 | End: 2022-08-19 | Stop reason: HOSPADM

## 2022-08-11 RX ORDER — GLUCAGON 1 MG
1 KIT INJECTION
Status: DISCONTINUED | OUTPATIENT
Start: 2022-08-11 | End: 2022-08-19 | Stop reason: HOSPADM

## 2022-08-11 RX ORDER — ACETAMINOPHEN 325 MG/1
650 TABLET ORAL EVERY 4 HOURS PRN
Status: DISCONTINUED | OUTPATIENT
Start: 2022-08-11 | End: 2022-08-19 | Stop reason: HOSPADM

## 2022-08-11 RX ORDER — PNV NO.95/FERROUS FUM/FOLIC AC 28MG-0.8MG
1000 TABLET ORAL DAILY
Status: DISCONTINUED | OUTPATIENT
Start: 2022-08-12 | End: 2022-08-19 | Stop reason: HOSPADM

## 2022-08-11 RX ORDER — METOPROLOL TARTRATE 25 MG/1
25 TABLET, FILM COATED ORAL DAILY
Status: DISCONTINUED | OUTPATIENT
Start: 2022-08-11 | End: 2022-08-13

## 2022-08-11 RX ORDER — ONDANSETRON 8 MG/1
8 TABLET, ORALLY DISINTEGRATING ORAL EVERY 8 HOURS PRN
Status: DISCONTINUED | OUTPATIENT
Start: 2022-08-11 | End: 2022-08-19 | Stop reason: HOSPADM

## 2022-08-11 RX ORDER — NALOXONE HCL 0.4 MG/ML
0.02 VIAL (ML) INJECTION
Status: DISCONTINUED | OUTPATIENT
Start: 2022-08-11 | End: 2022-08-19 | Stop reason: HOSPADM

## 2022-08-11 RX ORDER — HEPARIN SODIUM 5000 [USP'U]/ML
5000 INJECTION, SOLUTION INTRAVENOUS; SUBCUTANEOUS EVERY 8 HOURS
Status: DISCONTINUED | OUTPATIENT
Start: 2022-08-11 | End: 2022-08-11

## 2022-08-11 RX ORDER — IBUPROFEN 200 MG
24 TABLET ORAL
Status: DISCONTINUED | OUTPATIENT
Start: 2022-08-11 | End: 2022-08-19 | Stop reason: HOSPADM

## 2022-08-11 RX ADMIN — CEFTRIAXONE 2 G: 2 INJECTION, SOLUTION INTRAVENOUS at 04:08

## 2022-08-11 RX ADMIN — VANCOMYCIN HYDROCHLORIDE 2000 MG: 500 INJECTION, POWDER, LYOPHILIZED, FOR SOLUTION INTRAVENOUS at 02:08
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
## 2022-08-11 NOTE — ED TRIAGE NOTES
"Reports left foot felt warm last night. Reports "2 holes to the bottom of my foot, pressure points." Denies generalized fever or chills.   "

## 2022-08-11 NOTE — SUBJECTIVE & OBJECTIVE
Past Medical History:   Diagnosis Date    Atrial fibrillation        Past Surgical History:   Procedure Laterality Date    ABLATION N/A 12/27/2018    Procedure: ABLATION;  Surgeon: José Grey MD;  Location: [facility] EP LAB;  Service: Cardiology;  Laterality: N/A;  AF,PVI, RFA, CARTO, GEN, GP, 3 PREP    CARDIOVERSION N/A 10/29/2018    Procedure: CARDIOVERSION;  Surgeon: José Grey MD;  Location: [facility] CATH LAB;  Service: Cardiology;  Laterality: N/A;  AF, LOPEZ, DCCV, MAC, GP, 3 PREP    COLONOSCOPY N/A 04/14/2016    Procedure: COLONOSCOPY;  Surgeon: Kade Wang MD;  Location: [facility] ENDO (4TH FLR);  Service: Endoscopy;  Laterality: N/A;    COLONOSCOPY N/A 6/3/2022    Procedure: COLONOSCOPY;  Surgeon: Jerrod Tijerina MD;  Location: [facility] ENDO (4TH FLR);  Service: Endoscopy;  Laterality: N/A;  fully vaccianted  ok to hold Eliquis x 2 days per Dr. 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     Current Outpatient Medications on File Prior to Encounter   Medication Sig    apixaban (ELIQUIS) 5 mg Tab Take 1 tablet (5 mg total) by mouth 2 (two) times daily.    cranberry 500 mg Cap Take by mouth once daily.     L.acidophilus/B.bifidum,longum (HEALTHY COLON ORAL) Take by mouth.    metoprolol tartrate (LOPRESSOR)  25 MG tablet Take 1 tablet (25 mg total) by mouth once daily.    multivit-min/folic/vit K/lycop (ONE-A-DAY MEN'S 50 PLUS ORAL) Take by mouth.    multivitamin capsule Take by mouth.    omega-3 fatty acids/fish oil (FISH OIL-OMEGA-3 FATTY ACIDS) 300-1,000 mg capsule Take 1 capsule by mouth once daily.    vitamin E 1,000 unit/112 gram Crea Take by mouth.    vitamin E, dl,tocopheryl acet, (VITAMIN E, DL, ACETATE, ORAL) Take by mouth.     Family History       Problem Relation (Age of Onset)    Diabetes Brother    Heart disease Mother    Mental retardation Daughter          Tobacco Use    Smoking status: Former Smoker     Quit date: 1978     Years since quittin.1    Smokeless tobacco: Never Used   Substance and Sexual Activity    Alcohol use: No     Alcohol/week: 0.0 standard drinks    Drug use: No    Sexual activity: Not on file     Review of Systems   Constitutional:  Negative for chills and fever.   HENT:  Negative for rhinorrhea and sneezing.    Respiratory:  Negative for cough and shortness of breath.    Cardiovascular:  Negative for chest pain and leg swelling.   Gastrointestinal:  Negative for abdominal pain and nausea.   Genitourinary:  Negative for dysuria and hematuria.   Musculoskeletal:  Negative for arthralgias and myalgias.   Skin:  Positive for color change.   Neurological:  Negative for tremors and headaches.   Psychiatric/Behavioral:  Negative for agitation and confusion.    Objective:     Vital Signs (Most Recent):  Temp: 98 °F (36.7 °C) (22 1302)  Pulse: 78 (22 1302)  Resp: 18 (22 1302)  BP: 129/64 (22 1302)  SpO2: 99 % (22 1302) Vital Signs (24h Range):  Temp:  [97.7 °F (36.5 °C)-98 °F (36.7 °C)] 98 °F (36.7 °C)  Pulse:  [78-94] 78  Resp:  [18] 18  SpO2:  [96 %-99 %] 99 %  BP: (129-135)/(64-76) 129/64     Weight: 117.9 kg (260 lb)  Body mass index is 36.26 kg/m².    Physical Exam  Vitals and nursing note reviewed.   Constitutional:       General: He is not in  acute distress.  HENT:      Head: Normocephalic and atraumatic.      Nose: Nose normal. No rhinorrhea.   Eyes:      Extraocular Movements: Extraocular movements intact.      Conjunctiva/sclera: Conjunctivae normal.   Cardiovascular:      Rate and Rhythm: Normal rate and regular rhythm.   Pulmonary:      Effort: Pulmonary effort is normal. No respiratory distress.   Abdominal:      General: There is no distension.      Palpations: Abdomen is soft.   Musculoskeletal:         General: No swelling or tenderness.      Right lower leg: No edema.      Left lower leg: No edema.      Comments: Left forefoot amputation    Skin:     General: Skin is warm and dry.      Capillary Refill: Capillary refill takes less than 2 seconds.      Comments: Ulcers on sole of left foot   Neurological:      Mental Status: He is alert.           Significant Labs: All pertinent labs within the past 24 hours have been reviewed.  Recent Lab Results         08/11/22  1017        Albumin 3.6       Alkaline Phosphatase 65       ALT 13       Anion Gap 12       AST 14       Baso # 0.05       Basophil % 0.4       BILIRUBIN TOTAL 0.6  Comment: For infants and newborns, interpretation of results should be based  on gestational age, weight and in agreement with clinical  observations.    Premature Infant recommended reference ranges:  Up to 24 hours.............<8.0 mg/dL  Up to 48 hours............<12.0 mg/dL  3-5 days..................<15.0 mg/dL  6-29 days.................<15.0 mg/dL         BUN 10       Calcium 9.3       Chloride 100       CO2 22       Creatinine 0.8       CRP 89.7       Differential Method Automated       eGFR >60.0       Eos # 0.1       Eosinophil % 1.1       Glucose 98       Gran # (ANC) 8.5       Gran % 66.7       Hematocrit 39.8       Hemoglobin 13.9       Hepatitis C Ab Negative       Immature Grans (Abs) 0.05  Comment: Mild elevation in immature granulocytes is non specific and   can be seen in a variety of conditions including  stress response,   acute inflammation, trauma and pregnancy. Correlation with other   laboratory and clinical findings is essential.         Immature Granulocytes 0.4       Lymph # 2.9       Lymph % 22.9       MCH 29.8       MCHC 34.9       MCV 85       Mono # 1.1       Mono % 8.5       MPV 11.2       nRBC 0       Platelets 259       Potassium 3.6       PROTEIN TOTAL 7.4       RBC 4.66       RDW 13.3       Sed Rate 42       Sodium 134       WBC 12.80               Significant Imaging: I have reviewed all pertinent imaging results/findings within the past 24 hours.

## 2022-08-11 NOTE — ASSESSMENT & PLAN NOTE
Patient with prediabetes, last hba1c 5 months ago 5.9. Patient experiences bilateral peripheral neuropathy in a stocking glove pattern. He has a family history of diabetes in mother and sister. His neuropathy could be related to a diabetic process vs. thyroid etiology (hx of prior amiodarone use).  - Repeat Hba1c  - TSH

## 2022-08-11 NOTE — PHARMACY MED REC
"Admission Medication History     The home medication history was taken by Radha Casillas.    You may go to "Admission" then "Reconcile Home Medications" tabs to review and/or act upon these items.      The home medication list has been updated by the Pharmacy department.    Please read ALL comments highlighted in yellow.    Please address this information as you see fit.     Feel free to contact us if you have any questions or require assistance.        Medications listed below were obtained from: Patient/family    Medication Sig    apixaban (ELIQUIS) 5 mg Tab     Take 1 tablet (5 mg total) by mouth 2 (two) times daily.    cranberry 500 mg Cap   Take 1 capsule by mouth once daily.    L.acidophilus/B.bifidum,longum (HEALTHY COLON ORAL)   Take 1 tablet by mouth once daily.    metoprolol tartrate (LOPRESSOR) 25 MG tablet   Take 1 tablet (25 mg total) by mouth once daily.    multivit-min/folic/vit K/lycop (ONE-A-DAY MEN'S 50 PLUS ORAL)   Take 1 tablet by mouth once daily.    omega-3 fatty acids/fish oil (FISH OIL-OMEGA-3 FATTY ACIDS) 300-1,000 mg capsule   Take 1 capsule by mouth once daily.    VITAMIN E ACETATE ORAL   Take 1 tablet by mouth once daily.           Radha Casillas  EXT [id number]                    .          "

## 2022-08-11 NOTE — HPI
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 The patient noted his foot was feeling warmer in the middle of the night. He denies any chest pain, sob, palpitations, diarrhea, vomiting, dysuria, or nausea. Patient was last seen by a podiatrist on Tuesday who recommended a f/u MRI for a foot xray that was indicative of osteomyelitis. Patient denies history of alcohol, smoking, or drug use.

## 2022-08-11 NOTE — ED PROVIDER NOTES
"Encounter Date: 8/11/2022       History     Chief Complaint   Patient presents with    Wound Check     To L foot. Had all toes on this foot amputated. Currently has 2 open wounds on bottom of foot. Podiatrist sent pt here for possible infection of the bone. Erythema and warmth noted to L foot      This is a 66 year old male with a PMH of AFib anticoagulated on Eliquis presenting to the ED with warmth of the foot. He presented to podiatry on 8/9 to establish care for nonhealing wounds of the left foot. Per their note "He reports having a work related injury 10/16/2020 in which he had a chemical burn to the left foot.  [facility] and eventually had to have all toes amputated.  He has been wearing a orthotic walking shoe.  He reports recently having ulcers on the foot "pressure points" from wearing the orthotic shoe. He denies pain as he has lost feeling in the left foot." Debridement of wounds was performed on 8/9 in podiatry clinic. Wife states she noticed his left leg had warmth yesterday. She reached out to podiatrist who advised he come to the ED given her clinical concern for osteomyelitis and xray findings on the 9th indicating "Interval forefoot amputation at level of MTP joints.  Fourth and 5th metatarsals show irregularity of the heads with adjacent soft tissue swelling.  Correlate for possible osteomyelitis." Patient denies systemic symptoms such as fever, chills, nausea/vomiting, myalgias or additional complaints. He is not diabetic.        Review of patient's allergies indicates:  No Known Allergies  Past Medical History:   Diagnosis Date    Atrial fibrillation      Past Surgical History:   Procedure Laterality Date    ABLATION N/A 12/27/2018    Procedure: ABLATION;  Surgeon: José Grey MD;  Location: [facility];  Service: Cardiology;  Laterality: N/A;  AF,PVI, RFA, CARTO, GEN, GP, 3 PREP    CARDIOVERSION N/A 10/29/2018    Procedure: CARDIOVERSION;  Surgeon: José Grey, " MD;  Location: [facility] CATH LAB;  Service: Cardiology;  Laterality: N/A;  AF, LOPEZ, DCCV, MAC, GP, 3 PREP    COLONOSCOPY N/A 2016    Procedure: COLONOSCOPY;  Surgeon: Kade Wang MD;  Location: [facility] ENDO (4TH FLR);  Service: Endoscopy;  Laterality: N/A;    COLONOSCOPY N/A 6/3/2022    Procedure: COLONOSCOPY;  Surgeon: Jerrod Tijerina MD;  Location: [facility] ENDO (4TH FLR);  Service: Endoscopy;  Laterality: N/A;  fully vaccianted  ok to hold Eliquis x 2 days per Dr. 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    TREATMENT OF CARDIAC ARRHYTHMIA N/A 2018    Procedure: CARDIOVERSION;  Surgeon: José Grey MD;  Location: [facility] EP LAB;  Service: Cardiology;  Laterality: N/A;  AF, DCCV, MAC, GP, 3 PREP    TREATMENT OF CARDIAC ARRHYTHMIA  2018    Procedure: Cardioversion or Defibrillation;  Surgeon: José Grey MD;  Location: [facility] EP LAB;  Service: Cardiology;;     Family History   Problem Relation Age of Onset    Heart disease Mother     Diabetes Brother     Mental retardation Daughter      Social History     Tobacco Use    Smoking status: Former Smoker     Quit date: 1978     Years since quittin.1    Smokeless tobacco: Never Used   Substance Use Topics    Alcohol use: No     Alcohol/week: 0.0 standard drinks    Drug use: No     Review of Systems   Constitutional: Negative for chills and fever.   HENT: Negative for congestion.    Respiratory: Negative for cough and shortness of breath.    Cardiovascular: Negative for chest pain.   Gastrointestinal: Negative for nausea and vomiting.   Genitourinary: Negative for dysuria.   Musculoskeletal: Positive for joint swelling.   Skin: Positive for color change, rash and wound.   Neurological: Positive for numbness (chronic sensory loss in  left foot). Negative for weakness.   Hematological: Does not bruise/bleed easily.       Physical Exam     Initial Vitals   BP Pulse Resp Temp SpO2   08/11/22 0943 08/11/22 0943 08/11/22 0943 08/11/22 0948 08/11/22 0943   135/76 94 18 97.7 °F (36.5 °C) 96 %      MAP       --                Physical Exam    Constitutional: He appears well-developed and well-nourished. No distress.   HENT:   Head: Atraumatic.   Eyes: Conjunctivae and EOM are normal. Pupils are equal, round, and reactive to light.   Cardiovascular: Normal rate, regular rhythm and normal heart sounds.   Pulmonary/Chest: Breath sounds normal. No respiratory distress. He has no wheezes. He has no rhonchi. He has no rales.   Abdominal: Abdomen is soft. Bowel sounds are normal. There is no abdominal tenderness.     Neurological: He is alert and oriented to person, place, and time.   Skin: Skin is warm and dry. No rash noted.         ED Course   Procedures  Labs Reviewed   CBC W/ AUTO DIFFERENTIAL - Abnormal; Notable for the following components:       Result Value    WBC 12.80 (*)     Hemoglobin 13.9 (*)     Hematocrit 39.8 (*)     Gran # (ANC) 8.5 (*)     Immature Grans (Abs) 0.05 (*)     Mono # 1.1 (*)     All other components within normal limits    Narrative:     Release to patient->Immediate   COMPREHENSIVE METABOLIC PANEL - Abnormal; Notable for the following components:    Sodium 134 (*)     CO2 22 (*)     All other components within normal limits    Narrative:     Release to patient->Immediate   SEDIMENTATION RATE - Abnormal; Notable for the following components:    Sed Rate 42 (*)     All other components within normal limits    Narrative:     Release to patient->Immediate   C-REACTIVE PROTEIN - Abnormal; Notable for the following components:    CRP 89.7 (*)     All other components within normal limits    Narrative:     Release to patient->Immediate   URINALYSIS, REFLEX TO URINE CULTURE - Abnormal; Notable for the following components:    Ketones,  UA 1+ (*)     All other components within normal limits    Narrative:     Specimen Source->Urine   CULTURE, BLOOD   CULTURE, BLOOD   CULTURE, AEROBIC  (SPECIFY SOURCE)   CULTURE, ANAEROBIC   AFB CULTURE & SMEAR   CULTURE, FUNGUS   GRAM STAIN   HEPATITIS C ANTIBODY    Narrative:     Release to patient->Immediate   HEMOGLOBIN A1C   TSH   TSH    Narrative:     Release to patient->Immediate  ADD ON TSH PER KERMIT DANIEL RN PER TAMI BRASWELL MD ORDER#   [id number] @  08/11/2022  15:04           Imaging Results           MRI Foot (Midfoot) Left Without Contrast (Final result)  Result time 08/11/22 14:11:09    Final result by Emerson Pantoja MD (08/11/22 14:11:09)                 Impression:      Prior partial forefoot amputation.    Marrow signal changes in the distal 4th metatarsal, as above, concerning for osteomyelitis.    Probable second focus of osteomyelitis in the 1st metatarsal head.    This report was flagged in Epic as abnormal.      Electronically signed by: Emerson Pantoja MD  Date:    08/11/2022  Time:    14:11             Narrative:    EXAMINATION:  MRI FOOT (MIDFOOT) LEFT WITHOUT CONTRAST    CLINICAL HISTORY:  Osteomyelitis, foot;    TECHNIQUE:  Routine left multisequence multiplanar MRI forefoot protocol performed without IV contrast.    COMPARISON:  None    FINDINGS:  Prior forefoot amputation noted.    There are marrow signal changes in the distal 4th metatarsal head, noting increased T2/decreased T1 signal (series 4, image 22), concerning for osteomyelitis.  There is a small adjacent fluid collection measuring 2.1 cm (series 8, image 17).  This area is contiguous with prominent surrounding soft tissue inflammatory changes extending to the plantar and dorsal aspect of the forefoot. There is additional marrow edema involving the distal 1st metatarsal head (series 8, image 11), probable 2nd focus of osteomyelitis.  No other fluid collections identified.  There is diffuse muscle volume loss.  No  fractures.                               US Lower Extremity Veins Left (Final result)  Result time 08/11/22 12:44:57    Final result by Gee Oviedo MD (08/11/22 12:44:57)                 Impression:      No evidence of deep venous thrombosis in the left lower extremity.      Electronically signed by: Gee Oviedo MD  Date:    08/11/2022  Time:    12:44             Narrative:    EXAMINATION:  US LOWER EXTREMITY VEINS LEFT    CLINICAL HISTORY:  Other skin changes    TECHNIQUE:  Duplex and color flow Doppler evaluation and graded compression of the left lower extremity veins was performed.    COMPARISON:  None    FINDINGS:  Left thigh veins: The common femoral, femoral, popliteal, upper greater saphenous, and deep femoral veins are patent and free of thrombus. The veins are normally compressible and have normal phasic flow and augmentation response.    Left calf veins: The visualized calf veins are patent.    Contralateral CFV: The contralateral (right) common femoral vein is patent and free of thrombus.    Miscellaneous: None                                 Medications   vancomycin 2 g in dextrose 5 % 500 mL IVPB (2,000 mg Intravenous New Bag 8/11/22 1430)   metoprolol tartrate (LOPRESSOR) tablet 25 mg (25 mg Oral Not Given 8/11/22 1445)   sodium chloride 0.9% flush 10 mL (has no administration in time range)   naloxone 0.4 mg/mL injection 0.02 mg (has no administration in time range)   glucose chewable tablet 16 g (has no administration in time range)   glucose chewable tablet 24 g (has no administration in time range)   glucagon (human recombinant) injection 1 mg (has no administration in time range)   acetaminophen tablet 650 mg (has no administration in time range)   ondansetron disintegrating tablet 8 mg (has no administration in time range)   dextrose 10% bolus 125 mL (has no administration in time range)   dextrose 10% bolus 250 mL (has no administration in time range)   heparin (porcine) injection 5,000  Units (has no administration in time range)   vancomycin - pharmacy to dose (has no administration in time range)   vitamin E capsule 1,000 Units (has no administration in time range)   cefTRIAXone (ROCEPHIN) 2 g/50 mL D5W IVPB (has no administration in time range)   vancomycin 1.75 g in 5 % dextrose 500 mL IVPB ( Intravenous Trough Due As Scheduled Before Dose 8/13/22 1330)           APC / Resident Notes:   66 y.o. year old male presenting with foot wound.    DDx includes but is not limited to cellulitis, abscess, osteomyelitis, gangrene.    Workup shows elevated WBC 12, H&H 13, 39, CO2 22, ESR 42, CRP 89.   MRI midfoot demonstrates marrow signal changes in the distal 4th metatarsal concerning for osteomyelitis. Probable second focus of osteomyelitis in the 1st metatarsal head.     Plan  Poditatry consulted and advised to start on empiric antibiotics  Patient will be admitted to hospital medicine  I discussed the care of this patient with my supervising physician.                   Clinical Impression:   Final diagnoses:  [R23.8] Localized warmth of skin  [M86.9] Osteomyelitis, unspecified site, unspecified type (Primary)          ED Disposition Condition    Admit               Daphney Currie PA-C  08/11/22 3940

## 2022-08-11 NOTE — TELEPHONE ENCOUNTER
Called pt. Pt relates his leg is hot to the touch. He feels fine. His wife pulled the packing.     D/w pt recommend going to Emergency room for further eval to r/o DVT and sepsis. Pt has a chronic open with with suggested osteo on xray. MRI pending.   We have no cultures to go on.   May need iV abx pending lab work.       Called wife at pt's request. [phone] and gave recommendations. Pt's wife relates she is concerned and will take him to the ED. D/w her he may be able to be treated as an outpt but recommend labs/US/etc.     They will call and update me tomorrow          ----- Message from Herson Quiñones MA sent at 8/11/2022  8:45 AM CDT -----  Contact: pt    ----- Message -----  From: Vicky Ramos RN  Sent: 8/11/2022   8:33 AM CDT  To: Jorge BELCHER Staff      ----- Message -----  From: Richar Moya  Sent: 8/11/2022   8:06 AM CDT  To: Jorge Jiménez Staff    Pt requesting call back RE: PT states that his leg is running fever and would like to know if he can get antibiotics . Please call        Confirmed contact below:  Contact Name:Marcus Watkins  Phone Number: [phone]

## 2022-08-11 NOTE — PROGRESS NOTES
Pharmacokinetic Initial Assessment: IV Vancomycin    Assessment/Plan:    Patient received vancomycin IV loading dose of 2 g x1 in the ED. Maintenance dose of vancomycin 1750 mg IV every 12 hours will begin on 8/12 at 0230.   Desired empiric serum trough concentration is 15 to 20 mcg/mL  Draw vancomycin trough level 60 min prior to 5th dose on 8/13 at approximately 1330.  Pharmacy will continue to follow and monitor vancomycin.      Please contact pharmacy at extension [phone] with any questions regarding this assessment.     Thank you for the consult,   Rubina Caal       Patient brief summary:  Marcus Watkins is a 66 y.o. male initiated on antimicrobial therapy with IV Vancomycin for treatment of suspected bone/joint infection    Drug Allergies:   Review of patient's allergies indicates:  No Known Allergies    Actual Body Weight:   117.9 kg    Renal Function:   Estimated Creatinine Clearance: 115.4 mL/min (based on SCr of 0.8 mg/dL).,     CBC (last 72 hours):  Recent Labs   Lab Result Units 08/11/22  1017   WBC K/uL 12.80*   Hemoglobin g/dL 13.9*   Hematocrit % 39.8*   Platelets K/uL 259   Gran % % 66.7   Lymph % % 22.9   Mono % % 8.5   Eosinophil % % 1.1   Basophil % % 0.4   Differential Method  Automated       Metabolic Panel (last 72 hours):  Recent Labs   Lab Result Units 08/11/22  1017   Sodium mmol/L 134*   Potassium mmol/L 3.6   Chloride mmol/L 100   CO2 mmol/L 22*   Glucose mg/dL 98   BUN mg/dL 10   Creatinine mg/dL 0.8   Albumin g/dL 3.6   Total Bilirubin mg/dL 0.6   Alkaline Phosphatase U/L 65   AST U/L 14   ALT U/L 13       Drug levels (last 3 results):  No results for input(s): VANCOMYCINRA, VANCORANDOM, VANCOMYCINPE, VANCOPEAK, VANCOMYCINTR, VANCOTROUGH in the last 72 hours.    Microbiologic Results:  Microbiology Results (last 7 days)     Procedure Component Value Units Date/Time    Aerobic culture [[id number]]     Order Status: No result Specimen: Bone     Culture, Anaerobe [[id number]]     Order Status: No  result Specimen: Bone     AFB Culture & Smear [[id number]]     Order Status: No result Specimen: Bone     Fungus culture [[id number]]     Order Status: No result Specimen: Bone     Gram stain [[id number]]     Order Status: No result Specimen: Bone     Blood culture #2 **CANNOT BE ORDERED STAT** [[id number]] Collected: 08/11/22 1016    Order Status: Sent Specimen: Blood from Peripheral, Hand, Right Updated: 08/11/22 1022    Blood culture #1 **CANNOT BE ORDERED STAT** [[id number]] Collected: 08/11/22 1017    Order Status: Sent Specimen: Blood from Peripheral, Hand, Left Updated: 08/11/22 1021

## 2022-08-11 NOTE — ED NOTES
Patient identifiers have been checked and are correct.     LOC: The patient is awake, alert, and aware of environment. The patient is oriented x 3 and speaking appropriately.   APPEARANCE: No acute distress noted.   PSYCHOSOCIAL: Patient is calm and cooperative.   SKIN: The skin is warm, dry. 2 open wounds noted to ball of foot with dry crusty skin around. Redness to top top of foot-baseline per patient.   RESPIRATORY: Airway is open and patent. Bilateral chest rise and fall. Respirations are spontaneous, even and unlabored. Normal effort and rate noted. No accessory muscle use noted.   CARDIAC: Patient has a normal rate.   NEUROLOGIC: Eyes open spontaneously. Speech clear. Tolerating saliva secretions well. Able to follow commands, demonstrating ability to actively and appropriately communicate within context of current conversation. Symmetrical facial muscles. Moving all extremities.  Movement is purposeful.    MUSCULOSKELETAL: All toes on left foot surgically amputated. + left pedal pulse noted. Absent sensation noted to left foot-baseline per patient.
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
## 2022-08-12 LAB
ALBUMIN SERPL BCP-MCNC: 3.3 G/DL (ref 3.5–5.2)
ALP SERPL-CCNC: 64 U/L (ref 55–135)
ALT SERPL W/O P-5'-P-CCNC: 15 U/L (ref 10–44)
ANION GAP SERPL CALC-SCNC: 9 MMOL/L (ref 8–16)
AST SERPL-CCNC: 15 U/L (ref 10–40)
BASOPHILS # BLD AUTO: 0.06 K/UL (ref 0–0.2)
BASOPHILS NFR BLD: 0.7 % (ref 0–1.9)
BILIRUB SERPL-MCNC: 0.5 MG/DL (ref 0.1–1)
BUN SERPL-MCNC: 9 MG/DL (ref 8–23)
CALCIUM SERPL-MCNC: 9.1 MG/DL (ref 8.7–10.5)
CHLORIDE SERPL-SCNC: 102 MMOL/L (ref 95–110)
CO2 SERPL-SCNC: 24 MMOL/L (ref 23–29)
CREAT SERPL-MCNC: 0.8 MG/DL (ref 0.5–1.4)
DIFFERENTIAL METHOD: NORMAL
EOSINOPHIL # BLD AUTO: 0.3 K/UL (ref 0–0.5)
EOSINOPHIL NFR BLD: 3.2 % (ref 0–8)
ERYTHROCYTE [DISTWIDTH] IN BLOOD BY AUTOMATED COUNT: 13.2 % (ref 11.5–14.5)
EST. GFR  (NO RACE VARIABLE): >60 ML/MIN/1.73 M^2
GLUCOSE SERPL-MCNC: 104 MG/DL (ref 70–110)
GRAM STN SPEC: NORMAL
GRAM STN SPEC: NORMAL
HCT VFR BLD AUTO: 40.5 % (ref 40–54)
HGB BLD-MCNC: 14.1 G/DL (ref 14–18)
IMM GRANULOCYTES # BLD AUTO: 0.03 K/UL (ref 0–0.04)
IMM GRANULOCYTES NFR BLD AUTO: 0.3 % (ref 0–0.5)
LYMPHOCYTES # BLD AUTO: 1.9 K/UL (ref 1–4.8)
LYMPHOCYTES NFR BLD: 21.6 % (ref 18–48)
MAGNESIUM SERPL-MCNC: 2.1 MG/DL (ref 1.6–2.6)
MCH RBC QN AUTO: 29.9 PG (ref 27–31)
MCHC RBC AUTO-ENTMCNC: 34.8 G/DL (ref 32–36)
MCV RBC AUTO: 86 FL (ref 82–98)
MONOCYTES # BLD AUTO: 0.8 K/UL (ref 0.3–1)
MONOCYTES NFR BLD: 9.4 % (ref 4–15)
NEUTROPHILS # BLD AUTO: 5.8 K/UL (ref 1.8–7.7)
NEUTROPHILS NFR BLD: 64.8 % (ref 38–73)
NRBC BLD-RTO: 0 /100 WBC
PHOSPHATE SERPL-MCNC: 3.2 MG/DL (ref 2.7–4.5)
PLATELET # BLD AUTO: 236 K/UL (ref 150–450)
PMV BLD AUTO: 10.8 FL (ref 9.2–12.9)
POTASSIUM SERPL-SCNC: 4.1 MMOL/L (ref 3.5–5.1)
PROT SERPL-MCNC: 7.1 G/DL (ref 6–8.4)
RBC # BLD AUTO: 4.71 M/UL (ref 4.6–6.2)
SODIUM SERPL-SCNC: 135 MMOL/L (ref 136–145)
WBC # BLD AUTO: 8.95 K/UL (ref 3.9–12.7)

## 2022-08-12 PROCEDURE — 25000003 PHARM REV CODE 250: Performed by: INTERNAL MEDICINE

## 2022-08-12 PROCEDURE — 87075 CULTR BACTERIA EXCEPT BLOOD: CPT

## 2022-08-12 PROCEDURE — 87070 CULTURE OTHR SPECIMN AEROBIC: CPT

## 2022-08-12 PROCEDURE — 99233 PR SUBSEQUENT HOSPITAL CARE,LEVL III: ICD-10-PCS | Mod: ,,, | Performed by: INTERNAL MEDICINE

## 2022-08-12 PROCEDURE — 80053 COMPREHEN METABOLIC PANEL: CPT

## 2022-08-12 PROCEDURE — 36415 COLL VENOUS BLD VENIPUNCTURE: CPT

## 2022-08-12 PROCEDURE — 99233 SBSQ HOSP IP/OBS HIGH 50: CPT | Mod: ,,, | Performed by: INTERNAL MEDICINE

## 2022-08-12 PROCEDURE — 63600175 PHARM REV CODE 636 W HCPCS: Performed by: INTERNAL MEDICINE

## 2022-08-12 PROCEDURE — 85025 COMPLETE CBC W/AUTO DIFF WBC: CPT

## 2022-08-12 PROCEDURE — 83735 ASSAY OF MAGNESIUM: CPT

## 2022-08-12 PROCEDURE — 63600175 PHARM REV CODE 636 W HCPCS

## 2022-08-12 PROCEDURE — 25000003 PHARM REV CODE 250

## 2022-08-12 PROCEDURE — 84100 ASSAY OF PHOSPHORUS: CPT

## 2022-08-12 PROCEDURE — 87076 CULTURE ANAEROBE IDENT EACH: CPT

## 2022-08-12 PROCEDURE — 87205 SMEAR GRAM STAIN: CPT

## 2022-08-12 PROCEDURE — 11000001 HC ACUTE MED/SURG PRIVATE ROOM

## 2022-08-12 RX ORDER — ENOXAPARIN SODIUM 100 MG/ML
40 INJECTION SUBCUTANEOUS EVERY 24 HOURS
Status: DISCONTINUED | OUTPATIENT
Start: 2022-08-12 | End: 2022-08-12

## 2022-08-12 RX ORDER — ENOXAPARIN SODIUM 100 MG/ML
40 INJECTION SUBCUTANEOUS EVERY 24 HOURS
Status: DISCONTINUED | OUTPATIENT
Start: 2022-08-12 | End: 2022-08-17

## 2022-08-12 RX ADMIN — CEFTRIAXONE 2 G: 2 INJECTION, SOLUTION INTRAVENOUS at 04:08

## 2022-08-12 RX ADMIN — Medication 1000 UNITS: at 12:08

## 2022-08-12 RX ADMIN — ENOXAPARIN SODIUM 40 MG: 100 INJECTION SUBCUTANEOUS at 04:08

## 2022-08-12 RX ADMIN — VANCOMYCIN HYDROCHLORIDE 1750 MG: 500 INJECTION, POWDER, LYOPHILIZED, FOR SOLUTION INTRAVENOUS at 03:08

## 2022-08-12 RX ADMIN — METOPROLOL TARTRATE 25 MG: 25 TABLET, FILM COATED ORAL at 12:08

## 2022-08-12 RX ADMIN — VANCOMYCIN HYDROCHLORIDE 1750 MG: 500 INJECTION, POWDER, LYOPHILIZED, FOR SOLUTION INTRAVENOUS at 09:08
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
## 2022-08-12 NOTE — ASSESSMENT & PLAN NOTE
On eliquis of 5mg home dose.   - Will hold eliquis in the event a surgical procedure is performed

## 2022-08-12 NOTE — HOSPITAL COURSE
Patient admitted for osteomyelitis of L foot (previously with forefoot amputation), started on Vanc/CTX for empiric OM coverage as demonstrated on XR and MRI. Podiatry consulted, had TMA on 8/15, tolerated well. Clean bone margins and Bcx w/ NGTD. Wound cultures obtained 8/12 (+) streptococcus dysgalactiae and prevotella. ID consulted for long term abx management: recommend continuing CTX 2g IV q24H and Flagyl 500mg Q8H x 2-6 weeks. Patient discharged w/ PICC for long term abx and home PT/OT arranged by CM> Discharged home in stable condition. C

## 2022-08-12 NOTE — NURSING
Nurses Note -- 4 Eyes      8/11/2022   9:09 PM      Skin assessed during: on admission      [] No Pressure Injuries Present    []Prevention Measures Documented      [x] Yes- Altered Skin Integrity Present or Discovered   [] LDA Added if Not in Epic (Describe Wound)   [x] New Altered Skin Integrity was Present on Admit and Documented in LDA   [] Wound Image Taken    Wound Care Consulted? No    Attending Nurse:  María Gutierrez RN     Second RN/Staff Member:  Cynthia Chua RN    Left foot wound present on admission. Patient has history of all toes amputated on that foot. 2 small paola sized wounds, scant amount of serous drainage noted and small odor. Patient is able to ambulate without assistance. Patient was able to continue working job without difficulty until pain got unbearable.

## 2022-08-12 NOTE — ASSESSMENT & PLAN NOTE
Patient with prediabetes, last hba1c 5 months ago 5.9. Patient experiences bilateral peripheral neuropathy in a stocking glove pattern. He has a family history of diabetes in mother and sister. His neuropathy could be related to a diabetic process vs. thyroid etiology (hx of prior amiodarone use).  - Repeat Hba1c 5.7  - TSH wnl  - will consider further workup including- B1, B6, B12, and folate
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
## 2022-08-12 NOTE — SUBJECTIVE & OBJECTIVE
Interval History: NAEON, AF. No new chest pain, sob, nausea, vomiting, or diarrhea. Continuing antibiotic therapy with vancomycin and ceftriaxone. F/u with podiatry.     Review of Systems   Constitutional:  Negative for chills and fever.   HENT:  Negative for rhinorrhea and sneezing.    Respiratory:  Negative for cough and shortness of breath.    Cardiovascular:  Negative for chest pain and leg swelling.   Gastrointestinal:  Negative for abdominal pain and nausea.   Genitourinary:  Negative for dysuria and hematuria.   Musculoskeletal:  Negative for arthralgias and myalgias.   Skin:  Positive for color change.        Color change and warmth.   Neurological:  Negative for tremors and headaches.   Psychiatric/Behavioral:  Negative for agitation and confusion.    Objective:     Vital Signs (Most Recent):  Temp: 97.7 °F (36.5 °C) (08/12/22 0808)  Pulse: 79 (08/12/22 0808)  Resp: 18 (08/12/22 0808)  BP: 121/67 (08/12/22 0808)  SpO2: (!) 94 % (08/12/22 0808)   Vital Signs (24h Range):  Temp:  [97.3 °F (36.3 °C)-98.3 °F (36.8 °C)] 97.7 °F (36.5 °C)  Pulse:  [77-83] 79  Resp:  [18-20] 18  SpO2:  [94 %-99 %] 94 %  BP: (121-158)/(63-74) 121/67     Weight: 118 kg (260 lb 2.3 oz)  Body mass index is 36.28 kg/m².    Intake/Output Summary (Last 24 hours) at 8/12/2022 1116  Last data filed at 8/11/2022 1753  Gross per 24 hour   Intake 50 ml   Output --   Net 50 ml      Physical Exam  Vitals and nursing note reviewed.   Constitutional:       General: He is not in acute distress.  HENT:      Head: Normocephalic and atraumatic.      Nose: Nose normal. No rhinorrhea.   Eyes:      Extraocular Movements: Extraocular movements intact.      Conjunctiva/sclera: Conjunctivae normal.   Cardiovascular:      Rate and Rhythm: Normal rate and regular rhythm.   Pulmonary:      Effort: Pulmonary effort is normal. No respiratory distress.   Abdominal:      General: There is no distension.      Palpations: Abdomen is soft.   Musculoskeletal:          General: No swelling or tenderness.      Right lower leg: No edema.      Left lower leg: No edema.      Comments: Left forefoot amputation    Skin:     General: Skin is warm and dry.      Capillary Refill: Capillary refill takes less than 2 seconds.      Comments: Ulcers on sole of left foot   Neurological:      Mental Status: He is alert.       Significant Labs: All pertinent labs within the past 24 hours have been reviewed.  Recent Lab Results         08/12/22  0330   08/11/22  1534        Albumin 3.3         Alkaline Phosphatase 64         ALT 15         Anion Gap 9         Appearance, UA   Clear       AST 15         Baso # 0.06         Basophil % 0.7         Bilirubin (UA)   Negative       BILIRUBIN TOTAL 0.5  Comment: For infants and newborns, interpretation of results should be based  on gestational age, weight and in agreement with clinical  observations.    Premature Infant recommended reference ranges:  Up to 24 hours.............<8.0 mg/dL  Up to 48 hours............<12.0 mg/dL  3-5 days..................<15.0 mg/dL  6-29 days.................<15.0 mg/dL           BUN 9         Calcium 9.1         Chloride 102         CO2 24         Color, UA   Yellow       Creatinine 0.8         Differential Method Automated         eGFR >60.0         Eos # 0.3         Eosinophil % 3.2         Glucose 104         Glucose, UA   Negative       Gran # (ANC) 5.8         Gran % 64.8         Hematocrit 40.5         Hemoglobin 14.1         Immature Grans (Abs) 0.03  Comment: Mild elevation in immature granulocytes is non specific and   can be seen in a variety of conditions including stress response,   acute inflammation, trauma and pregnancy. Correlation with other   laboratory and clinical findings is essential.           Immature Granulocytes 0.3         Ketones, UA   1+       Leukocytes, UA   Negative       Lymph # 1.9         Lymph % 21.6         Magnesium 2.1         MCH 29.9         MCHC 34.8         MCV 86         Mono #  0.8         Mono % 9.4         MPV 10.8         NITRITE UA   Negative       nRBC 0         Occult Blood UA   Negative       pH, UA   6.0       Phosphorus 3.2         Platelets 236         Potassium 4.1         PROTEIN TOTAL 7.1         Protein, UA   Negative  Comment: Recommend a 24 hour urine protein or a urine   protein/creatinine ratio if globulin induced proteinuria is  clinically suspected.         RBC 4.71         RDW 13.2         Sodium 135         Specific [place], UA   1.015       Specimen UA   Urine, Clean Catch       WBC 8.95                 Significant Imaging: I have reviewed all pertinent imaging results/findings within the past 24 hours.

## 2022-08-13 PROBLEM — M86.172 ACUTE OSTEOMYELITIS OF METATARSAL BONE OF LEFT FOOT: Status: ACTIVE | Noted: 2022-08-13

## 2022-08-13 LAB
ALBUMIN SERPL BCP-MCNC: 3.3 G/DL (ref 3.5–5.2)
ALP SERPL-CCNC: 61 U/L (ref 55–135)
ALT SERPL W/O P-5'-P-CCNC: 17 U/L (ref 10–44)
ANION GAP SERPL CALC-SCNC: 8 MMOL/L (ref 8–16)
AST SERPL-CCNC: 15 U/L (ref 10–40)
BASOPHILS # BLD AUTO: 0.08 K/UL (ref 0–0.2)
BASOPHILS NFR BLD: 0.8 % (ref 0–1.9)
BILIRUB SERPL-MCNC: 0.4 MG/DL (ref 0.1–1)
BUN SERPL-MCNC: 10 MG/DL (ref 8–23)
CALCIUM SERPL-MCNC: 9 MG/DL (ref 8.7–10.5)
CHLORIDE SERPL-SCNC: 102 MMOL/L (ref 95–110)
CO2 SERPL-SCNC: 24 MMOL/L (ref 23–29)
CREAT SERPL-MCNC: 0.8 MG/DL (ref 0.5–1.4)
DIFFERENTIAL METHOD: NORMAL
EOSINOPHIL # BLD AUTO: 0.3 K/UL (ref 0–0.5)
EOSINOPHIL NFR BLD: 3.3 % (ref 0–8)
ERYTHROCYTE [DISTWIDTH] IN BLOOD BY AUTOMATED COUNT: 13 % (ref 11.5–14.5)
EST. GFR  (NO RACE VARIABLE): >60 ML/MIN/1.73 M^2
GLUCOSE SERPL-MCNC: 86 MG/DL (ref 70–110)
HCT VFR BLD AUTO: 41.9 % (ref 40–54)
HGB BLD-MCNC: 14.1 G/DL (ref 14–18)
IMM GRANULOCYTES # BLD AUTO: 0.04 K/UL (ref 0–0.04)
IMM GRANULOCYTES NFR BLD AUTO: 0.4 % (ref 0–0.5)
LYMPHOCYTES # BLD AUTO: 2.3 K/UL (ref 1–4.8)
LYMPHOCYTES NFR BLD: 22.7 % (ref 18–48)
MAGNESIUM SERPL-MCNC: 2.2 MG/DL (ref 1.6–2.6)
MCH RBC QN AUTO: 30.5 PG (ref 27–31)
MCHC RBC AUTO-ENTMCNC: 33.7 G/DL (ref 32–36)
MCV RBC AUTO: 91 FL (ref 82–98)
MONOCYTES # BLD AUTO: 0.8 K/UL (ref 0.3–1)
MONOCYTES NFR BLD: 8.4 % (ref 4–15)
NEUTROPHILS # BLD AUTO: 6.4 K/UL (ref 1.8–7.7)
NEUTROPHILS NFR BLD: 64.4 % (ref 38–73)
NRBC BLD-RTO: 0 /100 WBC
PHOSPHATE SERPL-MCNC: 3.3 MG/DL (ref 2.7–4.5)
PLATELET # BLD AUTO: 253 K/UL (ref 150–450)
PMV BLD AUTO: 11.3 FL (ref 9.2–12.9)
POTASSIUM SERPL-SCNC: 3.8 MMOL/L (ref 3.5–5.1)
PROT SERPL-MCNC: 7 G/DL (ref 6–8.4)
RBC # BLD AUTO: 4.63 M/UL (ref 4.6–6.2)
SODIUM SERPL-SCNC: 134 MMOL/L (ref 136–145)
VANCOMYCIN TROUGH SERPL-MCNC: 15.4 UG/ML (ref 10–22)
WBC # BLD AUTO: 9.97 K/UL (ref 3.9–12.7)

## 2022-08-13 PROCEDURE — 63600175 PHARM REV CODE 636 W HCPCS: Performed by: INTERNAL MEDICINE

## 2022-08-13 PROCEDURE — 63600175 PHARM REV CODE 636 W HCPCS

## 2022-08-13 PROCEDURE — 99223 PR INITIAL HOSPITAL CARE,LEVL III: ICD-10-PCS | Mod: ,,, | Performed by: PODIATRIST

## 2022-08-13 PROCEDURE — 36415 COLL VENOUS BLD VENIPUNCTURE: CPT | Performed by: INTERNAL MEDICINE

## 2022-08-13 PROCEDURE — 99232 PR SUBSEQUENT HOSPITAL CARE,LEVL II: ICD-10-PCS | Mod: ,,, | Performed by: INTERNAL MEDICINE

## 2022-08-13 PROCEDURE — 83735 ASSAY OF MAGNESIUM: CPT

## 2022-08-13 PROCEDURE — 25000003 PHARM REV CODE 250

## 2022-08-13 PROCEDURE — 85025 COMPLETE CBC W/AUTO DIFF WBC: CPT

## 2022-08-13 PROCEDURE — 80053 COMPREHEN METABOLIC PANEL: CPT

## 2022-08-13 PROCEDURE — 99232 SBSQ HOSP IP/OBS MODERATE 35: CPT | Mod: ,,, | Performed by: INTERNAL MEDICINE

## 2022-08-13 PROCEDURE — 99223 1ST HOSP IP/OBS HIGH 75: CPT | Mod: ,,, | Performed by: PODIATRIST

## 2022-08-13 PROCEDURE — 11000001 HC ACUTE MED/SURG PRIVATE ROOM

## 2022-08-13 PROCEDURE — 84100 ASSAY OF PHOSPHORUS: CPT

## 2022-08-13 PROCEDURE — 80202 ASSAY OF VANCOMYCIN: CPT | Performed by: INTERNAL MEDICINE

## 2022-08-13 PROCEDURE — 36415 COLL VENOUS BLD VENIPUNCTURE: CPT

## 2022-08-13 PROCEDURE — 25000003 PHARM REV CODE 250: Performed by: INTERNAL MEDICINE

## 2022-08-13 RX ORDER — METOPROLOL SUCCINATE 25 MG/1
25 TABLET, EXTENDED RELEASE ORAL DAILY
Status: DISCONTINUED | OUTPATIENT
Start: 2022-08-13 | End: 2022-08-19 | Stop reason: HOSPADM

## 2022-08-13 RX ADMIN — VANCOMYCIN HYDROCHLORIDE 1750 MG: 500 INJECTION, POWDER, LYOPHILIZED, FOR SOLUTION INTRAVENOUS at 09:08

## 2022-08-13 RX ADMIN — ENOXAPARIN SODIUM 40 MG: 100 INJECTION SUBCUTANEOUS at 04:08

## 2022-08-13 RX ADMIN — CEFTRIAXONE 2 G: 2 INJECTION, SOLUTION INTRAVENOUS at 04:08

## 2022-08-13 RX ADMIN — METOPROLOL SUCCINATE 25 MG: 25 TABLET, EXTENDED RELEASE ORAL at 09:08

## 2022-08-13 RX ADMIN — Medication 1000 UNITS: at 09:08

## 2022-08-13 NOTE — PROGRESS NOTES
Pharmacokinetic Assessment Follow Up: IV Vancomycin    Vancomycin serum concentration assessment(s):    The trough level was drawn correctly and can be used to guide therapy at this time. The measurement is within the desired definitive target range of 15 to 20 mcg/mL.    Vancomycin Regimen Plan:    Continue regimen to Vancomycin 1750 mg IV every 12 hours with next serum trough concentration measured at 0730 prior to 4th dose on 08/15    Drug levels (last 3 results):  Recent Labs   Lab Result Units 08/13/22  0749   Vancomycin-Trough ug/mL 15.4       Pharmacy will continue to follow and monitor vancomycin.    Please contact pharmacy at extension [phone] for questions regarding this assessment.    Thank you for the consult,   June Haro       Patient brief summary:  Marcus Watkins is a 66 y.o. male initiated on antimicrobial therapy with IV Vancomycin for treatment of bone/joint infection    The patient's current regimen is 1750 mg Q12H    Drug Allergies:   Review of patient's allergies indicates:  No Known Allergies    Actual Body Weight:   118 kg     Renal Function:   Estimated Creatinine Clearance: 115.5 mL/min (based on SCr of 0.8 mg/dL).,     Dialysis Method (if applicable):  N/A    CBC (last 72 hours):  Recent Labs   Lab Result Units 08/11/22  0950 08/11/22  1017 08/12/22  0330 08/13/22  0257   WBC K/uL  --  12.80* 8.95 9.97   Hemoglobin g/dL  --  13.9* 14.1 14.1   Hemoglobin A1C % 5.7*  --   --   --    Hematocrit %  --  39.8* 40.5 41.9   Platelets K/uL  --  259 236 253   Gran % %  --  66.7 64.8 64.4   Lymph % %  --  22.9 21.6 22.7   Mono % %  --  8.5 9.4 8.4   Eosinophil % %  --  1.1 3.2 3.3   Basophil % %  --  0.4 0.7 0.8   Differential Method   --  Automated Automated Automated       Metabolic Panel (last 72 hours):  Recent Labs   Lab Result Units 08/11/22  1017 08/11/22  1534 08/12/22  0330 08/13/22  0257   Sodium mmol/L 134*  --  135* 134*   Potassium mmol/L 3.6  --  4.1 3.8   Chloride mmol/L 100  --   102 102   CO2 mmol/L 22*  --  24 24   Glucose mg/dL 98  --  104 86   Glucose, UA   --  Negative  --   --    BUN mg/dL 10  --  9 10   Creatinine mg/dL 0.8  --  0.8 0.8   Albumin g/dL 3.6  --  3.3* 3.3*   Total Bilirubin mg/dL 0.6  --  0.5 0.4   Alkaline Phosphatase U/L 65  --  64 61   AST U/L 14  --  15 15   ALT U/L 13  --  15 17   Magnesium mg/dL  --   --  2.1 2.2   Phosphorus mg/dL  --   --  3.2 3.3       Vancomycin Administrations:  vancomycin given in the last 96 hours                     vancomycin 1.75 g in 5 % dextrose 500 mL IVPB (mg) 1,750 mg New Bag 08/12/22 2108      Restarted  0923     1,750 mg New Bag  0300    vancomycin 2 g in dextrose 5 % 500 mL IVPB (mg) 2,000 mg New Bag 08/11/22 1430                    Microbiologic Results:  Microbiology Results (last 7 days)       Procedure Component Value Units Date/Time    Aerobic culture [[id number]] Collected: 08/12/22 1433    Order Status: Completed Specimen: Wound from Foot, Left Updated: 08/13/22 0736     Aerobic Bacterial Culture No growth    Gram stain [[id number]] Collected: 08/12/22 1433    Order Status: Completed Specimen: Wound from Foot, Left Updated: 08/12/22 2220     Gram Stain Result Rare WBC's      No organisms seen    Culture, Anaerobe [[id number]] Collected: 08/12/22 1433    Order Status: Sent Specimen: Wound from Foot, Left Updated: 08/12/22 1625    Blood culture #2 **CANNOT BE ORDERED STAT** [[id number]] Collected: 08/11/22 1016    Order Status: Completed Specimen: Blood from Peripheral, Hand, Right Updated: 08/12/22 1212     Blood Culture, Routine No Growth to date      No Growth to date    Blood culture #1 **CANNOT BE ORDERED STAT** [[id number]] Collected: 08/11/22 1017    Order Status: Completed Specimen: Blood from Peripheral, Hand, Left Updated: 08/12/22 1212     Blood Culture, Routine No Growth to date      No Growth to date    Aerobic culture [[id number]]     Order Status: No result Specimen: Bone     Culture, Anaerobe [[id number]]     Order  Status: No result Specimen: Bone     AFB Culture & Smear [[id number]]     Order Status: No result Specimen: Bone     Fungus culture [[id number]]     Order Status: No result Specimen: Bone     Gram stain [[id number]]     Order Status: No result Specimen: Bone

## 2022-08-13 NOTE — HPI
"This is a 66 year old male with a PMH of AFib anticoagulated on Eliquis presenting to the ED with warmth of the foot. He presented to podiatry on 8/9 to establish care for nonhealing wounds of the left foot. Per their note "He reports having a work related injury 10/16/2020 in which he had a chemical burn to the left foot.  [facility] and eventually had to have all toes amputated.  He has been wearing a orthotic walking shoe.  He reports recently having ulcers on the foot "pressure points" from wearing the orthotic shoe. He denies pain as he has lost feeling in the left foot." Debridement of wounds was performed on 8/9 in podiatry clinic. Wife states she noticed his left leg had warmth yesterday. She reached out to podiatrist who advised he come to the ED given her clinical concern for osteomyelitis and xray findings on the 9th indicating "Interval forefoot amputation at level of MTP joints.  Fourth and 5th metatarsals show irregularity of the heads with adjacent soft tissue swelling.  Correlate for possible osteomyelitis." Patient denies systemic symptoms such as fever, chills, nausea/vomiting, myalgias or additional complaints. He is not diabetic.  "

## 2022-08-13 NOTE — SUBJECTIVE & OBJECTIVE
Scheduled Meds:   cefTRIAXone (ROCEPHIN) IVPB  2 g Intravenous Q24H    enoxaparin  40 mg Subcutaneous Daily    metoprolol tartrate  25 mg Oral Daily    vancomycin (VANCOCIN) IVPB  1,750 mg Intravenous Q12H    vitamin E  1,000 Units Oral Daily     Continuous Infusions:  PRN Meds:acetaminophen, dextrose 10%, dextrose 10%, glucagon (human recombinant), glucose, glucose, naloxone, ondansetron, sodium chloride 0.9%, Pharmacy to dose Vancomycin consult **AND** vancomycin - pharmacy to dose    Review of patient's allergies indicates:  No Known Allergies     Past Medical History:   Diagnosis Date    Atrial fibrillation      Past Surgical History:   Procedure Laterality Date    ABLATION N/A 12/27/2018    Procedure: ABLATION;  Surgeon: José Grey MD;  Location: [facility] EP LAB;  Service: Cardiology;  Laterality: N/A;  AF,PVI, RFA, CARTO, GEN, GP, 3 PREP    CARDIOVERSION N/A 10/29/2018    Procedure: CARDIOVERSION;  Surgeon: José Grey MD;  Location: [facility] CATH LAB;  Service: Cardiology;  Laterality: N/A;  AF, LOPEZ, DCCV, MAC, GP, 3 PREP    COLONOSCOPY N/A 04/14/2016    Procedure: COLONOSCOPY;  Surgeon: Kade Wang MD;  Location: [facility] ENDO (4TH FLR);  Service: Endoscopy;  Laterality: N/A;    COLONOSCOPY N/A 6/3/2022    Procedure: COLONOSCOPY;  Surgeon: Jerrod Tijerina MD;  Location: [facility] ENDO (4TH FLR);  Service: Endoscopy;  Laterality: N/A;  fully vaccianted  ok to hold Eliquis x 2 days per Dr. 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    TREATMENT OF CARDIAC ARRHYTHMIA N/A 11/29/2018    Procedure: CARDIOVERSION;  Surgeon: José Grey MD;  Location: [facility] EP LAB;  Service: Cardiology;  Laterality: N/A;  AF, DCCV, MAC, GP, 3 PREP    TREATMENT OF CARDIAC ARRHYTHMIA  12/27/2018    Procedure: Cardioversion or  Defibrillation;  Surgeon: José Grey MD;  Location: [facility] LAB;  Service: Cardiology;;       Family History       Problem Relation (Age of Onset)    Diabetes Brother    Heart disease Mother    Mental retardation Daughter          Tobacco Use    Smoking status: Former Smoker     Quit date: 1978     Years since quittin.1    Smokeless tobacco: Never Used   Substance and Sexual Activity    Alcohol use: No     Alcohol/week: 0.0 standard drinks    Drug use: No    Sexual activity: Not on file     Review of Systems   Constitutional:  Negative for chills and fever.   HENT:  Negative for rhinorrhea and sneezing.    Respiratory:  Negative for cough and shortness of breath.    Cardiovascular:  Negative for chest pain and leg swelling.   Gastrointestinal:  Negative for abdominal pain and nausea.   Genitourinary:  Negative for dysuria and hematuria.   Musculoskeletal:  Negative for arthralgias and myalgias.   Skin:  Positive for color change and wound.        Color change and warmth.   Neurological:  Negative for tremors and headaches.   Psychiatric/Behavioral:  Negative for agitation and confusion.    Objective:     Vital Signs (Most Recent):  Temp: 98.3 °F (36.8 °C) (22)  Pulse: 79 (22)  Resp: 18 (22)  BP: 131/67 (22)  SpO2: (!) 93 % (22)   Vital Signs (24h Range):  Temp:  [97.4 °F (36.3 °C)-98.3 °F (36.8 °C)] 98.3 °F (36.8 °C)  Pulse:  [75-82] 79  Resp:  [18] 18  SpO2:  [93 %-96 %] 93 %  BP: (116-132)/(62-72) 131/67     Weight: 118 kg (260 lb 2.3 oz)  Body mass index is 36.28 kg/m².    Foot Exam    Right Foot/Ankle     Inspection and Palpation  Ecchymosis: none  Tenderness: none   Swelling: none   Skin Exam: skin intact;     Neurovascular  Dorsalis pedis: 2+  Posterior tibial: 2+  Saphenous nerve sensation: normal  Tibial nerve sensation: normal  Superficial peroneal nerve sensation: normal  Deep peroneal nerve sensation: normal  Sural nerve sensation:  normal      Left Foot/Ankle      Inspection and Palpation  Ecchymosis: none  Tenderness: none   Swelling: (minimal to mild at forefoot)  Skin Exam: cellulitis, ulcer and erythema; no drainage and skin not intact Left foot callus: hyperkeratotic periwound.    Neurovascular  Dorsalis pedis: 2+  Posterior tibial: 2+  Saphenous nerve sensation: normal  Tibial nerve sensation: normal  Superficial peroneal nerve sensation: normal  Deep peroneal nerve sensation: normal  Sural nerve sensation: normal    Comments  Plantar aspect ulcerations x 2 Full thickness 70/30 granular to fibrotic, with hyperkeratotic periwound     Sub 4th met head   -Positive probe to bone to 4th metatarsal head    -Periwound erythema, minimal increased warmth, and minor forefoot edema noted.   -No Pain, No drainage, No fluctuance or crepitation, No Malodor      Sub 2nd met head   -Negative probe to bone   -Periwound erythema, minimal increased warmth, and minor forefoot edema noted.   -No Pain, No drainage, No fluctuance or crepitation, No Malodor        Laboratory:  A1C:   Recent Labs   Lab 03/04/22  0715 08/11/22  0950   HGBA1C 5.9* 5.7*     CBC:   Recent Labs   Lab 08/12/22  0330   WBC 8.95   RBC 4.71   HGB 14.1   HCT 40.5      MCV 86   MCH 29.9   MCHC 34.8     CMP:   Recent Labs   Lab 08/12/22  0330      CALCIUM 9.1   ALBUMIN 3.3*   PROT 7.1   *   K 4.1   CO2 24      BUN 9   CREATININE 0.8   ALKPHOS 64   ALT 15   AST 15   BILITOT 0.5     CRP:   Recent Labs   Lab 08/11/22  1017   CRP 89.7*     ESR:   Recent Labs   Lab 08/11/22  1017   SEDRATE 42*     Microbiology Results (last 7 days)       Procedure Component Value Units Date/Time    Gram stain [[id number]] Collected: 08/12/22 1433    Order Status: Completed Specimen: Wound from Foot, Left Updated: 08/12/22 2220     Gram Stain Result Rare WBC's      No organisms seen    Culture, Anaerobe [[id number]] Collected: 08/12/22 1433    Order Status: Sent Specimen: Wound from Foot,  Left Updated: 08/12/22 1625    Aerobic culture [[id number]] Collected: 08/12/22 1433    Order Status: Sent Specimen: Wound from Foot, Left Updated: 08/12/22 1624    Blood culture #2 **CANNOT BE ORDERED STAT** [[id number]] Collected: 08/11/22 1016    Order Status: Completed Specimen: Blood from Peripheral, Hand, Right Updated: 08/12/22 1212     Blood Culture, Routine No Growth to date      No Growth to date    Blood culture #1 **CANNOT BE ORDERED STAT** [[id number]] Collected: 08/11/22 1017    Order Status: Completed Specimen: Blood from Peripheral, Hand, Left Updated: 08/12/22 1212     Blood Culture, Routine No Growth to date      No Growth to date    Aerobic culture [[id number]]     Order Status: No result Specimen: Bone     Culture, Anaerobe [[id number]]     Order Status: No result Specimen: Bone     AFB Culture & Smear [[id number]]     Order Status: No result Specimen: Bone     Fungus culture [[id number]]     Order Status: No result Specimen: Bone     Gram stain [[id number]]     Order Status: No result Specimen: Bone           All pertinent labs reviewed within the last 24 hours.    Diagnostic Results:    MRI L Foot:   Prior partial forefoot amputation.   Marrow signal changes in the distal 4th metatarsal, as above, concerning for osteomyelitis.   Probable second focus of osteomyelitis in the 1st metatarsal head.     L Foot X Ray:   Interval forefoot amputation at level of MTP joints.  Fourth and 5th metatarsals show irregularity of the heads with adjacent soft tissue swelling.  Correlate for possible osteomyelitis.  If indicated, MRI could be done for better assessment    LE Venous US:  No DVT.      Clinical Findings:

## 2022-08-13 NOTE — ASSESSMENT & PLAN NOTE
On eliquis of 5mg home dose.   - Will hold eliquis in preparation for possible surgical procedure  - Mg > 2, K > 4; replete PRN  - Cardiac telemetry

## 2022-08-13 NOTE — PROGRESS NOTES
[facility] Acute Care Hospital Medicine  Progress Note    Patient Name: Marcus Watkins  MRN: [id number]  Patient Class: IP- Inpatient   Admission Date: 8/11/2022  Length of Stay: 2 days  Attending Physician: Tea Cabrera MD  Primary Care Provider: Radha Brunson MD        Subjective:     Principal Problem:Ulcer of amputation stump of foot        HPI:  66 y.o M hx of afib (on eliquis s/p ablation), left forefoot amputation (October 2020, due to chemical injury in plumbing), and thyroid nodules, presents with redness and warmth of the left foot. The patient noted his foot was feeling warmer in the middle of the night. He denies any chest pain, sob, palpitations, diarrhea, vomiting, dysuria, or nausea. Patient was last seen by a podiatrist on Tuesday who recommended a f/u MRI for a foot xray that was indicative of osteomyelitis. Patient denies history of alcohol, smoking, or drug use.       Overview/Hospital Course:  Patient admitted for osteomyelitis of L foot (previously with forefoot amputation), started on Vanc/CTX for empiric OM coverage as demonstrated on XR and MRI. Podiatry consulted, possible plans for TMA.      Interval History: AF HDS NAEON. Resting comfortably. Wound dressed by podiatry. No other complaints at this time.     Review of Systems   Constitutional:  Negative for chills and fever.   HENT:  Negative for rhinorrhea and sneezing.    Respiratory:  Negative for cough and shortness of breath.    Cardiovascular:  Negative for chest pain and leg swelling.   Gastrointestinal:  Negative for abdominal pain and nausea.   Genitourinary:  Negative for dysuria and hematuria.   Musculoskeletal:  Negative for arthralgias and myalgias.   Skin:  Positive for color change.        Color change and warmth.   Neurological:  Negative for tremors and headaches.   Psychiatric/Behavioral:  Negative for agitation and confusion.    Objective:     Vital Signs (Most Recent):  Temp: 98.1 °F (36.7 °C) (08/13/22  0758)  Pulse: 86 (08/13/22 0758)  Resp: 18 (08/13/22 0758)  BP: 124/66 (08/13/22 0758)  SpO2: (!) 93 % (08/13/22 0758)   Vital Signs (24h Range):  Temp:  [97.7 °F (36.5 °C)-98.3 °F (36.8 °C)] 98.1 °F (36.7 °C)  Pulse:  [75-86] 86  Resp:  [18] 18  SpO2:  [93 %-96 %] 93 %  BP: (116-132)/(62-71) 124/66     Weight: 118 kg (260 lb 2.3 oz)  Body mass index is 36.28 kg/m².    Intake/Output Summary (Last 24 hours) at 8/13/2022 1134  Last data filed at 8/12/2022 1200  Gross per 24 hour   Intake --   Output 700 ml   Net -700 ml      Physical Exam  Vitals and nursing note reviewed.   Constitutional:       General: He is not in acute distress.  HENT:      Head: Normocephalic and atraumatic.      Nose: Nose normal. No rhinorrhea.   Eyes:      Extraocular Movements: Extraocular movements intact.      Conjunctiva/sclera: Conjunctivae normal.   Cardiovascular:      Rate and Rhythm: Normal rate and regular rhythm.   Pulmonary:      Effort: Pulmonary effort is normal. No respiratory distress.   Abdominal:      General: There is no distension.      Palpations: Abdomen is soft.   Musculoskeletal:         General: No swelling or tenderness.      Right lower leg: No edema.      Left lower leg: No edema.      Comments: Left forefoot amputation    Skin:     General: Skin is warm and dry.      Capillary Refill: Capillary refill takes less than 2 seconds.      Comments: Ulcers on sole of left foot   Neurological:      Mental Status: He is alert.       Significant Labs: All pertinent labs within the past 24 hours have been reviewed.    Significant Imaging: I have reviewed all pertinent imaging results/findings within the past 24 hours.      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 Last seen by podiatrist on Tuesday. Xray of foot with evidence of possible OM in 4th and 5th metatarsals. F/u MRI with findings of OM in first metatarsal head. WBC slightly elevated at  12.8, ESR, 42, and CRP 89.7. On exam there is no significant swelling but visible ulceration and erythema.     - BCX 08/11 NGTD  - WCX 08/12 NGTD  - Started on rocephin 1g q24  - Continue vancomycin, dosing per pharmacy  - Podiatry consulted; continuing to evaluate, plans for potential TMA 08/15 vs 08/16.    Peripheral polyneuropathy  Patient with prediabetes, last hba1c 5 months ago 5.9. Patient experiences bilateral peripheral neuropathy in a stocking glove pattern. He has a family history of diabetes in mother and sister. His neuropathy could be related to a diabetic process vs. thyroid etiology (hx of prior amiodarone use).  - Repeat Hba1c 5.7  - TSH wnl  - Neuropathy well controlled at the moment; scheduled gabapentin if worsens    S/P ablation of atrial fibrillation  On eliquis of 5mg home dose.   - Will hold eliquis in preparation for possible surgical procedure  - Mg > 2, K > 4; replete PRN  - Cardiac telemetry    Multiple thyroid nodules  Hx of amiodarone use. See polyneuropathy.        VTE Risk Mitigation (From admission, onward)         Ordered     enoxaparin injection 40 mg  Daily         08/12/22 1438                Discharge Planning   TIM: 8/16/2022     Code Status: Full Code   Is the patient medically ready for discharge?:     Reason for patient still in hospital (select all that apply): Treatment  Discharge Plan A: Home with family                  Jeannette Morales MD  Department of Hospital Medicine   [facility] - Surgery

## 2022-08-13 NOTE — ASSESSMENT & PLAN NOTE
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 Last seen by podiatrist on Tuesday. Xray of foot with evidence of possible OM in 4th and 5th metatarsals. F/u MRI with findings of OM in first metatarsal head. WBC slightly elevated at 12.8, ESR, 42, and CRP 89.7. On exam there is no significant swelling but visible ulceration and erythema.     - BCX 08/11 NGTD  - WCX 08/12 NGTD  - Started on rocephin 1g q24  - Continue vancomycin, dosing per pharmacy  - Podiatry consulted; continuing to evaluate, plans for potential TMA 08/15 vs 08/16.

## 2022-08-13 NOTE — CONSULTS
Patient seen and evaluated 8/12, Plan for TMA early next week pending OR availability.     Podiatry consult received and acknowledged  Full consult and recommendations to follow   In the interim, please page  for any immediate concerns.      Noah Howell DPM, PGY-2  Podiatry / Foot and Ankle Surgery   Page # [phone]  Secure chat preferred

## 2022-08-13 NOTE — ASSESSMENT & PLAN NOTE
Patient with prediabetes, last hba1c 5 months ago 5.9. Patient experiences bilateral peripheral neuropathy in a stocking glove pattern. He has a family history of diabetes in mother and sister. His neuropathy could be related to a diabetic process vs. thyroid etiology (hx of prior amiodarone use).  - Repeat Hba1c 5.7  - TSH wnl  - Neuropathy well controlled at the moment; scheduled gabapentin if worsens

## 2022-08-13 NOTE — PLAN OF CARE
Problem: Adult Inpatient Plan of Care  Goal: Plan of Care Review  Outcome: Ongoing, Progressing     Problem: Impaired Wound Healing  Goal: Optimal Wound Healing  Outcome: Ongoing, Progressing     Problem: Fall Injury Risk  Goal: Absence of Fall and Fall-Related Injury  Outcome: Ongoing, Progressing     Problem: Skin Injury Risk Increased  Goal: Skin Health and Integrity  Outcome: Ongoing, Progressing

## 2022-08-13 NOTE — SUBJECTIVE & OBJECTIVE
Interval History: AF HDS NAEON. Resting comfortably. Wound dressed by podiatry. No other complaints at this time.     Review of Systems   Constitutional:  Negative for chills and fever.   HENT:  Negative for rhinorrhea and sneezing.    Respiratory:  Negative for cough and shortness of breath.    Cardiovascular:  Negative for chest pain and leg swelling.   Gastrointestinal:  Negative for abdominal pain and nausea.   Genitourinary:  Negative for dysuria and hematuria.   Musculoskeletal:  Negative for arthralgias and myalgias.   Skin:  Positive for color change.        Color change and warmth.   Neurological:  Negative for tremors and headaches.   Psychiatric/Behavioral:  Negative for agitation and confusion.    Objective:     Vital Signs (Most Recent):  Temp: 98.1 °F (36.7 °C) (08/13/22 0758)  Pulse: 86 (08/13/22 0758)  Resp: 18 (08/13/22 0758)  BP: 124/66 (08/13/22 0758)  SpO2: (!) 93 % (08/13/22 0758)   Vital Signs (24h Range):  Temp:  [97.7 °F (36.5 °C)-98.3 °F (36.8 °C)] 98.1 °F (36.7 °C)  Pulse:  [75-86] 86  Resp:  [18] 18  SpO2:  [93 %-96 %] 93 %  BP: (116-132)/(62-71) 124/66     Weight: 118 kg (260 lb 2.3 oz)  Body mass index is 36.28 kg/m².    Intake/Output Summary (Last 24 hours) at 8/13/2022 1134  Last data filed at 8/12/2022 1200  Gross per 24 hour   Intake --   Output 700 ml   Net -700 ml      Physical Exam  Vitals and nursing note reviewed.   Constitutional:       General: He is not in acute distress.  HENT:      Head: Normocephalic and atraumatic.      Nose: Nose normal. No rhinorrhea.   Eyes:      Extraocular Movements: Extraocular movements intact.      Conjunctiva/sclera: Conjunctivae normal.   Cardiovascular:      Rate and Rhythm: Normal rate and regular rhythm.   Pulmonary:      Effort: Pulmonary effort is normal. No respiratory distress.   Abdominal:      General: There is no distension.      Palpations: Abdomen is soft.   Musculoskeletal:         General: No swelling or tenderness.      Right  lower leg: No edema.      Left lower leg: No edema.      Comments: Left forefoot amputation    Skin:     General: Skin is warm and dry.      Capillary Refill: Capillary refill takes less than 2 seconds.      Comments: Ulcers on sole of left foot   Neurological:      Mental Status: He is alert.       Significant Labs: All pertinent labs within the past 24 hours have been reviewed.    Significant Imaging: I have reviewed all pertinent imaging results/findings within the past 24 hours.

## 2022-08-14 LAB
ALBUMIN SERPL BCP-MCNC: 3.3 G/DL (ref 3.5–5.2)
ALP SERPL-CCNC: 67 U/L (ref 55–135)
ALT SERPL W/O P-5'-P-CCNC: 15 U/L (ref 10–44)
ANION GAP SERPL CALC-SCNC: 10 MMOL/L (ref 8–16)
AST SERPL-CCNC: 14 U/L (ref 10–40)
BASOPHILS # BLD AUTO: 0.06 K/UL (ref 0–0.2)
BASOPHILS NFR BLD: 0.7 % (ref 0–1.9)
BILIRUB SERPL-MCNC: 0.3 MG/DL (ref 0.1–1)
BUN SERPL-MCNC: 10 MG/DL (ref 8–23)
CALCIUM SERPL-MCNC: 9.4 MG/DL (ref 8.7–10.5)
CHLORIDE SERPL-SCNC: 104 MMOL/L (ref 95–110)
CO2 SERPL-SCNC: 22 MMOL/L (ref 23–29)
CREAT SERPL-MCNC: 0.8 MG/DL (ref 0.5–1.4)
DIFFERENTIAL METHOD: NORMAL
EOSINOPHIL # BLD AUTO: 0.3 K/UL (ref 0–0.5)
EOSINOPHIL NFR BLD: 3.8 % (ref 0–8)
ERYTHROCYTE [DISTWIDTH] IN BLOOD BY AUTOMATED COUNT: 12.9 % (ref 11.5–14.5)
EST. GFR  (NO RACE VARIABLE): >60 ML/MIN/1.73 M^2
GLUCOSE SERPL-MCNC: 109 MG/DL (ref 70–110)
HCT VFR BLD AUTO: 42.2 % (ref 40–54)
HGB BLD-MCNC: 14.4 G/DL (ref 14–18)
IMM GRANULOCYTES # BLD AUTO: 0.03 K/UL (ref 0–0.04)
IMM GRANULOCYTES NFR BLD AUTO: 0.3 % (ref 0–0.5)
LYMPHOCYTES # BLD AUTO: 2 K/UL (ref 1–4.8)
LYMPHOCYTES NFR BLD: 22.3 % (ref 18–48)
MAGNESIUM SERPL-MCNC: 2.1 MG/DL (ref 1.6–2.6)
MCH RBC QN AUTO: 30.5 PG (ref 27–31)
MCHC RBC AUTO-ENTMCNC: 34.1 G/DL (ref 32–36)
MCV RBC AUTO: 89 FL (ref 82–98)
MONOCYTES # BLD AUTO: 0.8 K/UL (ref 0.3–1)
MONOCYTES NFR BLD: 8.7 % (ref 4–15)
NEUTROPHILS # BLD AUTO: 5.6 K/UL (ref 1.8–7.7)
NEUTROPHILS NFR BLD: 64.2 % (ref 38–73)
NRBC BLD-RTO: 0 /100 WBC
PHOSPHATE SERPL-MCNC: 3.5 MG/DL (ref 2.7–4.5)
PLATELET # BLD AUTO: 277 K/UL (ref 150–450)
PMV BLD AUTO: 10.8 FL (ref 9.2–12.9)
POTASSIUM SERPL-SCNC: 3.9 MMOL/L (ref 3.5–5.1)
PROT SERPL-MCNC: 7.2 G/DL (ref 6–8.4)
RBC # BLD AUTO: 4.72 M/UL (ref 4.6–6.2)
SODIUM SERPL-SCNC: 136 MMOL/L (ref 136–145)
WBC # BLD AUTO: 8.75 K/UL (ref 3.9–12.7)

## 2022-08-14 PROCEDURE — 99232 PR SUBSEQUENT HOSPITAL CARE,LEVL II: ICD-10-PCS | Mod: ,,, | Performed by: INTERNAL MEDICINE

## 2022-08-14 PROCEDURE — 36415 COLL VENOUS BLD VENIPUNCTURE: CPT

## 2022-08-14 PROCEDURE — 25000003 PHARM REV CODE 250

## 2022-08-14 PROCEDURE — 99233 PR SUBSEQUENT HOSPITAL CARE,LEVL III: ICD-10-PCS | Mod: ,,, | Performed by: PODIATRIST

## 2022-08-14 PROCEDURE — 63600175 PHARM REV CODE 636 W HCPCS

## 2022-08-14 PROCEDURE — 85025 COMPLETE CBC W/AUTO DIFF WBC: CPT

## 2022-08-14 PROCEDURE — 99232 SBSQ HOSP IP/OBS MODERATE 35: CPT | Mod: ,,, | Performed by: INTERNAL MEDICINE

## 2022-08-14 PROCEDURE — 84100 ASSAY OF PHOSPHORUS: CPT

## 2022-08-14 PROCEDURE — 99233 SBSQ HOSP IP/OBS HIGH 50: CPT | Mod: ,,, | Performed by: PODIATRIST

## 2022-08-14 PROCEDURE — 11000001 HC ACUTE MED/SURG PRIVATE ROOM

## 2022-08-14 PROCEDURE — 83735 ASSAY OF MAGNESIUM: CPT

## 2022-08-14 PROCEDURE — 63600175 PHARM REV CODE 636 W HCPCS: Performed by: INTERNAL MEDICINE

## 2022-08-14 PROCEDURE — 80053 COMPREHEN METABOLIC PANEL: CPT

## 2022-08-14 PROCEDURE — 25000003 PHARM REV CODE 250: Performed by: INTERNAL MEDICINE

## 2022-08-14 RX ORDER — SENNOSIDES 8.6 MG/1
8.6 TABLET ORAL DAILY PRN
Status: DISCONTINUED | OUTPATIENT
Start: 2022-08-14 | End: 2022-08-19 | Stop reason: HOSPADM

## 2022-08-14 RX ORDER — POLYETHYLENE GLYCOL 3350 17 G/17G
17 POWDER, FOR SOLUTION ORAL DAILY
Status: DISCONTINUED | OUTPATIENT
Start: 2022-08-14 | End: 2022-08-19 | Stop reason: HOSPADM

## 2022-08-14 RX ADMIN — CEFTRIAXONE 2 G: 2 INJECTION, SOLUTION INTRAVENOUS at 05:08

## 2022-08-14 RX ADMIN — ENOXAPARIN SODIUM 40 MG: 100 INJECTION SUBCUTANEOUS at 05:08

## 2022-08-14 RX ADMIN — Medication 1000 UNITS: at 08:08

## 2022-08-14 RX ADMIN — VANCOMYCIN HYDROCHLORIDE 1750 MG: 500 INJECTION, POWDER, LYOPHILIZED, FOR SOLUTION INTRAVENOUS at 09:08

## 2022-08-14 RX ADMIN — POLYETHYLENE GLYCOL 3350 17 G: 17 POWDER, FOR SOLUTION ORAL at 11:08

## 2022-08-14 RX ADMIN — METOPROLOL SUCCINATE 25 MG: 25 TABLET, EXTENDED RELEASE ORAL at 09:08

## 2022-08-14 NOTE — ASSESSMENT & PLAN NOTE
MRI confirmed osteomyelitis of the L 4th metatarsal head, with possible secondary focus of 1st metatarsal associated with contiguous plantar full thickness ulceration x 2. On X ray and clinical exam, 5th metatarsalhead appears suspect as well.     -No urgent surgical intervention currently indicated  -Discussed treatment options, alternatives, and complications with patient   -Patient amenable to transmetatarsal amputation, resection of the metatarsal heads 1-5.   -Surgery to take place early next week pending OR availability  -NPO Sunday midnight incase case can be obtained from pending OR list for Monday  -Nursing wound care routine ordered   -Continue IV ABx per primary team   -Wound culture obtained   -WB: Heel touch left foot with dressing intact   -Podiatry will follow

## 2022-08-14 NOTE — ASSESSMENT & PLAN NOTE
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 Last seen by podiatrist on Tuesday. Xray of foot with evidence of possible OM in 4th and 5th metatarsals. F/u MRI with findings of OM in first metatarsal head. WBC slightly elevated at 12.8, ESR, 42, and CRP 89.7. On exam there is no significant swelling but visible ulceration and erythema.     - BCX 08/11 NGTD  - WCX 08/12 NGTD, aerobic culture pending  - Started on rocephin 1g q24  - Continue vancomycin, dosing per pharmacy  - Podiatry with plans for potential TMA 08/15 vs 08/16.  - NPO midnight

## 2022-08-14 NOTE — PROGRESS NOTES
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 The patient noted his foot was feeling warmer in the middle of the night. He denies any chest pain, sob, palpitations, diarrhea, vomiting, dysuria, or nausea. Patient was last seen by a podiatrist on Tuesday who recommended a f/u MRI for a foot xray that was indicative of osteomyelitis. Patient denies history of alcohol, smoking, or drug use.       Overview/Hospital Course:  Patient admitted for osteomyelitis of L foot (previously with forefoot amputation), started on Vanc/CTX for empiric OM coverage as demonstrated on XR and MRI. Podiatry consulted, possible plans for TMA.      Interval History: AIDA WEBB. Plan for transmetatarsal amputation early this week.     Review of Systems   Constitutional:  Negative for chills and fever.   HENT:  Negative for rhinorrhea and sneezing.    Respiratory:  Negative for cough and shortness of breath.    Cardiovascular:  Negative for chest pain and leg swelling.   Gastrointestinal:  Negative for abdominal pain and nausea.   Genitourinary:  Negative for dysuria and hematuria.   Musculoskeletal:  Negative for arthralgias and myalgias.   Skin:  Positive for color change.        Color change and warmth.   Neurological:  Negative for tremors and headaches.   Psychiatric/Behavioral:  Negative for agitation and confusion.    Objective:     Vital Signs (Most Recent):  Temp: 98 °F (36.7 °C) (08/14/22 1139)  Pulse: 78 (08/14/22 1139)  Resp:  18 (08/14/22 1139)  BP: 124/87 (08/14/22 1139)  SpO2: (!) 94 % (08/14/22 1139)   Vital Signs (24h Range):  Temp:  [97.8 °F (36.6 °C)-98.6 °F (37 °C)] 98 °F (36.7 °C)  Pulse:  [78-93] 78  Resp:  [18] 18  SpO2:  [92 %-97 %] 94 %  BP: (124-135)/(65-87) 124/87     Weight: 118 kg (260 lb 2.3 oz)  Body mass index is 36.28 kg/m².    Intake/Output Summary (Last 24 hours) at 8/14/2022 1204  Last data filed at 8/14/2022 0850  Gross per 24 hour   Intake 100 ml   Output --   Net 100 ml      Physical Exam  Vitals and nursing note reviewed.   Constitutional:       General: He is not in acute distress.  HENT:      Head: Normocephalic and atraumatic.      Nose: Nose normal. No rhinorrhea.   Eyes:      Extraocular Movements: Extraocular movements intact.      Conjunctiva/sclera: Conjunctivae normal.   Cardiovascular:      Rate and Rhythm: Normal rate and regular rhythm.   Pulmonary:      Effort: Pulmonary effort is normal. No respiratory distress.   Abdominal:      General: There is no distension.      Palpations: Abdomen is soft.   Musculoskeletal:         General: No swelling or tenderness.      Right lower leg: No edema.      Left lower leg: No edema.      Comments: Left forefoot amputation    Skin:     General: Skin is warm and dry.      Capillary Refill: Capillary refill takes less than 2 seconds.      Comments: Ulcers on sole of left foot   Neurological:      Mental Status: He is alert.       Significant Labs: All pertinent labs within the past 24 hours have been reviewed.  Recent Lab Results         08/14/22  0615        Albumin 3.3       Alkaline Phosphatase 67       ALT 15       Anion Gap 10       AST 14       Baso # 0.06       Basophil % 0.7       BILIRUBIN TOTAL 0.3  Comment: For infants and newborns, interpretation of results should be based  on gestational age, weight and in agreement with clinical  observations.    Premature Infant recommended reference ranges:  Up to 24 hours.............<8.0 mg/dL  Up to 48  hours............<12.0 mg/dL  3-5 days..................<15.0 mg/dL  6-29 days.................<15.0 mg/dL         BUN 10       Calcium 9.4       Chloride 104       CO2 22       Creatinine 0.8       Differential Method Automated       eGFR >60.0       Eos # 0.3       Eosinophil % 3.8       Glucose 109       Gran # (ANC) 5.6       Gran % 64.2       Hematocrit 42.2       Hemoglobin 14.4       Immature Grans (Abs) 0.03  Comment: Mild elevation in immature granulocytes is non specific and   can be seen in a variety of conditions including stress response,   acute inflammation, trauma and pregnancy. Correlation with other   laboratory and clinical findings is essential.         Immature Granulocytes 0.3       Lymph # 2.0       Lymph % 22.3       Magnesium 2.1       MCH 30.5       MCHC 34.1       MCV 89       Mono # 0.8       Mono % 8.7       MPV 10.8       nRBC 0       Phosphorus 3.5       Platelets 277       Potassium 3.9       PROTEIN TOTAL 7.2       RBC 4.72       RDW 12.9       Sodium 136       WBC 8.75               Significant Imaging: I have reviewed all pertinent imaging results/findings within the past 24 hours.      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 Last seen by podiatrist on Tuesday. Xray of foot with evidence of possible OM in 4th and 5th metatarsals. F/u MRI with findings of OM in first metatarsal head. WBC slightly elevated at 12.8, ESR, 42, and CRP 89.7. On exam there is no significant swelling but visible ulceration and erythema.     - BCX 08/11 NGTD  - WCX 08/12 NGTD, aerobic culture pending  - Started on rocephin 1g q24  - Continue vancomycin, dosing per pharmacy  - Podiatry with plans for potential TMA 08/15 vs 08/16.  - NPO midnight    S/P ablation of atrial fibrillation  On eliquis of 5mg home dose.   - Will hold eliquis in preparation for possible surgical procedure  - Mg > 2, K > 4; replete PRN  - Cardiac  telemetry    Peripheral polyneuropathy  Patient with prediabetes, last hba1c 5 months ago 5.9. Patient experiences bilateral peripheral neuropathy in a stocking glove pattern. He has a family history of diabetes in mother and sister. His neuropathy could be related to a diabetic process vs. thyroid etiology (hx of prior amiodarone use).  - Repeat Hba1c 5.7  - TSH wnl  - Neuropathy well controlled at the moment; scheduled gabapentin if worsens    Multiple thyroid nodules  Hx of amiodarone use. See polyneuropathy.        VTE Risk Mitigation (From admission, onward)         Ordered     enoxaparin injection 40 mg  Daily         08/12/22 1438                Discharge Planning   TIM: 8/17/2022     Code Status: Full Code   Is the patient medically ready for discharge?:     Reason for patient still in hospital (select all that apply): Treatment  Discharge Plan A: Home with family                  Kade Morse MD  Department of Hospital Medicine   [facility] - Surgery

## 2022-08-14 NOTE — SUBJECTIVE & OBJECTIVE
Interval History: TRACEY, AF. Plan for transmetatarsal amputation early this week.     Review of Systems   Constitutional:  Negative for chills and fever.   HENT:  Negative for rhinorrhea and sneezing.    Respiratory:  Negative for cough and shortness of breath.    Cardiovascular:  Negative for chest pain and leg swelling.   Gastrointestinal:  Negative for abdominal pain and nausea.   Genitourinary:  Negative for dysuria and hematuria.   Musculoskeletal:  Negative for arthralgias and myalgias.   Skin:  Positive for color change.        Color change and warmth.   Neurological:  Negative for tremors and headaches.   Psychiatric/Behavioral:  Negative for agitation and confusion.    Objective:     Vital Signs (Most Recent):  Temp: 98 °F (36.7 °C) (08/14/22 1139)  Pulse: 78 (08/14/22 1139)  Resp: 18 (08/14/22 1139)  BP: 124/87 (08/14/22 1139)  SpO2: (!) 94 % (08/14/22 1139)   Vital Signs (24h Range):  Temp:  [97.8 °F (36.6 °C)-98.6 °F (37 °C)] 98 °F (36.7 °C)  Pulse:  [78-93] 78  Resp:  [18] 18  SpO2:  [92 %-97 %] 94 %  BP: (124-135)/(65-87) 124/87     Weight: 118 kg (260 lb 2.3 oz)  Body mass index is 36.28 kg/m².    Intake/Output Summary (Last 24 hours) at 8/14/2022 1204  Last data filed at 8/14/2022 0850  Gross per 24 hour   Intake 100 ml   Output --   Net 100 ml      Physical Exam  Vitals and nursing note reviewed.   Constitutional:       General: He is not in acute distress.  HENT:      Head: Normocephalic and atraumatic.      Nose: Nose normal. No rhinorrhea.   Eyes:      Extraocular Movements: Extraocular movements intact.      Conjunctiva/sclera: Conjunctivae normal.   Cardiovascular:      Rate and Rhythm: Normal rate and regular rhythm.   Pulmonary:      Effort: Pulmonary effort is normal. No respiratory distress.   Abdominal:      General: There is no distension.      Palpations: Abdomen is soft.   Musculoskeletal:         General: No swelling or tenderness.      Right lower leg: No edema.      Left lower leg:  No edema.      Comments: Left forefoot amputation    Skin:     General: Skin is warm and dry.      Capillary Refill: Capillary refill takes less than 2 seconds.      Comments: Ulcers on sole of left foot   Neurological:      Mental Status: He is alert.       Significant Labs: All pertinent labs within the past 24 hours have been reviewed.  Recent Lab Results         08/14/22  0615        Albumin 3.3       Alkaline Phosphatase 67       ALT 15       Anion Gap 10       AST 14       Baso # 0.06       Basophil % 0.7       BILIRUBIN TOTAL 0.3  Comment: For infants and newborns, interpretation of results should be based  on gestational age, weight and in agreement with clinical  observations.    Premature Infant recommended reference ranges:  Up to 24 hours.............<8.0 mg/dL  Up to 48 hours............<12.0 mg/dL  3-5 days..................<15.0 mg/dL  6-29 days.................<15.0 mg/dL         BUN 10       Calcium 9.4       Chloride 104       CO2 22       Creatinine 0.8       Differential Method Automated       eGFR >60.0       Eos # 0.3       Eosinophil % 3.8       Glucose 109       Gran # (ANC) 5.6       Gran % 64.2       Hematocrit 42.2       Hemoglobin 14.4       Immature Grans (Abs) 0.03  Comment: Mild elevation in immature granulocytes is non specific and   can be seen in a variety of conditions including stress response,   acute inflammation, trauma and pregnancy. Correlation with other   laboratory and clinical findings is essential.         Immature Granulocytes 0.3       Lymph # 2.0       Lymph % 22.3       Magnesium 2.1       MCH 30.5       MCHC 34.1       MCV 89       Mono # 0.8       Mono % 8.7       MPV 10.8       nRBC 0       Phosphorus 3.5       Platelets 277       Potassium 3.9       PROTEIN TOTAL 7.2       RBC 4.72       RDW 12.9       Sodium 136       WBC 8.75               Significant Imaging: I have reviewed all pertinent imaging results/findings within the past 24 hours.

## 2022-08-14 NOTE — CONSULTS
"[facility] - Surgery  Podiatry  Consult Note    Patient Name: Marcus Watkins  MRN: [id number]  Admission Date: 8/11/2022  Hospital Length of Stay: 2 days  Attending Physician: Tea Cabrera MD  Primary Care Provider: Radha Brunson MD     Consults  Subjective:     History of Present Illness:  This is a 66 year old male with a PMH of AFib anticoagulated on Eliquis presenting to the ED with warmth of the foot. He presented to podiatry on 8/9 to establish care for nonhealing wounds of the left foot. Per their note "He reports having a work related injury 10/16/2020 in which he had a chemical burn to the left foot.  [facility] and eventually had to have all toes amputated.  He has been wearing a orthotic walking shoe.  He reports recently having ulcers on the foot "pressure points" from wearing the orthotic shoe. He denies pain as he has lost feeling in the left foot." Debridement of wounds was performed on 8/9 in podiatry clinic. Wife states she noticed his left leg had warmth yesterday. She reached out to podiatrist who advised he come to the ED given her clinical concern for osteomyelitis and xray findings on the 9th indicating "Interval forefoot amputation at level of MTP joints.  Fourth and 5th metatarsals show irregularity of the heads with adjacent soft tissue swelling.  Correlate for possible osteomyelitis." Patient denies systemic symptoms such as fever, chills, nausea/vomiting, myalgias or additional complaints. He is not diabetic.      Scheduled Meds:   cefTRIAXone (ROCEPHIN) IVPB  2 g Intravenous Q24H    enoxaparin  40 mg Subcutaneous Daily    metoprolol tartrate  25 mg Oral Daily    vancomycin (VANCOCIN) IVPB  1,750 mg Intravenous Q12H    vitamin E  1,000 Units Oral Daily     Continuous Infusions:  PRN Meds:acetaminophen, dextrose 10%, dextrose 10%, glucagon (human recombinant), glucose, glucose, naloxone, ondansetron, sodium chloride 0.9%, Pharmacy to dose Vancomycin " consult **AND** vancomycin - pharmacy to dose    Review of patient's allergies indicates:  No Known Allergies     Past Medical History:   Diagnosis Date    Atrial fibrillation      Past Surgical History:   Procedure Laterality Date    ABLATION N/A 2018    Procedure: ABLATION;  Surgeon: José Grey MD;  Location: [facility] EP LAB;  Service: Cardiology;  Laterality: N/A;  AF,PVI, RFA, CARTO, GEN, GP, 3 PREP    CARDIOVERSION N/A 10/29/2018    Procedure: CARDIOVERSION;  Surgeon: José Grey MD;  Location: [facility] CATH LAB;  Service: Cardiology;  Laterality: N/A;  AF, LOPEZ, DCCV, MAC, GP, 3 PREP    COLONOSCOPY N/A 2016    Procedure: COLONOSCOPY;  Surgeon: Kade Wang MD;  Location: [facility] ENDO (4TH FLR);  Service: Endoscopy;  Laterality: N/A;    COLONOSCOPY N/A 6/3/2022    Procedure: COLONOSCOPY;  Surgeon: Jerrod Tijerina MD;  Location: [facility] ENDO (4TH FLR);  Service: Endoscopy;  Laterality: N/A;  fully vaccianted  ok to hold Eliquis x 2 days per Dr. 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    TREATMENT OF CARDIAC ARRHYTHMIA N/A 2018    Procedure: CARDIOVERSION;  Surgeon: José Grey MD;  Location: [facility] EP LAB;  Service: Cardiology;  Laterality: N/A;  AF, DCCV, MAC, GP, 3 PREP    TREATMENT OF CARDIAC ARRHYTHMIA  2018    Procedure: Cardioversion or Defibrillation;  Surgeon: José Grey MD;  Location: [facility] EP LAB;  Service: Cardiology;;       Family History       Problem Relation (Age of Onset)    Diabetes Brother    Heart disease Mother    Mental retardation Daughter          Tobacco Use    Smoking status: Former Smoker     Quit date: 1978     Years since quittin.1    Smokeless tobacco: Never Used   Substance and Sexual Activity    Alcohol use: No      Alcohol/week: 0.0 standard drinks    Drug use: No    Sexual activity: Not on file     Review of Systems   Constitutional:  Negative for chills and fever.   HENT:  Negative for rhinorrhea and sneezing.    Respiratory:  Negative for cough and shortness of breath.    Cardiovascular:  Negative for chest pain and leg swelling.   Gastrointestinal:  Negative for abdominal pain and nausea.   Genitourinary:  Negative for dysuria and hematuria.   Musculoskeletal:  Negative for arthralgias and myalgias.   Skin:  Positive for color change and wound.        Color change and warmth.   Neurological:  Negative for tremors and headaches.   Psychiatric/Behavioral:  Negative for agitation and confusion.    Objective:     Vital Signs (Most Recent):  Temp: 98.3 °F (36.8 °C) (08/13/22 0045)  Pulse: 79 (08/13/22 0045)  Resp: 18 (08/13/22 0045)  BP: 131/67 (08/13/22 0045)  SpO2: (!) 93 % (08/13/22 0045)   Vital Signs (24h Range):  Temp:  [97.4 °F (36.3 °C)-98.3 °F (36.8 °C)] 98.3 °F (36.8 °C)  Pulse:  [75-82] 79  Resp:  [18] 18  SpO2:  [93 %-96 %] 93 %  BP: (116-132)/(62-72) 131/67     Weight: 118 kg (260 lb 2.3 oz)  Body mass index is 36.28 kg/m².    Foot Exam    Right Foot/Ankle     Inspection and Palpation  Ecchymosis: none  Tenderness: none   Swelling: none   Skin Exam: skin intact;     Neurovascular  Dorsalis pedis: 2+  Posterior tibial: 2+  Saphenous nerve sensation: normal  Tibial nerve sensation: normal  Superficial peroneal nerve sensation: normal  Deep peroneal nerve sensation: normal  Sural nerve sensation: normal      Left Foot/Ankle      Inspection and Palpation  Ecchymosis: none  Tenderness: none   Swelling: (minimal to mild at forefoot)  Skin Exam: cellulitis, ulcer and erythema; no drainage and skin not intact Left foot callus: hyperkeratotic periwound.    Neurovascular  Dorsalis pedis: 2+  Posterior tibial: 2+  Saphenous nerve sensation: normal  Tibial nerve sensation: normal  Superficial peroneal nerve sensation:  normal  Deep peroneal nerve sensation: normal  Sural nerve sensation: normal    Comments  Plantar aspect ulcerations x 2 Full thickness 70/30 granular to fibrotic, with hyperkeratotic periwound     Sub 4th met head   -Positive probe to bone to 4th metatarsal head    -Periwound erythema, minimal increased warmth, and minor forefoot edema noted.   -No Pain, No drainage, No fluctuance or crepitation, No Malodor      Sub 2nd met head   -Negative probe to bone   -Periwound erythema, minimal increased warmth, and minor forefoot edema noted.   -No Pain, No drainage, No fluctuance or crepitation, No Malodor        Laboratory:  A1C:   Recent Labs   Lab 03/04/22  0715 08/11/22  0950   HGBA1C 5.9* 5.7*     CBC:   Recent Labs   Lab 08/12/22  0330   WBC 8.95   RBC 4.71   HGB 14.1   HCT 40.5      MCV 86   MCH 29.9   MCHC 34.8     CMP:   Recent Labs   Lab 08/12/22  0330      CALCIUM 9.1   ALBUMIN 3.3*   PROT 7.1   *   K 4.1   CO2 24      BUN 9   CREATININE 0.8   ALKPHOS 64   ALT 15   AST 15   BILITOT 0.5     CRP:   Recent Labs   Lab 08/11/22  1017   CRP 89.7*     ESR:   Recent Labs   Lab 08/11/22  1017   SEDRATE 42*     Microbiology Results (last 7 days)       Procedure Component Value Units Date/Time    Gram stain [[id number]] Collected: 08/12/22 1433    Order Status: Completed Specimen: Wound from Foot, Left Updated: 08/12/22 2220     Gram Stain Result Rare WBC's      No organisms seen    Culture, Anaerobe [[id number]] Collected: 08/12/22 1433    Order Status: Sent Specimen: Wound from Foot, Left Updated: 08/12/22 1625    Aerobic culture [[id number]] Collected: 08/12/22 1433    Order Status: Sent Specimen: Wound from Foot, Left Updated: 08/12/22 1624    Blood culture #2 **CANNOT BE ORDERED STAT** [[id number]] Collected: 08/11/22 1016    Order Status: Completed Specimen: Blood from Peripheral, Hand, Right Updated: 08/12/22 1212     Blood Culture, Routine No Growth to date      No Growth to date    Blood  culture #1 **CANNOT BE ORDERED STAT** [[id number]] Collected: 08/11/22 1017    Order Status: Completed Specimen: Blood from Peripheral, Hand, Left Updated: 08/12/22 1212     Blood Culture, Routine No Growth to date      No Growth to date    Aerobic culture [[id number]]     Order Status: No result Specimen: Bone     Culture, Anaerobe [[id number]]     Order Status: No result Specimen: Bone     AFB Culture & Smear [[id number]]     Order Status: No result Specimen: Bone     Fungus culture [[id number]]     Order Status: No result Specimen: Bone     Gram stain [[id number]]     Order Status: No result Specimen: Bone           All pertinent labs reviewed within the last 24 hours.    Diagnostic Results:    MRI L Foot:   Prior partial forefoot amputation.   Marrow signal changes in the distal 4th metatarsal, as above, concerning for osteomyelitis.   Probable second focus of osteomyelitis in the 1st metatarsal head.     L Foot X Ray:   Interval forefoot amputation at level of MTP joints.  Fourth and 5th metatarsals show irregularity of the heads with adjacent soft tissue swelling.  Correlate for possible osteomyelitis.  If indicated, MRI could be done for better assessment    LE Venous US:  No DVT.      Clinical Findings:        Assessment/Plan:     Acute osteomyelitis of metatarsal bone of left foot  MRI confirmed osteomyelitis of the L 4th metatarsal head, with possible secondary focus of 1st metatarsal associated with contiguous plantar full thickness ulceration x 2. On X ray and clinical exam, 5th metatarsalhead appears suspect as well.     -No urgent surgical intervention currently indicated  -Discussed treatment options, alternatives, and complications with patient   -Patient amenable to transmetatarsal amputation, resection of the metatarsal heads 1-5.   -Surgery to take place early next week pending OR availability  -Nursing wound care routine ordered   -Continue IV ABx per primary team   -Wound culture obtained   -WB: Heel  touch left foot with dressing intact   -Podiatry will follow       Thank you for your consult. I will follow-up with patient. Please contact us if you have any additional questions.    Noah Howell DPM, PGY-2  Podiatry / Foot and Ankle Surgery   Page # [phone]  Secure chat preferred

## 2022-08-15 ENCOUNTER — ANESTHESIA (OUTPATIENT)
Dept: SURGERY | Facility: HOSPITAL | Age: 67
DRG: 475 | End: 2022-08-15
Payer: COMMERCIAL

## 2022-08-15 ENCOUNTER — ANESTHESIA EVENT (OUTPATIENT)
Dept: SURGERY | Facility: HOSPITAL | Age: 67
DRG: 475 | End: 2022-08-15
Payer: OTHER MISCELLANEOUS

## 2022-08-15 LAB
ALBUMIN SERPL BCP-MCNC: 3.4 G/DL (ref 3.5–5.2)
ALP SERPL-CCNC: 68 U/L (ref 55–135)
ALT SERPL W/O P-5'-P-CCNC: 19 U/L (ref 10–44)
ANION GAP SERPL CALC-SCNC: 10 MMOL/L (ref 8–16)
AST SERPL-CCNC: 13 U/L (ref 10–40)
BACTERIA SPEC AEROBE CULT: ABNORMAL
BASOPHILS # BLD AUTO: 0.07 K/UL (ref 0–0.2)
BASOPHILS NFR BLD: 0.8 % (ref 0–1.9)
BILIRUB SERPL-MCNC: 0.4 MG/DL (ref 0.1–1)
BUN SERPL-MCNC: 10 MG/DL (ref 8–23)
CALCIUM SERPL-MCNC: 9.6 MG/DL (ref 8.7–10.5)
CHLORIDE SERPL-SCNC: 103 MMOL/L (ref 95–110)
CO2 SERPL-SCNC: 24 MMOL/L (ref 23–29)
CREAT SERPL-MCNC: 0.7 MG/DL (ref 0.5–1.4)
DIFFERENTIAL METHOD: NORMAL
EOSINOPHIL # BLD AUTO: 0.4 K/UL (ref 0–0.5)
EOSINOPHIL NFR BLD: 4.6 % (ref 0–8)
ERYTHROCYTE [DISTWIDTH] IN BLOOD BY AUTOMATED COUNT: 12.9 % (ref 11.5–14.5)
EST. GFR  (NO RACE VARIABLE): >60 ML/MIN/1.73 M^2
GLUCOSE SERPL-MCNC: 99 MG/DL (ref 70–110)
HCT VFR BLD AUTO: 43 % (ref 40–54)
HGB BLD-MCNC: 14.7 G/DL (ref 14–18)
IMM GRANULOCYTES # BLD AUTO: 0.04 K/UL (ref 0–0.04)
IMM GRANULOCYTES NFR BLD AUTO: 0.5 % (ref 0–0.5)
LYMPHOCYTES # BLD AUTO: 1.9 K/UL (ref 1–4.8)
LYMPHOCYTES NFR BLD: 22.5 % (ref 18–48)
MAGNESIUM SERPL-MCNC: 2.1 MG/DL (ref 1.6–2.6)
MCH RBC QN AUTO: 30.6 PG (ref 27–31)
MCHC RBC AUTO-ENTMCNC: 34.2 G/DL (ref 32–36)
MCV RBC AUTO: 90 FL (ref 82–98)
MONOCYTES # BLD AUTO: 0.8 K/UL (ref 0.3–1)
MONOCYTES NFR BLD: 8.8 % (ref 4–15)
NEUTROPHILS # BLD AUTO: 5.4 K/UL (ref 1.8–7.7)
NEUTROPHILS NFR BLD: 62.8 % (ref 38–73)
NRBC BLD-RTO: 0 /100 WBC
PHOSPHATE SERPL-MCNC: 3.2 MG/DL (ref 2.7–4.5)
PLATELET # BLD AUTO: 277 K/UL (ref 150–450)
PMV BLD AUTO: 10.8 FL (ref 9.2–12.9)
POTASSIUM SERPL-SCNC: 4 MMOL/L (ref 3.5–5.1)
PROT SERPL-MCNC: 7.3 G/DL (ref 6–8.4)
RBC # BLD AUTO: 4.8 M/UL (ref 4.6–6.2)
SODIUM SERPL-SCNC: 137 MMOL/L (ref 136–145)
VANCOMYCIN TROUGH SERPL-MCNC: 19.6 UG/ML (ref 10–22)
WBC # BLD AUTO: 8.56 K/UL (ref 3.9–12.7)

## 2022-08-15 PROCEDURE — D9220A PRA ANESTHESIA: Mod: CRNA,,, | Performed by: NURSE ANESTHETIST, CERTIFIED REGISTERED

## 2022-08-15 PROCEDURE — 87206 SMEAR FLUORESCENT/ACID STAI: CPT | Performed by: PODIATRIST

## 2022-08-15 PROCEDURE — 36000706: Performed by: PODIATRIST

## 2022-08-15 PROCEDURE — 83735 ASSAY OF MAGNESIUM: CPT

## 2022-08-15 PROCEDURE — 63600175 PHARM REV CODE 636 W HCPCS: Performed by: NURSE ANESTHETIST, CERTIFIED REGISTERED

## 2022-08-15 PROCEDURE — 85025 COMPLETE CBC W/AUTO DIFF WBC: CPT

## 2022-08-15 PROCEDURE — 87075 CULTR BACTERIA EXCEPT BLOOD: CPT | Performed by: PODIATRIST

## 2022-08-15 PROCEDURE — 88311 PR  DECALCIFY TISSUE: ICD-10-PCS | Mod: 26,,, | Performed by: PATHOLOGY

## 2022-08-15 PROCEDURE — 87070 CULTURE OTHR SPECIMN AEROBIC: CPT | Performed by: PODIATRIST

## 2022-08-15 PROCEDURE — 88311 DECALCIFY TISSUE: CPT | Mod: 59 | Performed by: PATHOLOGY

## 2022-08-15 PROCEDURE — 63600175 PHARM REV CODE 636 W HCPCS: Performed by: INTERNAL MEDICINE

## 2022-08-15 PROCEDURE — 87116 MYCOBACTERIA CULTURE: CPT | Performed by: PODIATRIST

## 2022-08-15 PROCEDURE — 25000003 PHARM REV CODE 250

## 2022-08-15 PROCEDURE — 28805 AMPUTATION THRU METATARSAL: CPT | Mod: LT,,, | Performed by: PODIATRIST

## 2022-08-15 PROCEDURE — 25000003 PHARM REV CODE 250: Performed by: INTERNAL MEDICINE

## 2022-08-15 PROCEDURE — 71000033 HC RECOVERY, INTIAL HOUR: Performed by: PODIATRIST

## 2022-08-15 PROCEDURE — 36415 COLL VENOUS BLD VENIPUNCTURE: CPT | Performed by: INTERNAL MEDICINE

## 2022-08-15 PROCEDURE — 25000003 PHARM REV CODE 250: Performed by: PODIATRIST

## 2022-08-15 PROCEDURE — 36415 COLL VENOUS BLD VENIPUNCTURE: CPT

## 2022-08-15 PROCEDURE — 37000009 HC ANESTHESIA EA ADD 15 MINS: Performed by: PODIATRIST

## 2022-08-15 PROCEDURE — 99233 PR SUBSEQUENT HOSPITAL CARE,LEVL III: ICD-10-PCS | Mod: ,,, | Performed by: INTERNAL MEDICINE

## 2022-08-15 PROCEDURE — D9220A PRA ANESTHESIA: ICD-10-PCS | Mod: ANES,,, | Performed by: ANESTHESIOLOGY

## 2022-08-15 PROCEDURE — 88311 DECALCIFY TISSUE: CPT | Mod: 26,,, | Performed by: PATHOLOGY

## 2022-08-15 PROCEDURE — 80202 ASSAY OF VANCOMYCIN: CPT | Performed by: INTERNAL MEDICINE

## 2022-08-15 PROCEDURE — 94761 N-INVAS EAR/PLS OXIMETRY MLT: CPT

## 2022-08-15 PROCEDURE — 87102 FUNGUS ISOLATION CULTURE: CPT | Performed by: PODIATRIST

## 2022-08-15 PROCEDURE — 14040 TIS TRNFR F/C/C/M/N/A/G/H/F: CPT | Mod: 51,,, | Performed by: PODIATRIST

## 2022-08-15 PROCEDURE — 71000015 HC POSTOP RECOV 1ST HR: Performed by: PODIATRIST

## 2022-08-15 PROCEDURE — 84100 ASSAY OF PHOSPHORUS: CPT

## 2022-08-15 PROCEDURE — 80053 COMPREHEN METABOLIC PANEL: CPT

## 2022-08-15 PROCEDURE — 88305 TISSUE EXAM BY PATHOLOGIST: ICD-10-PCS | Mod: 26,,, | Performed by: PATHOLOGY

## 2022-08-15 PROCEDURE — 28805 PR AMPUTATION FOOT,TRANSMETATARSAL: ICD-10-PCS | Mod: LT,,, | Performed by: PODIATRIST

## 2022-08-15 PROCEDURE — 36000707: Performed by: PODIATRIST

## 2022-08-15 PROCEDURE — 14040 PR ADJ TISS XFER HEAD,FAC,HAND <10 SQCM: ICD-10-PCS | Mod: 51,,, | Performed by: PODIATRIST

## 2022-08-15 PROCEDURE — 37000008 HC ANESTHESIA 1ST 15 MINUTES: Performed by: PODIATRIST

## 2022-08-15 PROCEDURE — 11000001 HC ACUTE MED/SURG PRIVATE ROOM

## 2022-08-15 PROCEDURE — 25000003 PHARM REV CODE 250: Performed by: NURSE ANESTHETIST, CERTIFIED REGISTERED

## 2022-08-15 PROCEDURE — D9220A PRA ANESTHESIA: Mod: ANES,,, | Performed by: ANESTHESIOLOGY

## 2022-08-15 PROCEDURE — 99233 SBSQ HOSP IP/OBS HIGH 50: CPT | Mod: ,,, | Performed by: INTERNAL MEDICINE

## 2022-08-15 PROCEDURE — D9220A PRA ANESTHESIA: ICD-10-PCS | Mod: CRNA,,, | Performed by: NURSE ANESTHETIST, CERTIFIED REGISTERED

## 2022-08-15 PROCEDURE — 88305 TISSUE EXAM BY PATHOLOGIST: CPT | Mod: 26,,, | Performed by: PATHOLOGY

## 2022-08-15 PROCEDURE — 63600175 PHARM REV CODE 636 W HCPCS

## 2022-08-15 PROCEDURE — 88305 TISSUE EXAM BY PATHOLOGIST: CPT | Mod: 59 | Performed by: PATHOLOGY

## 2022-08-15 PROCEDURE — 94799 UNLISTED PULMONARY SVC/PX: CPT

## 2022-08-15 PROCEDURE — 71000039 HC RECOVERY, EACH ADD'L HOUR: Performed by: PODIATRIST

## 2022-08-15 RX ORDER — MIDAZOLAM HYDROCHLORIDE 1 MG/ML
INJECTION, SOLUTION INTRAMUSCULAR; INTRAVENOUS
Status: DISCONTINUED | OUTPATIENT
Start: 2022-08-15 | End: 2022-08-15

## 2022-08-15 RX ORDER — BUPIVACAINE HYDROCHLORIDE 2.5 MG/ML
INJECTION, SOLUTION EPIDURAL; INFILTRATION; INTRACAUDAL
Status: DISCONTINUED | OUTPATIENT
Start: 2022-08-15 | End: 2022-08-15 | Stop reason: HOSPADM

## 2022-08-15 RX ORDER — FENTANYL CITRATE 50 UG/ML
INJECTION, SOLUTION INTRAMUSCULAR; INTRAVENOUS
Status: DISCONTINUED | OUTPATIENT
Start: 2022-08-15 | End: 2022-08-15

## 2022-08-15 RX ORDER — SODIUM CHLORIDE 0.9 % (FLUSH) 0.9 %
10 SYRINGE (ML) INJECTION
Status: DISCONTINUED | OUTPATIENT
Start: 2022-08-15 | End: 2022-08-19 | Stop reason: HOSPADM

## 2022-08-15 RX ORDER — PROPOFOL 10 MG/ML
VIAL (ML) INTRAVENOUS CONTINUOUS PRN
Status: DISCONTINUED | OUTPATIENT
Start: 2022-08-15 | End: 2022-08-15

## 2022-08-15 RX ORDER — LIDOCAINE HYDROCHLORIDE 10 MG/ML
INJECTION, SOLUTION INTRAVENOUS
Status: DISCONTINUED | OUTPATIENT
Start: 2022-08-15 | End: 2022-08-15

## 2022-08-15 RX ORDER — LIDOCAINE HYDROCHLORIDE 10 MG/ML
INJECTION INFILTRATION; PERINEURAL
Status: DISCONTINUED | OUTPATIENT
Start: 2022-08-15 | End: 2022-08-15 | Stop reason: HOSPADM

## 2022-08-15 RX ORDER — HYDROCODONE BITARTRATE AND ACETAMINOPHEN 5; 325 MG/1; MG/1
1 TABLET ORAL EVERY 4 HOURS PRN
Status: DISCONTINUED | OUTPATIENT
Start: 2022-08-15 | End: 2022-08-19 | Stop reason: HOSPADM

## 2022-08-15 RX ORDER — PROCHLORPERAZINE EDISYLATE 5 MG/ML
5 INJECTION INTRAMUSCULAR; INTRAVENOUS EVERY 30 MIN PRN
Status: DISCONTINUED | OUTPATIENT
Start: 2022-08-15 | End: 2022-08-15 | Stop reason: HOSPADM

## 2022-08-15 RX ORDER — ONDANSETRON 2 MG/ML
4 INJECTION INTRAMUSCULAR; INTRAVENOUS DAILY PRN
Status: DISCONTINUED | OUTPATIENT
Start: 2022-08-15 | End: 2022-08-15 | Stop reason: HOSPADM

## 2022-08-15 RX ORDER — PHENYLEPHRINE HYDROCHLORIDE 10 MG/ML
INJECTION INTRAVENOUS
Status: DISCONTINUED | OUTPATIENT
Start: 2022-08-15 | End: 2022-08-15

## 2022-08-15 RX ORDER — HYDROMORPHONE HYDROCHLORIDE 1 MG/ML
0.2 INJECTION, SOLUTION INTRAMUSCULAR; INTRAVENOUS; SUBCUTANEOUS EVERY 5 MIN PRN
Status: DISCONTINUED | OUTPATIENT
Start: 2022-08-15 | End: 2022-08-15 | Stop reason: HOSPADM

## 2022-08-15 RX ADMIN — PHENYLEPHRINE HYDROCHLORIDE 100 MCG: 10 INJECTION INTRAVENOUS at 01:08

## 2022-08-15 RX ADMIN — MIDAZOLAM HYDROCHLORIDE 2 MG: 1 INJECTION, SOLUTION INTRAMUSCULAR; INTRAVENOUS at 01:08

## 2022-08-15 RX ADMIN — METOPROLOL SUCCINATE 25 MG: 25 TABLET, EXTENDED RELEASE ORAL at 08:08

## 2022-08-15 RX ADMIN — PHENYLEPHRINE HYDROCHLORIDE 100 MCG: 10 INJECTION INTRAVENOUS at 02:08

## 2022-08-15 RX ADMIN — VANCOMYCIN HYDROCHLORIDE 1750 MG: 500 INJECTION, POWDER, LYOPHILIZED, FOR SOLUTION INTRAVENOUS at 09:08

## 2022-08-15 RX ADMIN — LIDOCAINE HYDROCHLORIDE 50 MG: 10 INJECTION, SOLUTION INTRAVENOUS at 01:08

## 2022-08-15 RX ADMIN — CEFTRIAXONE 2 G: 2 INJECTION, SOLUTION INTRAVENOUS at 05:08

## 2022-08-15 RX ADMIN — FENTANYL CITRATE 50 MCG: 50 INJECTION, SOLUTION INTRAMUSCULAR; INTRAVENOUS at 01:08

## 2022-08-15 RX ADMIN — Medication 1000 UNITS: at 08:08

## 2022-08-15 RX ADMIN — SODIUM CHLORIDE: 0.9 INJECTION, SOLUTION INTRAVENOUS at 01:08

## 2022-08-15 RX ADMIN — PROPOFOL 125 MCG/KG/MIN: 10 INJECTION, EMULSION INTRAVENOUS at 01:08

## 2022-08-15 NOTE — ANESTHESIA POSTPROCEDURE EVALUATION
Anesthesia Post Evaluation    Patient: Marcus Watkins    Procedure(s) Performed: Procedure(s) (LRB):  AMPUTATION, FOOT, TRANSMETATARSAL (Left)    Final Anesthesia Type: general      Patient location during evaluation: PACU  Patient participation: Yes- Able to Participate  Level of consciousness: awake and alert and oriented  Post-procedure vital signs: reviewed and stable  Pain management: adequate  Airway patency: patent    PONV status at discharge: No PONV  Anesthetic complications: no      Cardiovascular status: hemodynamically stable  Respiratory status: unassisted  Hydration status: euvolemic  Follow-up not needed.          Vitals Value Taken Time   /82 08/15/22 1532   Pulse 69 08/15/22 1544   Resp 14 08/15/22 1544   SpO2 99 % 08/15/22 1544   Vitals shown include unvalidated device data.      No case tracking events are documented in the log.      Pain/Monika Score: Monika Score: 9 (8/15/2022  3:15 PM)

## 2022-08-15 NOTE — ASSESSMENT & PLAN NOTE
Patient with prediabetes, last hba1c 5 months ago 5.9. Patient experiences bilateral peripheral neuropathy in a stocking glove pattern. He has a family history of diabetes in mother and sister. May be related to diabetic process vs. thyroid etiology (hx of prior amiodarone use).  - Repeat Hba1c 5.7  - TSH wnl  - Not requiring treatment at this time; can add gabapentin if worsens

## 2022-08-15 NOTE — SUBJECTIVE & OBJECTIVE
Interval History: NAEO. Feels well today. No pain in foot.    Review of Systems   Constitutional:  Negative for chills and fever.   HENT:  Negative for congestion and sore throat.    Eyes:  Negative for pain and visual disturbance.   Respiratory:  Negative for cough, chest tightness and shortness of breath.    Cardiovascular:  Negative for chest pain, palpitations and leg swelling.   Gastrointestinal:  Negative for abdominal distention, abdominal pain, constipation, diarrhea, nausea and vomiting.   Genitourinary:  Negative for difficulty urinating, dysuria and frequency.   Musculoskeletal:  Negative for arthralgias and gait problem.   Skin:  Negative for color change and rash.   Neurological:  Negative for dizziness, syncope and headaches.   Psychiatric/Behavioral:  Negative for confusion. The patient is not nervous/anxious.    Objective:     Vital Signs (Most Recent):  Temp: 97.9 °F (36.6 °C) (08/15/22 1223)  Pulse: 78 (08/15/22 1223)  Resp: 17 (08/15/22 1223)  BP: (!) 161/80 (08/15/22 1223)  SpO2: 97 % (08/15/22 1223)   Vital Signs (24h Range):  Temp:  [97.3 °F (36.3 °C)-98.4 °F (36.9 °C)] 97.9 °F (36.6 °C)  Pulse:  [70-81] 78  Resp:  [17-20] 17  SpO2:  [92 %-97 %] 97 %  BP: (125-161)/(68-83) 161/80     Weight: 118 kg (260 lb 2.3 oz)  Body mass index is 36.28 kg/m².  No intake or output data in the 24 hours ending 08/15/22 1238   Physical Exam  Vitals reviewed.   Constitutional:       General: He is not in acute distress.     Appearance: Normal appearance.   HENT:      Mouth/Throat:      Mouth: Mucous membranes are moist.      Pharynx: Oropharynx is clear.   Cardiovascular:      Rate and Rhythm: Normal rate and regular rhythm.      Pulses: Normal pulses.      Heart sounds: No murmur heard.  Pulmonary:      Effort: Pulmonary effort is normal.      Breath sounds: Normal breath sounds. No wheezing or rales.   Abdominal:      General: Abdomen is flat. Bowel sounds are normal. There is no distension.      Palpations:  Abdomen is soft.      Tenderness: There is no abdominal tenderness.   Musculoskeletal:         General: No swelling.      Right lower leg: No edema.      Left lower leg: No edema.      Comments: L foot bandaged   Skin:     General: Skin is warm.      Capillary Refill: Capillary refill takes less than 2 seconds.   Neurological:      General: No focal deficit present.      Mental Status: He is alert and oriented to person, place, and time.   Psychiatric:         Mood and Affect: Mood normal.         Behavior: Behavior normal.       Significant Labs: All pertinent labs within the past 24 hours have been reviewed.  CBC:   Recent Labs   Lab 08/14/22  0615 08/15/22  0538   WBC 8.75 8.56   HGB 14.4 14.7   HCT 42.2 43.0    277     CMP:   Recent Labs   Lab 08/14/22  0615 08/15/22  0538    137   K 3.9 4.0    103   CO2 22* 24    99   BUN 10 10   CREATININE 0.8 0.7   CALCIUM 9.4 9.6   PROT 7.2 7.3   ALBUMIN 3.3* 3.4*   BILITOT 0.3 0.4   ALKPHOS 67 68   AST 14 13   ALT 15 19   ANIONGAP 10 10       Significant Imaging: I have reviewed all pertinent imaging results/findings within the past 24 hours.

## 2022-08-15 NOTE — PROGRESS NOTES
[facility] - [facility]  Podiatry  Progress Note    Patient Name: Marcus Watkins  MRN: [id number]  Admission Date: 8/11/2022  Hospital Length of Stay: 4 days  Attending Physician: Tea Cabrera MD  Primary Care Provider: Radha Brunson MD     Subjective:     Interval History: NAEON. VSS. Afebrile. WBC down trend from 12.8 on 08/11 to 08/15, no leukocytosis. Anaerobic culture in progress, others no growth. Plan for OR today if availability.     Follow-up For: Procedure(s) (LRB):  AMPUTATION, FOOT, TRANSMETATARSAL (Left)    Post-Operative Day:      Scheduled Meds:   cefTRIAXone (ROCEPHIN) IVPB  2 g Intravenous Q24H    enoxaparin  40 mg Subcutaneous Daily    metoprolol succinate  25 mg Oral Daily    polyethylene glycol  17 g Oral Daily    vancomycin (VANCOCIN) IVPB  1,750 mg Intravenous Q12H    vitamin E  1,000 Units Oral Daily     Continuous Infusions:  PRN Meds:acetaminophen, dextrose 10%, dextrose 10%, glucagon (human recombinant), glucose, glucose, naloxone, ondansetron, senna, sodium chloride 0.9%, Pharmacy to dose Vancomycin consult **AND** vancomycin - pharmacy to dose    Review of Systems   Constitutional:  Negative for chills and fever.   HENT:  Negative for rhinorrhea and sneezing.    Respiratory:  Negative for cough and shortness of breath.    Cardiovascular:  Negative for chest pain and leg swelling.   Gastrointestinal:  Negative for abdominal pain and nausea.   Genitourinary:  Negative for dysuria and hematuria.   Musculoskeletal:  Negative for arthralgias and myalgias.   Skin:  Positive for color change and wound.        Color change and warmth.   Neurological:  Negative for tremors and headaches.   Psychiatric/Behavioral:  Negative for agitation and confusion.    Objective:     Vital Signs (Most Recent):  Temp: 98 °F (36.7 °C) (08/15/22 0754)  Pulse: 81 (08/15/22 0754)  Resp: 20 (08/15/22 0754)  BP: 138/83 (08/15/22 0754)  SpO2: 97 % (08/15/22 0754) Vital Signs (24h Range):  Temp:  [97.3 °F  (36.3 °C)-98.4 °F (36.9 °C)] 98 °F (36.7 °C)  Pulse:  [70-81] 81  Resp:  [18-20] 20  SpO2:  [92 %-97 %] 97 %  BP: (124-159)/(68-87) 138/83     Weight: 118 kg (260 lb 2.3 oz)  Body mass index is 36.28 kg/m².    Foot Exam    Right Foot/Ankle     Inspection and Palpation  Ecchymosis: none  Tenderness: none   Swelling: none   Skin Exam: skin intact;     Neurovascular  Dorsalis pedis: 2+  Posterior tibial: 2+  Saphenous nerve sensation: normal  Tibial nerve sensation: normal  Superficial peroneal nerve sensation: normal  Deep peroneal nerve sensation: normal  Sural nerve sensation: normal      Left Foot/Ankle      Inspection and Palpation  Ecchymosis: none  Tenderness: none   Swelling: (minimal to mild at forefoot)  Skin Exam: cellulitis, ulcer and erythema; no drainage and skin not intact Left foot callus: hyperkeratotic periwound.    Neurovascular  Dorsalis pedis: 2+  Posterior tibial: 2+  Saphenous nerve sensation: normal  Tibial nerve sensation: normal  Superficial peroneal nerve sensation: normal  Deep peroneal nerve sensation: normal  Sural nerve sensation: normal    Comments  Plantar aspect ulcerations x 2 Full thickness 70/30 granular to fibrotic, with hyperkeratotic periwound     Sub 4th met head   -Positive probe to bone to 4th metatarsal head    -Periwound erythema, minimal increased warmth, and minor forefoot edema noted.   -No Pain, No drainage, No fluctuance or crepitation, No Malodor      Sub 2nd met head   -Negative probe to bone   -Periwound erythema, minimal increased warmth, and minor forefoot edema noted.   -No Pain, No drainage, No fluctuance or crepitation, No Malodor        Laboratory:  CBC:   Recent Labs   Lab 08/15/22  0538   WBC 8.56   RBC 4.80   HGB 14.7   HCT 43.0      MCV 90   MCH 30.6   MCHC 34.2     CMP:   Recent Labs   Lab 08/15/22  0538   GLU 99   CALCIUM 9.6   ALBUMIN 3.4*   PROT 7.3      K 4.0   CO2 24      BUN 10   CREATININE 0.7   ALKPHOS 68   ALT 19   AST 13    BILITOT 0.4     CRP:   Recent Labs   Lab 08/11/22  1017   CRP 89.7*     ESR:   Recent Labs   Lab 08/11/22  1017   SEDRATE 42*     Microbiology Results (last 7 days)       Procedure Component Value Units Date/Time    Blood culture #1 **CANNOT BE ORDERED STAT** [[id number]] Collected: 08/11/22 1017    Order Status: Completed Specimen: Blood from Peripheral, Hand, Left Updated: 08/14/22 1212     Blood Culture, Routine No Growth to date      No Growth to date      No Growth to date      No Growth to date    Blood culture #2 **CANNOT BE ORDERED STAT** [[id number]] Collected: 08/11/22 1016    Order Status: Completed Specimen: Blood from Peripheral, Hand, Right Updated: 08/14/22 1212     Blood Culture, Routine No Growth to date      No Growth to date      No Growth to date      No Growth to date    Culture, Anaerobe [[id number]] Collected: 08/12/22 1433    Order Status: Completed Specimen: Wound from Foot, Left Updated: 08/13/22 1358     Anaerobic Culture Culture in progress    Aerobic culture [[id number]] Collected: 08/12/22 1433    Order Status: Completed Specimen: Wound from Foot, Left Updated: 08/13/22 0736     Aerobic Bacterial Culture No growth    Gram stain [[id number]] Collected: 08/12/22 1433    Order Status: Completed Specimen: Wound from Foot, Left Updated: 08/12/22 2220     Gram Stain Result Rare WBC's      No organisms seen    Aerobic culture [[id number]]     Order Status: No result Specimen: Bone     Culture, Anaerobe [[id number]]     Order Status: No result Specimen: Bone     AFB Culture & Smear [[id number]]     Order Status: No result Specimen: Bone     Fungus culture [[id number]]     Order Status: No result Specimen: Bone     Gram stain [[id number]]     Order Status: No result Specimen: Bone           All pertinent labs reviewed within the last 24 hours.    Diagnostic Results:  I have reviewed all pertinent imaging results/findings within the past 24 hours.    Clinical Findings:        Assessment/Plan:     Acute  osteomyelitis of metatarsal bone of left foot  MRI confirmed osteomyelitis of the L 4th metatarsal head, with possible secondary focus of 1st metatarsal associated with contiguous plantar full thickness ulceration x 2. On X ray and clinical exam, 5th metatarsalhead appears suspect as well.     -Discussed treatment options, alternatives, and complications with patient   -Patient amenable to transmetatarsal amputation, resection of the metatarsal heads 1-5.   -Surgery to take place early next week pending OR availability  -NPO Sunday midnight incase case can be obtained from pending OR list for Monday  -Nursing wound care routine ordered   -Continue IV ABx per primary team   -Wound culture obtained   -WB: Heel touch left foot with dressing intact   -Podiatry will follow         Noah Howell DPM, PGY-2  Podiatry / Foot and Ankle Surgery   Page # [phone]  Secure chat preferred

## 2022-08-15 NOTE — NURSING TRANSFER
Nursing Transfer Note      8/15/2022     Reason patient is being transferred: postop    Transfer To: 509    Transfer via stretcher    Transfer with n/a    Transported by [facility]    Medicines sent: n/a    Any special needs or follow-up needed: n/a    Chart send with patient: Yes    Notified:     Patient reassessed at: 8/15/22    Upon arrival to floor: Report given to Kacy Page

## 2022-08-15 NOTE — BRIEF OP NOTE
[facility] - [facility] ([facility])  Brief Operative Note    SUMMARY     Surgery Date: 8/15/2022     Surgeon(s) and Role:     * Santos Randle DPM - Primary     * Noah Howell DPM - Resident - Assisting        Pre-op Diagnosis:  Osteomyelitis, unspecified site, unspecified type [M86.9]    Post-op Diagnosis:  Post-Op Diagnosis Codes:     * Osteomyelitis, unspecified site, unspecified type [M86.9]    Procedure(s) (LRB):  AMPUTATION, FOOT, TRANSMETATARSAL (Left)    Anesthesia: Local MAC    Operative Findings: TMA Left foot primary closure. Specimen collected     Estimated Blood Loss: 2 mL     Estimated Blood Loss has been documented.         Specimens:   Specimen (24h ago, onward)             Start     Ordered    08/15/22 1424  Specimen to Pathology, Surgery Orthopedics  Once        Comments: Pre-op Diagnosis: Osteomyelitis, unspecified site, unspecified type [M86.9]Procedure(s):AMPUTATION, FOOT, TRANSMETATARSAL Number of specimens: 2Name of specimens: 1) left forefoot2) left foot 4th metatarsal     References:    Click here for ordering Quick Tip   Question Answer Comment   Procedure Type: Orthopedics    Which provider would you like to cc? SANTOS RANDLE    Release to patient Immediate        08/15/22 1433                [id number]

## 2022-08-15 NOTE — ASSESSMENT & PLAN NOTE
Takes eliquis 5mg daily at home  - Hold eliquis for surgery  - Mg > 2, K > 4; replete PRN  - Cardiac telemetry
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
## 2022-08-15 NOTE — PROGRESS NOTES
Pharmacokinetic Assessment Follow Up: IV Vancomycin    Vancomycin serum concentration assessment(s):    - 10 hour Vancomycin trough resulted at 19.6 mcg/mL   - Goal trough 15-20 mcg/mL  - Stable renal function, Scr 0.7 mg/dL    Vancomycin Regimen Plan:    - Continue current regimen of Vancomycin 1750 mg Q12h  - Obtain next trough on 8/18 @ 0830    Drug levels (last 3 results):  Recent Labs   Lab Result Units 08/13/22  0749 08/15/22  0714   Vancomycin-Trough ug/mL 15.4 19.6       Pharmacy will continue to follow and monitor vancomycin.    Please contact pharmacy at extension [phone] for questions regarding this assessment.    Thank you for the consult,   Julia Yeondam Roh       Patient brief summary:  Marcus Watkins is a 66 y.o. male initiated on antimicrobial therapy with IV Vancomycin for treatment of bone/joint infection    Drug Allergies:   Review of patient's allergies indicates:  No Known Allergies    Actual Body Weight:   118 kg     Renal Function:   Estimated Creatinine Clearance: 132 mL/min (based on SCr of 0.7 mg/dL).,     CBC (last 72 hours):  Recent Labs   Lab Result Units 08/13/22  0257 08/14/22  0615 08/15/22  0538   WBC K/uL 9.97 8.75 8.56   Hemoglobin g/dL 14.1 14.4 14.7   Hematocrit % 41.9 42.2 43.0   Platelets K/uL 253 277 277   Gran % % 64.4 64.2 62.8   Lymph % % 22.7 22.3 22.5   Mono % % 8.4 8.7 8.8   Eosinophil % % 3.3 3.8 4.6   Basophil % % 0.8 0.7 0.8   Differential Method  Automated Automated Automated       Metabolic Panel (last 72 hours):  Recent Labs   Lab Result Units 08/13/22  0257 08/14/22  0615 08/15/22  0538   Sodium mmol/L 134* 136 137   Potassium mmol/L 3.8 3.9 4.0   Chloride mmol/L 102 104 103   CO2 mmol/L 24 22* 24   Glucose mg/dL 86 109 99   BUN mg/dL 10 10 10   Creatinine mg/dL 0.8 0.8 0.7   Albumin g/dL 3.3* 3.3* 3.4*   Total Bilirubin mg/dL 0.4 0.3 0.4   Alkaline Phosphatase U/L 61 67 68   AST U/L 15 14 13   ALT U/L 17 15 19   Magnesium mg/dL 2.2 2.1 2.1   Phosphorus mg/dL 3.3 3.5  3.2       Vancomycin Administrations:  vancomycin given in the last 96 hours                   vancomycin 1.75 g in 5 % dextrose 500 mL IVPB (mg) 1,750 mg New Bag 08/15/22 0926     1,750 mg New Bag 08/14/22 2135     1,750 mg New Bag  0921     1,750 mg New Bag 08/13/22 2107     1,750 mg New Bag  0935     1,750 mg New Bag 08/12/22 2108      Restarted  0923     1,750 mg New Bag  0300    vancomycin 2 g in dextrose 5 % 500 mL IVPB (mg) 2,000 mg New Bag 08/11/22 1430                Microbiologic Results:  Microbiology Results (last 7 days)     Procedure Component Value Units Date/Time    Blood culture #1 **CANNOT BE ORDERED STAT** [[id number]] Collected: 08/11/22 1017    Order Status: Completed Specimen: Blood from Peripheral, Hand, Left Updated: 08/14/22 1212     Blood Culture, Routine No Growth to date      No Growth to date      No Growth to date      No Growth to date    Blood culture #2 **CANNOT BE ORDERED STAT** [[id number]] Collected: 08/11/22 1016    Order Status: Completed Specimen: Blood from Peripheral, Hand, Right Updated: 08/14/22 1212     Blood Culture, Routine No Growth to date      No Growth to date      No Growth to date      No Growth to date    Culture, Anaerobe [[id number]] Collected: 08/12/22 1433    Order Status: Completed Specimen: Wound from Foot, Left Updated: 08/13/22 1358     Anaerobic Culture Culture in progress    Aerobic culture [[id number]] Collected: 08/12/22 1433    Order Status: Completed Specimen: Wound from Foot, Left Updated: 08/13/22 0736     Aerobic Bacterial Culture No growth    Gram stain [[id number]] Collected: 08/12/22 1433    Order Status: Completed Specimen: Wound from Foot, Left Updated: 08/12/22 2220     Gram Stain Result Rare WBC's      No organisms seen    Aerobic culture [[id number]]     Order Status: No result Specimen: Bone     Culture, Anaerobe [[id number]]     Order Status: No result Specimen: Bone     AFB Culture & Smear [[id number]]     Order Status: No result Specimen: Bone      Fungus culture [[id number]]     Order Status: No result Specimen: Bone     Gram stain [[id number]]     Order Status: No result Specimen: Bone

## 2022-08-15 NOTE — TRANSFER OF CARE
"Anesthesia Transfer of Care Note    Patient: Marcus Watkins    Procedure(s) Performed: Procedure(s) (LRB):  AMPUTATION, FOOT, TRANSMETATARSAL (Left)    Patient location: PACU    Anesthesia Type: MAC    Transport from OR: Transported from OR on room air with adequate spontaneous ventilation    Post pain: adequate analgesia    Post assessment: no apparent anesthetic complications    Post vital signs: stable    Level of consciousness: awake and alert    Nausea/Vomiting: no nausea/vomiting    Complications: none    Transfer of care protocol was followed      Last vitals:   Visit Vitals  BP (!) 161/80 (BP Location: Right arm, Patient Position: Lying)   Pulse 78   Temp 36.6 °C (97.9 °F) (Oral)   Resp 17   Ht 5' 11" (1.803 m)   Wt 118 kg (260 lb 2.3 oz)   SpO2 97%   BMI 36.28 kg/m²     "

## 2022-08-15 NOTE — ASSESSMENT & PLAN NOTE
MRI confirmed osteomyelitis of the L 4th metatarsal head, with possible secondary focus of 1st metatarsal associated with contiguous plantar full thickness ulceration x 2. On X ray and clinical exam, 5th metatarsalhead appears suspect as well.     -Discussed treatment options, alternatives, and complications with patient   -Patient amenable to transmetatarsal amputation, resection of the metatarsal heads 1-5.   -Surgery to take place early next week pending OR availability  -NPO Sunday midnight incase case can be obtained from pending OR list for Monday  -Nursing wound care routine ordered   -Continue IV ABx per primary team   -Wound culture obtained   -WB: Heel touch left foot with dressing intact   -Podiatry will follow

## 2022-08-15 NOTE — SUBJECTIVE & OBJECTIVE
Subjective:     Interval History: NAEON. VSS. Afebrile. WBC down trend from 12.8 on 08/11 to 08/15, no leukocytosis. Anaerobic culture in progress, others no growth. Plan for OR today if availability.     Follow-up For: Procedure(s) (LRB):  AMPUTATION, FOOT, TRANSMETATARSAL (Left)    Post-Operative Day:      Scheduled Meds:   cefTRIAXone (ROCEPHIN) IVPB  2 g Intravenous Q24H    enoxaparin  40 mg Subcutaneous Daily    metoprolol succinate  25 mg Oral Daily    polyethylene glycol  17 g Oral Daily    vancomycin (VANCOCIN) IVPB  1,750 mg Intravenous Q12H    vitamin E  1,000 Units Oral Daily     Continuous Infusions:  PRN Meds:acetaminophen, dextrose 10%, dextrose 10%, glucagon (human recombinant), glucose, glucose, naloxone, ondansetron, senna, sodium chloride 0.9%, Pharmacy to dose Vancomycin consult **AND** vancomycin - pharmacy to dose    Review of Systems   Constitutional:  Negative for chills and fever.   HENT:  Negative for rhinorrhea and sneezing.    Respiratory:  Negative for cough and shortness of breath.    Cardiovascular:  Negative for chest pain and leg swelling.   Gastrointestinal:  Negative for abdominal pain and nausea.   Genitourinary:  Negative for dysuria and hematuria.   Musculoskeletal:  Negative for arthralgias and myalgias.   Skin:  Positive for color change and wound.        Color change and warmth.   Neurological:  Negative for tremors and headaches.   Psychiatric/Behavioral:  Negative for agitation and confusion.    Objective:     Vital Signs (Most Recent):  Temp: 98 °F (36.7 °C) (08/15/22 0754)  Pulse: 81 (08/15/22 0754)  Resp: 20 (08/15/22 0754)  BP: 138/83 (08/15/22 0754)  SpO2: 97 % (08/15/22 0754) Vital Signs (24h Range):  Temp:  [97.3 °F (36.3 °C)-98.4 °F (36.9 °C)] 98 °F (36.7 °C)  Pulse:  [70-81] 81  Resp:  [18-20] 20  SpO2:  [92 %-97 %] 97 %  BP: (124-159)/(68-87) 138/83     Weight: 118 kg (260 lb 2.3 oz)  Body mass index is 36.28 kg/m².    Foot Exam    Right Foot/Ankle     Inspection  and Palpation  Ecchymosis: none  Tenderness: none   Swelling: none   Skin Exam: skin intact;     Neurovascular  Dorsalis pedis: 2+  Posterior tibial: 2+  Saphenous nerve sensation: normal  Tibial nerve sensation: normal  Superficial peroneal nerve sensation: normal  Deep peroneal nerve sensation: normal  Sural nerve sensation: normal      Left Foot/Ankle      Inspection and Palpation  Ecchymosis: none  Tenderness: none   Swelling: (minimal to mild at forefoot)  Skin Exam: cellulitis, ulcer and erythema; no drainage and skin not intact Left foot callus: hyperkeratotic periwound.    Neurovascular  Dorsalis pedis: 2+  Posterior tibial: 2+  Saphenous nerve sensation: normal  Tibial nerve sensation: normal  Superficial peroneal nerve sensation: normal  Deep peroneal nerve sensation: normal  Sural nerve sensation: normal    Comments  Plantar aspect ulcerations x 2 Full thickness 70/30 granular to fibrotic, with hyperkeratotic periwound     Sub 4th met head   -Positive probe to bone to 4th metatarsal head    -Periwound erythema, minimal increased warmth, and minor forefoot edema noted.   -No Pain, No drainage, No fluctuance or crepitation, No Malodor      Sub 2nd met head   -Negative probe to bone   -Periwound erythema, minimal increased warmth, and minor forefoot edema noted.   -No Pain, No drainage, No fluctuance or crepitation, No Malodor        Laboratory:  CBC:   Recent Labs   Lab 08/15/22  0538   WBC 8.56   RBC 4.80   HGB 14.7   HCT 43.0      MCV 90   MCH 30.6   MCHC 34.2     CMP:   Recent Labs   Lab 08/15/22  0538   GLU 99   CALCIUM 9.6   ALBUMIN 3.4*   PROT 7.3      K 4.0   CO2 24      BUN 10   CREATININE 0.7   ALKPHOS 68   ALT 19   AST 13   BILITOT 0.4     CRP:   Recent Labs   Lab 08/11/22  1017   CRP 89.7*     ESR:   Recent Labs   Lab 08/11/22  1017   SEDRATE 42*     Microbiology Results (last 7 days)       Procedure Component Value Units Date/Time    Blood culture #1 **CANNOT BE ORDERED STAT**  [[id number]] Collected: 08/11/22 1017    Order Status: Completed Specimen: Blood from Peripheral, Hand, Left Updated: 08/14/22 1212     Blood Culture, Routine No Growth to date      No Growth to date      No Growth to date      No Growth to date    Blood culture #2 **CANNOT BE ORDERED STAT** [[id number]] Collected: 08/11/22 1016    Order Status: Completed Specimen: Blood from Peripheral, Hand, Right Updated: 08/14/22 1212     Blood Culture, Routine No Growth to date      No Growth to date      No Growth to date      No Growth to date    Culture, Anaerobe [[id number]] Collected: 08/12/22 1433    Order Status: Completed Specimen: Wound from Foot, Left Updated: 08/13/22 1358     Anaerobic Culture Culture in progress    Aerobic culture [[id number]] Collected: 08/12/22 1433    Order Status: Completed Specimen: Wound from Foot, Left Updated: 08/13/22 0736     Aerobic Bacterial Culture No growth    Gram stain [[id number]] Collected: 08/12/22 1433    Order Status: Completed Specimen: Wound from Foot, Left Updated: 08/12/22 2220     Gram Stain Result Rare WBC's      No organisms seen    Aerobic culture [[id number]]     Order Status: No result Specimen: Bone     Culture, Anaerobe [[id number]]     Order Status: No result Specimen: Bone     AFB Culture & Smear [[id number]]     Order Status: No result Specimen: Bone     Fungus culture [[id number]]     Order Status: No result Specimen: Bone     Gram stain [[id number]]     Order Status: No result Specimen: Bone           All pertinent labs reviewed within the last 24 hours.    Diagnostic Results:  I have reviewed all pertinent imaging results/findings within the past 24 hours.    Clinical Findings:

## 2022-08-16 ENCOUNTER — TELEPHONE (OUTPATIENT)
Dept: PODIATRY | Facility: CLINIC | Age: 67
End: 2022-08-16
Payer: COMMERCIAL

## 2022-08-16 LAB
ALBUMIN SERPL BCP-MCNC: 3.3 G/DL (ref 3.5–5.2)
ALP SERPL-CCNC: 67 U/L (ref 55–135)
ALT SERPL W/O P-5'-P-CCNC: 17 U/L (ref 10–44)
ANION GAP SERPL CALC-SCNC: 10 MMOL/L (ref 8–16)
AST SERPL-CCNC: 15 U/L (ref 10–40)
BACTERIA BLD CULT: NORMAL
BACTERIA BLD CULT: NORMAL
BACTERIA SPEC ANAEROBE CULT: ABNORMAL
BASOPHILS # BLD AUTO: 0.06 K/UL (ref 0–0.2)
BASOPHILS NFR BLD: 0.5 % (ref 0–1.9)
BILIRUB SERPL-MCNC: 0.4 MG/DL (ref 0.1–1)
BUN SERPL-MCNC: 12 MG/DL (ref 8–23)
CALCIUM SERPL-MCNC: 9.3 MG/DL (ref 8.7–10.5)
CHLORIDE SERPL-SCNC: 101 MMOL/L (ref 95–110)
CO2 SERPL-SCNC: 24 MMOL/L (ref 23–29)
CREAT SERPL-MCNC: 0.8 MG/DL (ref 0.5–1.4)
DIFFERENTIAL METHOD: ABNORMAL
EOSINOPHIL # BLD AUTO: 0.2 K/UL (ref 0–0.5)
EOSINOPHIL NFR BLD: 1.7 % (ref 0–8)
ERYTHROCYTE [DISTWIDTH] IN BLOOD BY AUTOMATED COUNT: 12.9 % (ref 11.5–14.5)
EST. GFR  (NO RACE VARIABLE): >60 ML/MIN/1.73 M^2
GLUCOSE SERPL-MCNC: 103 MG/DL (ref 70–110)
HCT VFR BLD AUTO: 41 % (ref 40–54)
HGB BLD-MCNC: 14.4 G/DL (ref 14–18)
IMM GRANULOCYTES # BLD AUTO: 0.07 K/UL (ref 0–0.04)
IMM GRANULOCYTES NFR BLD AUTO: 0.6 % (ref 0–0.5)
LYMPHOCYTES # BLD AUTO: 1.9 K/UL (ref 1–4.8)
LYMPHOCYTES NFR BLD: 14.9 % (ref 18–48)
MAGNESIUM SERPL-MCNC: 2.1 MG/DL (ref 1.6–2.6)
MCH RBC QN AUTO: 30.8 PG (ref 27–31)
MCHC RBC AUTO-ENTMCNC: 35.1 G/DL (ref 32–36)
MCV RBC AUTO: 88 FL (ref 82–98)
MONOCYTES # BLD AUTO: 1 K/UL (ref 0.3–1)
MONOCYTES NFR BLD: 8 % (ref 4–15)
NEUTROPHILS # BLD AUTO: 9.4 K/UL (ref 1.8–7.7)
NEUTROPHILS NFR BLD: 74.3 % (ref 38–73)
NRBC BLD-RTO: 0 /100 WBC
PHOSPHATE SERPL-MCNC: 3.7 MG/DL (ref 2.7–4.5)
PLATELET # BLD AUTO: 277 K/UL (ref 150–450)
PMV BLD AUTO: 10.7 FL (ref 9.2–12.9)
POTASSIUM SERPL-SCNC: 4 MMOL/L (ref 3.5–5.1)
PROT SERPL-MCNC: 7.1 G/DL (ref 6–8.4)
RBC # BLD AUTO: 4.68 M/UL (ref 4.6–6.2)
SODIUM SERPL-SCNC: 135 MMOL/L (ref 136–145)
WBC # BLD AUTO: 12.68 K/UL (ref 3.9–12.7)

## 2022-08-16 PROCEDURE — 25000003 PHARM REV CODE 250: Performed by: INTERNAL MEDICINE

## 2022-08-16 PROCEDURE — 83735 ASSAY OF MAGNESIUM: CPT

## 2022-08-16 PROCEDURE — 85025 COMPLETE CBC W/AUTO DIFF WBC: CPT

## 2022-08-16 PROCEDURE — 63600175 PHARM REV CODE 636 W HCPCS

## 2022-08-16 PROCEDURE — 25000003 PHARM REV CODE 250

## 2022-08-16 PROCEDURE — 80053 COMPREHEN METABOLIC PANEL: CPT

## 2022-08-16 PROCEDURE — 11000001 HC ACUTE MED/SURG PRIVATE ROOM

## 2022-08-16 PROCEDURE — 99232 PR SUBSEQUENT HOSPITAL CARE,LEVL II: ICD-10-PCS | Mod: ,,, | Performed by: INTERNAL MEDICINE

## 2022-08-16 PROCEDURE — 36415 COLL VENOUS BLD VENIPUNCTURE: CPT

## 2022-08-16 PROCEDURE — 84100 ASSAY OF PHOSPHORUS: CPT

## 2022-08-16 PROCEDURE — 99232 SBSQ HOSP IP/OBS MODERATE 35: CPT | Mod: ,,, | Performed by: INTERNAL MEDICINE

## 2022-08-16 PROCEDURE — 99024 POSTOP FOLLOW-UP VISIT: CPT | Mod: ,,, | Performed by: PODIATRIST

## 2022-08-16 PROCEDURE — 63600175 PHARM REV CODE 636 W HCPCS: Performed by: INTERNAL MEDICINE

## 2022-08-16 PROCEDURE — 99024 PR POST-OP FOLLOW-UP VISIT: ICD-10-PCS | Mod: ,,, | Performed by: PODIATRIST

## 2022-08-16 RX ADMIN — METOPROLOL SUCCINATE 25 MG: 25 TABLET, EXTENDED RELEASE ORAL at 08:08

## 2022-08-16 RX ADMIN — VANCOMYCIN HYDROCHLORIDE 1750 MG: 500 INJECTION, POWDER, LYOPHILIZED, FOR SOLUTION INTRAVENOUS at 09:08

## 2022-08-16 RX ADMIN — Medication 1000 UNITS: at 08:08

## 2022-08-16 RX ADMIN — ENOXAPARIN SODIUM 40 MG: 100 INJECTION SUBCUTANEOUS at 04:08

## 2022-08-16 RX ADMIN — VANCOMYCIN HYDROCHLORIDE 1750 MG: 500 INJECTION, POWDER, LYOPHILIZED, FOR SOLUTION INTRAVENOUS at 10:08

## 2022-08-16 RX ADMIN — CEFTRIAXONE 2 G: 2 INJECTION, SOLUTION INTRAVENOUS at 03:08
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
## 2022-08-16 NOTE — TELEPHONE ENCOUNTER
called pt on 8/16/22 spoke with pt answered all pt questions and he will be follow up with us soon    E.S.

## 2022-08-16 NOTE — SUBJECTIVE & OBJECTIVE
Interval History: NAEO. Had TMA yesterday. Feels well today. No complaints.    Review of Systems   Constitutional:  Negative for chills and fever.   HENT:  Negative for congestion and sore throat.    Eyes:  Negative for pain and visual disturbance.   Respiratory:  Negative for cough, chest tightness and shortness of breath.    Cardiovascular:  Negative for chest pain, palpitations and leg swelling.   Gastrointestinal:  Negative for abdominal distention, abdominal pain, constipation, diarrhea, nausea and vomiting.   Genitourinary:  Negative for difficulty urinating, dysuria and frequency.   Musculoskeletal:  Negative for arthralgias and gait problem.   Skin:  Negative for color change and rash.   Neurological:  Negative for dizziness, syncope and headaches.   Psychiatric/Behavioral:  Negative for confusion. The patient is not nervous/anxious.    Objective:     Vital Signs (Most Recent):  Temp: 99 °F (37.2 °C) (08/16/22 1113)  Pulse: 85 (08/16/22 1113)  Resp: 12 (08/16/22 1113)  BP: 130/63 (08/16/22 1113)  SpO2: (!) 94 % (08/16/22 1113)   Vital Signs (24h Range):  Temp:  [96.2 °F (35.7 °C)-99.3 °F (37.4 °C)] 99 °F (37.2 °C)  Pulse:  [69-97] 85  Resp:  [12-20] 12  SpO2:  [94 %-99 %] 94 %  BP: (118-151)/(59-85) 130/63     Weight: 118 kg (260 lb 2.3 oz)  Body mass index is 36.28 kg/m².    Intake/Output Summary (Last 24 hours) at 8/16/2022 1312  Last data filed at 8/15/2022 1459  Gross per 24 hour   Intake 500 ml   Output --   Net 500 ml        Physical Exam  Vitals reviewed.   Constitutional:       General: He is not in acute distress.     Appearance: Normal appearance.   HENT:      Mouth/Throat:      Mouth: Mucous membranes are moist.      Pharynx: Oropharynx is clear.   Cardiovascular:      Rate and Rhythm: Normal rate and regular rhythm.      Pulses: Normal pulses.      Heart sounds: No murmur heard.  Pulmonary:      Effort: Pulmonary effort is normal.      Breath sounds: Normal breath sounds. No wheezing or rales.    Abdominal:      General: Abdomen is flat. Bowel sounds are normal. There is no distension.      Palpations: Abdomen is soft.      Tenderness: There is no abdominal tenderness.   Musculoskeletal:         General: No swelling.      Right lower leg: No edema.      Left lower leg: No edema.      Comments: L foot bandaged   Skin:     General: Skin is warm.      Capillary Refill: Capillary refill takes less than 2 seconds.   Neurological:      General: No focal deficit present.      Mental Status: He is alert and oriented to person, place, and time.   Psychiatric:         Mood and Affect: Mood normal.         Behavior: Behavior normal.       Significant Labs: All pertinent labs within the past 24 hours have been reviewed.  CBC:   Recent Labs   Lab 08/15/22  0538 08/16/22  0253   WBC 8.56 12.68   HGB 14.7 14.4   HCT 43.0 41.0    277       CMP:   Recent Labs   Lab 08/15/22  0538 08/16/22  0253    135*   K 4.0 4.0    101   CO2 24 24   GLU 99 103   BUN 10 12   CREATININE 0.7 0.8   CALCIUM 9.6 9.3   PROT 7.3 7.1   ALBUMIN 3.4* 3.3*   BILITOT 0.4 0.4   ALKPHOS 68 67   AST 13 15   ALT 19 17   ANIONGAP 10 10         Significant Imaging: I have reviewed all pertinent imaging results/findings within the past 24 hours.

## 2022-08-16 NOTE — TELEPHONE ENCOUNTER
----- Message from Donita Patel DPM sent at 8/16/2022  1:08 PM CDT -----  Contact: @ [phone]  Please call and let patient know that I am not rounding at the hospital and our podiatry team will continue to see him as an inpatient. He can followup here or main campus after discharge. The resident will help set up the followup appointment      ----- Message -----  From: Herson Quiñones MA  Sent: 8/16/2022  11:45 AM CDT  To: Donita Patel DPM      ----- Message -----  From: Bridgett Cervantes  Sent: 8/16/2022   9:48 AM CDT  To: [facility] Staff    Pt is calling to see if the doctor is going to come see him in the hospital or will he has to reschedule please call and adv @ [phone]
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
## 2022-08-17 LAB
ALBUMIN SERPL BCP-MCNC: 3.2 G/DL (ref 3.5–5.2)
ALP SERPL-CCNC: 65 U/L (ref 55–135)
ALT SERPL W/O P-5'-P-CCNC: 15 U/L (ref 10–44)
ANION GAP SERPL CALC-SCNC: 9 MMOL/L (ref 8–16)
AST SERPL-CCNC: 13 U/L (ref 10–40)
BASOPHILS # BLD AUTO: 0.06 K/UL (ref 0–0.2)
BASOPHILS NFR BLD: 0.5 % (ref 0–1.9)
BILIRUB SERPL-MCNC: 0.4 MG/DL (ref 0.1–1)
BUN SERPL-MCNC: 13 MG/DL (ref 8–23)
CALCIUM SERPL-MCNC: 9.2 MG/DL (ref 8.7–10.5)
CHLORIDE SERPL-SCNC: 103 MMOL/L (ref 95–110)
CO2 SERPL-SCNC: 25 MMOL/L (ref 23–29)
CREAT SERPL-MCNC: 1 MG/DL (ref 0.5–1.4)
DIFFERENTIAL METHOD: ABNORMAL
EOSINOPHIL # BLD AUTO: 0.2 K/UL (ref 0–0.5)
EOSINOPHIL NFR BLD: 1.7 % (ref 0–8)
ERYTHROCYTE [DISTWIDTH] IN BLOOD BY AUTOMATED COUNT: 12.7 % (ref 11.5–14.5)
EST. GFR  (NO RACE VARIABLE): >60 ML/MIN/1.73 M^2
FINAL PATHOLOGIC DIAGNOSIS: NORMAL
GLUCOSE SERPL-MCNC: 112 MG/DL (ref 70–110)
HCT VFR BLD AUTO: 41.6 % (ref 40–54)
HGB BLD-MCNC: 14.1 G/DL (ref 14–18)
IMM GRANULOCYTES # BLD AUTO: 0.06 K/UL (ref 0–0.04)
IMM GRANULOCYTES NFR BLD AUTO: 0.5 % (ref 0–0.5)
LYMPHOCYTES # BLD AUTO: 2.8 K/UL (ref 1–4.8)
LYMPHOCYTES NFR BLD: 22 % (ref 18–48)
Lab: NORMAL
MAGNESIUM SERPL-MCNC: 2.1 MG/DL (ref 1.6–2.6)
MCH RBC QN AUTO: 30.7 PG (ref 27–31)
MCHC RBC AUTO-ENTMCNC: 33.9 G/DL (ref 32–36)
MCV RBC AUTO: 91 FL (ref 82–98)
MONOCYTES # BLD AUTO: 1.2 K/UL (ref 0.3–1)
MONOCYTES NFR BLD: 9.8 % (ref 4–15)
NEUTROPHILS # BLD AUTO: 8.3 K/UL (ref 1.8–7.7)
NEUTROPHILS NFR BLD: 65.5 % (ref 38–73)
NRBC BLD-RTO: 0 /100 WBC
PHOSPHATE SERPL-MCNC: 3.2 MG/DL (ref 2.7–4.5)
PLATELET # BLD AUTO: 274 K/UL (ref 150–450)
PMV BLD AUTO: 11.2 FL (ref 9.2–12.9)
POTASSIUM SERPL-SCNC: 4 MMOL/L (ref 3.5–5.1)
PROT SERPL-MCNC: 7.1 G/DL (ref 6–8.4)
RBC # BLD AUTO: 4.59 M/UL (ref 4.6–6.2)
SODIUM SERPL-SCNC: 137 MMOL/L (ref 136–145)
WBC # BLD AUTO: 12.7 K/UL (ref 3.9–12.7)

## 2022-08-17 PROCEDURE — 97165 OT EVAL LOW COMPLEX 30 MIN: CPT

## 2022-08-17 PROCEDURE — 99233 PR SUBSEQUENT HOSPITAL CARE,LEVL III: ICD-10-PCS | Mod: ,,, | Performed by: INTERNAL MEDICINE

## 2022-08-17 PROCEDURE — 25000003 PHARM REV CODE 250

## 2022-08-17 PROCEDURE — 25000003 PHARM REV CODE 250: Performed by: INTERNAL MEDICINE

## 2022-08-17 PROCEDURE — 84100 ASSAY OF PHOSPHORUS: CPT

## 2022-08-17 PROCEDURE — 80053 COMPREHEN METABOLIC PANEL: CPT

## 2022-08-17 PROCEDURE — 25000003 PHARM REV CODE 250: Performed by: REGISTERED NURSE

## 2022-08-17 PROCEDURE — 11000001 HC ACUTE MED/SURG PRIVATE ROOM

## 2022-08-17 PROCEDURE — 97116 GAIT TRAINING THERAPY: CPT

## 2022-08-17 PROCEDURE — 99223 PR INITIAL HOSPITAL CARE,LEVL III: ICD-10-PCS | Mod: ,,, | Performed by: INTERNAL MEDICINE

## 2022-08-17 PROCEDURE — 99233 SBSQ HOSP IP/OBS HIGH 50: CPT | Mod: ,,, | Performed by: INTERNAL MEDICINE

## 2022-08-17 PROCEDURE — 97161 PT EVAL LOW COMPLEX 20 MIN: CPT

## 2022-08-17 PROCEDURE — 36415 COLL VENOUS BLD VENIPUNCTURE: CPT

## 2022-08-17 PROCEDURE — 85025 COMPLETE CBC W/AUTO DIFF WBC: CPT

## 2022-08-17 PROCEDURE — 99223 1ST HOSP IP/OBS HIGH 75: CPT | Mod: ,,, | Performed by: INTERNAL MEDICINE

## 2022-08-17 PROCEDURE — 63600175 PHARM REV CODE 636 W HCPCS: Performed by: INTERNAL MEDICINE

## 2022-08-17 PROCEDURE — 83735 ASSAY OF MAGNESIUM: CPT

## 2022-08-17 PROCEDURE — 94761 N-INVAS EAR/PLS OXIMETRY MLT: CPT

## 2022-08-17 RX ORDER — METRONIDAZOLE 500 MG/1
500 TABLET ORAL EVERY 8 HOURS
Status: DISCONTINUED | OUTPATIENT
Start: 2022-08-17 | End: 2022-08-19 | Stop reason: HOSPADM

## 2022-08-17 RX ADMIN — CEFTRIAXONE 2 G: 2 INJECTION, SOLUTION INTRAVENOUS at 04:08

## 2022-08-17 RX ADMIN — VANCOMYCIN HYDROCHLORIDE 1750 MG: 500 INJECTION, POWDER, LYOPHILIZED, FOR SOLUTION INTRAVENOUS at 09:08

## 2022-08-17 RX ADMIN — METRONIDAZOLE 500 MG: 500 TABLET ORAL at 09:08

## 2022-08-17 RX ADMIN — APIXABAN 5 MG: 5 TABLET, FILM COATED ORAL at 09:08

## 2022-08-17 RX ADMIN — METOPROLOL SUCCINATE 25 MG: 25 TABLET, EXTENDED RELEASE ORAL at 09:08

## 2022-08-17 RX ADMIN — SENNOSIDES 8.6 MG: 8.6 TABLET, FILM COATED ORAL at 09:08

## 2022-08-17 RX ADMIN — Medication 1000 UNITS: at 09:08
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
## 2022-08-17 NOTE — PROGRESS NOTES
[facility] - [facility]  Podiatry  Progress Note    Patient Name: Marcus Watkins  MRN: [id number]  Admission Date: 8/11/2022  Hospital Length of Stay: 6 days  Attending Physician: Tea Cabrera MD  Primary Care Provider: Radha Brunson MD     Subjective:     Interval History: S/P L TMA 8/15. NAEON. VSS. Afebrile. No SOB. No pain. Mild strike through on surgical dressing noted.     Follow-up For: Procedure(s) (LRB):  AMPUTATION, FOOT, TRANSMETATARSAL (Left)    Post-Operative Day: 2 Days Post-Op    Scheduled Meds:   cefTRIAXone (ROCEPHIN) IVPB  2 g Intravenous Q24H    enoxaparin  40 mg Subcutaneous Daily    metoprolol succinate  25 mg Oral Daily    polyethylene glycol  17 g Oral Daily    vancomycin (VANCOCIN) IVPB  1,750 mg Intravenous Q12H    vitamin E  1,000 Units Oral Daily     Continuous Infusions:  PRN Meds:acetaminophen, dextrose 10%, dextrose 10%, glucagon (human recombinant), glucose, glucose, HYDROcodone-acetaminophen, naloxone, ondansetron, senna, sodium chloride 0.9%, sodium chloride 0.9%, Pharmacy to dose Vancomycin consult **AND** vancomycin - pharmacy to dose    Review of Systems   Constitutional:  Negative for chills and fever.   HENT:  Negative for rhinorrhea and sneezing.    Respiratory:  Negative for cough and shortness of breath.    Cardiovascular:  Negative for chest pain and leg swelling.   Gastrointestinal:  Negative for abdominal pain and nausea.   Genitourinary:  Negative for dysuria and hematuria.   Musculoskeletal:  Negative for arthralgias and myalgias.   Skin:  Positive for color change and wound.   Neurological:  Negative for tremors and headaches.   Psychiatric/Behavioral:  Negative for agitation and confusion.    Objective:     Vital Signs (Most Recent):  Temp: 98.9 °F (37.2 °C) (08/16/22 2019)  Pulse: 92 (08/16/22 2019)  Resp: 16 (08/16/22 2019)  BP: (!) 123/59 (08/16/22 2019)  SpO2: (!) 94 % (08/16/22 2019)   Vital Signs (24h Range):  Temp:  [96.2 °F (35.7 °C)-99.3 °F  (37.4 °C)] 98.9 °F (37.2 °C)  Pulse:  [78-97] 92  Resp:  [12-18] 16  SpO2:  [94 %-96 %] 94 %  BP: (118-130)/(59-65) 123/59     Weight: 118 kg (260 lb 2.3 oz)  Body mass index is 36.28 kg/m².    Foot Exam    Right Foot/Ankle     Inspection and Palpation  Ecchymosis: none  Tenderness: none   Swelling: none   Skin Exam: skin intact;     Neurovascular  Dorsalis pedis: 2+  Posterior tibial: 2+  Saphenous nerve sensation: normal  Tibial nerve sensation: normal  Superficial peroneal nerve sensation: normal  Deep peroneal nerve sensation: normal  Sural nerve sensation: normal      Left Foot/Ankle      Inspection and Palpation  Ecchymosis: none  Tenderness: none   Swelling: (minimal to mild at forefoot)  Skin Exam: cellulitis, ulcer and erythema; no drainage and skin not intact Left foot callus: hyperkeratotic periwound.    Neurovascular  Dorsalis pedis: 2+  Posterior tibial: 2+  Saphenous nerve sensation: normal  Tibial nerve sensation: normal  Superficial peroneal nerve sensation: normal  Deep peroneal nerve sensation: normal  Sural nerve sensation: normal    Comments  S/P L TMA 8/15/22  - Mild heme strike thru  - Sutures mostly intact, two central sutures failed, minor maceration of periwound 2/2 sanguinous drainage   - Areas of hematoma flap incision site noted       Laboratory:  CBC:   Recent Labs   Lab 08/16/22  0253   WBC 12.68   RBC 4.68   HGB 14.4   HCT 41.0      MCV 88   MCH 30.8   MCHC 35.1     CMP:   Recent Labs   Lab 08/16/22  0253      CALCIUM 9.3   ALBUMIN 3.3*   PROT 7.1   *   K 4.0   CO2 24      BUN 12   CREATININE 0.8   ALKPHOS 67   ALT 17   AST 15   BILITOT 0.4     Microbiology Results (last 7 days)       Procedure Component Value Units Date/Time    AFB Culture & Smear [[id number]] Collected: 08/15/22 2759    Order Status: Completed Specimen: Bone from Foot, Left Updated: 08/16/22 2127     AFB Culture & Smear Culture in progress     AFB CULTURE STAIN No acid fast bacilli seen.     Narrative:      Left foot 4th metatarsal    Culture, Anaerobe [[id number]]  (Abnormal) Collected: 08/12/22 1433    Order Status: Completed Specimen: Wound from Foot, Left Updated: 08/16/22 1300     Anaerobic Culture PREVOTELLA (B.) DISIENS  Few      Blood culture #2 **CANNOT BE ORDERED STAT** [[id number]] Collected: 08/11/22 1016    Order Status: Completed Specimen: Blood from Peripheral, Hand, Right Updated: 08/16/22 1212     Blood Culture, Routine No growth after 5 days.    Blood culture #1 **CANNOT BE ORDERED STAT** [[id number]] Collected: 08/11/22 1017    Order Status: Completed Specimen: Blood from Peripheral, Hand, Left Updated: 08/16/22 1212     Blood Culture, Routine No growth after 5 days.    Aerobic culture [[id number]] Collected: 08/15/22 1439    Order Status: Completed Specimen: Bone from Foot, Left Updated: 08/16/22 0732     Aerobic Bacterial Culture No growth    Narrative:      Left foot 4th metatarsal    Culture, Anaerobe [[id number]] Collected: 08/15/22 1439    Order Status: Completed Specimen: Bone from Foot, Left Updated: 08/16/22 0707     Anaerobic Culture Culture in progress    Narrative:      Left foot 4th metatarsal    Fungus culture [[id number]] Collected: 08/15/22 1439    Order Status: Sent Specimen: Bone from Foot, Left Updated: 08/15/22 1615    Gram stain [[id number]] Collected: 08/15/22 1439    Order Status: Sent Specimen: Bone from Foot, Left Updated: 08/15/22 1439    Aerobic culture [[id number]]  (Abnormal) Collected: 08/12/22 1433    Order Status: Completed Specimen: Wound from Foot, Left Updated: 08/15/22 1430     Aerobic Bacterial Culture STREPTOCOCCUS DYSGALACTIAE  Few      Gram stain [[id number]] Collected: 08/12/22 1433    Order Status: Completed Specimen: Wound from Foot, Left Updated: 08/12/22 2220     Gram Stain Result Rare WBC's      No organisms seen    Aerobic culture [[id number]]     Order Status: No result Specimen: Bone     Culture, Anaerobe [[id number]]     Order Status: No result  Specimen: Bone     AFB Culture & Smear [[id number]]     Order Status: No result Specimen: Bone     Fungus culture [[id number]]     Order Status: No result Specimen: Bone     Gram stain [[id number]]     Order Status: No result Specimen: Bone           Specimen (24h ago, onward)                None            Diagnostic Results:  I have reviewed all pertinent imaging results/findings within the past 24 hours.    Clinical Findings:  See foot exam     Assessment/Plan:     Acute osteomyelitis of metatarsal bone of left foot  MRI confirmed osteomyelitis of the L 4th metatarsal head, with possible secondary focus of 1st metatarsal associated with contiguous plantar full thickness ulceration x 2. On X ray and clinical exam, 5th metatarsal head appears suspect as well.     Now S/P L TMA 8/15/22, cultures in process    -Nursing wound care routine ordered   -Continue IV ABx per primary team pending bone cultures   -Rec ID consult if/when cultures speciates for assistance on 6 weeks OPAT  -WB: Heel touch left foot with dressing intact   -Podiatry will follow       Noah Howell DPM, PGY-2  Podiatry / Foot and Ankle Surgery   Page # [phone]  Secure chat preferred
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
## 2022-08-17 NOTE — PLAN OF CARE
Problem: Adult Inpatient Plan of Care  Goal: Plan of Care Review  Outcome: Ongoing, Progressing  Goal: Patient-Specific Goal (Individualized)  Outcome: Ongoing, Progressing  Goal: Readiness for Transition of Care  Outcome: Ongoing, Progressing     Problem: Impaired Wound Healing  Goal: Optimal Wound Healing  Outcome: Ongoing, Progressing     Problem: Fall Injury Risk  Goal: Absence of Fall and Fall-Related Injury  Outcome: Ongoing, Progressing     Problem: Skin Injury Risk Increased  Goal: Skin Health and Integrity  Outcome: Ongoing, Progressing
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
## 2022-08-17 NOTE — ASSESSMENT & PLAN NOTE
MRI confirmed osteomyelitis of the L 4th metatarsal head, with possible secondary focus of 1st metatarsal associated with contiguous plantar full thickness ulceration x 2. On X ray and clinical exam, 5th metatarsal head appears suspect as well.     Now S/P L TMA 8/15/22, cultures in process    -Nursing wound care routine ordered   -Continue IV ABx per primary team pending bone cultures   -Rec ID consult if/when cultures speciates for assistance on 6 weeks OPAT  -WB: Heel touch left foot with dressing intact   -Podiatry will follow

## 2022-08-17 NOTE — PROGRESS NOTES
VANCOMYCIN DOSING BY PHARMACY DISCONTINUATION NOTE     Marcus Watkins is a 66 y.o. male who had been consulted for vancomycin dosing.     The pharmacy consult for vancomycin dosing has been discontinued.     Vancomycin Dosing by Pharmacy Consult will sign-off.     Please reconsult if necessary.     Thank you for allowing us to participate in this patient's care.    Jeff Rucker, Pharm.D.     Inpatient Pharmacist [phone]
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
## 2022-08-17 NOTE — PLAN OF CARE
Pt resting in bed comfortably. Ambulated in [place] with therapy this morning and sat up in chair for several hours this shift. PIV line intact and free of infection and irritation. Fall precautions maintained, no falls noted. Call light within reach, bed locked and in lowest position. Non-skid socks on while out of bed. Patient instructed to call for assistance. Skin integrity maintained as patient is independent with frequent repositioning. Wound care provided to left foot per orders. No complaints of pain or N/V. Progressing towards goals. Will continue to monitor and follow plan of care.

## 2022-08-17 NOTE — OP NOTE
[facility] - Surgery  Operative Note      Date of Procedure: 8/15/2022     Procedure: Procedure(s) (LRB):  AMPUTATION, FOOT, TRANSMETATARSAL (Left)     Surgeon(s) and Role:     * Santos Randle DPM - Primary     * Noah Howell DPM - Resident - Assisting        Pre-Operative Diagnosis: Osteomyelitis, unspecified site, unspecified type [M86.9]    Post-Operative Diagnosis: Post-Op Diagnosis Codes:     * Osteomyelitis, unspecified site, unspecified type [M86.9]    Anesthesia: Local MAC    Description of Technical Procedures:     The patient was brought to the operating room on a stretcher and was transferred to the OR table in supine position. Anesthesia was induced.  A tourniquet was applied to the left ankle. 20 mL of a 1:1 mixture of 0.25% bupivacaine plain and 1% lidocaine plain was locally infiltrated. The left lower limb was prepped and draped in a sterile manner. Tourniquet(s) inflated to 250 mmHg.     Attention was directed to the left foot. A 10 blade was utilized to create a full thickness fish mouth incision down to bone just proximal to and extending around the metatarsophalangeal joints circumferentially, with attempts to salvage as much healthy tissue as possible to assist with primary closure. Soft tissue encompassing the distal metatarsals were sharply reflected away from bone using 15 blade while a towel clamp was used to stabilize and maneuver the distal forefoot. Using a sagittal saw metatarsals 1-5 were transected approximately 3 cm cm proximal of the metatarsal necks slightly angled distal dorsal to proximal plantar, with a medial and lateral bevel to the 1st and 5th metatarsals respectively. Distal foot specimen removed from the field to be sent for pathology. Care was taken to preserve the metatarsal parabola, and the remaining bone was noted to appear white, shiny, hard, and without cortical breaks. Using hemostats distal traction was applied to flexor and extensor tendons, and sharply  resected as far back proximally as possible. Non-viable soft tissues were resected to the level of only healthy viable appearing tissue. Excess skin and soft tissue was debulked as required in preparation for closure. Plantar rotational skin flap with skin rearrangement and soft tissue advanced 7 cm sq lateral  over the metatarsals required for primary closure. Monopolar Bovie electrocautery was employed to ligate bleeders as necessary. The incision area was copiously irrigated with normal saline solution using pulsavac. A clean margin from the 4th metatarsal was collected employing a rongeur, and divided into two samples for micro and path. Deep wound cultures of the surgical site were obtained for culture and sensitivity. Using vicryl 3-0  suture for deep closure was performed with sequential advancement, rotation, and revision of skin flaps using a 15 blade as needed. Superficial closure done with nylon 3-0 suture.     The surgical site was irrigated with normal saline solution via bulb syringe.  The surgical site was dressed with adaptic, 4x4 gauze, ABD pad, kerlix, cast padding and ACE wrap. Tourniquet(s) deflated.       The patient was transferred to the recovery room with vital signs stable. Following a period of post op monitoring, the patient will be transferred to the floor with the following postop instructions:   1. Keep dressing clean, dry, and intact until next seen by podiatry.   2. Weightbearing status: partial weightbearing.   3. All necessary prescriptions were ordered and medical management will continue.   4. Contact podiatry with any postop questions or concerns.       Estimated Blood Loss (EBL): 2 mL           Implants: * No implants in log *    Specimens:   Specimen (24h ago, onward)            None                  Condition: Good    Disposition: PACU - hemodynamically stable.    Attestation: I was present and scrubbed for the entire procedure.    Discharge Note    OUTCOME: Patient tolerated  "treatment/procedure well without complication and is now ready for discharge.    DISPOSITION: Home or Self Care    FINAL DIAGNOSIS:  Ulcer of amputation stump of foot    FOLLOWUP: In clinic    DISCHARGE INSTRUCTIONS:    Discharge Procedure Orders   WALKER FOR HOME USE     Order Specific Question Answer Comments   Type of Walker: Adult (5'4"-6'6")    With wheels? Yes    Height: 5' 11" (1.803 m)    Weight: 118 kg (260 lb 2.3 oz)    Length of need (1-99 months): 99    Does patient have medical equipment at home? rollator    Please check all that apply: Patient's condition impairs ambulation.      COMMODE FOR HOME USE     Order Specific Question Answer Comments   Type: Standard    Height: 5' 11" (1.803 m)    Weight: 118 kg (260 lb 2.3 oz)    Does patient have medical equipment at home? rollator    Length of need (1-99 months): 99      TRANSFER TUB BENCH FOR HOME USE     Order Specific Question Answer Comments   Type of Transfer Tub Bench: Unpadded    Height: 5' 11" (1.803 m)    Weight: 118 kg (260 lb 2.3 oz)    Does patient have medical equipment at home? rollator    Length of need (1-99 months): 99    Patient notified - Not covered by insurance considered a convenience item No    Discussed financial responsibility with responsible party No      "
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
## 2022-08-17 NOTE — SUBJECTIVE & OBJECTIVE
Interval History: NAEO. Feels well today. Denies foot pain.    Review of Systems   Constitutional:  Negative for chills and fever.   HENT:  Negative for congestion and sore throat.    Eyes:  Negative for pain and visual disturbance.   Respiratory:  Negative for cough, chest tightness and shortness of breath.    Cardiovascular:  Negative for chest pain, palpitations and leg swelling.   Gastrointestinal:  Negative for abdominal distention, abdominal pain, constipation, diarrhea, nausea and vomiting.   Genitourinary:  Negative for difficulty urinating, dysuria and frequency.   Musculoskeletal:  Negative for arthralgias and gait problem.   Skin:  Negative for color change and rash.   Neurological:  Negative for dizziness, syncope and headaches.   Psychiatric/Behavioral:  Negative for confusion. The patient is not nervous/anxious.    Objective:     Vital Signs (Most Recent):  Temp: 98.7 °F (37.1 °C) (08/17/22 0810)  Pulse: 93 (08/17/22 0810)  Resp: 18 (08/17/22 0810)  BP: (!) 126/58 (08/17/22 0810)  SpO2: 95 % (08/17/22 0810)   Vital Signs (24h Range):  Temp:  [98.1 °F (36.7 °C)-99 °F (37.2 °C)] 98.7 °F (37.1 °C)  Pulse:  [85-94] 93  Resp:  [12-18] 18  SpO2:  [94 %-95 %] 95 %  BP: (123-130)/(58-65) 126/58     Weight: 118 kg (260 lb 2.3 oz)  Body mass index is 36.28 kg/m².    Intake/Output Summary (Last 24 hours) at 8/17/2022 0939  Last data filed at 8/17/2022 0000  Gross per 24 hour   Intake --   Output 1200 ml   Net -1200 ml      Physical Exam  Vitals reviewed.   Constitutional:       General: He is not in acute distress.     Appearance: Normal appearance.   HENT:      Mouth/Throat:      Mouth: Mucous membranes are moist.      Pharynx: Oropharynx is clear.   Cardiovascular:      Rate and Rhythm: Normal rate and regular rhythm.      Pulses: Normal pulses.      Heart sounds: No murmur heard.  Pulmonary:      Effort: Pulmonary effort is normal.      Breath sounds: Normal breath sounds. No wheezing or rales.   Abdominal:       General: Abdomen is flat. Bowel sounds are normal. There is no distension.      Palpations: Abdomen is soft.      Tenderness: There is no abdominal tenderness.   Musculoskeletal:         General: No swelling.      Right lower leg: No edema.      Left lower leg: No edema.      Comments: L foot bandaged   Skin:     General: Skin is warm.      Capillary Refill: Capillary refill takes less than 2 seconds.   Neurological:      General: No focal deficit present.      Mental Status: He is alert and oriented to person, place, and time.   Psychiatric:         Mood and Affect: Mood normal.         Behavior: Behavior normal.       Significant Labs: All pertinent labs within the past 24 hours have been reviewed.  CBC:   Recent Labs   Lab 08/16/22  0253 08/17/22  0445   WBC 12.68 12.70   HGB 14.4 14.1   HCT 41.0 41.6    274     CMP:   Recent Labs   Lab 08/16/22  0253 08/17/22  0445   * 137   K 4.0 4.0    103   CO2 24 25    112*   BUN 12 13   CREATININE 0.8 1.0   CALCIUM 9.3 9.2   PROT 7.1 7.1   ALBUMIN 3.3* 3.2*   BILITOT 0.4 0.4   ALKPHOS 67 65   AST 15 13   ALT 17 15   ANIONGAP 10 9       Significant Imaging:  none
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
## 2022-08-17 NOTE — PT/OT/SLP EVAL
Physical Therapy Evaluation and Treatment     Patient Name:  Marcus Watkins  MRN: [id number]    Admit Date: 8/11/2022  Admitting Diagnosis:  Ulcer of amputation stump of foot  Length of Stay: 6 days  Recent Surgery: Procedure(s) (LRB):  AMPUTATION, FOOT, TRANSMETATARSAL (Left) 2 Days Post-Op    Recommendations:     Discharge Recommendations: Home Health PT  Equipment recommendations: rolling walker, bedside commode and transfer tub bench  Barriers to discharge: None Identified     Assessment:     Marcus Watkins is a 66 y.o. male admitted to [facility] on 8/11/2022 with medical diagnosis of Ulcer of amputation stump of foot. Pt presents with impaired endurance, impaired sensation, impaired functional mobility, impaired balance, orthopedic precautions. These deficits effect their roles and responsibilities in which they were able to complete prior to admit. PTA, pt (I) with ambulation and ADLs. Pt able to ambulate 150' with RW SBA while maintaining PWB to L heel in [facility]. Marcus Watkins would benefit from acute PT intervention to improve quality of life, focus on recovery of impairments, provide patient/caregiver education, reduce fall risk, and maximize (I) and safety with functional mobility. Once medically stable, recommending pt discharge to Home Health PT.      Rehab Prognosis: Good    Plan:     During this hospitalization, patient to be seen 3 x/week to address the identified rehab impairments via gait training, therapeutic activities, therapeutic exercises, neuromuscular re-education and progress towards stated goals.     Plan of Care Expires:  09/16/22  Plan of Care reviewed with: patient    This plan of care has been discussed with the patient/caregiver, who was included in its development and is in agreement with the identified goals and treatment plan.     Subjective     Communicated with RN prior to session.  Patient found supine upon PT entry to room, agreeable to evaluation. Pt alone during session.    Chief Complaint:  "none stated    Patient/Family Comments/goals: "Sure I'll try to get up and do it"    Pain/Comfort:  · Pain Rating 1: 0/10  · Pain Rating Post-Intervention 1: 0/10    Patients cultural, spiritual, Jainism conflicts given the current situation: None identified     Patient History: information obtained from pt     Living Environment: Pt lives with wife and daughter in 2 story [facility]  with threshold ESTEFANIA (bed on 1st floor; full bath and bed on 2nd floor; can live on 1st floor if needed) Poli HR on full flight of stairs. Bathroom set-up: tub/shower combo  Prior Level of Function: independent with mobility and ADLs  DME owned: rollator  Support Available/Caregiver Assistance: wife and daughter can assist as needed    Objective:     Patient found with: peripheral IV    Recent Surgery: Procedure(s) (LRB):  AMPUTATION, FOOT, TRANSMETATARSAL (Left) 2 Days Post-Op  General Precautions: Standard, fall   Orthopedic Precautions:LLE partial weight bearing (WB to L heel in darco)   Braces:  (darco shoe)   Oxygen Device: room air      Exams:     Cognition:  · Alert  · Command following: Follows multistep verbal commands  · Communication: clear/fluent     Sensation:   o Light touch sensation: Pt reported generalized numbness/tingling of poli feet distal to malleoli     Gross Motor Coordination: No deficits noted during functional mobility tasks      Edema/Skin Integrity: None noted; Visible skin intact     Postural examination/scapula alignment: Rounded shoulder and Head forward     Lower Extremity Range of Motion:  o Right Lower Extremity: WFL  o Left Lower Extremity: WFL     Lower Extremity Strength:  o Right Lower Extremity: WFL  o Left Lower Extremity: WFL    Functional Mobility:    Bed Mobility:  · Supine > Sit with supervision    Transfers:   · Sit <> Stand Transfer: Contact Guard Assistance x 1 trials from eob with RW AD   · Bed <> Chair: Stand-by Assistance with RW AD              Gait:  · Distance: 150 " ft  · Assistance level: Contact Guard Assistance with progression to SBA  · Assistive Device: rolling walker  · Gait Assessment: decreased step length , decreased gait speed and antalgic gait pattern   · No LOB noted    Balance:  · Dynamic Sitting: GOOD: Maintains balance through MODERATE excursions of active trunk movement  · Standing:  · Static: FAIR+: Takes MINIMAL challenges from all directions   · Dynamic: FAIR+: Needs CLOSE SUPERVISION during gait and is able to right self with minor LOB    Outcome Measure: AM-PAC 6 CLICK MOBILITY  Total Score:18     Patient/Caregiver Education:     Therapist educated pt/caregiver regarding:    PT POC and goals for therapy    Safety with mobility and fall risk    Instruction on use of call button and importance of calling nursing staff for assistance with mobility    Time provided for therapeutic counseling and discussion of current health disposition. All questions answered to satisfaction, within scope of PT practice    Pt safe to ambulate with RN/PCT    Patient/caregiver able to verbalize understanding and expressed no further questions this visit; will follow-up with pt/caregiver during current admit for additional questions/concerns within scope of practice.     White board updated.     Patient left up in chair with all lines intact and call button in reach.    GOALS:   Multidisciplinary Problems     Physical Therapy Goals        Problem: Physical Therapy    Goal Priority Disciplines Outcome Goal Variances Interventions   Physical Therapy Goal     PT, PT/OT Ongoing, Progressing     Description: Goals to be met by: 22    Patient will increase functional independence with mobility by performin. Sit to stand transfer with Modified [place]  2. Gait  x 159 feet with Modified [place] using LRAD  3. Ascend/descend 8 stairs with bilateral Handrails Supervision using No Assistive Device.                        History:     Past Medical History:    Diagnosis Date    Atrial fibrillation        Past Surgical History:   Procedure Laterality Date    ABLATION N/A 12/27/2018    Procedure: ABLATION;  Surgeon: José Grey MD;  Location: [facility] EP LAB;  Service: Cardiology;  Laterality: N/A;  AF,PVI, RFA, CARTO, GEN, GP, 3 PREP    CARDIOVERSION N/A 10/29/2018    Procedure: CARDIOVERSION;  Surgeon: José Grey MD;  Location: [facility] CATH LAB;  Service: Cardiology;  Laterality: N/A;  AF, LOPEZ, DCCV, MAC, GP, 3 PREP    COLONOSCOPY N/A 04/14/2016    Procedure: COLONOSCOPY;  Surgeon: Kade Wang MD;  Location: [facility] ENDO (4TH FLR);  Service: Endoscopy;  Laterality: N/A;    COLONOSCOPY N/A 6/3/2022    Procedure: COLONOSCOPY;  Surgeon: Jerrod Tijerina MD;  Location: [facility] ENDO (4TH FLR);  Service: Endoscopy;  Laterality: N/A;  fully vaccianted  ok to hold Eliquis x 2 days per Dr. 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    TREATMENT OF CARDIAC ARRHYTHMIA N/A 11/29/2018    Procedure: CARDIOVERSION;  Surgeon: José Grey MD;  Location: [facility] EP LAB;  Service: Cardiology;  Laterality: N/A;  AF, DCCV, MAC, GP, 3 PREP    TREATMENT OF CARDIAC ARRHYTHMIA  12/27/2018    Procedure: Cardioversion or Defibrillation;  Surgeon: José Grey MD;  Location: [facility] EP LAB;  Service: Cardiology;;       Time Tracking:     PT Received On: 08/17/22  PT Start Time: 1023     PT Stop Time: 1042  PT Total Time (min): 19 min     Billable Minutes: Evaluation 8 and Gait Training 11    08/17/2022

## 2022-08-17 NOTE — SUBJECTIVE & OBJECTIVE
Subjective:     Interval History: S/P L TMA 8/15. NAEON. VSS. Afebrile. No SOB. No pain. Mild strike through on surgical dressing noted.     Follow-up For: Procedure(s) (LRB):  AMPUTATION, FOOT, TRANSMETATARSAL (Left)    Post-Operative Day: 2 Days Post-Op    Scheduled Meds:   cefTRIAXone (ROCEPHIN) IVPB  2 g Intravenous Q24H    enoxaparin  40 mg Subcutaneous Daily    metoprolol succinate  25 mg Oral Daily    polyethylene glycol  17 g Oral Daily    vancomycin (VANCOCIN) IVPB  1,750 mg Intravenous Q12H    vitamin E  1,000 Units Oral Daily     Continuous Infusions:  PRN Meds:acetaminophen, dextrose 10%, dextrose 10%, glucagon (human recombinant), glucose, glucose, HYDROcodone-acetaminophen, naloxone, ondansetron, senna, sodium chloride 0.9%, sodium chloride 0.9%, Pharmacy to dose Vancomycin consult **AND** vancomycin - pharmacy to dose    Review of Systems   Constitutional:  Negative for chills and fever.   HENT:  Negative for rhinorrhea and sneezing.    Respiratory:  Negative for cough and shortness of breath.    Cardiovascular:  Negative for chest pain and leg swelling.   Gastrointestinal:  Negative for abdominal pain and nausea.   Genitourinary:  Negative for dysuria and hematuria.   Musculoskeletal:  Negative for arthralgias and myalgias.   Skin:  Positive for color change and wound.   Neurological:  Negative for tremors and headaches.   Psychiatric/Behavioral:  Negative for agitation and confusion.    Objective:     Vital Signs (Most Recent):  Temp: 98.9 °F (37.2 °C) (08/16/22 2019)  Pulse: 92 (08/16/22 2019)  Resp: 16 (08/16/22 2019)  BP: (!) 123/59 (08/16/22 2019)  SpO2: (!) 94 % (08/16/22 2019)   Vital Signs (24h Range):  Temp:  [96.2 °F (35.7 °C)-99.3 °F (37.4 °C)] 98.9 °F (37.2 °C)  Pulse:  [78-97] 92  Resp:  [12-18] 16  SpO2:  [94 %-96 %] 94 %  BP: (118-130)/(59-65) 123/59     Weight: 118 kg (260 lb 2.3 oz)  Body mass index is 36.28 kg/m².    Foot Exam    Right Foot/Ankle     Inspection and  Palpation  Ecchymosis: none  Tenderness: none   Swelling: none   Skin Exam: skin intact;     Neurovascular  Dorsalis pedis: 2+  Posterior tibial: 2+  Saphenous nerve sensation: normal  Tibial nerve sensation: normal  Superficial peroneal nerve sensation: normal  Deep peroneal nerve sensation: normal  Sural nerve sensation: normal      Left Foot/Ankle      Inspection and Palpation  Ecchymosis: none  Tenderness: none   Swelling: (minimal to mild at forefoot)  Skin Exam: cellulitis, ulcer and erythema; no drainage and skin not intact Left foot callus: hyperkeratotic periwound.    Neurovascular  Dorsalis pedis: 2+  Posterior tibial: 2+  Saphenous nerve sensation: normal  Tibial nerve sensation: normal  Superficial peroneal nerve sensation: normal  Deep peroneal nerve sensation: normal  Sural nerve sensation: normal    Comments  S/P L TMA 8/15/22  - Mild heme strike thru  - Sutures mostly intact, two central sutures failed, minor maceration of periwound 2/2 sanguinous drainage   - Areas of hematoma flap incision site noted       Laboratory:  CBC:   Recent Labs   Lab 08/16/22  0253   WBC 12.68   RBC 4.68   HGB 14.4   HCT 41.0      MCV 88   MCH 30.8   MCHC 35.1     CMP:   Recent Labs   Lab 08/16/22  0253      CALCIUM 9.3   ALBUMIN 3.3*   PROT 7.1   *   K 4.0   CO2 24      BUN 12   CREATININE 0.8   ALKPHOS 67   ALT 17   AST 15   BILITOT 0.4     Microbiology Results (last 7 days)       Procedure Component Value Units Date/Time    AFB Culture & Smear [[id number]] Collected: 08/15/22 1439    Order Status: Completed Specimen: Bone from Foot, Left Updated: 08/16/22 2127     AFB Culture & Smear Culture in progress     AFB CULTURE STAIN No acid fast bacilli seen.    Narrative:      Left foot 4th metatarsal    Culture, Anaerobe [[id number]]  (Abnormal) Collected: 08/12/22 1433    Order Status: Completed Specimen: Wound from Foot, Left Updated: 08/16/22 1300     Anaerobic Culture PREVOTELLA (B.)  DISIENS  Few      Blood culture #2 **CANNOT BE ORDERED STAT** [[id number]] Collected: 08/11/22 1016    Order Status: Completed Specimen: Blood from Peripheral, Hand, Right Updated: 08/16/22 1212     Blood Culture, Routine No growth after 5 days.    Blood culture #1 **CANNOT BE ORDERED STAT** [[id number]] Collected: 08/11/22 1017    Order Status: Completed Specimen: Blood from Peripheral, Hand, Left Updated: 08/16/22 1212     Blood Culture, Routine No growth after 5 days.    Aerobic culture [[id number]] Collected: 08/15/22 1439    Order Status: Completed Specimen: Bone from Foot, Left Updated: 08/16/22 0732     Aerobic Bacterial Culture No growth    Narrative:      Left foot 4th metatarsal    Culture, Anaerobe [[id number]] Collected: 08/15/22 1439    Order Status: Completed Specimen: Bone from Foot, Left Updated: 08/16/22 0707     Anaerobic Culture Culture in progress    Narrative:      Left foot 4th metatarsal    Fungus culture [[id number]] Collected: 08/15/22 1439    Order Status: Sent Specimen: Bone from Foot, Left Updated: 08/15/22 1615    Gram stain [[id number]] Collected: 08/15/22 1439    Order Status: Sent Specimen: Bone from Foot, Left Updated: 08/15/22 1439    Aerobic culture [[id number]]  (Abnormal) Collected: 08/12/22 1433    Order Status: Completed Specimen: Wound from Foot, Left Updated: 08/15/22 1430     Aerobic Bacterial Culture STREPTOCOCCUS DYSGALACTIAE  Few      Gram stain [[id number]] Collected: 08/12/22 1433    Order Status: Completed Specimen: Wound from Foot, Left Updated: 08/12/22 2220     Gram Stain Result Rare WBC's      No organisms seen    Aerobic culture [[id number]]     Order Status: No result Specimen: Bone     Culture, Anaerobe [[id number]]     Order Status: No result Specimen: Bone     AFB Culture & Smear [[id number]]     Order Status: No result Specimen: Bone     Fungus culture [[id number]]     Order Status: No result Specimen: Bone     Gram stain [[id number]]     Order Status: No result Specimen:  Bone           Specimen (24h ago, onward)                None            Diagnostic Results:  I have reviewed all pertinent imaging results/findings within the past 24 hours.    Clinical Findings:  See foot exam

## 2022-08-17 NOTE — PROGRESS NOTES
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 The patient noted his foot was feeling warmer in the middle of the night. He denies any chest pain, sob, palpitations, diarrhea, vomiting, dysuria, or nausea. Patient was last seen by a podiatrist on Tuesday who recommended a f/u MRI for a foot xray that was indicative of osteomyelitis. Patient denies history of alcohol, smoking, or drug use.       Overview/Hospital Course:  Patient admitted for osteomyelitis of L foot (previously with forefoot amputation), started on Vanc/CTX for empiric OM coverage as demonstrated on XR and MRI. Podiatry consulted, had TMA on 8/15.      Interval History: NAEO. Feels well today. Denies foot pain.    Review of Systems   Constitutional:  Negative for chills and fever.   HENT:  Negative for congestion and sore throat.    Eyes:  Negative for pain and visual disturbance.   Respiratory:  Negative for cough, chest tightness and shortness of breath.    Cardiovascular:  Negative for chest pain, palpitations and leg swelling.   Gastrointestinal:  Negative for abdominal distention, abdominal pain, constipation, diarrhea, nausea and vomiting.   Genitourinary:  Negative for difficulty urinating, dysuria and frequency.   Musculoskeletal:  Negative for arthralgias and gait problem.   Skin:  Negative for color change and rash.   Neurological:  Negative for dizziness, syncope and headaches.   Psychiatric/Behavioral:  Negative for confusion. The  patient is not nervous/anxious.    Objective:     Vital Signs (Most Recent):  Temp: 98.7 °F (37.1 °C) (08/17/22 0810)  Pulse: 93 (08/17/22 0810)  Resp: 18 (08/17/22 0810)  BP: (!) 126/58 (08/17/22 0810)  SpO2: 95 % (08/17/22 0810)   Vital Signs (24h Range):  Temp:  [98.1 °F (36.7 °C)-99 °F (37.2 °C)] 98.7 °F (37.1 °C)  Pulse:  [85-94] 93  Resp:  [12-18] 18  SpO2:  [94 %-95 %] 95 %  BP: (123-130)/(58-65) 126/58     Weight: 118 kg (260 lb 2.3 oz)  Body mass index is 36.28 kg/m².    Intake/Output Summary (Last 24 hours) at 8/17/2022 0939  Last data filed at 8/17/2022 0000  Gross per 24 hour   Intake --   Output 1200 ml   Net -1200 ml      Physical Exam  Vitals reviewed.   Constitutional:       General: He is not in acute distress.     Appearance: Normal appearance.   HENT:      Mouth/Throat:      Mouth: Mucous membranes are moist.      Pharynx: Oropharynx is clear.   Cardiovascular:      Rate and Rhythm: Normal rate and regular rhythm.      Pulses: Normal pulses.      Heart sounds: No murmur heard.  Pulmonary:      Effort: Pulmonary effort is normal.      Breath sounds: Normal breath sounds. No wheezing or rales.   Abdominal:      General: Abdomen is flat. Bowel sounds are normal. There is no distension.      Palpations: Abdomen is soft.      Tenderness: There is no abdominal tenderness.   Musculoskeletal:         General: No swelling.      Right lower leg: No edema.      Left lower leg: No edema.      Comments: L foot bandaged   Skin:     General: Skin is warm.      Capillary Refill: Capillary refill takes less than 2 seconds.   Neurological:      General: No focal deficit present.      Mental Status: He is alert and oriented to person, place, and time.   Psychiatric:         Mood and Affect: Mood normal.         Behavior: Behavior normal.       Significant Labs: All pertinent labs within the past 24 hours have been reviewed.  CBC:   Recent Labs   Lab 08/16/22  0253 08/17/22  0445   WBC 12.68 12.70   HGB 14.4 14.1    HCT 41.0 41.6    274     CMP:   Recent Labs   Lab 08/16/22  0253 08/17/22  0445   * 137   K 4.0 4.0    103   CO2 24 25    112*   BUN 12 13   CREATININE 0.8 1.0   CALCIUM 9.3 9.2   PROT 7.1 7.1   ALBUMIN 3.3* 3.2*   BILITOT 0.4 0.4   ALKPHOS 67 65   AST 15 13   ALT 17 15   ANIONGAP 10 9       Significant Imaging:  none      Assessment/Plan:      * Ulcer of amputation stump of foot  Left forefoot amputation secondary to chemical injury Oct 2020. Xray of foot with evidence of possible OM in 4th and 5th metatarsals. MRI with findings of OM in first metatarsal head. WBC slightly elevated at 12.8, ESR, 42, and CRP 89.7. On exam there is no significant swelling but visible ulceration and erythema.     - BCX 08/11 NGTD  - WCX 08/12 NGTD, aerobic culture pending  - Started on rocephin 1g q24  - Continue vancomycin, dosing per pharmacy  - Podiatry on board, s/p TMA 8/15  - ID consulted for long term abx    Acute osteomyelitis of metatarsal bone of left foot  See ulcer of amputation stump of foot      Peripheral polyneuropathy  Patient with prediabetes, last hba1c 5 months ago 5.9. Patient experiences bilateral peripheral neuropathy in a stocking glove pattern. He has a family history of diabetes in mother and sister. May be related to diabetic process vs. thyroid etiology (hx of prior amiodarone use).  - Repeat Hba1c 5.7  - TSH wnl  - Not requiring treatment at this time; can add gabapentin if worsens    S/P ablation of atrial fibrillation  Takes eliquis 5mg daily at home  - Resume eliquis today  - Mg > 2, K > 4; replete PRN    Multiple thyroid nodules  Stable. Followed by PCP.        VTE Risk Mitigation (From admission, onward)         Ordered     apixaban tablet 5 mg  2 times daily         08/17/22 0914                Discharge Planning   TIM: 8/18/2022     Code Status: Full Code   Is the patient medically ready for discharge?:     Reason for patient still in hospital (select all that apply):  Treatment  Discharge Plan A: Home with family          Johnathan Mcdaniel MD  Department of Hospital Medicine   [facility] - Surgery

## 2022-08-17 NOTE — PLAN OF CARE
PT evaluation complete - see note for details. POC and goals established.    Problem: Physical Therapy  Goal: Physical Therapy Goal  Description: Goals to be met by: 22    Patient will increase functional independence with mobility by performin. Sit to stand transfer with Modified [place]  2. Gait  x 159 feet with Modified [place] using LRAD  3. Ascend/descend 8 stairs with bilateral Handrails Supervision using No Assistive Device.     Outcome: Ongoing, Progressing     2022

## 2022-08-18 LAB
ALBUMIN SERPL BCP-MCNC: 3.1 G/DL (ref 3.5–5.2)
ALP SERPL-CCNC: 65 U/L (ref 55–135)
ALT SERPL W/O P-5'-P-CCNC: 16 U/L (ref 10–44)
ANION GAP SERPL CALC-SCNC: 10 MMOL/L (ref 8–16)
AST SERPL-CCNC: 13 U/L (ref 10–40)
BACTERIA SPEC AEROBE CULT: NO GROWTH
BASOPHILS # BLD AUTO: 0.07 K/UL (ref 0–0.2)
BASOPHILS NFR BLD: 0.6 % (ref 0–1.9)
BILIRUB SERPL-MCNC: 0.5 MG/DL (ref 0.1–1)
BUN SERPL-MCNC: 12 MG/DL (ref 8–23)
CALCIUM SERPL-MCNC: 9.2 MG/DL (ref 8.7–10.5)
CHLORIDE SERPL-SCNC: 102 MMOL/L (ref 95–110)
CO2 SERPL-SCNC: 25 MMOL/L (ref 23–29)
CREAT SERPL-MCNC: 0.8 MG/DL (ref 0.5–1.4)
DIFFERENTIAL METHOD: ABNORMAL
EOSINOPHIL # BLD AUTO: 0.3 K/UL (ref 0–0.5)
EOSINOPHIL NFR BLD: 2.8 % (ref 0–8)
ERYTHROCYTE [DISTWIDTH] IN BLOOD BY AUTOMATED COUNT: 12.7 % (ref 11.5–14.5)
EST. GFR  (NO RACE VARIABLE): >60 ML/MIN/1.73 M^2
GLUCOSE SERPL-MCNC: 108 MG/DL (ref 70–110)
HCT VFR BLD AUTO: 40.5 % (ref 40–54)
HGB BLD-MCNC: 13.6 G/DL (ref 14–18)
IMM GRANULOCYTES # BLD AUTO: 0.05 K/UL (ref 0–0.04)
IMM GRANULOCYTES NFR BLD AUTO: 0.4 % (ref 0–0.5)
LYMPHOCYTES # BLD AUTO: 2.5 K/UL (ref 1–4.8)
LYMPHOCYTES NFR BLD: 22 % (ref 18–48)
MAGNESIUM SERPL-MCNC: 2 MG/DL (ref 1.6–2.6)
MCH RBC QN AUTO: 30 PG (ref 27–31)
MCHC RBC AUTO-ENTMCNC: 33.6 G/DL (ref 32–36)
MCV RBC AUTO: 89 FL (ref 82–98)
MONOCYTES # BLD AUTO: 1.1 K/UL (ref 0.3–1)
MONOCYTES NFR BLD: 9.6 % (ref 4–15)
NEUTROPHILS # BLD AUTO: 7.4 K/UL (ref 1.8–7.7)
NEUTROPHILS NFR BLD: 64.6 % (ref 38–73)
NRBC BLD-RTO: 0 /100 WBC
PHOSPHATE SERPL-MCNC: 3.3 MG/DL (ref 2.7–4.5)
PLATELET # BLD AUTO: 274 K/UL (ref 150–450)
PMV BLD AUTO: 11.2 FL (ref 9.2–12.9)
POTASSIUM SERPL-SCNC: 3.8 MMOL/L (ref 3.5–5.1)
PROT SERPL-MCNC: 7 G/DL (ref 6–8.4)
RBC # BLD AUTO: 4.53 M/UL (ref 4.6–6.2)
SODIUM SERPL-SCNC: 137 MMOL/L (ref 136–145)
WBC # BLD AUTO: 11.52 K/UL (ref 3.9–12.7)

## 2022-08-18 PROCEDURE — 25000003 PHARM REV CODE 250

## 2022-08-18 PROCEDURE — 11000001 HC ACUTE MED/SURG PRIVATE ROOM

## 2022-08-18 PROCEDURE — 76937 US GUIDE VASCULAR ACCESS: CPT

## 2022-08-18 PROCEDURE — 63600175 PHARM REV CODE 636 W HCPCS: Performed by: INTERNAL MEDICINE

## 2022-08-18 PROCEDURE — C1751 CATH, INF, PER/CENT/MIDLINE: HCPCS

## 2022-08-18 PROCEDURE — 25000003 PHARM REV CODE 250: Performed by: REGISTERED NURSE

## 2022-08-18 PROCEDURE — 36415 COLL VENOUS BLD VENIPUNCTURE: CPT

## 2022-08-18 PROCEDURE — 99233 SBSQ HOSP IP/OBS HIGH 50: CPT | Mod: ,,, | Performed by: PHYSICIAN ASSISTANT

## 2022-08-18 PROCEDURE — 84100 ASSAY OF PHOSPHORUS: CPT

## 2022-08-18 PROCEDURE — 80053 COMPREHEN METABOLIC PANEL: CPT

## 2022-08-18 PROCEDURE — 99232 SBSQ HOSP IP/OBS MODERATE 35: CPT | Mod: GC,,, | Performed by: HOSPITALIST

## 2022-08-18 PROCEDURE — 85025 COMPLETE CBC W/AUTO DIFF WBC: CPT

## 2022-08-18 PROCEDURE — 83735 ASSAY OF MAGNESIUM: CPT

## 2022-08-18 PROCEDURE — 99233 PR SUBSEQUENT HOSPITAL CARE,LEVL III: ICD-10-PCS | Mod: ,,, | Performed by: PHYSICIAN ASSISTANT

## 2022-08-18 PROCEDURE — 99232 PR SUBSEQUENT HOSPITAL CARE,LEVL II: ICD-10-PCS | Mod: GC,,, | Performed by: HOSPITALIST

## 2022-08-18 PROCEDURE — 36573 INSJ PICC RS&I 5 YR+: CPT

## 2022-08-18 RX ORDER — METOPROLOL SUCCINATE 25 MG/1
25 TABLET, EXTENDED RELEASE ORAL DAILY
Qty: 30 TABLET | Refills: 1 | Status: SHIPPED | OUTPATIENT
Start: 2022-08-19 | End: 2022-10-28

## 2022-08-18 RX ORDER — METRONIDAZOLE 500 MG/1
500 TABLET ORAL EVERY 8 HOURS
Qty: 90 TABLET | Refills: 1 | Status: SHIPPED | OUTPATIENT
Start: 2022-08-18 | End: 2022-09-23

## 2022-08-18 RX ORDER — METRONIDAZOLE 500 MG/1
500 TABLET ORAL EVERY 8 HOURS
Qty: 90 TABLET | Refills: 1
Start: 2022-08-18 | End: 2022-08-18 | Stop reason: SDUPTHER

## 2022-08-18 RX ADMIN — METRONIDAZOLE 500 MG: 500 TABLET ORAL at 09:08

## 2022-08-18 RX ADMIN — Medication 1000 UNITS: at 09:08

## 2022-08-18 RX ADMIN — METOPROLOL SUCCINATE 25 MG: 25 TABLET, EXTENDED RELEASE ORAL at 09:08

## 2022-08-18 RX ADMIN — APIXABAN 5 MG: 5 TABLET, FILM COATED ORAL at 09:08

## 2022-08-18 RX ADMIN — METRONIDAZOLE 500 MG: 500 TABLET ORAL at 02:08

## 2022-08-18 RX ADMIN — APIXABAN 5 MG: 5 TABLET, FILM COATED ORAL at 08:08

## 2022-08-18 RX ADMIN — METRONIDAZOLE 500 MG: 500 TABLET ORAL at 05:08

## 2022-08-18 RX ADMIN — CEFTRIAXONE 2 G: 2 INJECTION, SOLUTION INTRAVENOUS at 05:08

## 2022-08-18 RX ADMIN — POLYETHYLENE GLYCOL 3350 17 G: 17 POWDER, FOR SOLUTION ORAL at 09:08

## 2022-08-18 NOTE — PLAN OF CARE
08/18/22 1542   Post-Acute Status   Post-Acute Authorization Home Health;IV Infusion   Home Health Status Referrals Sent   IV Infusion Status Referral(s) sent     SW faxed referral to [facility] via hard fax for HH and Infusion review. SW will follow up as needed.        Re Mann LMSW  Case Management   [facility]   Ext. [phone]
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
## 2022-08-18 NOTE — SUBJECTIVE & OBJECTIVE
Interval History: Feels good today. No concerns.    Review of Systems   Constitutional:  Negative for chills and fever.   HENT:  Negative for congestion and sore throat.    Eyes:  Negative for pain and visual disturbance.   Respiratory:  Negative for cough, chest tightness and shortness of breath.    Cardiovascular:  Negative for chest pain, palpitations and leg swelling.   Gastrointestinal:  Negative for abdominal distention, abdominal pain, constipation, diarrhea, nausea and vomiting.   Genitourinary:  Negative for difficulty urinating, dysuria and frequency.   Musculoskeletal:  Negative for arthralgias and gait problem.   Skin:  Negative for color change and rash.   Neurological:  Negative for dizziness, syncope and headaches.   Psychiatric/Behavioral:  Negative for confusion. The patient is not nervous/anxious.    Objective:     Vital Signs (Most Recent):  Temp: 96.4 °F (35.8 °C) (08/18/22 1128)  Pulse: 79 (08/18/22 1128)  Resp: 16 (08/18/22 1128)  BP: (!) 118/58 (08/18/22 1128)  SpO2: 96 % (08/18/22 1128)   Vital Signs (24h Range):  Temp:  [96.4 °F (35.8 °C)-99.3 °F (37.4 °C)] 96.4 °F (35.8 °C)  Pulse:  [64-84] 79  Resp:  [16-18] 16  SpO2:  [94 %-96 %] 96 %  BP: (111-134)/(58-78) 118/58     Weight: 118 kg (260 lb 2.3 oz)  Body mass index is 36.28 kg/m².    Intake/Output Summary (Last 24 hours) at 8/18/2022 1426  Last data filed at 8/18/2022 0930  Gross per 24 hour   Intake 400 ml   Output 1225 ml   Net -825 ml      Physical Exam  Vitals reviewed.   Constitutional:       General: He is not in acute distress.     Appearance: Normal appearance.   HENT:      Mouth/Throat:      Mouth: Mucous membranes are moist.      Pharynx: Oropharynx is clear.   Cardiovascular:      Rate and Rhythm: Normal rate and regular rhythm.      Pulses: Normal pulses.      Heart sounds: No murmur heard.  Pulmonary:      Effort: Pulmonary effort is normal.      Breath sounds: Normal breath sounds. No wheezing or rales.   Abdominal:       General: Abdomen is flat. There is no distension.   Musculoskeletal:         General: No swelling.      Right lower leg: No edema.      Left lower leg: No edema.      Comments: L foot bandaged   Skin:     General: Skin is warm.      Capillary Refill: Capillary refill takes less than 2 seconds.   Neurological:      General: No focal deficit present.      Mental Status: He is alert and oriented to person, place, and time.   Psychiatric:         Mood and Affect: Mood normal.         Behavior: Behavior normal.       Significant Labs: All pertinent labs within the past 24 hours have been reviewed.  CBC:   Recent Labs   Lab 08/17/22  0445 08/18/22  0503   WBC 12.70 11.52   HGB 14.1 13.6*   HCT 41.6 40.5    274     CMP:   Recent Labs   Lab 08/17/22  0445 08/18/22  0503    137   K 4.0 3.8    102   CO2 25 25   * 108   BUN 13 12   CREATININE 1.0 0.8   CALCIUM 9.2 9.2   PROT 7.1 7.0   ALBUMIN 3.2* 3.1*   BILITOT 0.4 0.5   ALKPHOS 65 65   AST 13 13   ALT 15 16   ANIONGAP 9 10       Significant Imaging: I have reviewed all pertinent imaging results/findings within the past 24 hours.

## 2022-08-18 NOTE — SUBJECTIVE & OBJECTIVE
Interval History:   NAEON  Bone cultures NGTD  Prior wound cultures +prevotella and GBS  Plans for discharge today     Review of Systems   Constitutional:  Negative for appetite change, diaphoresis, fatigue and fever.   Respiratory:  Negative for cough and shortness of breath.    Cardiovascular:  Negative for chest pain, palpitations and leg swelling.   Gastrointestinal:  Negative for abdominal pain, diarrhea, nausea and vomiting.   Genitourinary:  Negative for dysuria.   Musculoskeletal:  Negative for back pain.   Skin:  Positive for wound. Negative for color change, pallor and rash.   Neurological:  Negative for headaches.   All other systems reviewed and are negative.  Objective:     Vital Signs (Most Recent):  Temp: 98 °F (36.7 °C) (08/18/22 1542)  Pulse: 87 (08/18/22 1542)  Resp: 16 (08/18/22 1542)  BP: 123/73 (08/18/22 1542)  SpO2: 95 % (08/18/22 1542) Vital Signs (24h Range):  Temp:  [96.4 °F (35.8 °C)-99.3 °F (37.4 °C)] 98 °F (36.7 °C)  Pulse:  [64-87] 87  Resp:  [16-18] 16  SpO2:  [94 %-96 %] 95 %  BP: (111-134)/(58-78) 123/73     Weight: 118 kg (260 lb 2.3 oz)  Body mass index is 36.28 kg/m².    Estimated Creatinine Clearance: 115.5 mL/min (based on SCr of 0.8 mg/dL).    Physical Exam  Vitals and nursing note reviewed.   Constitutional:       General: He is not in acute distress.     Appearance: He is well-developed. He is not diaphoretic.   HENT:      Head: Normocephalic and atraumatic.   Eyes:      Pupils: Pupils are equal, round, and reactive to light.   Cardiovascular:      Rate and Rhythm: Normal rate and regular rhythm.      Heart sounds: Normal heart sounds. No murmur heard.    No friction rub. No gallop.   Pulmonary:      Effort: Pulmonary effort is normal. No respiratory distress.      Breath sounds: Normal breath sounds. No wheezing or rales.   Chest:      Chest wall: No tenderness.   Abdominal:      General: Bowel sounds are normal. There is no distension.      Palpations: There is no mass.       Tenderness: There is no abdominal tenderness. There is no guarding.   Musculoskeletal:         General: No deformity. Normal range of motion.      Cervical back: Normal range of motion and neck supple.      Comments: Foot wounds dressed     Skin:     General: Skin is warm and dry.      Findings: No erythema or rash.   Neurological:      Mental Status: He is alert and oriented to person, place, and time.   Psychiatric:         Behavior: Behavior normal.         Thought Content: Thought content normal.         Judgment: Judgment normal.         Significant Labs: All pertinent labs within the past 24 hours have been reviewed.    Significant Imaging: I have reviewed all pertinent imaging results/findings within the past 24 hours.

## 2022-08-18 NOTE — PLAN OF CARE
Pt AAOx4. Vital signs as charted. Safety measures in place. Surgical dressing in place. Wound care done per orders. No c/o pain during night. PIV in place, saline locked. Voiding without difficulty. Tolerating diet. Pt resting throughout night. No falls or injuries at this time.

## 2022-08-18 NOTE — ASSESSMENT & PLAN NOTE
Mr. Watkins is a 66 year old male with a PMH of afib, L TMA 2020 presented to ED with cellulitis and wounds on L TMA site. MRI +osteomyelitsi of 4th and 1st metatarsal head. Pt is s/p left LMA with Dr. Randle on 8/15/22. Clean margin bone cultures NGTD. Path pending. Previous wound cultures obtained on 8/12/22 per bedside debridement + stretococcus dysgalactiae and prevotella. Blood cultures NGTD.    Recommendations:  - Continue Ceftriaxone 2g IV q24 hour   - continue Flagyl 500 mg PO q8hr.   - will follow cultures and tailor abx accordingly. If bone cultures or path +acute osteo, will need to continue abx x 6 weeks.  - close ID f/u    Outpatient Antibiotic Therapy Plan:    Please send referral to [facility].    1) Infection:  osteomyelitis    2) Discharge Antibiotics:    Intravenous antibiotics:  Ceftriaxone [id number]\    Oral antibiotics:   500 mg po metronidazole TID    3) Therapy Duration: 6 weeks    Estimated end date of IV antibiotics: 9/26/22    4) Outpatient Weekly Labs:    Order the following labs to be drawn on Mondays:    CBC   CMP    CRP    5) Fax Lab Results to Infectious Diseases Provider: deneen cheney    [facility] Fax Number: [phone]    6) Outpatient Infectious Diseases Follow-up     Follow-up appointment will be arranged by the ID clinic and will be found in the patient's appointments tab.     Prior to discharge, please ensure the patient's follow-up has been scheduled.     If there is still no follow-up scheduled prior to discharge, please send an EPIC message to Sadaf Cifuentes in Infectious Diseases.

## 2022-08-18 NOTE — PLAN OF CARE
On call JEFF contacted by Dr. Mcdaniel and nurse on floor about discharging patient tonight.  JEFF contacted Ramiro with [facility] who states they have just rec'd the referral and cannot get this plan in place for discharge this evening.  JEFF advised nurse that patient would have to stay in hospital until tomorrow.  CM notified MD as well.
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
## 2022-08-18 NOTE — PLAN OF CARE
[facility]      HOME HEALTH ORDERS  FACE TO FACE ENCOUNTER    Patient Name: Marcus Watkins  YOB: 1955    PCP: Radha Brunson MD   PCP Address: [address] [place] [address] [facility] [address] / [place] [address]  PCP Phone Number: [phone]  PCP Fax: [phone]    Encounter Date: 8/11/22    Admit to Home Health    Diagnoses:  Active Hospital Problems    Diagnosis  POA    *Ulcer of amputation stump of foot [T87.89, L97.509]  Yes    Acute osteomyelitis of metatarsal bone of left foot [M86.172]  Yes    Peripheral polyneuropathy [G62.9]  Yes    S/P ablation of atrial fibrillation [Z98.890, Z86.79]  Not Applicable      Resolved Hospital Problems    Diagnosis Date Resolved POA    Osteomyelitis [M86.9] 08/11/2022 Unknown       Follow Up Appointments:  Future Appointments   Date Time Provider Department Center   8/22/2022 11:30 AM Donita Patel DPM [facility] POD [place]   9/2/2022  9:00 AM Gallo Eisenberg PA-C [facility]   9/26/2022  9:30 AM Gallo Eisenberg PA-C [facility]   11/21/2022  7:45 AM EKG, APPT [facility]   11/21/2022  8:20 AM José Grey MD [facility]       Allergies:Review of patient's allergies indicates:  No Known Allergies    Medications: Review discharge medications with patient and family and provide education.    Current Facility-Administered Medications   Medication Dose Route Frequency Provider Last Rate Last Admin    acetaminophen tablet 650 mg  650 mg Oral Q4H PRN Kade Morse MD        apixaban tablet 5 mg  5 mg Oral BID Johnathan Mcdaniel MD   5 mg at 08/18/22 2042    cefTRIAXone (ROCEPHIN) 2 g/50 mL D5W IVPB  2 g Intravenous Q24H Tea Cabrera MD   Stopped at 08/18/22 1820    dextrose 10% bolus 125 mL  12.5 g Intravenous PRN Tea Cabrera MD        dextrose 10% bolus 250 mL  25 g Intravenous PRN Tea Cabrera MD        glucagon (human recombinant) injection 1 mg  1 mg Intramuscular PRN Kade Morse MD         glucose chewable tablet 16 g  16 g Oral PRN Kade Morse MD        glucose chewable tablet 24 g  24 g Oral PRN Kade Morse MD        HYDROcodone-acetaminophen 5-325 mg per tablet 1 tablet  1 tablet Oral Q4H PRN Noah Howell DPM        metoprolol succinate (TOPROL-XL) 24 hr tablet 25 mg  25 mg Oral Daily Jeannette Morales MD   25 mg at 08/18/22 0926    metroNIDAZOLE tablet 500 mg  500 mg Oral Q8H Gwen Kessler NP   500 mg at 08/19/22 0539    naloxone 0.4 mg/mL injection 0.02 mg  0.02 mg Intravenous PRN Kade Morse MD        ondansetron disintegrating tablet 8 mg  8 mg Oral Q8H PRN Kade Morse MD        polyethylene glycol packet 17 g  17 g Oral Daily Jeannette Morales MD   17 g at 08/18/22 0923    senna tablet 8.6 mg  8.6 mg Oral Daily PRN Jeannette Morales MD   8.6 mg at 08/17/22 0909    sodium chloride 0.9% flush 10 mL  10 mL Intravenous Q12H PRN Kade Morse MD        sodium chloride 0.9% flush 10 mL  10 mL Intravenous PRN Noah Howell DPM        vitamin E capsule 1,000 Units  1,000 Units Oral Daily Kade Morse MD   1,000 Units at 08/18/22 0926     Current Discharge Medication List      START taking these medications    Details   cefTRIAXone (ROCEPHIN) 2 g/50 mL PgBk IVPB Inject 50 mLs (2 g total) into the vein once daily at 6am.      metoprolol succinate (TOPROL-XL) 25 MG 24 hr tablet Take 1 tablet (25 mg total) by mouth once daily.  Qty: 30 tablet, Refills: 1    Comments: .      metroNIDAZOLE (FLAGYL) 500 MG tablet Take 1 tablet (500 mg total) by mouth every 8 (eight) hours.  Qty: 90 tablet, Refills: 1         CONTINUE these medications which have NOT CHANGED    Details   apixaban (ELIQUIS) 5 mg Tab Take 1 tablet (5 mg total) by mouth 2 (two) times daily.  Qty: 180 tablet, Refills: 3    Associated Diagnoses: Atrial fibrillation with RVR      cranberry 500 mg Cap Take 1 capsule by mouth once daily.      L.acidophilus/B.bifidum,longum (HEALTHY COLON ORAL) Take 1 tablet by mouth once daily.       multivit-min/folic/vit K/lycop (ONE-A-DAY MEN'S 50 PLUS ORAL) Take 1 tablet by mouth once daily.      omega-3 fatty acids/fish oil (FISH OIL-OMEGA-3 FATTY ACIDS) 300-1,000 mg capsule Take 1 capsule by mouth once daily.    Associated Diagnoses: Atrial fibrillation with RVR      VITAMIN E ACETATE ORAL Take 1 tablet by mouth once daily.         STOP taking these medications       metoprolol tartrate (LOPRESSOR) 25 MG tablet Comments:   Reason for Stopping:                 I have seen and examined this patient within the last 30 days. My clinical findings that support the need for the home health skilled services and home bound status are the following:no   Weakness/numbness causing balance and gait disturbance due to Infection making it taxing to leave home.     Diet:   cardiac diet    Labs:  Report Lab results to PCP.     Intravenous antibiotics:  Ceftriaxone [id number]     Oral antibiotics:  · 500 mg po metronidazole TID     3) Therapy Duration: 6 weeks     Estimated end date of IV antibiotics: 9/26/22     4) Outpatient Weekly Labs:     Order the following labs to be drawn on Mondays:   · CBC  · CMP   · CRP     5) Fax Lab Results to Infectious Diseases Provider: deneen cheney     [facility] Fax Number: [phone]     6) Outpatient Infectious Diseases Follow-up     · Follow-up appointment will be arranged by the ID clinic and will be found in the patient's appointments tab.      Referrals/ Consults  Physical Therapy to evaluate and treat. Evaluate for home safety and equipment needs; Establish/upgrade home exercise program. Perform / instruct on therapeutic exercises, gait training, transfer training, and Range of Motion.  Occupational Therapy to evaluate and treat. Evaluate home environment for safety and equipment needs. Perform/Instruct on transfers, ADL training, ROM, and therapeutic exercises.    Activities:   activity as tolerated    Nursing:   Agency to admit patient within 24 hours of hospital discharge  unless specified on physician order or at patient request    SN to complete comprehensive assessment including routine vital signs. Instruct on disease process and s/s of complications to report to MD. Review/verify medication list sent home with the patient at time of discharge  and instruct patient/caregiver as needed. Frequency may be adjusted depending on start of care date.     Skilled nurse to perform up to 3 visits PRN for symptoms related to diagnosis    Notify MD if SBP > 160 or < 90; DBP > 90 or < 50; HR > 120 or < 50; Temp > 101; O2 < 88%    Ok to schedule additional visits based on staff availability and patient request on consecutive days within the home health episode.    When multiple disciplines ordered:    Start of Care occurs on Sunday - Wednesday schedule remaining discipline evaluations as ordered on separate consecutive days following the start of care.    Thursday SOC -schedule subsequent evaluations Friday and Monday the following week.     Friday - Saturday SOC - schedule subsequent discipline evaluations on consecutive days starting Monday of the following week.    For all post-discharge communication and subsequent orders please contact patient's primary care physician.     Miscellaneous   Home Infusion Therapy:  SN to perform Infusion Therapy/Central Line Care.  Review Central Line Care & Central Line Flush with patient.    Administer: Rocephin 2g Q24H  Last dose given: 8/19                       Home dose due: 8/20    Scrub the Hub: Prior to accessing the line, always perform a 30 second alcohol scrub  Each lumen of the central line is to be flushed at least daily with 10 mL Normal Saline and 3 mL Heparin flush (10 units/mL)  Skilled Nurse (SN) may draw blood from IV access  Blood Draw Procedure:   - Aspirate at least 5 mL of blood   - Discard   - Obtain specimen   - Change injection cap   - Flush with 20 mL Normal Saline followed by a                 3-5 mL Heparin flush (10  units/mL)  Central :   - Sterile dressing changes are done weekly and as needed.   - Use chlor-hexadine scrub to cleanse site, apply Biopatch to insertion site,       apply securement device dressing   - Injection caps are changed weekly and after EVERY lab draw.   - If sterile gauze is under dressing to control oozing,                 dressing change must be performed every 24 hours until gauze is not needed.    Home Health Aide:  Physical Therapy Twice weekly and Occupational Therapy Twice weekly    Wound Care Orders  As per podiatry: Gauze doused with betadine solution. Kerlix ace bandage and  secure with tape.     I certify that this patient is confined to his home and needs physical therapy and occupational therapy.

## 2022-08-18 NOTE — PLAN OF CARE
Problem: Adult Inpatient Plan of Care  Goal: Plan of Care Review  Outcome: Ongoing, Progressing  Goal: Readiness for Transition of Care  Outcome: Ongoing, Progressing     Problem: Impaired Wound Healing  Goal: Optimal Wound Healing  Outcome: Ongoing, Progressing     Problem: Fall Injury Risk  Goal: Absence of Fall and Fall-Related Injury  Outcome: Ongoing, Progressing     Problem: Skin Injury Risk Increased  Goal: Skin Health and Integrity  Outcome: Ongoing, Progressing

## 2022-08-18 NOTE — PROCEDURES
"Marcus Watkins is a 66 y.o. male patient.    Temp: 98 °F (36.7 °C) (08/18/22 1542)  Pulse: 87 (08/18/22 1542)  Resp: 16 (08/18/22 1542)  BP: 123/73 (08/18/22 1542)  SpO2: 95 % (08/18/22 1542)  Weight: 118 kg (260 lb 2.3 oz) (08/15/22 1223)  Height: 5' 11" (180.3 cm) (08/15/22 1223)    PICC  Date/Time: 8/18/2022 4:25 PM  Performed by: Rowan George RN  Assisting provider: Duc Urias RN  Consent Done: Yes  Time out: Immediately prior to procedure a time out was called to verify the correct patient, procedure, equipment, support staff and site/side marked as required  Indications: med administration and vascular access  Anesthesia: local infiltration  Local anesthetic: lidocaine 1% without epinephrine  Anesthetic Total (mL): 3  Description of findings: PICC  Preparation: skin prepped with ChloraPrep  Skin prep agent dried: skin prep agent completely dried prior to procedure  Sterile barriers: all five maximum sterile barriers used - cap, mask, sterile gown, sterile gloves, and large sterile sheet  Hand hygiene: hand hygiene performed prior to central venous catheter insertion  Location details: right brachial  Catheter type: double lumen  Catheter size: 5 Fr  Catheter Length: 39cm    Ultrasound guidance: yes  Vessel Caliber: medium and patent, compressibility normal  Vascular Doppler: not done  Needle advanced into vessel with real time Ultrasound guidance.  Guidewire confirmed in vessel.  Image recorded and saved.  Sterile sheath used.  Number of attempts: 1  Post-procedure: blood return through all ports, chlorhexidine patch and sterile dressing applied  Technical procedures used: 3CG  Specimens: No  Implants: No  Assessment: placement verified by x-ray  Complications: none          Name   8/18/2022    "

## 2022-08-18 NOTE — CONSULTS
Double lumen PICC to right brachial  vein.  39 cm in length, 38 cm arm circumference and 0 cm exposed.   Lot # [id number].

## 2022-08-18 NOTE — ASSESSMENT & PLAN NOTE
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 Xray of foot with evidence of possible OM in 4th and 5th metatarsals. MRI with findings of OM in first metatarsal head. WBC slightly elevated at 12.8, ESR, 42, and CRP 89.7. On exam there is no significant swelling but visible ulceration and erythema.     - BCX 08/11 NGTD  - WCX 08/12 NGTD, aerobic culture pending  - Started on rocephin 1g q24 and vancomycin  - Podiatry on board, s/p TMA 8/15  - ID consulted for long term abx; vanc discontinued, added flagyl

## 2022-08-19 VITALS
HEIGHT: 71 IN | HEART RATE: 76 BPM | WEIGHT: 260.13 LBS | OXYGEN SATURATION: 96 % | SYSTOLIC BLOOD PRESSURE: 128 MMHG | TEMPERATURE: 98 F | DIASTOLIC BLOOD PRESSURE: 65 MMHG | BODY MASS INDEX: 36.42 KG/M2 | RESPIRATION RATE: 17 BRPM

## 2022-08-19 LAB
ALBUMIN SERPL BCP-MCNC: 3.3 G/DL (ref 3.5–5.2)
ALP SERPL-CCNC: 62 U/L (ref 55–135)
ALT SERPL W/O P-5'-P-CCNC: 22 U/L (ref 10–44)
ANION GAP SERPL CALC-SCNC: 9 MMOL/L (ref 8–16)
AST SERPL-CCNC: 15 U/L (ref 10–40)
BACTERIA SPEC ANAEROBE CULT: NORMAL
BASOPHILS # BLD AUTO: 0.08 K/UL (ref 0–0.2)
BASOPHILS NFR BLD: 0.7 % (ref 0–1.9)
BILIRUB SERPL-MCNC: 0.5 MG/DL (ref 0.1–1)
BUN SERPL-MCNC: 12 MG/DL (ref 8–23)
CALCIUM SERPL-MCNC: 9.5 MG/DL (ref 8.7–10.5)
CHLORIDE SERPL-SCNC: 104 MMOL/L (ref 95–110)
CO2 SERPL-SCNC: 24 MMOL/L (ref 23–29)
CREAT SERPL-MCNC: 0.8 MG/DL (ref 0.5–1.4)
DIFFERENTIAL METHOD: ABNORMAL
EOSINOPHIL # BLD AUTO: 0.3 K/UL (ref 0–0.5)
EOSINOPHIL NFR BLD: 2.9 % (ref 0–8)
ERYTHROCYTE [DISTWIDTH] IN BLOOD BY AUTOMATED COUNT: 13 % (ref 11.5–14.5)
EST. GFR  (NO RACE VARIABLE): >60 ML/MIN/1.73 M^2
GLUCOSE SERPL-MCNC: 117 MG/DL (ref 70–110)
HCT VFR BLD AUTO: 41.2 % (ref 40–54)
HGB BLD-MCNC: 14.4 G/DL (ref 14–18)
IMM GRANULOCYTES # BLD AUTO: 0.05 K/UL (ref 0–0.04)
IMM GRANULOCYTES NFR BLD AUTO: 0.4 % (ref 0–0.5)
LYMPHOCYTES # BLD AUTO: 2.8 K/UL (ref 1–4.8)
LYMPHOCYTES NFR BLD: 23.9 % (ref 18–48)
MAGNESIUM SERPL-MCNC: 2.1 MG/DL (ref 1.6–2.6)
MCH RBC QN AUTO: 30.8 PG (ref 27–31)
MCHC RBC AUTO-ENTMCNC: 35 G/DL (ref 32–36)
MCV RBC AUTO: 88 FL (ref 82–98)
MONOCYTES # BLD AUTO: 1 K/UL (ref 0.3–1)
MONOCYTES NFR BLD: 8.5 % (ref 4–15)
NEUTROPHILS # BLD AUTO: 7.4 K/UL (ref 1.8–7.7)
NEUTROPHILS NFR BLD: 63.6 % (ref 38–73)
NRBC BLD-RTO: 0 /100 WBC
PHOSPHATE SERPL-MCNC: 3.3 MG/DL (ref 2.7–4.5)
PLATELET # BLD AUTO: 305 K/UL (ref 150–450)
PMV BLD AUTO: 10.8 FL (ref 9.2–12.9)
POTASSIUM SERPL-SCNC: 3.9 MMOL/L (ref 3.5–5.1)
PROT SERPL-MCNC: 7.4 G/DL (ref 6–8.4)
RBC # BLD AUTO: 4.68 M/UL (ref 4.6–6.2)
SODIUM SERPL-SCNC: 137 MMOL/L (ref 136–145)
WBC # BLD AUTO: 11.61 K/UL (ref 3.9–12.7)

## 2022-08-19 PROCEDURE — 25000003 PHARM REV CODE 250

## 2022-08-19 PROCEDURE — 25000003 PHARM REV CODE 250: Performed by: REGISTERED NURSE

## 2022-08-19 PROCEDURE — 80053 COMPREHEN METABOLIC PANEL: CPT

## 2022-08-19 PROCEDURE — 36415 COLL VENOUS BLD VENIPUNCTURE: CPT

## 2022-08-19 PROCEDURE — 99239 PR HOSPITAL DISCHARGE DAY,>30 MIN: ICD-10-PCS | Mod: GC,,, | Performed by: HOSPITALIST

## 2022-08-19 PROCEDURE — 85025 COMPLETE CBC W/AUTO DIFF WBC: CPT

## 2022-08-19 PROCEDURE — 94761 N-INVAS EAR/PLS OXIMETRY MLT: CPT

## 2022-08-19 PROCEDURE — 97116 GAIT TRAINING THERAPY: CPT

## 2022-08-19 PROCEDURE — 83735 ASSAY OF MAGNESIUM: CPT

## 2022-08-19 PROCEDURE — 63600175 PHARM REV CODE 636 W HCPCS

## 2022-08-19 PROCEDURE — 84100 ASSAY OF PHOSPHORUS: CPT

## 2022-08-19 PROCEDURE — 99239 HOSP IP/OBS DSCHRG MGMT >30: CPT | Mod: GC,,, | Performed by: HOSPITALIST

## 2022-08-19 RX ADMIN — Medication 1000 UNITS: at 08:08

## 2022-08-19 RX ADMIN — APIXABAN 5 MG: 5 TABLET, FILM COATED ORAL at 08:08

## 2022-08-19 RX ADMIN — METOPROLOL SUCCINATE 25 MG: 25 TABLET, EXTENDED RELEASE ORAL at 08:08

## 2022-08-19 RX ADMIN — SENNOSIDES 8.6 MG: 8.6 TABLET, FILM COATED ORAL at 08:08

## 2022-08-19 RX ADMIN — CEFTRIAXONE 2 G: 2 INJECTION, SOLUTION INTRAVENOUS at 11:08

## 2022-08-19 RX ADMIN — METRONIDAZOLE 500 MG: 500 TABLET ORAL at 05:08

## 2022-08-19 RX ADMIN — METRONIDAZOLE 500 MG: 500 TABLET ORAL at 02:08

## 2022-08-19 NOTE — PT/OT/SLP PROGRESS
Physical Therapy Treatment    Patient Name:  Marcus Watkins   MRN:  [id number]    Recommendations:     Discharge Recommendations:  home   Discharge Equipment Recommendations: walker, rolling, bedside commode, bath bench   Barriers to discharge: None    Assessment:     Marcus Watkins is a 66 y.o. male admitted with a medical diagnosis of Ulcer of amputation stump of foot.  He presents with the following impairments/functional limitations:  impaired endurance, impaired functional mobility, gait instability, orthopedic precautions . Patient anticipating discharge home today. Ambulates 150 feet w/ RW and SBA PWB LLE w/ darco offloading shoe; up/down 10 steps w/ BHR and non reciprocal gait w/ SBA..    Rehab Prognosis: Good; patient would benefit from acute skilled PT services to address these deficits and reach maximum level of function.    Recent Surgery: Procedure(s) (LRB):  AMPUTATION, FOOT, TRANSMETATARSAL (Left) 4 Days Post-Op    Plan:     During this hospitalization, patient to be seen 3 x/week to address the identified rehab impairments via gait training, therapeutic activities, therapeutic exercises and progress toward the following goals:    · Plan of Care Expires:  09/16/22    Subjective     Chief Complaint: waiting on infusion training  Patient/Family Comments/goals: discharge home today  Pain/Comfort:  · Pain Rating 1: 0/10  · Pain Rating Post-Intervention 1: 0/10      Objective:     Communicated with nurse prior to session.  Patient found sitting EOB w/ wife present with  (no active lines) upon PT entry to room.     General Precautions: Standard, fall   Orthopedic Precautions:RLE partial weight bearing (to right heel w/ darco shoe (forefoot off loading shoe))   Braces:  (darco offloading shoe)  Respiratory Status: Room air     Functional Mobility:  · Transfers:     · Sit to Stand:  supervision with no AD  · Bed to Chair: supervision with  rolling walker  using  Step Transfer  · Gait: w/ RW and  feet, PWB LLE  w/d arco shoe. No LOB.   · Ascends/descends 10 steps w/ BHR and SBA w/ non reciprocal gait. No LOB. darco shoe on LLE      AM-PAC 6 CLICK MOBILITY  Turning over in bed (including adjusting bedclothes, sheets and blankets)?: 4  Sitting down on and standing up from a chair with arms (e.g., wheelchair, bedside commode, etc.): 3  Moving from lying on back to sitting on the side of the bed?: 4  Moving to and from a bed to a chair (including a wheelchair)?: 3  Need to walk in hospital room?: 3  Climbing 3-5 steps with a railing?: 3  Basic Mobility Total Score: 20       Therapeutic Activities and Exercises:   patient educated on PT POC, gait and trf tech, PWB status LLE, heel weightbearing    Patient left up in chair with all lines intact, call button in reach and wife present..    GOALS:   Multidisciplinary Problems     Physical Therapy Goals        Problem: Physical Therapy    Goal Priority Disciplines Outcome Goal Variances Interventions   Physical Therapy Goal     PT, PT/OT Ongoing, Progressing     Description: Goals to be met by: 22    Patient will increase functional independence with mobility by performin. Sit to stand transfer with Modified [place]  2. Gait  x 159 feet with Modified [place] using LRAD  3. Ascend/descend 8 stairs with bilateral Handrails Supervision using No Assistive Device.                      Time Tracking:     PT Received On: 22  PT Start Time: 1053     PT Stop Time: 1103  PT Total Time (min): 10 min     Billable Minutes: Gait Training 10    Treatment Type: Treatment  PT/PTA: PT     PTA Visit Number: 0     2022

## 2022-08-19 NOTE — NURSING
Notified on-call that pt not being discharged tonight per case management note. Discharge order discontinued.

## 2022-08-19 NOTE — PLAN OF CARE
Pt AAOx4. Vital signs as charted. Safety measures in place, dressing in place to RLE. Wound care done per orders. Pt ambulating, no concern. Voiding without difficulty. No c/o pain. Tolerating diet. PICC in place to ELIJAH. Pt resting throughout night. No falls or injuries at this time.

## 2022-08-19 NOTE — DISCHARGE SUMMARY
[facility] - [facility]  [facility] Medicine  Discharge Summary      Patient Name: Marcus Watkins  MRN: [id number]  Patient Class: IP- Inpatient  Admission Date: 8/11/2022  Hospital Length of Stay: 8 days  Discharge Date and Time:  08/19/2022 12:42 PM  Attending Physician: Kena May MD   Discharging Provider: Lesli Rivera MD  Primary Care Provider: Radha Brunson MD  Hospital Medicine Team: [facility] Lesli Rivera MD    HPI:   66 y.o M hx of afib (on eliquis s/p ablation), left forefoot amputation (October 2020, due to chemical injury in plumbing), and thyroid nodules, presents with redness and warmth of the left foot. The patient noted his foot was feeling warmer in the middle of the night. He denies any chest pain, sob, palpitations, diarrhea, vomiting, dysuria, or nausea. Patient was last seen by a podiatrist on Tuesday who recommended a f/u MRI for a foot xray that was indicative of osteomyelitis. Patient denies history of alcohol, smoking, or drug use.       Procedure(s) (LRB):  AMPUTATION, FOOT, TRANSMETATARSAL (Left)      Hospital Course:   Patient admitted for osteomyelitis of L foot (previously with forefoot amputation), started on Vanc/CTX for empiric OM coverage as demonstrated on XR and MRI. Podiatry consulted, had TMA on 8/15, tolerated well. Clean bone margins and Bcx w/ NGTD. Wound cultures obtained 8/12 (+) streptococcus dysgalactiae and prevotella. ID consulted for long term abx management: recommend continuing CTX 2g IV q24H and Flagyl 500mg Q8H x 2-6 weeks. Patient discharged w/ PICC for long term abx and home PT/OT arranged by CM> Discharged home in stable condition. C       Goals of Care Treatment Preferences:  Code Status: Full Code      Consults:   Consults (From admission, onward)        Status Ordering Provider     Inpatient consult to PICC team (TITO)  Once        Provider:  (Not yet assigned)    Completed MITA GEIGER     Inpatient consult to Infectious Diseases   "Once        Provider:  (Not yet assigned)    Completed SY LENZ     Inpatient consult to Podiatry  Once        Provider:  (Not yet assigned)    Completed RACHELL SKY          No new Assessment & Plan notes have been filed under this hospital service since the last note was generated.  Service: Hospital Medicine    Final Active Diagnoses:    Diagnosis Date Noted POA    PRINCIPAL PROBLEM:  Ulcer of amputation stump of foot [T87.89, L97.509] 08/11/2022 Yes    Acute osteomyelitis of metatarsal bone of left foot [M86.172] 08/13/2022 Yes    Peripheral polyneuropathy [G62.9] 05/02/2019 Yes    S/P ablation of atrial fibrillation [Z98.890, Z86.79] 02/18/2019 Not Applicable      Problems Resolved During this Admission:    Diagnosis Date Noted Date Resolved POA    Osteomyelitis [M86.9] 08/11/2022 08/11/2022 Unknown       Discharged Condition: stable    Disposition:     Follow Up:    Patient Instructions:      WALKER FOR HOME USE     Order Specific Question Answer Comments   Type of Walker: Adult (5'4"-6'6")    With wheels? Yes    Height: 5' 11" (1.803 m)    Weight: 118 kg (260 lb 2.3 oz)    Length of need (1-99 months): 99    Does patient have medical equipment at home? rollator    Please check all that apply: Patient's condition impairs ambulation.      COMMODE FOR HOME USE     Order Specific Question Answer Comments   Type: Standard    Height: 5' 11" (1.803 m)    Weight: 118 kg (260 lb 2.3 oz)    Does patient have medical equipment at home? rollator    Length of need (1-99 months): 99      TRANSFER TUB BENCH FOR HOME USE     Order Specific Question Answer Comments   Type of Transfer Tub Bench: Unpadded    Height: 5' 11" (1.803 m)    Weight: 118 kg (260 lb 2.3 oz)    Does patient have medical equipment at home? rollator    Length of need (1-99 months): 99    Patient notified - Not covered by insurance considered a convenience item No    Discussed financial responsibility with responsible party No  "     Ambulatory referral/consult to Infectious Disease   Standing Status: Future   Referral Priority: Routine Referral Type: Consultation   Referral Reason: Specialty Services Required   Requested Specialty: Infectious Diseases   Number of Visits Requested: 1     Vitals:    08/19/22 0405 08/19/22 0808 08/19/22 1024 08/19/22 1203   BP: 135/73 (!) 139/58  128/65   BP Location: Left arm   Left arm   Patient Position: Lying   Lying   Pulse: 80 84 82 76   Resp: 18 16 17 17   Temp: 98.2 °F (36.8 °C)   98.3 °F (36.8 °C)   TempSrc: Oral Axillary  Oral   SpO2: (!) 93% 97% 96% 96%   Weight:       Height:         Physical Exam  Vitals reviewed.   Constitutional:       General: He is not in acute distress.     Appearance: Normal appearance.   HENT:      Mouth/Throat:      Mouth: Mucous membranes are moist.      Pharynx: Oropharynx is clear.   Cardiovascular:      Rate and Rhythm: Normal rate and regular rhythm.      Pulses: Normal pulses.      Heart sounds: No murmur heard.  Pulmonary:      Effort: Pulmonary effort is normal.      Breath sounds: Normal breath sounds. No wheezing or rales.   Abdominal:      General: Abdomen is flat. There is no distension.   Musculoskeletal:         General: No swelling.      Right lower leg: No edema.      Left lower leg: No edema.      Comments: L foot bandaged   Skin:     General: Skin is warm.      Capillary Refill: Capillary refill takes less than 2 seconds.   Neurological:      General: No focal deficit present.      Mental Status: He is alert and oriented to person, place, and time.   Psychiatric:         Mood and Affect: Mood normal.         Behavior: Behavior normal    Significant Diagnostic Studies: Labs:   CMP   Recent Labs   Lab 08/18/22  0503 08/19/22  0451    137   K 3.8 3.9    104   CO2 25 24    117*   BUN 12 12   CREATININE 0.8 0.8   CALCIUM 9.2 9.5   PROT 7.0 7.4   ALBUMIN 3.1* 3.3*   BILITOT 0.5 0.5   ALKPHOS 65 62   AST 13 15   ALT 16 22   ANIONGAP 10 9     and CBC   Recent Labs   Lab 08/18/22  0503 08/19/22  0451   WBC 11.52 11.61   HGB 13.6* 14.4   HCT 40.5 41.2    305     Microbiology:   Blood Culture   Lab Results   Component Value Date    LABBLOO No growth after 5 days. 08/11/2022       Pending Diagnostic Studies:     Procedure Component Value Units Date/Time    Specimen to Pathology, Surgery Orthopedics [[id number]] Collected: 08/15/22 1439    Order Status: Sent Lab Status: In process Updated: 08/15/22 2127    Specimen: Tissue          Medications:  Reconciled Home Medications:      Medication List      START taking these medications    cefTRIAXone 2 g/50 mL Pgbk IVPB  Commonly known as: ROCEPHIN  Inject 50 mLs (2 g total) into the vein once daily at 6am.  Start taking on: September 23, 2022     metoprolol succinate 25 MG 24 hr tablet  Commonly known as: TOPROL-XL  Take 1 tablet (25 mg total) by mouth once daily.     metroNIDAZOLE 500 MG tablet  Commonly known as: FLAGYL  Take 1 tablet (500 mg total) by mouth every 8 (eight) hours.        CONTINUE taking these medications    apixaban 5 mg Tab  Commonly known as: ELIQUIS  Take 1 tablet (5 mg total) by mouth 2 (two) times daily.     cranberry 500 mg Cap  Take 1 capsule by mouth once daily.     fish oil-omega-3 fatty acids 300-1,000 mg capsule  Take 1 capsule by mouth once daily.     HEALTHY COLON ORAL  Take 1 tablet by mouth once daily.     ONE-A-DAY MEN'S 50 PLUS ORAL  Take 1 tablet by mouth once daily.     VITAMIN E ACETATE ORAL  Take 1 tablet by mouth once daily.        STOP taking these medications    metoprolol tartrate 25 MG tablet  Commonly known as: LOPRESSOR            Indwelling Lines/Drains at time of discharge:   Lines/Drains/Airways     Peripherally Inserted Central Catheter Line  Duration           PICC Double Lumen 08/18/22 1625 right brachial <1 day                Time spent on the discharge of patient: 35 minutes         Lesli Rivera MD  Department of Hospital Medicine  [facility] -  [facility]gery

## 2022-08-19 NOTE — PROGRESS NOTES
[facility] Home IV ABX Education/Discharge Planning Note:     [facility] educator met with patient and spouse and discussed discharge plan for home IVABX. Mr Marcus Watkins will discharge home with family support. Patient will infuse IV medication via Elastomeric Pump. Patient educated on S.A.S.H procedure & [facility] S.A.S.H mat provided.  Patient education checklist reviewed and acknowledged by the patient and his spouse and they are agreeable to discharge with home infusion plan of care. IV administration process using aspetic technique was reviewed with successful return demonstration by the patient's spouse. Patient and patient's spouse feel comfortable with home infusion process after bedside education provided. Patient and spouse also comfortable with infusing without assistance of nurse, as they were under the impression (at the time of this education) that HHRN would likely not be out to admit patient until Monday. Patient will discharge home with IV Ceftriaxone 2g Q24 hours for estimated end of therapy date 9/26/22. Dosing schedule times will be 5:30pm daily, beginning with first home dose on 8/20/22; patient wanted to accommodate his dosing time to better fit his home schedule. No extension set placed on double lumen PICC, as patient will not be self infusing. Patient to be followed by home health TBD (see below for note regarding HH arrangements) for weekly dressing changes and lab draws.     Time allotted for questions; questions addressed appropriately. Patients home IV ABX & supplies to be delivered to patient's home. Provided patient with [facility] support number [phone]. Patient is ready discharge home from [facility] perspective. Patient's discharge planning team and bedside nurse updated with the information above.      -I called Gayle from [facility] to provide report, as SW/CM stated that patient was accepted to care by [facility] for nursing. When I spoke to Gayle,  she reported that they were not able to accept patient, and that patient was appropriately declined in [facility], with the appropriate reason for declining. After we spoke, Gayle also called SW to follow up with denial, and to reinforce that patient was not accepted by [facility] and cannot be due to staffing reasons. I then followed up with SW to assess what the plan for HH would be; the plan was to move forward with discharge, and patient was discharged without confirmation of home health. Two agencies were pending final review of referral per the SW, and she stated that she would continue to follow up. Ramiro Davenport was also made aware of this, as was [facility] pharmacy as a whole.      Mayra Mandel MS, RDN, LDN  Clinical Dietitian  [facility]   Desk: [phone]  Other Phone: [phone]  [email or web address]

## 2022-08-19 NOTE — PLAN OF CARE
Patient to d/c today, pending education from infusion agency. JERAMY to follow up with N and [facility] this am.    9:28 AM  JERAMY sent updated HH order to [facility] via hard fax.    10:27 AM  JERAMY informed patient's hospital stay is under workman's comp. JERAMY contacted  Laura Falcon #[phone]. Obtained the following information:    [facility] [employer]   [address] [address]  [facility] [place] [address]  : Laura Falcon #[phone].  Fax#[phone]      Re Mann LMSW  Case Management   [facility]   Ext. [phone]

## 2022-08-19 NOTE — PROGRESS NOTES
AVS provided and education given on f/u appointments and f/u care. Pt sent with belongings and equipment provided at bedside. Pt transported off unit via wheelchair.

## 2022-08-22 ENCOUNTER — OFFICE VISIT (OUTPATIENT)
Dept: PODIATRY | Facility: CLINIC | Age: 67
End: 2022-08-22
Payer: MEDICARE

## 2022-08-22 ENCOUNTER — TELEPHONE (OUTPATIENT)
Dept: PODIATRY | Facility: CLINIC | Age: 67
End: 2022-08-22
Payer: COMMERCIAL

## 2022-08-22 VITALS
HEIGHT: 71 IN | BODY MASS INDEX: 36.42 KG/M2 | HEART RATE: 100 BPM | SYSTOLIC BLOOD PRESSURE: 132 MMHG | DIASTOLIC BLOOD PRESSURE: 78 MMHG | WEIGHT: 260.13 LBS

## 2022-08-22 DIAGNOSIS — Z79.01 CURRENT USE OF LONG TERM ANTICOAGULATION: ICD-10-CM

## 2022-08-22 DIAGNOSIS — Z89.432 S/P TRANSMETATARSAL AMPUTATION OF FOOT, LEFT: Primary | ICD-10-CM

## 2022-08-22 PROCEDURE — 3075F SYST BP GE 130 - 139MM HG: CPT | Mod: CPTII,S$GLB,, | Performed by: PODIATRIST

## 2022-08-22 PROCEDURE — 99024 POSTOP FOLLOW-UP VISIT: CPT | Mod: S$GLB,,, | Performed by: PODIATRIST

## 2022-08-22 PROCEDURE — 1159F PR MEDICATION LIST DOCUMENTED IN MEDICAL RECORD: ICD-10-PCS | Mod: CPTII,S$GLB,, | Performed by: PODIATRIST

## 2022-08-22 PROCEDURE — 1126F AMNT PAIN NOTED NONE PRSNT: CPT | Mod: CPTII,S$GLB,, | Performed by: PODIATRIST

## 2022-08-22 PROCEDURE — 3075F PR MOST RECENT SYSTOLIC BLOOD PRESS GE 130-139MM HG: ICD-10-PCS | Mod: CPTII,S$GLB,, | Performed by: PODIATRIST

## 2022-08-22 PROCEDURE — 3078F DIAST BP <80 MM HG: CPT | Mod: CPTII,S$GLB,, | Performed by: PODIATRIST

## 2022-08-22 PROCEDURE — 99999 PR PBB SHADOW E&M-EST. PATIENT-LVL III: CPT | Mod: PBBFAC,,, | Performed by: PODIATRIST

## 2022-08-22 PROCEDURE — 1159F MED LIST DOCD IN RCRD: CPT | Mod: CPTII,S$GLB,, | Performed by: PODIATRIST

## 2022-08-22 PROCEDURE — 3008F PR BODY MASS INDEX (BMI) DOCUMENTED: ICD-10-PCS | Mod: CPTII,S$GLB,, | Performed by: PODIATRIST

## 2022-08-22 PROCEDURE — 3008F BODY MASS INDEX DOCD: CPT | Mod: CPTII,S$GLB,, | Performed by: PODIATRIST

## 2022-08-22 PROCEDURE — 3078F PR MOST RECENT DIASTOLIC BLOOD PRESSURE < 80 MM HG: ICD-10-PCS | Mod: CPTII,S$GLB,, | Performed by: PODIATRIST

## 2022-08-22 PROCEDURE — 3044F PR MOST RECENT HEMOGLOBIN A1C LEVEL <7.0%: ICD-10-PCS | Mod: CPTII,S$GLB,, | Performed by: PODIATRIST

## 2022-08-22 PROCEDURE — 1126F PR PAIN SEVERITY QUANTIFIED, NO PAIN PRESENT: ICD-10-PCS | Mod: CPTII,S$GLB,, | Performed by: PODIATRIST

## 2022-08-22 PROCEDURE — 99024 PR POST-OP FOLLOW-UP VISIT: ICD-10-PCS | Mod: S$GLB,,, | Performed by: PODIATRIST

## 2022-08-22 PROCEDURE — 3044F HG A1C LEVEL LT 7.0%: CPT | Mod: CPTII,S$GLB,, | Performed by: PODIATRIST

## 2022-08-22 PROCEDURE — 99999 PR PBB SHADOW E&M-EST. PATIENT-LVL III: ICD-10-PCS | Mod: PBBFAC,,, | Performed by: PODIATRIST

## 2022-08-22 NOTE — TELEPHONE ENCOUNTER
----- Message from Topher De La Cruz sent at 8/22/2022  9:25 AM CDT -----  Contact: patient  Patient requesting call back in regards to having questions before scheduled visit.       Patient @[phone] (home)

## 2022-08-22 NOTE — TELEPHONE ENCOUNTER
called pt on 8/22/22 spoke with pt...pt just wanted to update us that he was in the hospital and is now released and has foot wrapped    E.S.

## 2022-08-22 NOTE — PROGRESS NOTES
SUBJECTIVE: Patient returns to the clinic left foot status post revised TMA procedure with Dr. Randle.   Presents with wife  She was concerned that there has been some bleeding through the drainage.  She notices at night when patient walks to the bathroom there is blood spots on the floor.  Patient relates he is wearing the offloading shoe all the time except at night in bed.  Does not put it on to go the bathroom.  Wife relates that they were unaware he was supposed to keep it on all the time.  He is getting his IV antibiotics through PICC line not having any issues  No pain  Using a walker  Safety concerns on stairs patient did have physical therapy at the hospital did offer repeat; will consider      .  Patient has no complaints of fever chills or sweats.         PAST MEDICAL HISTORY: Unchanged    Physical examination: General: Pt. is in no acute distress, alert and oriented x 3.  Dressing clean dry and intact mild strike through    Podiatric examination: Vascular no signs of ischemia  Dermatologic: Surgical site is clean without any signs of infection.  Mild breakdown to the dorsal aspect of the suture line there is no acute gapping or dehiscence noted No significant erythema.                    IMPRESSION:  Status post TMA procedure / removal of osteomyelitis with satisfactory progress no signs of infection.    PLAN:    - Betadine applied with ABD cast padding Yelena and an Ace    Patient to wear surgical shoe offloading forefoot at all times also can consider switching to Cam boot if comfortable do not remove at night when sleeping place pillow case over boot or shoe      - id following waiting for bone biopsies to determine if need2 or 6 weeks of antibiotics   Patient should call the clinic immediately if any signs of infection such as fever chills sweats increased redness or pain but was otherwise discharged.    Called and discussed with resident who was in procedure and advised  patient following up at [facility] [place] in the next 1-2 weeks  Discussed with resident patient may ideally need a Achilles tendon lengthening procedure versus bracing with toe filler for custom shoe in the future    Follow-up 1-2 weeks [facility] with Dr. Randle; patient and wife to call if any excessive bleeding occurs  Patient and wife are aware patient can follow up here in the future

## 2022-08-23 ENCOUNTER — OFFICE VISIT (OUTPATIENT)
Dept: INTERNAL MEDICINE | Facility: CLINIC | Age: 67
End: 2022-08-23
Payer: COMMERCIAL

## 2022-08-23 VITALS
TEMPERATURE: 99 F | WEIGHT: 253.75 LBS | OXYGEN SATURATION: 95 % | BODY MASS INDEX: 35.52 KG/M2 | DIASTOLIC BLOOD PRESSURE: 78 MMHG | HEART RATE: 68 BPM | HEIGHT: 71 IN | SYSTOLIC BLOOD PRESSURE: 118 MMHG

## 2022-08-23 DIAGNOSIS — T87.89: Primary | ICD-10-CM

## 2022-08-23 DIAGNOSIS — Z45.2 PICC (PERIPHERALLY INSERTED CENTRAL CATHETER) IN PLACE: ICD-10-CM

## 2022-08-23 DIAGNOSIS — M86.172 ACUTE OSTEOMYELITIS OF METATARSAL BONE OF LEFT FOOT: ICD-10-CM

## 2022-08-23 DIAGNOSIS — Z91.81 RISK FOR FALLS: ICD-10-CM

## 2022-08-23 DIAGNOSIS — L97.509: Primary | ICD-10-CM

## 2022-08-23 PROCEDURE — 1126F PR PAIN SEVERITY QUANTIFIED, NO PAIN PRESENT: ICD-10-PCS | Mod: CPTII,S$GLB,, | Performed by: NURSE PRACTITIONER

## 2022-08-23 PROCEDURE — 99999 PR PBB SHADOW E&M-EST. PATIENT-LVL IV: ICD-10-PCS | Mod: PBBFAC,,, | Performed by: NURSE PRACTITIONER

## 2022-08-23 PROCEDURE — 99999 PR PBB SHADOW E&M-EST. PATIENT-LVL IV: CPT | Mod: PBBFAC,,, | Performed by: NURSE PRACTITIONER

## 2022-08-23 PROCEDURE — 3078F PR MOST RECENT DIASTOLIC BLOOD PRESSURE < 80 MM HG: ICD-10-PCS | Mod: CPTII,S$GLB,, | Performed by: NURSE PRACTITIONER

## 2022-08-23 PROCEDURE — 1101F PR PT FALLS ASSESS DOC 0-1 FALLS W/OUT INJ PAST YR: ICD-10-PCS | Mod: CPTII,S$GLB,, | Performed by: NURSE PRACTITIONER

## 2022-08-23 PROCEDURE — 3288F FALL RISK ASSESSMENT DOCD: CPT | Mod: CPTII,S$GLB,, | Performed by: NURSE PRACTITIONER

## 2022-08-23 PROCEDURE — 3288F PR FALLS RISK ASSESSMENT DOCUMENTED: ICD-10-PCS | Mod: CPTII,S$GLB,, | Performed by: NURSE PRACTITIONER

## 2022-08-23 PROCEDURE — 1111F DSCHRG MED/CURRENT MED MERGE: CPT | Mod: CPTII,S$GLB,, | Performed by: NURSE PRACTITIONER

## 2022-08-23 PROCEDURE — 3044F PR MOST RECENT HEMOGLOBIN A1C LEVEL <7.0%: ICD-10-PCS | Mod: CPTII,S$GLB,, | Performed by: NURSE PRACTITIONER

## 2022-08-23 PROCEDURE — 3008F BODY MASS INDEX DOCD: CPT | Mod: CPTII,S$GLB,, | Performed by: NURSE PRACTITIONER

## 2022-08-23 PROCEDURE — 99214 OFFICE O/P EST MOD 30 MIN: CPT | Mod: S$GLB,,, | Performed by: NURSE PRACTITIONER

## 2022-08-23 PROCEDURE — 1126F AMNT PAIN NOTED NONE PRSNT: CPT | Mod: CPTII,S$GLB,, | Performed by: NURSE PRACTITIONER

## 2022-08-23 PROCEDURE — 3078F DIAST BP <80 MM HG: CPT | Mod: CPTII,S$GLB,, | Performed by: NURSE PRACTITIONER

## 2022-08-23 PROCEDURE — 3074F PR MOST RECENT SYSTOLIC BLOOD PRESSURE < 130 MM HG: ICD-10-PCS | Mod: CPTII,S$GLB,, | Performed by: NURSE PRACTITIONER

## 2022-08-23 PROCEDURE — 1111F PR DISCHARGE MEDS RECONCILED W/ CURRENT OUTPATIENT MED LIST: ICD-10-PCS | Mod: CPTII,S$GLB,, | Performed by: NURSE PRACTITIONER

## 2022-08-23 PROCEDURE — 99214 PR OFFICE/OUTPT VISIT, EST, LEVL IV, 30-39 MIN: ICD-10-PCS | Mod: S$GLB,,, | Performed by: NURSE PRACTITIONER

## 2022-08-23 PROCEDURE — 1101F PT FALLS ASSESS-DOCD LE1/YR: CPT | Mod: CPTII,S$GLB,, | Performed by: NURSE PRACTITIONER

## 2022-08-23 PROCEDURE — 3074F SYST BP LT 130 MM HG: CPT | Mod: CPTII,S$GLB,, | Performed by: NURSE PRACTITIONER

## 2022-08-23 PROCEDURE — 3008F PR BODY MASS INDEX (BMI) DOCUMENTED: ICD-10-PCS | Mod: CPTII,S$GLB,, | Performed by: NURSE PRACTITIONER

## 2022-08-23 PROCEDURE — 3044F HG A1C LEVEL LT 7.0%: CPT | Mod: CPTII,S$GLB,, | Performed by: NURSE PRACTITIONER

## 2022-08-23 NOTE — PROGRESS NOTES
Transitional Care Note  Subjective:       Patient ID: Marcus Watkins is a 66 y.o. male.  Chief Complaint: No chief complaint on file.    Family and/or Caretaker present at visit?  Yes.  Diagnostic tests reviewed/disposition: No diagnosic tests pending after this hospitalization.  Disease/illness education: nutrition, hydration  Home health/community services discussion/referrals: [facility]   Establishment or re-establishment of referral orders for community resources: No other necessary community resources.   Discussion with other health care providers: No discussion with other health care providers necessary.       HPI    This is a 67 y/o male patient of Dr. 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 Pt reports something spilled on his foot at work, but due to his neuropathy he did not feel pain or any problems. He reports that when he got home he noticed his foot black.    D/C note:  presents with redness and warmth of the left foot. The patient noted his foot was feeling warmer in the middle of the night. He denies any chest pain, sob, palpitations, diarrhea, vomiting, dysuria, or nausea. Patient was last seen by a podiatrist on Tuesday who recommended a f/u MRI for a foot xray that was indicative of osteomyelitis. Patient denies history of alcohol, smoking, or drug use.         Procedure(s) (LRB):  AMPUTATION, FOOT, TRANSMETATARSAL (Left)       Hospital Course:   Patient admitted for osteomyelitis of L foot (previously with forefoot amputation), started on Vanc/CTX for empiric OM coverage as demonstrated on XR and MRI. Podiatry consulted, had TMA on 8/15, tolerated well. Clean bone margins and Bcx w/ NGTD. Wound cultures obtained 8/12 (+) streptococcus dysgalactiae and  prevotella. ID consulted for long term abx management: recommend continuing CTX 2g IV q24H and Flagyl 500mg Q8H x 2-6 weeks. Patient discharged w/ PICC for long term abx and home PT/OT arranged by CM> Discharged home in stable condition.   Pt reports that he hasn't heard back from HH, he needs his PICC line dressing changed and PICC monitored. Pt reports does have walker, commode  Referral to ID ordered prior to D/C  Plan to reorder HH         Review of Systems   Constitutional:  Positive for activity change. Negative for appetite change, chills, fatigue and fever.   HENT:  Negative for congestion, ear pain, mouth sores, postnasal drip, sinus pressure, sinus pain, sore throat and trouble swallowing.    Eyes:  Negative for redness and visual disturbance.   Respiratory:  Negative for cough, chest tightness and shortness of breath.    Cardiovascular:  Positive for leg swelling. Negative for chest pain and palpitations.   Gastrointestinal:  Negative for abdominal distention, abdominal pain, constipation, diarrhea, nausea and vomiting.   Endocrine: Negative for polyuria.   Genitourinary:  Negative for difficulty urinating, dysuria and flank pain.   Musculoskeletal:  Negative for arthralgias, back pain, gait problem, joint swelling and myalgias.   Skin:  Positive for wound. Negative for color change, pallor and rash.   Neurological:  Negative for dizziness, facial asymmetry, speech difficulty, weakness, light-headedness, numbness and headaches.   Psychiatric/Behavioral:  Negative for agitation and confusion. The patient is not nervous/anxious.          Objective:      Physical Exam  Vitals and nursing note reviewed.   Constitutional:       General: He is not in acute distress.     Appearance: Normal appearance. He is well-developed. He is not ill-appearing.   HENT:      Head: Normocephalic and atraumatic.      Right Ear: External ear normal.      Left Ear: External ear normal.   Eyes:      Conjunctiva/sclera: Conjunctivae  normal.      Pupils: Pupils are equal, round, and reactive to light.   Neck:      Thyroid: No thyromegaly.      Vascular: No JVD.      Trachea: No tracheal deviation.   Cardiovascular:      Rate and Rhythm: Normal rate and regular rhythm.      Heart sounds: Normal heart sounds.   Pulmonary:      Effort: Pulmonary effort is normal. No respiratory distress.      Breath sounds: Normal breath sounds.   Abdominal:      General: Bowel sounds are normal. There is no distension.      Palpations: Abdomen is soft.      Tenderness: There is no abdominal tenderness.   Musculoskeletal:         General: Normal range of motion.      Cervical back: Normal range of motion and neck supple.      Comments: left lower extremity with dressing C/D/I.   Lymphadenopathy:      Cervical: No cervical adenopathy.   Skin:     General: Skin is warm and dry.      Coloration: Skin is not pale.      Findings: No erythema or rash.   Neurological:      General: No focal deficit present.      Mental Status: He is alert and oriented to person, place, and time.   Psychiatric:         Mood and Affect: Mood normal.         Behavior: Behavior normal.         Thought Content: Thought content normal.         Judgment: Judgment normal.           Assessment:       No diagnosis found.    Plan:       Continue to monitor site  Will reorder HH for PICC  Keep current appt with specialist  Monitor Temp, urine output,   Make sure to stay hydrated with water  Protein intake    F/u for any concerns

## 2022-08-24 ENCOUNTER — TELEPHONE (OUTPATIENT)
Dept: INTERNAL MEDICINE | Facility: CLINIC | Age: 67
End: 2022-08-24
Payer: COMMERCIAL

## 2022-08-24 NOTE — TELEPHONE ENCOUNTER
Please call pt and let them know that [facility] says that they are out of network with pt's insurance. Can the pt call his insurance and find out who is covered so I can reorder HH.   thanks

## 2022-08-24 NOTE — TELEPHONE ENCOUNTER
Spoke to pt, he stated all this is going through workers comp insurance.He will contact them to see who they cover

## 2022-08-25 ENCOUNTER — INFUSION (OUTPATIENT)
Dept: INFECTIOUS DISEASES | Facility: HOSPITAL | Age: 67
End: 2022-08-25
Attending: INTERNAL MEDICINE
Payer: COMMERCIAL

## 2022-08-25 ENCOUNTER — OFFICE VISIT (OUTPATIENT)
Dept: INFECTIOUS DISEASES | Facility: CLINIC | Age: 67
End: 2022-08-25
Payer: COMMERCIAL

## 2022-08-25 VITALS
HEART RATE: 90 BPM | HEIGHT: 71 IN | TEMPERATURE: 98 F | SYSTOLIC BLOOD PRESSURE: 135 MMHG | DIASTOLIC BLOOD PRESSURE: 76 MMHG | WEIGHT: 255.31 LBS | BODY MASS INDEX: 35.74 KG/M2

## 2022-08-25 DIAGNOSIS — M86.172 ACUTE OSTEOMYELITIS OF METATARSAL BONE OF LEFT FOOT: Primary | ICD-10-CM

## 2022-08-25 DIAGNOSIS — M86.172 ACUTE OSTEOMYELITIS OF METATARSAL BONE OF LEFT FOOT: ICD-10-CM

## 2022-08-25 LAB
ALBUMIN SERPL BCP-MCNC: 3.5 G/DL (ref 3.5–5.2)
ALP SERPL-CCNC: 54 U/L (ref 55–135)
ALT SERPL W/O P-5'-P-CCNC: 18 U/L (ref 10–44)
ANION GAP SERPL CALC-SCNC: 9 MMOL/L (ref 8–16)
AST SERPL-CCNC: 20 U/L (ref 10–40)
BASOPHILS # BLD AUTO: 0.05 K/UL (ref 0–0.2)
BASOPHILS NFR BLD: 0.6 % (ref 0–1.9)
BILIRUB SERPL-MCNC: 0.2 MG/DL (ref 0.1–1)
BUN SERPL-MCNC: 9 MG/DL (ref 8–23)
CALCIUM SERPL-MCNC: 9.6 MG/DL (ref 8.7–10.5)
CHLORIDE SERPL-SCNC: 101 MMOL/L (ref 95–110)
CO2 SERPL-SCNC: 26 MMOL/L (ref 23–29)
CREAT SERPL-MCNC: 0.8 MG/DL (ref 0.5–1.4)
CRP SERPL-MCNC: 7.9 MG/L (ref 0–8.2)
DIFFERENTIAL METHOD: ABNORMAL
EOSINOPHIL # BLD AUTO: 0.2 K/UL (ref 0–0.5)
EOSINOPHIL NFR BLD: 2.2 % (ref 0–8)
ERYTHROCYTE [DISTWIDTH] IN BLOOD BY AUTOMATED COUNT: 13.2 % (ref 11.5–14.5)
EST. GFR  (NO RACE VARIABLE): >60 ML/MIN/1.73 M^2
FINAL PATHOLOGIC DIAGNOSIS: NORMAL
GLUCOSE SERPL-MCNC: 84 MG/DL (ref 70–110)
HCT VFR BLD AUTO: 40.8 % (ref 40–54)
HGB BLD-MCNC: 14 G/DL (ref 14–18)
IMM GRANULOCYTES # BLD AUTO: 0.03 K/UL (ref 0–0.04)
IMM GRANULOCYTES NFR BLD AUTO: 0.4 % (ref 0–0.5)
LYMPHOCYTES # BLD AUTO: 2.3 K/UL (ref 1–4.8)
LYMPHOCYTES NFR BLD: 28.1 % (ref 18–48)
Lab: NORMAL
MCH RBC QN AUTO: 30.6 PG (ref 27–31)
MCHC RBC AUTO-ENTMCNC: 34.3 G/DL (ref 32–36)
MCV RBC AUTO: 89 FL (ref 82–98)
MONOCYTES # BLD AUTO: 0.7 K/UL (ref 0.3–1)
MONOCYTES NFR BLD: 8.6 % (ref 4–15)
NEUTROPHILS # BLD AUTO: 5 K/UL (ref 1.8–7.7)
NEUTROPHILS NFR BLD: 60.1 % (ref 38–73)
NRBC BLD-RTO: 0 /100 WBC
PLATELET # BLD AUTO: 320 K/UL (ref 150–450)
PMV BLD AUTO: 10.6 FL (ref 9.2–12.9)
POTASSIUM SERPL-SCNC: 3.8 MMOL/L (ref 3.5–5.1)
PROT SERPL-MCNC: 7.4 G/DL (ref 6–8.4)
RBC # BLD AUTO: 4.58 M/UL (ref 4.6–6.2)
SODIUM SERPL-SCNC: 136 MMOL/L (ref 136–145)
WBC # BLD AUTO: 8.25 K/UL (ref 3.9–12.7)

## 2022-08-25 PROCEDURE — 99213 PR OFFICE/OUTPT VISIT, EST, LEVL III, 20-29 MIN: ICD-10-PCS | Mod: S$GLB,,, | Performed by: PHYSICIAN ASSISTANT

## 2022-08-25 PROCEDURE — 80053 COMPREHEN METABOLIC PANEL: CPT | Performed by: PHYSICIAN ASSISTANT

## 2022-08-25 PROCEDURE — 99999 PR PBB SHADOW E&M-EST. PATIENT-LVL IV: ICD-10-PCS | Mod: PBBFAC,,, | Performed by: PHYSICIAN ASSISTANT

## 2022-08-25 PROCEDURE — 99999 PR PBB SHADOW E&M-EST. PATIENT-LVL IV: CPT | Mod: PBBFAC,,, | Performed by: PHYSICIAN ASSISTANT

## 2022-08-25 PROCEDURE — 86140 C-REACTIVE PROTEIN: CPT | Performed by: PHYSICIAN ASSISTANT

## 2022-08-25 PROCEDURE — 99213 OFFICE O/P EST LOW 20 MIN: CPT | Mod: S$GLB,,, | Performed by: PHYSICIAN ASSISTANT

## 2022-08-25 PROCEDURE — 85025 COMPLETE CBC W/AUTO DIFF WBC: CPT | Performed by: PHYSICIAN ASSISTANT

## 2022-08-25 PROCEDURE — 36592 COLLECT BLOOD FROM PICC: CPT

## 2022-08-25 PROCEDURE — 36415 COLL VENOUS BLD VENIPUNCTURE: CPT | Performed by: PHYSICIAN ASSISTANT

## 2022-08-25 NOTE — PATIENT INSTRUCTIONS
PICC line removal today   Repeat CBC CMP CRP today via PICC line. Please remove picc line after bloodwork  Offload pressure of foot  Judicial leg elevation ankle>knee>hip  Follow up with podiatry as scheduled  Continue local wound care as recommended   Follow up as needed  Seek immediate  medical attention if with fevers >100.4, chills, night sweats, increase swelling redness or purulent drainage from surgical wounds

## 2022-08-25 NOTE — PROGRESS NOTES
Labs collected from Right upper arm PICC. C-reactive protein, CMP, and CBC collected and sent to lab. Right upper arm PICC line then removed as per order. Tip intact. Length confirmed. VAseline gauze dressing applied. Pt instructed to keep dressing on x24 hours. Spouse at bedside at the time of discharge instructions. Both verbalized understanding of instructions. Pt observed for 30 minutes post removal. Pt tolerated well. Pt left the unit in NAD with spouse at side.

## 2022-08-25 NOTE — PROGRESS NOTES
Subjective:       Patient ID: Marcus Watkins is a 66 y.o. male.    Chief Complaint: Follow-up    HPI     Mr. Watkins is a 66 year old male with a PMH of afib, L TMA 2020 presented to ED with cellulitis and wounds on L TMA site. MRI +osteomyelitsi of 4th and 1st metatarsal head. Pt is s/p left LMA with Dr. Randle on 8/15/22. Clean margin bone cultures NGTD. Path negative for osteomyelitis. Previous wound cultures obtained on 8/12/22 per bedside debridement + stretococcus dysgalactiae and prevotella. Blood cultures NGTD.     Pt has RUE PICC line. NAEON. Tolerating abx well. Saw podiatry this week. Sutures in tact. Some with swelling and bleeding. No foul odor or purulent drainage. No fever chills or night sweats       Review of Systems   Constitutional: Negative for activity change, appetite change, chills, diaphoresis, fatigue and fever.   Respiratory: Negative for cough and shortness of breath.    Gastrointestinal: Negative for abdominal pain, diarrhea, nausea and vomiting.   Genitourinary: Negative for dysuria.   Musculoskeletal: Negative for arthralgias, back pain and joint swelling.   Integumentary:  Negative for rash.   Neurological: Negative for dizziness and headaches.         Objective:      Physical Exam  Vitals and nursing note reviewed.   Constitutional:       General: He is not in acute distress.     Appearance: He is well-developed. He is not diaphoretic.   HENT:      Head: Normocephalic and atraumatic.   Eyes:      Pupils: Pupils are equal, round, and reactive to light.   Cardiovascular:      Rate and Rhythm: Normal rate and regular rhythm.      Heart sounds: Normal heart sounds. No murmur heard.    No friction rub. No gallop.   Pulmonary:      Effort: Pulmonary effort is normal. No respiratory distress.      Breath sounds: Normal breath sounds. No wheezing or rales.   Chest:      Chest wall: No tenderness.   Abdominal:      General: Bowel sounds are normal. There is no distension.      Palpations: There is  no mass.      Tenderness: There is no abdominal tenderness. There is no guarding.   Musculoskeletal:         General: Swelling present. No deformity. Normal range of motion.      Cervical back: Normal range of motion and neck supple.   Skin:     General: Skin is warm and dry.      Findings: No erythema or rash.   Neurological:      Mental Status: He is alert and oriented to person, place, and time.   Psychiatric:         Behavior: Behavior normal.         Thought Content: Thought content normal.         Judgment: Judgment normal.                      Assessment:       Problem List Items Addressed This Visit        ID    Acute osteomyelitis of metatarsal bone of left foot - Primary    Relevant Orders    Nursing communication    PICC line removal (Completed)    CBC Auto Differential    Comprehensive Metabolic Panel    C-Reactive Protein          Plan:         1. PICC line removal today   2. Repeat CBC CMP CRP today via PICC line. Please remove picc line after bloodwork  3. Offload pressure of foot  4. Judicial leg elevation ankle>knee>hip  5. Follow up with podiatry as scheduled  6. Continue local wound care as recommended   7. Follow up as needed  8. Seek immediate  medical attention if with fevers >100.4, chills, night sweats, increase swelling redness or purulent drainage from surgical wounds       >35 minutes of total time spent on the encounter, which includes face to face time and non-face to face time preparing to see the patient (eg, review of tests), Obtaining and/or reviewing separately obtained history, Documenting clinical information in the electronic or other health record, Independently interpreting results (not separately reported) and communicating results to the patient/family/caregiver, or Care coordination (not separately reported).

## 2022-08-26 PROCEDURE — G0180 PR HOME HEALTH MD CERTIFICATION: ICD-10-PCS | Mod: ,,, | Performed by: HOSPITALIST

## 2022-08-26 PROCEDURE — G0180 MD CERTIFICATION HHA PATIENT: HCPCS | Mod: ,,, | Performed by: HOSPITALIST

## 2022-08-29 ENCOUNTER — TELEPHONE (OUTPATIENT)
Dept: VASCULAR SURGERY | Facility: CLINIC | Age: 67
End: 2022-08-29
Payer: COMMERCIAL

## 2022-08-29 NOTE — TELEPHONE ENCOUNTER
----- Message from Audrey Booker MA sent at 8/26/2022  6:59 PM CDT -----  Regarding: Scheduling  Please assist pt. With scheduling apt. ASAP    Thank you!

## 2022-08-29 NOTE — TELEPHONE ENCOUNTER
Sent message to Audrey Booker we need more information to why the pt need to be seen. No referral in the system.

## 2022-08-30 ENCOUNTER — OFFICE VISIT (OUTPATIENT)
Dept: PODIATRY | Facility: CLINIC | Age: 67
End: 2022-08-30
Payer: COMMERCIAL

## 2022-08-30 VITALS
SYSTOLIC BLOOD PRESSURE: 131 MMHG | BODY MASS INDEX: 35.61 KG/M2 | HEART RATE: 99 BPM | WEIGHT: 255.31 LBS | DIASTOLIC BLOOD PRESSURE: 84 MMHG

## 2022-08-30 DIAGNOSIS — L97.522 ULCER OF LEFT FOOT WITH FAT LAYER EXPOSED: ICD-10-CM

## 2022-08-30 DIAGNOSIS — Z89.432 S/P TRANSMETATARSAL AMPUTATION OF FOOT, LEFT: Primary | ICD-10-CM

## 2022-08-30 PROCEDURE — 1159F PR MEDICATION LIST DOCUMENTED IN MEDICAL RECORD: ICD-10-PCS | Mod: CPTII,S$GLB,, | Performed by: PODIATRIST

## 2022-08-30 PROCEDURE — 99499 RISK ADDL DX/OHS AUDIT: ICD-10-PCS | Mod: S$GLB,,, | Performed by: PODIATRIST

## 2022-08-30 PROCEDURE — 3008F PR BODY MASS INDEX (BMI) DOCUMENTED: ICD-10-PCS | Mod: CPTII,S$GLB,, | Performed by: PODIATRIST

## 2022-08-30 PROCEDURE — 99024 POSTOP FOLLOW-UP VISIT: CPT | Mod: S$GLB,,, | Performed by: PODIATRIST

## 2022-08-30 PROCEDURE — 3044F HG A1C LEVEL LT 7.0%: CPT | Mod: CPTII,S$GLB,, | Performed by: PODIATRIST

## 2022-08-30 PROCEDURE — 3079F PR MOST RECENT DIASTOLIC BLOOD PRESSURE 80-89 MM HG: ICD-10-PCS | Mod: CPTII,S$GLB,, | Performed by: PODIATRIST

## 2022-08-30 PROCEDURE — 1126F AMNT PAIN NOTED NONE PRSNT: CPT | Mod: CPTII,S$GLB,, | Performed by: PODIATRIST

## 2022-08-30 PROCEDURE — 99499 UNLISTED E&M SERVICE: CPT | Mod: S$GLB,,, | Performed by: PODIATRIST

## 2022-08-30 PROCEDURE — 99024 PR POST-OP FOLLOW-UP VISIT: ICD-10-PCS | Mod: S$GLB,,, | Performed by: PODIATRIST

## 2022-08-30 PROCEDURE — 3079F DIAST BP 80-89 MM HG: CPT | Mod: CPTII,S$GLB,, | Performed by: PODIATRIST

## 2022-08-30 PROCEDURE — 99999 PR PBB SHADOW E&M-EST. PATIENT-LVL III: ICD-10-PCS | Mod: PBBFAC,,, | Performed by: PODIATRIST

## 2022-08-30 PROCEDURE — 3008F BODY MASS INDEX DOCD: CPT | Mod: CPTII,S$GLB,, | Performed by: PODIATRIST

## 2022-08-30 PROCEDURE — 99999 PR PBB SHADOW E&M-EST. PATIENT-LVL III: CPT | Mod: PBBFAC,,, | Performed by: PODIATRIST

## 2022-08-30 PROCEDURE — 1126F PR PAIN SEVERITY QUANTIFIED, NO PAIN PRESENT: ICD-10-PCS | Mod: CPTII,S$GLB,, | Performed by: PODIATRIST

## 2022-08-30 PROCEDURE — 3075F SYST BP GE 130 - 139MM HG: CPT | Mod: CPTII,S$GLB,, | Performed by: PODIATRIST

## 2022-08-30 PROCEDURE — 3044F PR MOST RECENT HEMOGLOBIN A1C LEVEL <7.0%: ICD-10-PCS | Mod: CPTII,S$GLB,, | Performed by: PODIATRIST

## 2022-08-30 PROCEDURE — 1159F MED LIST DOCD IN RCRD: CPT | Mod: CPTII,S$GLB,, | Performed by: PODIATRIST

## 2022-08-30 PROCEDURE — 3075F PR MOST RECENT SYSTOLIC BLOOD PRESS GE 130-139MM HG: ICD-10-PCS | Mod: CPTII,S$GLB,, | Performed by: PODIATRIST

## 2022-08-31 ENCOUNTER — TELEPHONE (OUTPATIENT)
Dept: INTERNAL MEDICINE | Facility: CLINIC | Age: 67
End: 2022-08-31
Payer: COMMERCIAL

## 2022-08-31 NOTE — TELEPHONE ENCOUNTER
HH states picc line was pulled out on 08/26/22 they are asking if any labs were needed for this patient, do you need them to draw anything on this patient?

## 2022-08-31 NOTE — TELEPHONE ENCOUNTER
----- Message from Melissa Atkins sent at 8/31/2022  2:22 PM CDT -----  Contact: [phone] Angela with [facility]  Angela is requesting clarification on  orders for lab draws. Angela was advised Dr Luke is no longer in primary care and the pt will est with a new PCP in October. Please call and advise.

## 2022-09-07 NOTE — PROGRESS NOTES
SUBJECTIVE: Patient returns to the clinic left foot status post revised TMA procedure   Presents with wife  Patient relates he is wearing the offloading shoe all the time except at night in bed.  Does not put it on to go the bathroom.  He is getting his IV antibiotics through PICC line not having any issues  No pain  Using a walker  Safety concerns on stairs patient did have physical therapy at the hospital did offer repeat; will consider      Patient has no complaints of fever chills or sweats.         PAST MEDICAL HISTORY: Unchanged    Physical examination: General: Pt. is in no acute distress, alert and oriented x 3.  Dressing clean dry and intact mild strike through    Podiatric examination: Vascular no signs of ischemia  Dermatologic: Surgical site is clean without any signs of infection.  Mild breakdown to the dorsal aspect of the suture line there is no acute gapping or dehiscence noted No significant erythema.                    IMPRESSION:  Status post TMA procedure / removal of osteomyelitis with satisfactory progress no signs of infection.    PLAN:    - Betadine applied with ABD cast padding Yelena and an Ace    Patient to wear surgical shoe offloading forefoot at all times also can consider switching to Cam boot if comfortable do not remove at night when sleeping place pillow case over boot or shoe

## 2022-09-12 ENCOUNTER — EXTERNAL HOME HEALTH (OUTPATIENT)
Dept: HOME HEALTH SERVICES | Facility: HOSPITAL | Age: 67
End: 2022-09-12
Payer: MEDICARE

## 2022-09-13 ENCOUNTER — TELEPHONE (OUTPATIENT)
Dept: INFECTIOUS DISEASES | Facility: CLINIC | Age: 67
End: 2022-09-13
Payer: COMMERCIAL

## 2022-09-13 ENCOUNTER — OFFICE VISIT (OUTPATIENT)
Dept: PODIATRY | Facility: CLINIC | Age: 67
End: 2022-09-13
Payer: COMMERCIAL

## 2022-09-13 VITALS
SYSTOLIC BLOOD PRESSURE: 122 MMHG | BODY MASS INDEX: 35.61 KG/M2 | WEIGHT: 255.31 LBS | DIASTOLIC BLOOD PRESSURE: 80 MMHG | HEART RATE: 75 BPM

## 2022-09-13 DIAGNOSIS — Z89.432 S/P TRANSMETATARSAL AMPUTATION OF FOOT, LEFT: Primary | ICD-10-CM

## 2022-09-13 PROCEDURE — 99999 PR PBB SHADOW E&M-EST. PATIENT-LVL III: CPT | Mod: PBBFAC,,, | Performed by: PODIATRIST

## 2022-09-13 PROCEDURE — 99024 POSTOP FOLLOW-UP VISIT: CPT | Mod: S$GLB,,, | Performed by: PODIATRIST

## 2022-09-13 PROCEDURE — 3008F BODY MASS INDEX DOCD: CPT | Mod: CPTII,S$GLB,, | Performed by: PODIATRIST

## 2022-09-13 PROCEDURE — 3079F DIAST BP 80-89 MM HG: CPT | Mod: CPTII,S$GLB,, | Performed by: PODIATRIST

## 2022-09-13 PROCEDURE — 3074F SYST BP LT 130 MM HG: CPT | Mod: CPTII,S$GLB,, | Performed by: PODIATRIST

## 2022-09-13 PROCEDURE — 1159F MED LIST DOCD IN RCRD: CPT | Mod: CPTII,S$GLB,, | Performed by: PODIATRIST

## 2022-09-13 PROCEDURE — 3044F PR MOST RECENT HEMOGLOBIN A1C LEVEL <7.0%: ICD-10-PCS | Mod: CPTII,S$GLB,, | Performed by: PODIATRIST

## 2022-09-13 PROCEDURE — 3008F PR BODY MASS INDEX (BMI) DOCUMENTED: ICD-10-PCS | Mod: CPTII,S$GLB,, | Performed by: PODIATRIST

## 2022-09-13 PROCEDURE — 99024 PR POST-OP FOLLOW-UP VISIT: ICD-10-PCS | Mod: S$GLB,,, | Performed by: PODIATRIST

## 2022-09-13 PROCEDURE — 1126F PR PAIN SEVERITY QUANTIFIED, NO PAIN PRESENT: ICD-10-PCS | Mod: CPTII,S$GLB,, | Performed by: PODIATRIST

## 2022-09-13 PROCEDURE — 3079F PR MOST RECENT DIASTOLIC BLOOD PRESSURE 80-89 MM HG: ICD-10-PCS | Mod: CPTII,S$GLB,, | Performed by: PODIATRIST

## 2022-09-13 PROCEDURE — 1111F DSCHRG MED/CURRENT MED MERGE: CPT | Mod: CPTII,S$GLB,, | Performed by: PODIATRIST

## 2022-09-13 PROCEDURE — 1159F PR MEDICATION LIST DOCUMENTED IN MEDICAL RECORD: ICD-10-PCS | Mod: CPTII,S$GLB,, | Performed by: PODIATRIST

## 2022-09-13 PROCEDURE — 3074F PR MOST RECENT SYSTOLIC BLOOD PRESSURE < 130 MM HG: ICD-10-PCS | Mod: CPTII,S$GLB,, | Performed by: PODIATRIST

## 2022-09-13 PROCEDURE — 1111F PR DISCHARGE MEDS RECONCILED W/ CURRENT OUTPATIENT MED LIST: ICD-10-PCS | Mod: CPTII,S$GLB,, | Performed by: PODIATRIST

## 2022-09-13 PROCEDURE — 99999 PR PBB SHADOW E&M-EST. PATIENT-LVL III: ICD-10-PCS | Mod: PBBFAC,,, | Performed by: PODIATRIST

## 2022-09-13 PROCEDURE — 3044F HG A1C LEVEL LT 7.0%: CPT | Mod: CPTII,S$GLB,, | Performed by: PODIATRIST

## 2022-09-13 PROCEDURE — 1126F AMNT PAIN NOTED NONE PRSNT: CPT | Mod: CPTII,S$GLB,, | Performed by: PODIATRIST

## 2022-09-13 NOTE — TELEPHONE ENCOUNTER
Spoke with pt and informed him I am waiting on the IR dept for scheduling. Also informed him that I sent a dwain priority message to the dept. Pt verbally acknowledged and accepted.

## 2022-09-13 NOTE — TELEPHONE ENCOUNTER
----- Message from Elodia Castañeda sent at 9/13/2022 10:46 AM CDT -----  Regarding: Wound Care  Contact: pt @[phone]  Pt have appt with Dr Randle on 9/26 for wound care.  inform pt to call office to ask if she would like to come to appt to see wound since it would be unwrapped. Asking for a call back

## 2022-09-14 NOTE — PROGRESS NOTES
"SUBJECTIVE: Patient returns to the clinic left foot status post revised TMA procedure   Presents with wife  Does not put it on to go the bathroom.  He is getting his IV antibiotics through PICC line not having any issues  No pain  Using a walker  Safety concerns on stairs patient did have physical therapy at the hospital did offer repeat; will consider      Patient has no complaints of fever chills or sweats.    Home health nurse is present via phone today.     PAST MEDICAL HISTORY: Unchanged    Physical examination: General: Pt. is in no acute distress, alert and oriented x 3.  Dressing clean dry and intact mild strike through    Podiatric examination: Vascular no signs of ischemia  Dermatologic: Surgical site is clean without any signs of infection.  Mild breakdown to the dorsal aspect of the suture line there is no acute gapping or dehiscence noted No significant erythema.                  Central aspect of wound is noted to be open dorsally with adhered eschar which was removed today.  Underlying wound measures 3 cm x 2 cm x 0.1 cm.  Postoperative debridement measurements consist of 3 cm x 2 cm x 0.2 cm.  IMPRESSION:  Status post TMA procedure / removal of osteomyelitis with satisfactory progress no signs of infection.    PLAN:    No data recorded    Wound Debridement    Performed by: Santos Randle DPM   Authorized by: Patient    Time out: Immediately prior to procedure a "time out" was called to verify the correct patient, procedure, equipment, support staff and site/side marked as required.   Consent Done?:  Yes (Verbal)  Local anesthesia used?: No       Wound Details:    Location:  Left foot     Type of Debridement:  Excisional       Length (cm):  3 cm       Width (cm):    2 cm       Depth (cm):   0.1 cm       Percent Debrided (%):  100             Depth of debridement:  Subcutaneous tissue    Tissue debrided:  Dermis, Epidermis and Subcutaneous    Devitalized tissue debrided:  Biofilm, Callus and " Necrotic/Eschar    Instruments:  Blade, Curette and Nippers     Bleeding:  Minimal  Hemostasis Achieved: Yes    Method Used:  Pressure  Patient tolerance:  Patient tolerated the procedure well with no immediate complications      Patient to wear surgical shoe offloading forefoot at all times also can consider switching to Cam boot if comfortable do not remove at night when sleeping place pillow case over boot or shoe  Home health updated.

## 2022-09-16 ENCOUNTER — DOCUMENT SCAN (OUTPATIENT)
Dept: HOME HEALTH SERVICES | Facility: HOSPITAL | Age: 67
End: 2022-09-16
Payer: COMMERCIAL

## 2022-09-19 LAB — FUNGUS SPEC CULT: NORMAL

## 2022-09-26 ENCOUNTER — TELEPHONE (OUTPATIENT)
Dept: PODIATRY | Facility: CLINIC | Age: 67
End: 2022-09-26
Payer: COMMERCIAL

## 2022-09-26 ENCOUNTER — OFFICE VISIT (OUTPATIENT)
Dept: PODIATRY | Facility: CLINIC | Age: 67
End: 2022-09-26
Payer: OTHER MISCELLANEOUS

## 2022-09-26 ENCOUNTER — OFFICE VISIT (OUTPATIENT)
Dept: INFECTIOUS DISEASES | Facility: CLINIC | Age: 67
End: 2022-09-26
Payer: COMMERCIAL

## 2022-09-26 VITALS
HEIGHT: 71 IN | HEART RATE: 75 BPM | DIASTOLIC BLOOD PRESSURE: 79 MMHG | BODY MASS INDEX: 36.21 KG/M2 | TEMPERATURE: 98 F | WEIGHT: 258.63 LBS | SYSTOLIC BLOOD PRESSURE: 127 MMHG

## 2022-09-26 VITALS — SYSTOLIC BLOOD PRESSURE: 132 MMHG | HEART RATE: 73 BPM | DIASTOLIC BLOOD PRESSURE: 85 MMHG

## 2022-09-26 DIAGNOSIS — Z89.432 S/P TRANSMETATARSAL AMPUTATION OF FOOT, LEFT: Primary | ICD-10-CM

## 2022-09-26 DIAGNOSIS — L97.522 ULCER OF LEFT FOOT WITH FAT LAYER EXPOSED: ICD-10-CM

## 2022-09-26 PROCEDURE — 3288F FALL RISK ASSESSMENT DOCD: CPT | Mod: CPTII,S$GLB,, | Performed by: PHYSICIAN ASSISTANT

## 2022-09-26 PROCEDURE — 1159F MED LIST DOCD IN RCRD: CPT | Mod: CPTII,S$GLB,, | Performed by: PHYSICIAN ASSISTANT

## 2022-09-26 PROCEDURE — 99999 PR PBB SHADOW E&M-EST. PATIENT-LVL III: ICD-10-PCS | Mod: PBBFAC,,, | Performed by: PHYSICIAN ASSISTANT

## 2022-09-26 PROCEDURE — 3288F PR FALLS RISK ASSESSMENT DOCUMENTED: ICD-10-PCS | Mod: CPTII,S$GLB,, | Performed by: PHYSICIAN ASSISTANT

## 2022-09-26 PROCEDURE — 1101F PR PT FALLS ASSESS DOC 0-1 FALLS W/OUT INJ PAST YR: ICD-10-PCS | Mod: CPTII,S$GLB,, | Performed by: PHYSICIAN ASSISTANT

## 2022-09-26 PROCEDURE — 99024 POSTOP FOLLOW-UP VISIT: CPT | Mod: S$GLB,,, | Performed by: PODIATRIST

## 2022-09-26 PROCEDURE — 99999 PR PBB SHADOW E&M-EST. PATIENT-LVL II: CPT | Mod: PBBFAC,,, | Performed by: PODIATRIST

## 2022-09-26 PROCEDURE — 3074F SYST BP LT 130 MM HG: CPT | Mod: CPTII,S$GLB,, | Performed by: PHYSICIAN ASSISTANT

## 2022-09-26 PROCEDURE — 99024 PR POST-OP FOLLOW-UP VISIT: ICD-10-PCS | Mod: S$GLB,,, | Performed by: PODIATRIST

## 2022-09-26 PROCEDURE — 3044F PR MOST RECENT HEMOGLOBIN A1C LEVEL <7.0%: ICD-10-PCS | Mod: CPTII,S$GLB,, | Performed by: PHYSICIAN ASSISTANT

## 2022-09-26 PROCEDURE — 3078F PR MOST RECENT DIASTOLIC BLOOD PRESSURE < 80 MM HG: ICD-10-PCS | Mod: CPTII,S$GLB,, | Performed by: PHYSICIAN ASSISTANT

## 2022-09-26 PROCEDURE — 99999 PR PBB SHADOW E&M-EST. PATIENT-LVL III: CPT | Mod: PBBFAC,,, | Performed by: PHYSICIAN ASSISTANT

## 2022-09-26 PROCEDURE — 3074F PR MOST RECENT SYSTOLIC BLOOD PRESSURE < 130 MM HG: ICD-10-PCS | Mod: CPTII,S$GLB,, | Performed by: PHYSICIAN ASSISTANT

## 2022-09-26 PROCEDURE — 99213 OFFICE O/P EST LOW 20 MIN: CPT | Mod: S$GLB,,, | Performed by: PHYSICIAN ASSISTANT

## 2022-09-26 PROCEDURE — 1126F AMNT PAIN NOTED NONE PRSNT: CPT | Mod: CPTII,S$GLB,, | Performed by: PHYSICIAN ASSISTANT

## 2022-09-26 PROCEDURE — 99999 PR PBB SHADOW E&M-EST. PATIENT-LVL II: ICD-10-PCS | Mod: PBBFAC,,, | Performed by: PODIATRIST

## 2022-09-26 PROCEDURE — 99213 PR OFFICE/OUTPT VISIT, EST, LEVL III, 20-29 MIN: ICD-10-PCS | Mod: S$GLB,,, | Performed by: PHYSICIAN ASSISTANT

## 2022-09-26 PROCEDURE — 3078F DIAST BP <80 MM HG: CPT | Mod: CPTII,S$GLB,, | Performed by: PHYSICIAN ASSISTANT

## 2022-09-26 PROCEDURE — 3044F HG A1C LEVEL LT 7.0%: CPT | Mod: CPTII,S$GLB,, | Performed by: PHYSICIAN ASSISTANT

## 2022-09-26 PROCEDURE — 1159F PR MEDICATION LIST DOCUMENTED IN MEDICAL RECORD: ICD-10-PCS | Mod: CPTII,S$GLB,, | Performed by: PHYSICIAN ASSISTANT

## 2022-09-26 PROCEDURE — 3008F PR BODY MASS INDEX (BMI) DOCUMENTED: ICD-10-PCS | Mod: CPTII,S$GLB,, | Performed by: PHYSICIAN ASSISTANT

## 2022-09-26 PROCEDURE — 1101F PT FALLS ASSESS-DOCD LE1/YR: CPT | Mod: CPTII,S$GLB,, | Performed by: PHYSICIAN ASSISTANT

## 2022-09-26 PROCEDURE — 1126F PR PAIN SEVERITY QUANTIFIED, NO PAIN PRESENT: ICD-10-PCS | Mod: CPTII,S$GLB,, | Performed by: PHYSICIAN ASSISTANT

## 2022-09-26 PROCEDURE — 3008F BODY MASS INDEX DOCD: CPT | Mod: CPTII,S$GLB,, | Performed by: PHYSICIAN ASSISTANT

## 2022-09-26 NOTE — PROGRESS NOTES
Subjective:       Patient ID: Marcus Watkins is a 67 y.o. male.    Chief Complaint: Follow-up    HPI     Mr. Watkins is a 66 year old male with a PMH of afib, L TMA 2020 presented to ED with cellulitis and wounds on L TMA site. MRI +osteomyelitsi of 4th and 1st metatarsal head. Pt is s/p left LMA with Dr. Randle on 8/15/22. Clean margin bone cultures NGTD. Path negative for osteomyelitis. Previous wound cultures obtained on 8/12/22 per bedside debridement + stretococcus dysgalactiae and prevotella. Blood cultures NGTD.     Tolerating abx well. Saw podiatry this week. Sutures in tact. Some with swelling and bleeding. No foul odor or purulent drainage. No fever chills or night sweats     9/26/22 pt is seen today for follow up. Pt has been off of abx since last seen in ID one month ago. Pt is followed by podiaty closely. Saw podiatry this AM. Sutures removed. Well healed. Has some eschar that was removed this AM. Healing very well. No fever chills or night sweats    Review of Systems   Constitutional:  Negative for activity change, appetite change, chills, diaphoresis, fatigue and fever.   Respiratory:  Negative for cough and shortness of breath.    Gastrointestinal:  Negative for abdominal pain, diarrhea, nausea and vomiting.   Genitourinary:  Negative for dysuria.   Musculoskeletal:  Negative for arthralgias, back pain and joint swelling.   Integumentary:  Negative for rash.   Neurological:  Negative for dizziness and headaches.       Objective:      Physical Exam  Vitals and nursing note reviewed.   Constitutional:       General: He is not in acute distress.     Appearance: He is well-developed. He is not diaphoretic.   HENT:      Head: Normocephalic and atraumatic.   Eyes:      Pupils: Pupils are equal, round, and reactive to light.   Cardiovascular:      Rate and Rhythm: Normal rate and regular rhythm.      Heart sounds: Normal heart sounds. No murmur heard.    No friction rub. No gallop.   Pulmonary:      Effort:  Pulmonary effort is normal. No respiratory distress.      Breath sounds: Normal breath sounds. No wheezing or rales.   Chest:      Chest wall: No tenderness.   Abdominal:      General: Bowel sounds are normal. There is no distension.      Palpations: There is no mass.      Tenderness: There is no abdominal tenderness. There is no guarding.   Musculoskeletal:         General: Swelling present. No deformity. Normal range of motion.      Cervical back: Normal range of motion and neck supple.      Comments: Left tma well healed   Skin:     General: Skin is warm and dry.      Findings: No erythema or rash.   Neurological:      Mental Status: He is alert and oriented to person, place, and time.   Psychiatric:         Behavior: Behavior normal.         Thought Content: Thought content normal.         Judgment: Judgment normal.              Assessment:       Problem List Items Addressed This Visit    None  Visit Diagnoses       S/P transmetatarsal amputation of foot, left    -  Primary            Plan:         Continue to monitor off of abx  No further abx at this time  F/u with podiatry as scheduled  Continue leg elevation ankle>knee>hip      >35 minutes of total time spent on the encounter, which includes face to face time and non-face to face time preparing to see the patient (eg, review of tests), Obtaining and/or reviewing separately obtained history, Documenting clinical information in the electronic or other health record, Independently interpreting results (not separately reported) and communicating results to the patient/family/caregiver, or Care coordination (not separately reported).

## 2022-09-26 NOTE — TELEPHONE ENCOUNTER
Left message for Nurse Bridgett [phone] ro call the office I need H/H Agency so I can route the orders

## 2022-10-03 ENCOUNTER — TELEPHONE (OUTPATIENT)
Dept: PODIATRY | Facility: CLINIC | Age: 67
End: 2022-10-03
Payer: COMMERCIAL

## 2022-10-03 LAB
ACID FAST MOD KINY STN SPEC: NORMAL
MYCOBACTERIUM SPEC QL CULT: NORMAL

## 2022-10-03 NOTE — TELEPHONE ENCOUNTER
----- Message from Chelsea Vuong sent at 10/3/2022 10:43 AM CDT -----  Contact: [phone] Sebastien  I have Sebastien calling from home health asking for all clinical notes from every appt after his surgery.  And any orders that  would like them to follow.    [phone] Sebastien  Fax# [phone]

## 2022-10-07 NOTE — PROGRESS NOTES
"SUBJECTIVE: Patient returns to the clinic left foot status post revised TMA procedure   Presents with wife. He is getting his IV antibiotics through PICC line not having any issues  No pain  Using a walker  Safety concerns on stairs patient did have physical therapy at the hospital did offer repeat; will consider      Patient has no complaints of fever chills or sweats.    Home health nurse is present via phone today.     PAST MEDICAL HISTORY: Unchanged    Physical examination: General: Pt. is in no acute distress, alert and oriented x 3.  Dressing clean dry and intact mild strike through    Podiatric examination: Vascular no signs of ischemia  Dermatologic: Surgical site is clean without any signs of infection.  Mild breakdown to the dorsal aspect of the suture line there is no acute gapping or dehiscence noted No significant erythema.      Central aspect of wound is noted to be open dorsally with adhered eschar which was removed today.  Underlying wound measures 2.6 cm x 2 cm x 0.1 cm.  Postoperative debridement measurements consist of 2.8 cm x 2 cm x 0.2 cm.  IMPRESSION:  Status post TMA procedure / removal of osteomyelitis with satisfactory progress no signs of infection.    PLAN:    No data recorded    Wound Debridement    Performed by: Santos Randle DPM   Authorized by: Patient    Time out: Immediately prior to procedure a "time out" was called to verify the correct patient, procedure, equipment, support staff and site/side marked as required.   Consent Done?:  Yes (Verbal)  Local anesthesia used?: No       Wound Details:    Location:  Left foot     Type of Debridement:  Excisional       Length (cm):  2.8 cm       Width (cm):    2 cm       Depth (cm):   0.2 cm       Percent Debrided (%):  100             Depth of debridement:  Subcutaneous tissue    Tissue debrided:  Dermis, Epidermis and Subcutaneous    Devitalized tissue debrided:  Biofilm, Callus and Necrotic/Eschar    Instruments:  Blade, Curette and " Nippers     Bleeding:  Minimal  Hemostasis Achieved: Yes    Method Used:  Pressure  Patient tolerance:  Patient tolerated the procedure well with no immediate complications    The nature of the condition, options for management, as well as potential risks and complications were discussed in detail with patient. Patient was amenable to my recommendations and left my office fully informed and will follow up as instructed or sooner if necessary.      Home health updated.

## 2022-10-11 ENCOUNTER — OFFICE VISIT (OUTPATIENT)
Dept: PODIATRY | Facility: CLINIC | Age: 67
End: 2022-10-11
Payer: COMMERCIAL

## 2022-10-11 VITALS
SYSTOLIC BLOOD PRESSURE: 127 MMHG | HEART RATE: 80 BPM | BODY MASS INDEX: 36.07 KG/M2 | WEIGHT: 258.63 LBS | DIASTOLIC BLOOD PRESSURE: 84 MMHG

## 2022-10-11 DIAGNOSIS — L97.522 ULCER OF LEFT FOOT WITH FAT LAYER EXPOSED: ICD-10-CM

## 2022-10-11 DIAGNOSIS — Z89.432 S/P TRANSMETATARSAL AMPUTATION OF FOOT, LEFT: Primary | ICD-10-CM

## 2022-10-11 PROCEDURE — 1159F PR MEDICATION LIST DOCUMENTED IN MEDICAL RECORD: ICD-10-PCS | Mod: CPTII,S$GLB,, | Performed by: PODIATRIST

## 2022-10-11 PROCEDURE — 3074F SYST BP LT 130 MM HG: CPT | Mod: CPTII,S$GLB,, | Performed by: PODIATRIST

## 2022-10-11 PROCEDURE — 99024 PR POST-OP FOLLOW-UP VISIT: ICD-10-PCS | Mod: S$GLB,,, | Performed by: PODIATRIST

## 2022-10-11 PROCEDURE — 99999 PR PBB SHADOW E&M-EST. PATIENT-LVL III: CPT | Mod: PBBFAC,,, | Performed by: PODIATRIST

## 2022-10-11 PROCEDURE — 1159F MED LIST DOCD IN RCRD: CPT | Mod: CPTII,S$GLB,, | Performed by: PODIATRIST

## 2022-10-11 PROCEDURE — 1126F AMNT PAIN NOTED NONE PRSNT: CPT | Mod: CPTII,S$GLB,, | Performed by: PODIATRIST

## 2022-10-11 PROCEDURE — 99999 PR PBB SHADOW E&M-EST. PATIENT-LVL III: ICD-10-PCS | Mod: PBBFAC,,, | Performed by: PODIATRIST

## 2022-10-11 PROCEDURE — 1160F RVW MEDS BY RX/DR IN RCRD: CPT | Mod: CPTII,S$GLB,, | Performed by: PODIATRIST

## 2022-10-11 PROCEDURE — 1160F PR REVIEW ALL MEDS BY PRESCRIBER/CLIN PHARMACIST DOCUMENTED: ICD-10-PCS | Mod: CPTII,S$GLB,, | Performed by: PODIATRIST

## 2022-10-11 PROCEDURE — 3074F PR MOST RECENT SYSTOLIC BLOOD PRESSURE < 130 MM HG: ICD-10-PCS | Mod: CPTII,S$GLB,, | Performed by: PODIATRIST

## 2022-10-11 PROCEDURE — 3079F DIAST BP 80-89 MM HG: CPT | Mod: CPTII,S$GLB,, | Performed by: PODIATRIST

## 2022-10-11 PROCEDURE — 3044F HG A1C LEVEL LT 7.0%: CPT | Mod: CPTII,S$GLB,, | Performed by: PODIATRIST

## 2022-10-11 PROCEDURE — 99024 POSTOP FOLLOW-UP VISIT: CPT | Mod: S$GLB,,, | Performed by: PODIATRIST

## 2022-10-11 PROCEDURE — 1126F PR PAIN SEVERITY QUANTIFIED, NO PAIN PRESENT: ICD-10-PCS | Mod: CPTII,S$GLB,, | Performed by: PODIATRIST

## 2022-10-11 PROCEDURE — 3079F PR MOST RECENT DIASTOLIC BLOOD PRESSURE 80-89 MM HG: ICD-10-PCS | Mod: CPTII,S$GLB,, | Performed by: PODIATRIST

## 2022-10-11 PROCEDURE — 3044F PR MOST RECENT HEMOGLOBIN A1C LEVEL <7.0%: ICD-10-PCS | Mod: CPTII,S$GLB,, | Performed by: PODIATRIST

## 2022-10-11 RX ORDER — PREGABALIN 50 MG/1
50 CAPSULE ORAL
COMMUNITY
Start: 2022-04-22 | End: 2022-12-08

## 2022-10-11 RX ORDER — SODIUM, POTASSIUM,MAG SULFATES 17.5-3.13G
SOLUTION, RECONSTITUTED, ORAL ORAL
COMMUNITY
Start: 2022-05-20 | End: 2022-12-08

## 2022-10-12 ENCOUNTER — TELEPHONE (OUTPATIENT)
Dept: PODIATRY | Facility: CLINIC | Age: 67
End: 2022-10-12
Payer: COMMERCIAL

## 2022-10-12 NOTE — TELEPHONE ENCOUNTER
----- Message from Elyse Messina sent at 10/12/2022  2:26 PM CDT -----  Regarding: pt advice  Contact: Christel@ [phone]  Rec'd call from Christel ([facility]) calling to speak with someone in Dr. Randle's office regarding a fax they rec'd that is needing to be corrected. Please call.

## 2022-10-12 NOTE — TELEPHONE ENCOUNTER
----- Message from Sudha Pacheco sent at 10/12/2022 12:24 PM CDT -----  Regarding: Farhan MOORE regard [facility] orders and a cream  Contact: Farhan  [phone]  Farhan MOORE on Marcus Cancino MRN [id number] regarding his wound are and a cream  Please call to advise nurse Cheli  [phone]

## 2022-10-22 NOTE — PROGRESS NOTES
"SUBJECTIVE: Patient returns to the clinic left foot status post revised TMA procedure   Presents with wife.     Patient has no complaints of fever chills or sweats.    Home health nurse is present via phone today.     PAST MEDICAL HISTORY: Unchanged    Physical examination: General: Pt. is in no acute distress, alert and oriented x 3.  Dressing clean dry and intact mild strike through    Podiatric examination: Vascular no signs of ischemia  Dermatologic: Surgical site is clean without any signs of infection.  Mild breakdown to the dorsal aspect of the suture line there is no acute gapping or dehiscence noted No significant erythema.      Central aspect of wound is noted to be open dorsally with adhered eschar which was removed today.  Underlying wound measures 2.6 cm x 2 cm x 0.1 cm.  Postoperative debridement measurements consist of 2.8 cm x 2 cm x 0.2 cm.  IMPRESSION:  Status post TMA procedure / removal of osteomyelitis with satisfactory progress no signs of infection.    PLAN:    No data recorded    Wound Debridement    Performed by: Santos Randle DPM   Authorized by: Patient    Time out: Immediately prior to procedure a "time out" was called to verify the correct patient, procedure, equipment, support staff and site/side marked as required.   Consent Done?:  Yes (Verbal)  Local anesthesia used?: No       Wound Details:    Location:  Left foot     Type of Debridement:  Excisional       Length (cm):  2.0 cm       Width (cm):    2.0 cm       Depth (cm):   0.2 cm       Percent Debrided (%):  100             Depth of debridement:  Subcutaneous tissue    Tissue debrided:  Dermis, Epidermis and Subcutaneous    Devitalized tissue debrided:  Biofilm, Callus and Necrotic/Eschar    Instruments:  Blade, Curette and Nippers     Bleeding:  Minimal  Hemostasis Achieved: Yes    Method Used:  Pressure  Patient tolerance:  Patient tolerated the procedure well with no immediate complications    The nature of the condition, " options for management, as well as potential risks and complications were discussed in detail with patient. Patient was amenable to my recommendations and left my office fully informed and will follow up as instructed or sooner if necessary.      Home health updated.

## 2022-10-25 PROCEDURE — G0179 PR HOME HEALTH MD RECERTIFICATION: ICD-10-PCS | Mod: ,,, | Performed by: HOSPITALIST

## 2022-10-25 PROCEDURE — G0179 MD RECERTIFICATION HHA PT: HCPCS | Mod: ,,, | Performed by: HOSPITALIST

## 2022-10-26 ENCOUNTER — OFFICE VISIT (OUTPATIENT)
Dept: PODIATRY | Facility: CLINIC | Age: 67
End: 2022-10-26
Payer: OTHER MISCELLANEOUS

## 2022-10-26 VITALS
BODY MASS INDEX: 36.07 KG/M2 | SYSTOLIC BLOOD PRESSURE: 130 MMHG | HEART RATE: 79 BPM | WEIGHT: 258.63 LBS | DIASTOLIC BLOOD PRESSURE: 84 MMHG

## 2022-10-26 DIAGNOSIS — Z89.432 S/P TRANSMETATARSAL AMPUTATION OF FOOT, LEFT: Primary | ICD-10-CM

## 2022-10-26 DIAGNOSIS — L97.522 ULCER OF LEFT FOOT WITH FAT LAYER EXPOSED: ICD-10-CM

## 2022-10-26 PROCEDURE — 99999 PR PBB SHADOW E&M-EST. PATIENT-LVL III: ICD-10-PCS | Mod: PBBFAC,,, | Performed by: PODIATRIST

## 2022-10-26 PROCEDURE — 99024 PR POST-OP FOLLOW-UP VISIT: ICD-10-PCS | Mod: S$GLB,,, | Performed by: PODIATRIST

## 2022-10-26 PROCEDURE — 99999 PR PBB SHADOW E&M-EST. PATIENT-LVL III: CPT | Mod: PBBFAC,,, | Performed by: PODIATRIST

## 2022-10-26 PROCEDURE — 99024 POSTOP FOLLOW-UP VISIT: CPT | Mod: S$GLB,,, | Performed by: PODIATRIST

## 2022-10-27 ENCOUNTER — TELEPHONE (OUTPATIENT)
Dept: PODIATRY | Facility: CLINIC | Age: 67
End: 2022-10-27
Payer: COMMERCIAL

## 2022-10-27 NOTE — TELEPHONE ENCOUNTER
----- Message from Elodia Castañeda sent at 10/27/2022  9:54 AM CDT -----  Regarding: Wound Care Orders  Contact: Sebastien @ [phone]  Osvaldo from [facility] is calling to get orders for wound care. Orders need to be faxed to [phone]

## 2022-11-04 NOTE — ASSESSMENT & PLAN NOTE
2nd call into patient. Letter mailed to her address to schedule her appointments.   Takes eliquis 5mg daily at home  - Hold eliquis, will resume when OK per podiatry  - Mg > 2, K > 4; replete PRN  - Cardiac telemetry

## 2022-11-05 NOTE — PROGRESS NOTES
"SUBJECTIVE: Patient returns to the clinic left foot status post revised TMA procedure   Presents with wife.     Patient has no complaints of fever chills or sweats.    Home health nurse is present via phone today.     PAST MEDICAL HISTORY: Unchanged    Physical examination: General: Pt. is in no acute distress, alert and oriented x 3.  Dressing clean dry and intact mild strike through    Podiatric examination: Vascular no signs of ischemia  Dermatologic: Surgical site is clean without any signs of infection.  Mild breakdown to the dorsal aspect of the suture line there is no acute gapping or dehiscence noted No significant erythema.      Central aspect of wound is noted to be open dorsally with adhered eschar which was removed today.  Underlying wound measures 1.0 cm x 1 cm x 0.1 cm.  Postoperative debridement measurements consist of 1.5 cm x 1 cm x 0.2 cm.  IMPRESSION:  Status post TMA procedure / removal of osteomyelitis with satisfactory progress no signs of infection.    PLAN:    No data recorded    Wound Debridement    Performed by: Santos Randle DPM   Authorized by: Patient    Time out: Immediately prior to procedure a "time out" was called to verify the correct patient, procedure, equipment, support staff and site/side marked as required.   Consent Done?:  Yes (Verbal)  Local anesthesia used?: No       Wound Details:    Location:  Left foot     Type of Debridement:  Excisional       Length (cm):  1.5 cm       Width (cm):    1.0 cm       Depth (cm):   0.2 cm       Percent Debrided (%):  100             Depth of debridement:  Subcutaneous tissue    Tissue debrided:  Dermis, Epidermis and Subcutaneous    Devitalized tissue debrided:  Biofilm, Callus and Necrotic/Eschar    Instruments:  Blade, Curette and Nippers     Bleeding:  Minimal  Hemostasis Achieved: Yes    Method Used:  Pressure  Patient tolerance:  Patient tolerated the procedure well with no immediate complications    The nature of the condition, " options for management, as well as potential risks and complications were discussed in detail with patient. Patient was amenable to my recommendations and left my office fully informed and will follow up as instructed or sooner if necessary.      Home health updated.

## 2022-11-08 ENCOUNTER — EXTERNAL HOME HEALTH (OUTPATIENT)
Dept: HOME HEALTH SERVICES | Facility: HOSPITAL | Age: 67
End: 2022-11-08
Payer: COMMERCIAL

## 2022-11-09 ENCOUNTER — OFFICE VISIT (OUTPATIENT)
Dept: PODIATRY | Facility: CLINIC | Age: 67
End: 2022-11-09
Payer: COMMERCIAL

## 2022-11-09 ENCOUNTER — TELEPHONE (OUTPATIENT)
Dept: PODIATRY | Facility: CLINIC | Age: 67
End: 2022-11-09

## 2022-11-09 VITALS
WEIGHT: 258.63 LBS | HEIGHT: 71 IN | BODY MASS INDEX: 36.21 KG/M2 | SYSTOLIC BLOOD PRESSURE: 115 MMHG | DIASTOLIC BLOOD PRESSURE: 75 MMHG | HEART RATE: 79 BPM

## 2022-11-09 DIAGNOSIS — R60.0 EDEMA OF LEFT FOOT: ICD-10-CM

## 2022-11-09 DIAGNOSIS — L97.522 ULCER OF LEFT FOOT WITH FAT LAYER EXPOSED: ICD-10-CM

## 2022-11-09 DIAGNOSIS — Z89.432 S/P TRANSMETATARSAL AMPUTATION OF FOOT, LEFT: Primary | ICD-10-CM

## 2022-11-09 PROCEDURE — 29580 STRAPPING UNNA BOOT: CPT | Mod: LT,S$GLB,, | Performed by: PODIATRIST

## 2022-11-09 PROCEDURE — 99024 POSTOP FOLLOW-UP VISIT: CPT | Mod: S$GLB,,, | Performed by: PODIATRIST

## 2022-11-09 PROCEDURE — 3078F DIAST BP <80 MM HG: CPT | Mod: CPTII,S$GLB,, | Performed by: PODIATRIST

## 2022-11-09 PROCEDURE — 1126F AMNT PAIN NOTED NONE PRSNT: CPT | Mod: CPTII,S$GLB,, | Performed by: PODIATRIST

## 2022-11-09 PROCEDURE — 1159F PR MEDICATION LIST DOCUMENTED IN MEDICAL RECORD: ICD-10-PCS | Mod: CPTII,S$GLB,, | Performed by: PODIATRIST

## 2022-11-09 PROCEDURE — 3044F PR MOST RECENT HEMOGLOBIN A1C LEVEL <7.0%: ICD-10-PCS | Mod: CPTII,S$GLB,, | Performed by: PODIATRIST

## 2022-11-09 PROCEDURE — 99024 PR POST-OP FOLLOW-UP VISIT: ICD-10-PCS | Mod: S$GLB,,, | Performed by: PODIATRIST

## 2022-11-09 PROCEDURE — 3008F PR BODY MASS INDEX (BMI) DOCUMENTED: ICD-10-PCS | Mod: CPTII,S$GLB,, | Performed by: PODIATRIST

## 2022-11-09 PROCEDURE — 3044F HG A1C LEVEL LT 7.0%: CPT | Mod: CPTII,S$GLB,, | Performed by: PODIATRIST

## 2022-11-09 PROCEDURE — 3008F BODY MASS INDEX DOCD: CPT | Mod: CPTII,S$GLB,, | Performed by: PODIATRIST

## 2022-11-09 PROCEDURE — 1126F PR PAIN SEVERITY QUANTIFIED, NO PAIN PRESENT: ICD-10-PCS | Mod: CPTII,S$GLB,, | Performed by: PODIATRIST

## 2022-11-09 PROCEDURE — 1159F MED LIST DOCD IN RCRD: CPT | Mod: CPTII,S$GLB,, | Performed by: PODIATRIST

## 2022-11-09 PROCEDURE — 29580 PR STRAPPING UNNA BOOT: ICD-10-PCS | Mod: LT,S$GLB,, | Performed by: PODIATRIST

## 2022-11-09 PROCEDURE — 3078F PR MOST RECENT DIASTOLIC BLOOD PRESSURE < 80 MM HG: ICD-10-PCS | Mod: CPTII,S$GLB,, | Performed by: PODIATRIST

## 2022-11-09 PROCEDURE — 99999 PR PBB SHADOW E&M-EST. PATIENT-LVL III: CPT | Mod: PBBFAC,,, | Performed by: PODIATRIST

## 2022-11-09 PROCEDURE — 99999 PR PBB SHADOW E&M-EST. PATIENT-LVL III: ICD-10-PCS | Mod: PBBFAC,,, | Performed by: PODIATRIST

## 2022-11-09 PROCEDURE — 3074F SYST BP LT 130 MM HG: CPT | Mod: CPTII,S$GLB,, | Performed by: PODIATRIST

## 2022-11-09 PROCEDURE — 3074F PR MOST RECENT SYSTOLIC BLOOD PRESSURE < 130 MM HG: ICD-10-PCS | Mod: CPTII,S$GLB,, | Performed by: PODIATRIST

## 2022-11-09 RX ORDER — COVID-19 ANTIGEN TEST
KIT MISCELLANEOUS
COMMUNITY
Start: 2022-10-27 | End: 2022-12-08 | Stop reason: ALTCHOICE

## 2022-11-09 NOTE — PROGRESS NOTES
"SUBJECTIVE: Patient returns to the clinic left foot status post revised TMA procedure   Presents with wife.     Patient has no complaints of fever chills or sweats.    Home health nurse is present via phone today.     PAST MEDICAL HISTORY: Unchanged    Physical examination: General: Pt. is in no acute distress, alert and oriented x 3.  Dressing clean dry and intact mild strike through    Podiatric examination: Vascular no signs of ischemia  Dermatologic: Surgical site is clean without any signs of infection.  Mild breakdown to the dorsal aspect of the suture line there is no acute gapping or dehiscence noted No significant erythema.      Central aspect of wound is noted to be open dorsally with adhered eschar which was removed today.  Underlying wound measures 0.5cm x 0.5 cm x 0.1 cm.  Postoperative debridement measurements consist of 0.5 cm x 0.5 cm x 0.2 cm.  IMPRESSION:  Status post TMA procedure / removal of osteomyelitis with satisfactory progress no signs of infection.    PLAN:    No data recorded    Wound Debridement    Performed by: Santos Randle DPM   Authorized by: Patient    Time out: Immediately prior to procedure a "time out" was called to verify the correct patient, procedure, equipment, support staff and site/side marked as required.   Consent Done?:  Yes (Verbal)  Local anesthesia used?: No       Wound Details:    Location:  Left foot     Type of Debridement:  Excisional       Length (cm):  0.5 cm       Width (cm):    0.5 cm       Depth (cm):   0.2 cm       Percent Debrided (%):  100             Depth of debridement:  Subcutaneous tissue    Tissue debrided:  Dermis, Epidermis and Subcutaneous    Devitalized tissue debrided:  Biofilm, Callus and Necrotic/Eschar    Instruments:  Blade, Curette and Nippers     Bleeding:  Minimal  Hemostasis Achieved: Yes    Method Used:  Pressure  Patient tolerance:  Patient tolerated the procedure well with no immediate complications    The nature of the condition, " options for management, as well as potential risks and complications were discussed in detail with patient. Patient was amenable to my recommendations and left my office fully informed and will follow up as instructed or sooner if necessary.      Home health updated.

## 2022-11-09 NOTE — TELEPHONE ENCOUNTER
----- Message from Rosario Hess sent at 11/9/2022  3:46 PM CST -----  Contact: Home Health Care  Osvaldo from Home Health Is calling in regards to pt. She stated that she don't have the order in the paperwork for wound care. Please adv.       Confirmed contact below:   Contact Name:Anthony  Phone Number: [phone]

## 2022-11-18 ENCOUNTER — TELEPHONE (OUTPATIENT)
Dept: ELECTROPHYSIOLOGY | Facility: CLINIC | Age: 67
End: 2022-11-18
Payer: COMMERCIAL

## 2022-11-18 NOTE — TELEPHONE ENCOUNTER
Called pt to confirm appointment with Dr. Grey on Monday. Pt verbally confirmed appointment date, time, and location, and thanked me for calling.

## 2022-11-21 ENCOUNTER — OFFICE VISIT (OUTPATIENT)
Dept: ELECTROPHYSIOLOGY | Facility: CLINIC | Age: 67
End: 2022-11-21
Payer: COMMERCIAL

## 2022-11-21 ENCOUNTER — HOSPITAL ENCOUNTER (OUTPATIENT)
Dept: CARDIOLOGY | Facility: CLINIC | Age: 67
Discharge: HOME | End: 2022-11-21
Payer: COMMERCIAL

## 2022-11-21 ENCOUNTER — TELEPHONE (OUTPATIENT)
Dept: PODIATRY | Facility: CLINIC | Age: 67
End: 2022-11-21
Payer: COMMERCIAL

## 2022-11-21 VITALS
DIASTOLIC BLOOD PRESSURE: 80 MMHG | WEIGHT: 268.75 LBS | HEART RATE: 78 BPM | SYSTOLIC BLOOD PRESSURE: 115 MMHG | HEIGHT: 71 IN | BODY MASS INDEX: 37.62 KG/M2

## 2022-11-21 DIAGNOSIS — I49.8 OTHER SPECIFIED CARDIAC ARRHYTHMIAS: ICD-10-CM

## 2022-11-21 DIAGNOSIS — Z86.79 S/P ABLATION OF ATRIAL FIBRILLATION: Primary | ICD-10-CM

## 2022-11-21 DIAGNOSIS — Z98.890 S/P ABLATION OF ATRIAL FIBRILLATION: Primary | ICD-10-CM

## 2022-11-21 DIAGNOSIS — Z79.01 CURRENT USE OF LONG TERM ANTICOAGULATION: ICD-10-CM

## 2022-11-21 DIAGNOSIS — I48.0 PAROXYSMAL ATRIAL FIBRILLATION: ICD-10-CM

## 2022-11-21 PROCEDURE — 93010 RHYTHM STRIP: ICD-10-PCS | Mod: S$GLB,,, | Performed by: INTERNAL MEDICINE

## 2022-11-21 PROCEDURE — 99214 PR OFFICE/OUTPT VISIT, EST, LEVL IV, 30-39 MIN: ICD-10-PCS | Mod: S$GLB,,, | Performed by: INTERNAL MEDICINE

## 2022-11-21 PROCEDURE — 3044F HG A1C LEVEL LT 7.0%: CPT | Mod: CPTII,S$GLB,, | Performed by: INTERNAL MEDICINE

## 2022-11-21 PROCEDURE — 93010 ELECTROCARDIOGRAM REPORT: CPT | Mod: S$GLB,,, | Performed by: INTERNAL MEDICINE

## 2022-11-21 PROCEDURE — 3079F PR MOST RECENT DIASTOLIC BLOOD PRESSURE 80-89 MM HG: ICD-10-PCS | Mod: CPTII,S$GLB,, | Performed by: INTERNAL MEDICINE

## 2022-11-21 PROCEDURE — 3288F PR FALLS RISK ASSESSMENT DOCUMENTED: ICD-10-PCS | Mod: CPTII,S$GLB,, | Performed by: INTERNAL MEDICINE

## 2022-11-21 PROCEDURE — 99999 PR PBB SHADOW E&M-EST. PATIENT-LVL III: ICD-10-PCS | Mod: PBBFAC,,, | Performed by: INTERNAL MEDICINE

## 2022-11-21 PROCEDURE — 93005 ELECTROCARDIOGRAM TRACING: CPT | Mod: S$GLB,,, | Performed by: INTERNAL MEDICINE

## 2022-11-21 PROCEDURE — 99214 OFFICE O/P EST MOD 30 MIN: CPT | Mod: S$GLB,,, | Performed by: INTERNAL MEDICINE

## 2022-11-21 PROCEDURE — 3074F SYST BP LT 130 MM HG: CPT | Mod: CPTII,S$GLB,, | Performed by: INTERNAL MEDICINE

## 2022-11-21 PROCEDURE — 1126F AMNT PAIN NOTED NONE PRSNT: CPT | Mod: CPTII,S$GLB,, | Performed by: INTERNAL MEDICINE

## 2022-11-21 PROCEDURE — 1101F PR PT FALLS ASSESS DOC 0-1 FALLS W/OUT INJ PAST YR: ICD-10-PCS | Mod: CPTII,S$GLB,, | Performed by: INTERNAL MEDICINE

## 2022-11-21 PROCEDURE — 3044F PR MOST RECENT HEMOGLOBIN A1C LEVEL <7.0%: ICD-10-PCS | Mod: CPTII,S$GLB,, | Performed by: INTERNAL MEDICINE

## 2022-11-21 PROCEDURE — 3079F DIAST BP 80-89 MM HG: CPT | Mod: CPTII,S$GLB,, | Performed by: INTERNAL MEDICINE

## 2022-11-21 PROCEDURE — 1101F PT FALLS ASSESS-DOCD LE1/YR: CPT | Mod: CPTII,S$GLB,, | Performed by: INTERNAL MEDICINE

## 2022-11-21 PROCEDURE — 1160F RVW MEDS BY RX/DR IN RCRD: CPT | Mod: CPTII,S$GLB,, | Performed by: INTERNAL MEDICINE

## 2022-11-21 PROCEDURE — 1159F MED LIST DOCD IN RCRD: CPT | Mod: CPTII,S$GLB,, | Performed by: INTERNAL MEDICINE

## 2022-11-21 PROCEDURE — 3008F PR BODY MASS INDEX (BMI) DOCUMENTED: ICD-10-PCS | Mod: CPTII,S$GLB,, | Performed by: INTERNAL MEDICINE

## 2022-11-21 PROCEDURE — 93005 RHYTHM STRIP: ICD-10-PCS | Mod: S$GLB,,, | Performed by: INTERNAL MEDICINE

## 2022-11-21 PROCEDURE — 3008F BODY MASS INDEX DOCD: CPT | Mod: CPTII,S$GLB,, | Performed by: INTERNAL MEDICINE

## 2022-11-21 PROCEDURE — 1160F PR REVIEW ALL MEDS BY PRESCRIBER/CLIN PHARMACIST DOCUMENTED: ICD-10-PCS | Mod: CPTII,S$GLB,, | Performed by: INTERNAL MEDICINE

## 2022-11-21 PROCEDURE — 3074F PR MOST RECENT SYSTOLIC BLOOD PRESSURE < 130 MM HG: ICD-10-PCS | Mod: CPTII,S$GLB,, | Performed by: INTERNAL MEDICINE

## 2022-11-21 PROCEDURE — 1159F PR MEDICATION LIST DOCUMENTED IN MEDICAL RECORD: ICD-10-PCS | Mod: CPTII,S$GLB,, | Performed by: INTERNAL MEDICINE

## 2022-11-21 PROCEDURE — 99999 PR PBB SHADOW E&M-EST. PATIENT-LVL III: CPT | Mod: PBBFAC,,, | Performed by: INTERNAL MEDICINE

## 2022-11-21 PROCEDURE — 3288F FALL RISK ASSESSMENT DOCD: CPT | Mod: CPTII,S$GLB,, | Performed by: INTERNAL MEDICINE

## 2022-11-21 PROCEDURE — 1126F PR PAIN SEVERITY QUANTIFIED, NO PAIN PRESENT: ICD-10-PCS | Mod: CPTII,S$GLB,, | Performed by: INTERNAL MEDICINE

## 2022-11-21 NOTE — PROGRESS NOTES
Subjective:     PCP: Radha Vizcarra MD    I had the pleasure of seeing Marcus Watkins in follow-up today for his history of atrial arrhythmias. Per the 8/2020 note:    He is a [67]-year old male who presented to the [facility] ED in early 2017 with a 24 hour history of palpitations associated with shortness of breath, nausea, and vomiting. He was found to be in typical atrial flutter with RVR, and was administered Diltiazem which converted him to AF. He then spontaneously reverted to sinus rhythm. He was discharged on a 30-day course of Eliquis, as well as Metoprolol.  In 4/2017, Mr. Watkins underwent EPS and CTI-line. In 6/2017, Mr. Watkins presented to [facility] in AF and underwent DCCV. He was started on Sotalol around this time. In fall 2018, AF recurred. A DCCV was unsuccessful, so he was switched to Amiodarone. A repeat DCCV done in 11/2018 was performed, but AF recurred again.  In 12/2018, Mr. Watkins underwent PVI. He was discharged on Amiodarone 200 mg daily, which was stopped in 4/2019. As of 9/2019, he had no recurrent AF. Today in the office Mr. Watkins feels well and denies recurrent arrhythmias. The plan at that point was to continue eliquis.     Mr. Watkins denies recurrent AF. He remains on eliquis and toprol xl 25 mg daily.    Interval History:  Patient denies chest pain fevers chills SOB. Taking eliquis and toprol without issues.    ECG today normal sinus rhythm with normal intervals rate 78 BPM      Review of Systems   Constitutional: Negative for decreased appetite, malaise/fatigue, weight gain and weight loss.   HENT:  Negative for sore throat.    Eyes:  Negative for blurred vision.   Cardiovascular:  Negative for chest pain, dyspnea on exertion, irregular heartbeat, leg swelling, near-syncope, orthopnea, palpitations, paroxysmal nocturnal dyspnea and syncope.   Respiratory:  Negative for shortness of breath.    Skin:  Negative for rash.   Musculoskeletal:  Negative for arthritis.   Gastrointestinal:  Negative for  abdominal pain.   Neurological:  Negative for focal weakness and numbness.   Psychiatric/Behavioral:  Negative for altered mental status.       Objective:    Physical Exam  Constitutional:       General: He is not in acute distress.     Appearance: He is well-developed.   HENT:      Head: Normocephalic and atraumatic.   Eyes:      General: No scleral icterus.     Pupils: Pupils are equal, round, and reactive to light.   Neck:      Thyroid: No thyromegaly.   Cardiovascular:      Rate and Rhythm: Regular rhythm.      Pulses: Normal pulses.      Heart sounds: Normal heart sounds. No murmur heard.    No friction rub. No gallop.   Pulmonary:      Effort: Pulmonary effort is normal.      Breath sounds: Normal breath sounds. No rales.   Abdominal:      General: Bowel sounds are normal. There is no distension.      Palpations: Abdomen is soft.      Tenderness: There is no abdominal tenderness.   Musculoskeletal:      Cervical back: Neck supple.   Skin:     General: Skin is warm and dry.      Findings: No rash.   Neurological:      Mental Status: He is alert and oriented to person, place, and time.   Psychiatric:         Behavior: Behavior normal.         Assessment:       1. S/P ablation of atrial fibrillation    2. Current use of long term anticoagulation    3. Paroxysmal atrial fibrillation           Plan:     In summary, Marcus Watkins is a 67-year old male with a history of symptomatic typical atrial flutter status-post CTI-line, who then developed AF refractory to Sotalol and Amiodarone. He is now multiple years post-PVI, and is maintaining sinus rhythm off amiodarone. He should continue Eliquis.     FU 2 years

## 2022-11-23 ENCOUNTER — OFFICE VISIT (OUTPATIENT)
Dept: PODIATRY | Facility: CLINIC | Age: 67
End: 2022-11-23
Payer: COMMERCIAL

## 2022-11-23 VITALS
HEART RATE: 86 BPM | BODY MASS INDEX: 37.48 KG/M2 | WEIGHT: 268.75 LBS | DIASTOLIC BLOOD PRESSURE: 81 MMHG | SYSTOLIC BLOOD PRESSURE: 134 MMHG

## 2022-11-23 DIAGNOSIS — L97.522 ULCER OF LEFT FOOT WITH FAT LAYER EXPOSED: ICD-10-CM

## 2022-11-23 DIAGNOSIS — Z89.432 S/P TRANSMETATARSAL AMPUTATION OF FOOT, LEFT: Primary | ICD-10-CM

## 2022-11-23 PROCEDURE — 11042 DBRDMT SUBQ TIS 1ST 20SQCM/<: CPT | Mod: 79,LT,S$GLB, | Performed by: PODIATRIST

## 2022-11-23 PROCEDURE — 99024 POSTOP FOLLOW-UP VISIT: CPT | Mod: S$GLB,,, | Performed by: PODIATRIST

## 2022-11-23 PROCEDURE — 99999 PR PBB SHADOW E&M-EST. PATIENT-LVL III: CPT | Mod: PBBFAC,,, | Performed by: PODIATRIST

## 2022-11-23 PROCEDURE — 99024 PR POST-OP FOLLOW-UP VISIT: ICD-10-PCS | Mod: S$GLB,,, | Performed by: PODIATRIST

## 2022-11-23 PROCEDURE — 11042 PR DEBRIDEMENT, SKIN, SUB-Q TISSUE,=<20 SQ CM: ICD-10-PCS | Mod: 79,LT,S$GLB, | Performed by: PODIATRIST

## 2022-11-23 PROCEDURE — 99999 PR PBB SHADOW E&M-EST. PATIENT-LVL III: ICD-10-PCS | Mod: PBBFAC,,, | Performed by: PODIATRIST

## 2022-11-30 ENCOUNTER — TELEPHONE (OUTPATIENT)
Dept: PODIATRY | Facility: CLINIC | Age: 67
End: 2022-11-30
Payer: COMMERCIAL

## 2022-11-30 NOTE — TELEPHONE ENCOUNTER
called patient to verify what he needed done; patient DME RX needed to be routed to  orthotics; RX routed to  PT advised to wait on a call to schedule appointment for orthotic device    BESSY

## 2022-12-08 ENCOUNTER — OFFICE VISIT (OUTPATIENT)
Dept: PRIMARY CARE CLINIC | Facility: CLINIC | Age: 67
End: 2022-12-08
Payer: COMMERCIAL

## 2022-12-08 VITALS
SYSTOLIC BLOOD PRESSURE: 128 MMHG | HEART RATE: 73 BPM | HEIGHT: 71 IN | RESPIRATION RATE: 18 BRPM | OXYGEN SATURATION: 99 % | DIASTOLIC BLOOD PRESSURE: 74 MMHG | WEIGHT: 268.75 LBS | BODY MASS INDEX: 37.62 KG/M2 | TEMPERATURE: 98 F

## 2022-12-08 DIAGNOSIS — E66.01 SEVERE OBESITY (BMI 35.0-39.9) WITH COMORBIDITY: ICD-10-CM

## 2022-12-08 DIAGNOSIS — Z89.432 STATUS POST AMPUTATION OF LEFT FOOT THROUGH METATARSAL BONE: ICD-10-CM

## 2022-12-08 DIAGNOSIS — I48.0 PAROXYSMAL ATRIAL FIBRILLATION: ICD-10-CM

## 2022-12-08 DIAGNOSIS — Z12.5 PROSTATE CANCER SCREENING: ICD-10-CM

## 2022-12-08 DIAGNOSIS — R73.03 PREDIABETES: Primary | ICD-10-CM

## 2022-12-08 DIAGNOSIS — E78.5 HYPERLIPIDEMIA, UNSPECIFIED HYPERLIPIDEMIA TYPE: ICD-10-CM

## 2022-12-08 DIAGNOSIS — M86.172 ACUTE OSTEOMYELITIS OF METATARSAL BONE OF LEFT FOOT: ICD-10-CM

## 2022-12-08 PROCEDURE — 1159F MED LIST DOCD IN RCRD: CPT | Mod: CPTII,S$GLB,, | Performed by: FAMILY MEDICINE

## 2022-12-08 PROCEDURE — 1126F PR PAIN SEVERITY QUANTIFIED, NO PAIN PRESENT: ICD-10-PCS | Mod: CPTII,S$GLB,, | Performed by: FAMILY MEDICINE

## 2022-12-08 PROCEDURE — 1159F PR MEDICATION LIST DOCUMENTED IN MEDICAL RECORD: ICD-10-PCS | Mod: CPTII,S$GLB,, | Performed by: FAMILY MEDICINE

## 2022-12-08 PROCEDURE — 3074F SYST BP LT 130 MM HG: CPT | Mod: CPTII,S$GLB,, | Performed by: FAMILY MEDICINE

## 2022-12-08 PROCEDURE — 99999 PR PBB SHADOW E&M-EST. PATIENT-LVL IV: CPT | Mod: PBBFAC,,, | Performed by: FAMILY MEDICINE

## 2022-12-08 PROCEDURE — 99214 PR OFFICE/OUTPT VISIT, EST, LEVL IV, 30-39 MIN: ICD-10-PCS | Mod: S$GLB,,, | Performed by: FAMILY MEDICINE

## 2022-12-08 PROCEDURE — 3008F PR BODY MASS INDEX (BMI) DOCUMENTED: ICD-10-PCS | Mod: CPTII,S$GLB,, | Performed by: FAMILY MEDICINE

## 2022-12-08 PROCEDURE — 1101F PT FALLS ASSESS-DOCD LE1/YR: CPT | Mod: CPTII,S$GLB,, | Performed by: FAMILY MEDICINE

## 2022-12-08 PROCEDURE — 3008F BODY MASS INDEX DOCD: CPT | Mod: CPTII,S$GLB,, | Performed by: FAMILY MEDICINE

## 2022-12-08 PROCEDURE — 3288F FALL RISK ASSESSMENT DOCD: CPT | Mod: CPTII,S$GLB,, | Performed by: FAMILY MEDICINE

## 2022-12-08 PROCEDURE — 3044F HG A1C LEVEL LT 7.0%: CPT | Mod: CPTII,S$GLB,, | Performed by: FAMILY MEDICINE

## 2022-12-08 PROCEDURE — 3074F PR MOST RECENT SYSTOLIC BLOOD PRESSURE < 130 MM HG: ICD-10-PCS | Mod: CPTII,S$GLB,, | Performed by: FAMILY MEDICINE

## 2022-12-08 PROCEDURE — 3288F PR FALLS RISK ASSESSMENT DOCUMENTED: ICD-10-PCS | Mod: CPTII,S$GLB,, | Performed by: FAMILY MEDICINE

## 2022-12-08 PROCEDURE — 3044F PR MOST RECENT HEMOGLOBIN A1C LEVEL <7.0%: ICD-10-PCS | Mod: CPTII,S$GLB,, | Performed by: FAMILY MEDICINE

## 2022-12-08 PROCEDURE — 1160F RVW MEDS BY RX/DR IN RCRD: CPT | Mod: CPTII,S$GLB,, | Performed by: FAMILY MEDICINE

## 2022-12-08 PROCEDURE — 1160F PR REVIEW ALL MEDS BY PRESCRIBER/CLIN PHARMACIST DOCUMENTED: ICD-10-PCS | Mod: CPTII,S$GLB,, | Performed by: FAMILY MEDICINE

## 2022-12-08 PROCEDURE — 1101F PR PT FALLS ASSESS DOC 0-1 FALLS W/OUT INJ PAST YR: ICD-10-PCS | Mod: CPTII,S$GLB,, | Performed by: FAMILY MEDICINE

## 2022-12-08 PROCEDURE — 3078F DIAST BP <80 MM HG: CPT | Mod: CPTII,S$GLB,, | Performed by: FAMILY MEDICINE

## 2022-12-08 PROCEDURE — 99214 OFFICE O/P EST MOD 30 MIN: CPT | Mod: S$GLB,,, | Performed by: FAMILY MEDICINE

## 2022-12-08 PROCEDURE — 3078F PR MOST RECENT DIASTOLIC BLOOD PRESSURE < 80 MM HG: ICD-10-PCS | Mod: CPTII,S$GLB,, | Performed by: FAMILY MEDICINE

## 2022-12-08 PROCEDURE — 1126F AMNT PAIN NOTED NONE PRSNT: CPT | Mod: CPTII,S$GLB,, | Performed by: FAMILY MEDICINE

## 2022-12-08 PROCEDURE — 99999 PR PBB SHADOW E&M-EST. PATIENT-LVL IV: ICD-10-PCS | Mod: PBBFAC,,, | Performed by: FAMILY MEDICINE

## 2022-12-08 NOTE — PROGRESS NOTES
"Subjective:       Patient ID: Marcus Watkins is a 67 y.o. male.    Chief Complaint: Establish Care    Here to establish care, previous PCP transferred. Has been dealing with long-term repercussions of peripheral neuropathy and left foot chemical burn sustained several years ago when he spilled industrial strength  on his foot. Ultimately developed osteomyelitis requiring several surgeries, and eventually had transmetatarsal amputation of left foot in mid-August of this year. Has done very well postoperatively, foot almost completely healed. About to deescalate frequency of f/u with podiatry.   PAF s/p ablation, maintained on beta blocker and Eliquis, followed by EP    Review of Systems   Constitutional:  Negative for chills, fatigue and fever.   HENT:  Negative for congestion.    Eyes:  Negative for visual disturbance.   Respiratory:  Negative for cough and shortness of breath.    Cardiovascular:  Negative for chest pain and palpitations.   Gastrointestinal:  Negative for abdominal pain, blood in stool, nausea and vomiting.   Genitourinary:  Negative for difficulty urinating.   Musculoskeletal:  Positive for arthralgias.   Skin:  Negative for rash.   Allergic/Immunologic: Negative for immunocompromised state.   Neurological:  Positive for numbness. Negative for dizziness.   Psychiatric/Behavioral:  Negative for sleep disturbance.      Objective:      Vitals:    12/08/22 0748   BP: 128/74   BP Location: Right arm   Patient Position: Sitting   BP Method: Medium (Manual)   Pulse: 73   Resp: 18   Temp: 98 °F (36.7 °C)   SpO2: 99%   Weight: 121.9 kg (268 lb 11.9 oz)   Height: 5' 11" (1.803 m)     Physical Exam  Vitals and nursing note reviewed.   Constitutional:       General: He is not in acute distress.     Appearance: Normal appearance. He is well-developed.   HENT:      Head: Normocephalic and atraumatic.   Neck:      Vascular: No carotid bruit.   Cardiovascular:      Rate and Rhythm: Normal rate and " regular rhythm.      Heart sounds: Normal heart sounds.   Pulmonary:      Effort: Pulmonary effort is normal.      Breath sounds: Normal breath sounds.   Abdominal:      General: There is no distension.      Tenderness: There is no abdominal tenderness.   Musculoskeletal:      Right lower leg: No edema.      Left lower leg: No edema.      Left foot: Deformity (s/p transmetatarsal amputation) present.   Skin:     General: Skin is warm and dry.   Neurological:      Mental Status: He is alert and oriented to person, place, and time.   Psychiatric:         Mood and Affect: Mood normal.         Behavior: Behavior normal.       Lab Results   Component Value Date    WBC 8.25 08/25/2022    HGB 14.0 08/25/2022    HCT 40.8 08/25/2022     08/25/2022    CHOL 222 (H) 03/04/2022    TRIG 117 03/04/2022    HDL 39 (L) 03/04/2022    ALT 18 08/25/2022    AST 20 08/25/2022     08/25/2022    K 3.8 08/25/2022     08/25/2022    CREATININE 0.8 08/25/2022    BUN 9 08/25/2022    CO2 26 08/25/2022    TSH 1.216 08/11/2022    PSA 2.1 03/04/2022    INR 0.9 10/16/2020    HGBA1C 5.7 (H) 08/11/2022      Assessment:       1. Prediabetes    2. Severe obesity (BMI 35.0-39.9) with comorbidity    3. Acute osteomyelitis of metatarsal bone of left foot    4. Paroxysmal atrial fibrillation    5. Status post amputation of left foot through metatarsal bone    6. Prostate cancer screening    7. Hyperlipidemia, unspecified hyperlipidemia type          Plan:       Prediabetes  -     CBC Auto Differential; Future; Expected date: 03/08/2023  -     Hemoglobin A1C; Future; Expected date: 03/08/2023  -     TSH; Future; Expected date: 03/08/2023    Severe obesity (BMI 35.0-39.9) with comorbidity  Low carb diet, exercise  Acute osteomyelitis of metatarsal bone of left foot  Resolved, off IV antibiotics   Paroxysmal atrial fibrillation  -     CBC Auto Differential; Future; Expected date: 03/08/2023  -     TSH; Future; Expected date:  2023  Stable on current regimen  Status post amputation of left foot through metatarsal bone  Management as per podiatry  Prostate cancer screening  -     PSA, Screening; Future; Expected date: 2023    Hyperlipidemia, unspecified hyperlipidemia type  -     Comprehensive Metabolic Panel; Future; Expected date: 2023  -     Lipid Panel; Future; Expected date: 2023  -     TSH; Future; Expected date: 2023    Medication List with Changes/Refills   Current Medications    APIXABAN (ELIQUIS) 5 MG TAB    Take 1 tablet (5 mg total) by mouth 2 (two) times daily.    CRANBERRY 500 MG CAP    Take 1 capsule by mouth once daily.    METOPROLOL SUCCINATE (TOPROL-XL) 25 MG 24 HR TABLET    Take 1 tablet (25 mg total) by mouth once daily.    MULTIVIT-MIN/FOLIC/VIT K/LYCOP (ONE-A-DAY MEN'S 50 PLUS ORAL)    Take 1 tablet by mouth once daily.    OMEGA-3 FATTY ACIDS/FISH OIL (FISH OIL-OMEGA-3 FATTY ACIDS) 300-1,000 MG CAPSULE    Take 1 capsule by mouth once daily.    VITAMIN E ACETATE ORAL    Take 1 tablet by mouth once daily.   Discontinued Medications    CEFTRIAXONE (ROCEPHIN) 2 G/50 ML PGBK IVPB    Inject 50 mLs (2 g total) into the vein once daily at 6am.    FLOWFLEX COVID-19 AG HOME TEST KIT        L.ACIDOPHILUS/B.BIFIDUM,LONGUM (HEALTHY COLON ORAL)    Take 1 tablet by mouth once daily.    PREGABALIN (LYRICA) 50 MG CAPSULE    Take 50 mg by mouth.    SUPREP BOWEL PREP KIT 17.5-3.13-1.6 GRAM SOLR    SMARTSI Milliliter(s) By Mouth Daily

## 2022-12-08 NOTE — PROGRESS NOTES
"SUBJECTIVE: Patient returns to the clinic left foot status post revised TMA procedure   Presents with wife.     Patient has no complaints of fever chills or sweats.    Home health nurse is present via phone today.     PAST MEDICAL HISTORY: Unchanged    Physical examination: General: Pt. is in no acute distress, alert and oriented x 3.  Dressing clean dry and intact mild strike through    Podiatric examination: Vascular no signs of ischemia  Dermatologic: Surgical site is clean without any signs of infection.  Mild breakdown to the dorsal aspect of the suture line there is no acute gapping or dehiscence noted No significant erythema.    Central aspect of wound is noted to be open dorsally with adhered eschar which was removed today.  Underlying wound measures 0.2cm x 0.2 cm x 0.1 cm.  Postoperative debridement measurements consist of 0.3 cm x 0.2 cm x 0.2 cm.  IMPRESSION:  Status post TMA procedure / removal of osteomyelitis with satisfactory progress no signs of infection.    PLAN:    No data recorded    Wound Debridement    Performed by: Santos Randle DPM   Authorized by: Patient    Time out: Immediately prior to procedure a "time out" was called to verify the correct patient, procedure, equipment, support staff and site/side marked as required.   Consent Done?:  Yes (Verbal)  Local anesthesia used?: No       Wound Details:    Location:  Left foot     Type of Debridement:  Excisional       Length (cm):  0.5 cm       Width (cm):    0.5 cm       Depth (cm):   0.2 cm       Percent Debrided (%):  100             Depth of debridement:  Subcutaneous tissue    Tissue debrided:  Dermis, Epidermis and Subcutaneous    Devitalized tissue debrided:  Biofilm, Callus and Necrotic/Eschar    Instruments:  Blade, Curette and Nippers     Bleeding:  Minimal  Hemostasis Achieved: Yes    Method Used:  Pressure  Patient tolerance:  Patient tolerated the procedure well with no immediate complications    The nature of the condition, " options for management, as well as potential risks and complications were discussed in detail with patient. Patient was amenable to my recommendations and left my office fully informed and will follow up as instructed or sooner if necessary.      Home health updated.

## 2022-12-12 ENCOUNTER — TELEPHONE (OUTPATIENT)
Dept: PODIATRY | Facility: CLINIC | Age: 67
End: 2022-12-12
Payer: COMMERCIAL

## 2022-12-12 NOTE — TELEPHONE ENCOUNTER
Called patient to reschedule appointment due to doctor out. Spoke with patient and rescheduled for 12/28 at 2p.m.

## 2022-12-28 ENCOUNTER — OFFICE VISIT (OUTPATIENT)
Dept: PODIATRY | Facility: CLINIC | Age: 67
End: 2022-12-28
Payer: COMMERCIAL

## 2022-12-28 VITALS
HEART RATE: 76 BPM | HEIGHT: 71 IN | WEIGHT: 267.19 LBS | SYSTOLIC BLOOD PRESSURE: 125 MMHG | BODY MASS INDEX: 37.41 KG/M2 | DIASTOLIC BLOOD PRESSURE: 80 MMHG

## 2022-12-28 DIAGNOSIS — Z89.432 S/P TRANSMETATARSAL AMPUTATION OF FOOT, LEFT: Primary | ICD-10-CM

## 2022-12-28 PROCEDURE — 99024 POSTOP FOLLOW-UP VISIT: CPT | Mod: S$GLB,,, | Performed by: PODIATRIST

## 2022-12-28 PROCEDURE — 99999 PR PBB SHADOW E&M-EST. PATIENT-LVL III: ICD-10-PCS | Mod: PBBFAC,,, | Performed by: PODIATRIST

## 2022-12-28 PROCEDURE — 99999 PR PBB SHADOW E&M-EST. PATIENT-LVL III: CPT | Mod: PBBFAC,,, | Performed by: PODIATRIST

## 2022-12-28 PROCEDURE — 99024 PR POST-OP FOLLOW-UP VISIT: ICD-10-PCS | Mod: S$GLB,,, | Performed by: PODIATRIST

## 2022-12-31 NOTE — PROGRESS NOTES
SUBJECTIVE: Patient returns to the clinic left foot status post revised TMA procedure   Presents with wife.     Patient has no complaints of fever chills or sweats.    Home health nurse is present via phone today.     PAST MEDICAL HISTORY: Unchanged    Physical examination: General: Pt. is in no acute distress, alert and oriented x 3.  Dressing clean dry and intact mild strike through    Podiatric examination: Vascular no signs of ischemia  Dermatologic: Surgical site is clean without any signs of infection.  Mild breakdown to the dorsal aspect of the suture line there is no acute gapping or dehiscence noted No significant erythema.    Wound is healed.   IMPRESSION:  Status post TMA procedure / removal of osteomyelitis with satisfactory progress no signs of infection.    PLAN:    Discontinue home health at this point.  Begin normal diabetic shoe gear with diabetic insoles and toe filler once available.  Follow-up in 3 months.

## 2023-01-03 ENCOUNTER — TELEPHONE (OUTPATIENT)
Dept: PODIATRY | Facility: CLINIC | Age: 68
End: 2023-01-03
Payer: COMMERCIAL

## 2023-01-24 ENCOUNTER — PES CALL (OUTPATIENT)
Dept: ADMINISTRATIVE | Facility: CLINIC | Age: 68
End: 2023-01-24
Payer: COMMERCIAL

## 2023-01-26 ENCOUNTER — TELEPHONE (OUTPATIENT)
Dept: PODIATRY | Facility: CLINIC | Age: 68
End: 2023-01-26
Payer: COMMERCIAL

## 2023-01-30 DIAGNOSIS — I48.91 ATRIAL FIBRILLATION WITH RVR: ICD-10-CM

## 2023-03-08 ENCOUNTER — LAB VISIT (OUTPATIENT)
Dept: LAB | Facility: HOSPITAL | Age: 68
End: 2023-03-08
Attending: FAMILY MEDICINE
Payer: COMMERCIAL

## 2023-03-08 DIAGNOSIS — R73.03 PREDIABETES: ICD-10-CM

## 2023-03-08 DIAGNOSIS — E78.5 HYPERLIPIDEMIA, UNSPECIFIED HYPERLIPIDEMIA TYPE: ICD-10-CM

## 2023-03-08 DIAGNOSIS — Z12.5 PROSTATE CANCER SCREENING: ICD-10-CM

## 2023-03-08 DIAGNOSIS — I48.0 PAROXYSMAL ATRIAL FIBRILLATION: ICD-10-CM

## 2023-03-08 LAB
ALBUMIN SERPL BCP-MCNC: 3.9 G/DL (ref 3.5–5.2)
ALP SERPL-CCNC: 76 U/L (ref 55–135)
ALT SERPL W/O P-5'-P-CCNC: 19 U/L (ref 10–44)
ANION GAP SERPL CALC-SCNC: 11 MMOL/L (ref 8–16)
AST SERPL-CCNC: 16 U/L (ref 10–40)
BASOPHILS # BLD AUTO: 0.08 K/UL (ref 0–0.2)
BASOPHILS NFR BLD: 0.8 % (ref 0–1.9)
BILIRUB SERPL-MCNC: 0.4 MG/DL (ref 0.1–1)
BUN SERPL-MCNC: 12 MG/DL (ref 8–23)
CALCIUM SERPL-MCNC: 9.5 MG/DL (ref 8.7–10.5)
CHLORIDE SERPL-SCNC: 103 MMOL/L (ref 95–110)
CHOLEST SERPL-MCNC: 197 MG/DL (ref 120–199)
CHOLEST/HDLC SERPL: 4.5 {RATIO} (ref 2–5)
CO2 SERPL-SCNC: 22 MMOL/L (ref 23–29)
COMPLEXED PSA SERPL-MCNC: 2.1 NG/ML (ref 0–4)
CREAT SERPL-MCNC: 0.8 MG/DL (ref 0.5–1.4)
DIFFERENTIAL METHOD: NORMAL
EOSINOPHIL # BLD AUTO: 0.3 K/UL (ref 0–0.5)
EOSINOPHIL NFR BLD: 2.9 % (ref 0–8)
ERYTHROCYTE [DISTWIDTH] IN BLOOD BY AUTOMATED COUNT: 13.2 % (ref 11.5–14.5)
EST. GFR  (NO RACE VARIABLE): >60 ML/MIN/1.73 M^2
ESTIMATED AVG GLUCOSE: 123 MG/DL (ref 68–131)
GLUCOSE SERPL-MCNC: 115 MG/DL (ref 70–110)
HBA1C MFR BLD: 5.9 % (ref 4–5.6)
HCT VFR BLD AUTO: 44.3 % (ref 40–54)
HDLC SERPL-MCNC: 44 MG/DL (ref 40–75)
HDLC SERPL: 22.3 % (ref 20–50)
HGB BLD-MCNC: 14.8 G/DL (ref 14–18)
IMM GRANULOCYTES # BLD AUTO: 0.03 K/UL (ref 0–0.04)
IMM GRANULOCYTES NFR BLD AUTO: 0.3 % (ref 0–0.5)
LDLC SERPL CALC-MCNC: 135.8 MG/DL (ref 63–159)
LYMPHOCYTES # BLD AUTO: 2.7 K/UL (ref 1–4.8)
LYMPHOCYTES NFR BLD: 27.9 % (ref 18–48)
MCH RBC QN AUTO: 29.7 PG (ref 27–31)
MCHC RBC AUTO-ENTMCNC: 33.4 G/DL (ref 32–36)
MCV RBC AUTO: 89 FL (ref 82–98)
MONOCYTES # BLD AUTO: 0.6 K/UL (ref 0.3–1)
MONOCYTES NFR BLD: 6.7 % (ref 4–15)
NEUTROPHILS # BLD AUTO: 5.9 K/UL (ref 1.8–7.7)
NEUTROPHILS NFR BLD: 61.4 % (ref 38–73)
NONHDLC SERPL-MCNC: 153 MG/DL
NRBC BLD-RTO: 0 /100 WBC
PLATELET # BLD AUTO: 271 K/UL (ref 150–450)
PMV BLD AUTO: 11.4 FL (ref 9.2–12.9)
POTASSIUM SERPL-SCNC: 4.1 MMOL/L (ref 3.5–5.1)
PROT SERPL-MCNC: 7.2 G/DL (ref 6–8.4)
RBC # BLD AUTO: 4.98 M/UL (ref 4.6–6.2)
SODIUM SERPL-SCNC: 136 MMOL/L (ref 136–145)
TRIGL SERPL-MCNC: 86 MG/DL (ref 30–150)
TSH SERPL DL<=0.005 MIU/L-ACNC: 1.07 UIU/ML (ref 0.4–4)
WBC # BLD AUTO: 9.62 K/UL (ref 3.9–12.7)

## 2023-03-08 PROCEDURE — 83036 HEMOGLOBIN GLYCOSYLATED A1C: CPT | Performed by: FAMILY MEDICINE

## 2023-03-08 PROCEDURE — 36415 COLL VENOUS BLD VENIPUNCTURE: CPT | Mod: PN | Performed by: FAMILY MEDICINE

## 2023-03-08 PROCEDURE — 85025 COMPLETE CBC W/AUTO DIFF WBC: CPT | Performed by: FAMILY MEDICINE

## 2023-03-08 PROCEDURE — 84153 ASSAY OF PSA TOTAL: CPT | Performed by: FAMILY MEDICINE

## 2023-03-08 PROCEDURE — 80061 LIPID PANEL: CPT | Performed by: FAMILY MEDICINE

## 2023-03-08 PROCEDURE — 80053 COMPREHEN METABOLIC PANEL: CPT | Performed by: FAMILY MEDICINE

## 2023-03-08 PROCEDURE — 84443 ASSAY THYROID STIM HORMONE: CPT | Performed by: FAMILY MEDICINE

## 2023-03-28 DIAGNOSIS — I48.91 ATRIAL FIBRILLATION WITH RVR: ICD-10-CM

## 2023-03-28 RX ORDER — METOPROLOL SUCCINATE 25 MG/1
25 TABLET, EXTENDED RELEASE ORAL DAILY
Qty: 90 TABLET | Refills: 3 | Status: SHIPPED | OUTPATIENT
Start: 2023-03-28 | End: 2023-11-27 | Stop reason: SDUPTHER

## 2023-04-04 ENCOUNTER — OFFICE VISIT (OUTPATIENT)
Dept: PODIATRY | Facility: CLINIC | Age: 68
End: 2023-04-04
Payer: COMMERCIAL

## 2023-04-04 VITALS
BODY MASS INDEX: 36.6 KG/M2 | SYSTOLIC BLOOD PRESSURE: 119 MMHG | DIASTOLIC BLOOD PRESSURE: 80 MMHG | WEIGHT: 261.44 LBS | HEART RATE: 88 BPM | HEIGHT: 71 IN

## 2023-04-04 DIAGNOSIS — Z79.01 CURRENT USE OF LONG TERM ANTICOAGULATION: ICD-10-CM

## 2023-04-04 DIAGNOSIS — G60.9 IDIOPATHIC PERIPHERAL NEUROPATHY: ICD-10-CM

## 2023-04-04 DIAGNOSIS — Z89.432 S/P TRANSMETATARSAL AMPUTATION OF FOOT, LEFT: Primary | ICD-10-CM

## 2023-04-04 PROCEDURE — 99999 PR PBB SHADOW E&M-EST. PATIENT-LVL III: ICD-10-PCS | Mod: PBBFAC,,, | Performed by: PODIATRIST

## 2023-04-04 PROCEDURE — 99999 PR PBB SHADOW E&M-EST. PATIENT-LVL III: CPT | Mod: PBBFAC,,, | Performed by: PODIATRIST

## 2023-04-04 PROCEDURE — 99213 PR OFFICE/OUTPT VISIT, EST, LEVL III, 20-29 MIN: ICD-10-PCS | Mod: S$GLB,,, | Performed by: PODIATRIST

## 2023-04-04 PROCEDURE — 99213 OFFICE O/P EST LOW 20 MIN: CPT | Mod: S$GLB,,, | Performed by: PODIATRIST

## 2023-04-11 NOTE — PROGRESS NOTES
Subjective:      Patient ID: Marcus Watkins is a 67 y.o. male.    Chief Complaint:   Follow-up  Patient is here for follow-up status post TMA procedure.  He is doing very well.  He is awaiting shoe gear/orthotics with foot filler.  No new issues.      Review of Systems   Constitutional: Negative for chills, decreased appetite, fever and night sweats.   HENT:  Negative for congestion, ear discharge, nosebleeds and tinnitus.    Eyes:  Negative for double vision, pain and visual disturbance.   Cardiovascular:  Negative for chest pain, claudication, cyanosis and palpitations.   Respiratory:  Negative for cough, hemoptysis, shortness of breath and wheezing.    Endocrine: Negative for cold intolerance and heat intolerance.   Hematologic/Lymphatic: Negative for adenopathy and bleeding problem.   Skin:  Positive for color change, dry skin, nail changes and unusual hair distribution.   Musculoskeletal:  Positive for arthritis, joint pain and stiffness. Negative for myalgias.   Gastrointestinal:  Negative for abdominal pain, dysphagia, nausea and vomiting.   Genitourinary:  Negative for dysuria, flank pain, hematuria and pelvic pain.   Neurological:  Positive for disturbances in coordination, numbness, paresthesias and sensory change.   Psychiatric/Behavioral:  Negative for altered mental status, hallucinations and suicidal ideas. The patient does not have insomnia.    Allergic/Immunologic: Negative for environmental allergies and persistent infections.         Objective:      Physical Exam  Vitals reviewed.   Constitutional:       Appearance: He is well-developed.   HENT:      Head: Normocephalic and atraumatic.   Cardiovascular:      Pulses:           Dorsalis pedis pulses are 1+ on the right side and 1+ on the left side.        Posterior tibial pulses are 1+ on the right side and 1+ on the left side.   Pulmonary:      Effort: Pulmonary effort is normal.   Musculoskeletal:         General: Normal range of motion.       Comments: Anterior, lateral, and posterior muscle groups bilateral lower extremities show strength 4 over 5 symmetrically. Inspection and palpation of the joints and bones reveal no crepitus or joint effusion. No tenderness upon palpation. Mild plantar flexor contractures noted to digits 2 through 5 bilaterally.  Angle and base of gait are normal.   Feet:      Right foot:      Skin integrity: Callus and dry skin present.      Left foot:      Skin integrity: Callus and dry skin present.   Skin:     General: Skin is warm and dry.      Capillary Refill: Capillary refill takes 2 to 3 seconds.      Coloration: Skin is pale.      Comments: Skin bilateral lower extremities noted to be thin, dry, and atrophic.  TMA site healed.   Neurological:      Mental Status: He is alert and oriented to person, place, and time.      Sensory: Sensory deficit present.      Motor: Atrophy present.      Deep Tendon Reflexes: Reflexes abnormal.      Reflex Scores:       Patellar reflexes are 1+ on the right side and 1+ on the left side.       Achilles reflexes are 1+ on the right side and 1+ on the left side.     Comments: Sharp, dull, light touch, and vibratory sensation are diminished bilaterally. Proprioceptive sensation is intact to both lower extremities. [place] monofilament exam shows loss of protective sensation to plantar toes 1 through 5 bilaterally. Deep tendon reflexes to the patellar tendons is 1 over 4 bilaterally symmetrical. Deep tendon reflexes to the Achilles tendon is 0 over 4 bilaterally symmetrical. No ankle clonus or Babinski reflex noted bilaterally. Coordination is fair to both lower extremities.  Patient admits to intermittent burning and tingling in the feet.   Psychiatric:         Behavior: Behavior normal.             Assessment:       Encounter Diagnoses   Name Primary?    S/P transmetatarsal amputation of foot, left Yes    Current use of long term anticoagulation     Idiopathic peripheral neuropathy       Independent visualization of imaging was performed.  Results were reviewed in detail with patient.       Plan:       Marcus was seen today for follow-up.    Diagnoses and all orders for this visit:    S/P transmetatarsal amputation of foot, left    Current use of long term anticoagulation    Idiopathic peripheral neuropathy      I counseled the patient on his conditions, their implications and medical management.    The nature of the condition, options for management, as well as potential risks and complications were discussed in detail with patient. Patient was amenable to my recommendations and left my office fully informed and will follow up as instructed or sooner if necessary.

## 2023-04-13 NOTE — TELEPHONE ENCOUNTER
Called pt in regards to scheduled procedure on 10/29/18. Pt did not answer, left vmail instructing patient to return call to Kenia Still RN.   
Detail Level: Detailed
Quality 226: Preventive Care And Screening: Tobacco Use: Screening And Cessation Intervention: Patient screened for tobacco use and is an ex/non-smoker
Quality 130: Documentation Of Current Medications In The Medical Record: Current Medications Documented
Quality 431: Preventive Care And Screening: Unhealthy Alcohol Use - Screening: Patient not identified as an unhealthy alcohol user when screened for unhealthy alcohol use using a systematic screening method

## 2023-05-01 ENCOUNTER — TELEPHONE (OUTPATIENT)
Dept: PRIMARY CARE CLINIC | Facility: CLINIC | Age: 68
End: 2023-05-01
Payer: COMMERCIAL

## 2023-05-01 DIAGNOSIS — Q55.61 CURVATURE OF THE PENIS: Primary | ICD-10-CM

## 2023-05-01 NOTE — TELEPHONE ENCOUNTER
----- Message from Ovidio Tovar sent at 5/1/2023  8:35 AM CDT -----  Contact: [phone] @ patient  Good Morning patient would like a call back.

## 2023-05-04 ENCOUNTER — OFFICE VISIT (OUTPATIENT)
Dept: UROLOGY | Facility: CLINIC | Age: 68
End: 2023-05-04
Payer: COMMERCIAL

## 2023-05-04 VITALS
DIASTOLIC BLOOD PRESSURE: 72 MMHG | HEART RATE: 85 BPM | WEIGHT: 262.38 LBS | BODY MASS INDEX: 36.73 KG/M2 | HEIGHT: 71 IN | SYSTOLIC BLOOD PRESSURE: 126 MMHG

## 2023-05-04 DIAGNOSIS — N40.1 BPH WITH URINARY OBSTRUCTION: ICD-10-CM

## 2023-05-04 DIAGNOSIS — N48.6 PEYRONIE'S DISEASE: Primary | ICD-10-CM

## 2023-05-04 DIAGNOSIS — N13.8 BPH WITH URINARY OBSTRUCTION: ICD-10-CM

## 2023-05-04 PROBLEM — S98.139A: Status: RESOLVED | Noted: 2020-11-01 | Resolved: 2023-05-04

## 2023-05-04 PROBLEM — L97.509: Status: RESOLVED | Noted: 2022-08-11 | Resolved: 2023-05-04

## 2023-05-04 PROBLEM — T87.89: Status: RESOLVED | Noted: 2022-08-11 | Resolved: 2023-05-04

## 2023-05-04 PROBLEM — Z86.79 S/P ABLATION OF ATRIAL FIBRILLATION: Status: RESOLVED | Noted: 2019-02-18 | Resolved: 2023-05-04

## 2023-05-04 PROBLEM — Z98.890 STATUS POST CATHETER ABLATION OF ATRIAL FLUTTER: Status: RESOLVED | Noted: 2017-06-13 | Resolved: 2023-05-04

## 2023-05-04 PROBLEM — Z98.890 S/P ABLATION OF ATRIAL FIBRILLATION: Status: RESOLVED | Noted: 2019-02-18 | Resolved: 2023-05-04

## 2023-05-04 PROBLEM — Z89.432 STATUS POST AMPUTATION OF LEFT FOOT THROUGH METATARSAL BONE: Status: RESOLVED | Noted: 2022-12-08 | Resolved: 2023-05-04

## 2023-05-04 PROCEDURE — 99204 OFFICE O/P NEW MOD 45 MIN: CPT | Mod: S$GLB,,, | Performed by: UROLOGY

## 2023-05-04 PROCEDURE — 1126F AMNT PAIN NOTED NONE PRSNT: CPT | Mod: CPTII,S$GLB,, | Performed by: UROLOGY

## 2023-05-04 PROCEDURE — 3074F SYST BP LT 130 MM HG: CPT | Mod: CPTII,S$GLB,, | Performed by: UROLOGY

## 2023-05-04 PROCEDURE — 3074F PR MOST RECENT SYSTOLIC BLOOD PRESSURE < 130 MM HG: ICD-10-PCS | Mod: CPTII,S$GLB,, | Performed by: UROLOGY

## 2023-05-04 PROCEDURE — 3008F BODY MASS INDEX DOCD: CPT | Mod: CPTII,S$GLB,, | Performed by: UROLOGY

## 2023-05-04 PROCEDURE — 1160F RVW MEDS BY RX/DR IN RCRD: CPT | Mod: CPTII,S$GLB,, | Performed by: UROLOGY

## 2023-05-04 PROCEDURE — 3044F PR MOST RECENT HEMOGLOBIN A1C LEVEL <7.0%: ICD-10-PCS | Mod: CPTII,S$GLB,, | Performed by: UROLOGY

## 2023-05-04 PROCEDURE — 1101F PT FALLS ASSESS-DOCD LE1/YR: CPT | Mod: CPTII,S$GLB,, | Performed by: UROLOGY

## 2023-05-04 PROCEDURE — 1159F PR MEDICATION LIST DOCUMENTED IN MEDICAL RECORD: ICD-10-PCS | Mod: CPTII,S$GLB,, | Performed by: UROLOGY

## 2023-05-04 PROCEDURE — 99999 PR PBB SHADOW E&M-EST. PATIENT-LVL IV: ICD-10-PCS | Mod: PBBFAC,,, | Performed by: UROLOGY

## 2023-05-04 PROCEDURE — 3078F PR MOST RECENT DIASTOLIC BLOOD PRESSURE < 80 MM HG: ICD-10-PCS | Mod: CPTII,S$GLB,, | Performed by: UROLOGY

## 2023-05-04 PROCEDURE — 3008F PR BODY MASS INDEX (BMI) DOCUMENTED: ICD-10-PCS | Mod: CPTII,S$GLB,, | Performed by: UROLOGY

## 2023-05-04 PROCEDURE — 1160F PR REVIEW ALL MEDS BY PRESCRIBER/CLIN PHARMACIST DOCUMENTED: ICD-10-PCS | Mod: CPTII,S$GLB,, | Performed by: UROLOGY

## 2023-05-04 PROCEDURE — 1159F MED LIST DOCD IN RCRD: CPT | Mod: CPTII,S$GLB,, | Performed by: UROLOGY

## 2023-05-04 PROCEDURE — 3078F DIAST BP <80 MM HG: CPT | Mod: CPTII,S$GLB,, | Performed by: UROLOGY

## 2023-05-04 PROCEDURE — 3288F PR FALLS RISK ASSESSMENT DOCUMENTED: ICD-10-PCS | Mod: CPTII,S$GLB,, | Performed by: UROLOGY

## 2023-05-04 PROCEDURE — 3288F FALL RISK ASSESSMENT DOCD: CPT | Mod: CPTII,S$GLB,, | Performed by: UROLOGY

## 2023-05-04 PROCEDURE — 99204 PR OFFICE/OUTPT VISIT, NEW, LEVL IV, 45-59 MIN: ICD-10-PCS | Mod: S$GLB,,, | Performed by: UROLOGY

## 2023-05-04 PROCEDURE — 1101F PR PT FALLS ASSESS DOC 0-1 FALLS W/OUT INJ PAST YR: ICD-10-PCS | Mod: CPTII,S$GLB,, | Performed by: UROLOGY

## 2023-05-04 PROCEDURE — 3044F HG A1C LEVEL LT 7.0%: CPT | Mod: CPTII,S$GLB,, | Performed by: UROLOGY

## 2023-05-04 PROCEDURE — 99999 PR PBB SHADOW E&M-EST. PATIENT-LVL IV: CPT | Mod: PBBFAC,,, | Performed by: UROLOGY

## 2023-05-04 PROCEDURE — 1126F PR PAIN SEVERITY QUANTIFIED, NO PAIN PRESENT: ICD-10-PCS | Mod: CPTII,S$GLB,, | Performed by: UROLOGY

## 2023-05-04 NOTE — PROGRESS NOTES
CHIEF COMPLAINT:    Mr. Watkins is a 67 y.o. male presenting for a consultation at the request of Dr. Benedict. Patient presents with peyronie's disease.    PRESENTING ILLNESS:    Marcus Watkins is a 67 y.o. male who c/o peyronie's disease.  Has been present for > 1 year.  It is difficult to penetrate due to the curve.  Sex is not painful for him or his partner due to the curve.    He does not have ED.    He has LUTS.  Nocturia x 1-2.  Is pleased with how he voids.    REVIEW OF SYSTEMS:    Marcus Watkins denies headache, blurred vision, fever, nausea, vomiting, chills, abdominal pain, chest pain, sore throat, bleeding per rectum, cough, SOB, recent loss of consciousness, recent mental status changes, seizures, dizziness, or upper or lower extremity weakness.    GUILLERMO  1. 4  2. 0  3. 0  4. 5  5. 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 12/8/2022    Status post catheter ablation of atrial flutter 6/13/2017       Past Surgical History:   Procedure Laterality Date    ABLATION N/A 12/27/2018    Procedure: ABLATION;  Surgeon: José Grey MD;  Location: [facility] EP LAB;  Service: Cardiology;  Laterality: N/A;  AF,PVI, RFA, CARTO, GEN, GP, 3 PREP    CARDIOVERSION N/A 10/29/2018    Procedure: CARDIOVERSION;  Surgeon: José Grey MD;  Location: [facility] CATH LAB;  Service: Cardiology;  Laterality: N/A;  AF, LOPEZ, DCCV, MAC, GP, 3 PREP    COLONOSCOPY N/A 04/14/2016    Procedure: COLONOSCOPY;  Surgeon: Kade Wang MD;  Location: [facility] ENDO ([address] [place] [address]);  Service: Endoscopy;  Laterality: N/A;    COLONOSCOPY N/A 6/3/2022    Procedure: COLONOSCOPY;  Surgeon: Jerrod Tijerina MD;  Location: [facility] ENDO (4TH FLR);  Service: Endoscopy;  Laterality: N/A;  fully vaccianted  ok to hold Eliquis x 2 days per Dr. Grey-see  telephone encounter dated -MS  -[facility] for holding eliquis emailed to wife-tb    FOOT AMPUTATION THROUGH METATARSAL Left 8/15/2022    Procedure: AMPUTATION, FOOT, TRANSMETATARSAL;  Surgeon: Santos Randle DPM;  Location: 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 EP LAB;  Service: Cardiology;  Laterality: N/A;  AF, DCCV, MAC, GP, 3 PREP    TREATMENT OF CARDIAC ARRHYTHMIA  2018    Procedure: Cardioversion or Defibrillation;  Surgeon: José Grey MD;  Location: [facility] EP LAB;  Service: Cardiology;;       Family History   Problem Relation Age of Onset    Heart disease Mother     Diabetes Brother     Mental retardation Daughter        Social History     Socioeconomic History    Marital status:    Tobacco Use    Smoking status: Former     Types: Cigarettes     Quit date: 1978     Years since quittin.8    Smokeless tobacco: Never   Substance and Sexual Activity    Alcohol use: No     Alcohol/week: 0.0 standard drinks    Drug use: No   Social History Narrative     [employer], wife Pat works for [employer], 4 children (youngest daughter with MR), nonsmoker, nondrinker     Social Determinants of Health     Financial Resource Strain: Low Risk     Difficulty of Paying Living Expenses: Not hard at all   Food Insecurity: No Food Insecurity    Worried About Running Out of Food in the Last Year: Never true    Ran Out of Food in the Last Year: Never true   Transportation Needs: No Transportation Needs    Lack of Transportation (Medical): No    Lack of Transportation (Non-Medical): No   Physical Activity: Unknown    Days of Exercise per Week: 0 days   Stress: No Stress Concern Present     Feeling of Stress : Not at all   Social Connections: Unknown    Frequency of Social Gatherings with Friends and Family: More than three times a week    Marital Status:        Allergies:  Patient has no known allergies.    Medications:    Current Outpatient Medications:     apixaban (ELIQUIS) 5 mg Tab, Take 1 tablet (5 mg total) by mouth 2 (two) times daily., Disp: 180 tablet, Rfl: 3    cranberry 500 mg Cap, Take 1 capsule by mouth once daily., Disp: , Rfl:     metoprolol succinate (TOPROL-XL) 25 MG 24 hr tablet, Take 1 tablet (25 mg total) by mouth once daily., Disp: 90 tablet, Rfl: 3    multivit-min/folic/vit K/lycop (ONE-A-DAY MEN'S 50 PLUS ORAL), Take 1 tablet by mouth once daily., Disp: , Rfl:     omega-3 fatty acids/fish oil (FISH OIL-OMEGA-3 FATTY ACIDS) 300-1,000 mg capsule, Take 1 capsule by mouth once daily., Disp: , Rfl:     VITAMIN E ACETATE ORAL, Take 1 tablet by mouth once daily., Disp: , Rfl:     PHYSICAL EXAMINATION:    The patient generally appears in good health, is appropriately interactive, and is in no apparent distress.     Eyes: anicteric sclerae, moist conjunctivae; no lid-lag; PERRLA     HENT: Atraumatic; oropharynx clear with moist mucous membranes and no mucosal ulcerations;normal hard and soft palate.  No evidence of lymphadenopathy.    Neck: Trachea midline.  No thyromegaly.    Skin: No lesions.    Mental: Cooperative with normal affect.  Is oriented to time, place, and person.    Neuro: Grossly intact.    Chest: Normal inspiratory effort.   No accessory muscles.  No audible wheezes.  Respirations symmetric on inspiration and expiration.    Heart: Regular rhythm.      Abdomen:  Soft, non-tender. No masses or organomegaly. Bladder is not palpable. No evidence of flank discomfort. No evidence of inguinal hernia.    Genitourinary: The penis is not circumcised with a plaque c/w peyronie's on the shaft. The urethral meatus is normal. The testes, epididymides, and cord structures are  normal in size and contour bilaterally. The scrotum is normal in size and contour.    Normal anal sphincter tone. No rectal mass.    The prostate is smooth. Normal landmarks. Lateral sulci. Median furrow intact.  No nodularity or induration. Seminal vesicles are normal.    Extremities: No clubbing, cyanosis, or edema      LABS:      Lab Results   Component Value Date    PSA 2.1 03/08/2023    PSA 2.1 03/04/2022    PSA 0.91 07/17/2020       IMPRESSION:    Encounter Diagnoses   Name Primary?    Peyronie's disease Yes    BPH with urinary obstruction          PLAN:    1. Discussed the pathophysiology of peyronie's.  Recommend penile doppler. I  have explained the risk, benefits, and alternatives of the procedure in detail. The patient voices understanding and all questions have been answered. The patient agrees to proceed as planned.   2. Will observe his LUTS as they don't bother him.      Copy to: Jak

## 2023-05-05 ENCOUNTER — PROCEDURE VISIT (OUTPATIENT)
Dept: UROLOGY | Facility: CLINIC | Age: 68
End: 2023-05-05
Payer: COMMERCIAL

## 2023-05-05 ENCOUNTER — DOCUMENTATION ONLY (OUTPATIENT)
Dept: UROLOGY | Facility: CLINIC | Age: 68
End: 2023-05-05
Payer: COMMERCIAL

## 2023-05-05 VITALS
WEIGHT: 256.63 LBS | RESPIRATION RATE: 18 BRPM | TEMPERATURE: 98 F | SYSTOLIC BLOOD PRESSURE: 136 MMHG | HEART RATE: 91 BPM | HEIGHT: 71 IN | DIASTOLIC BLOOD PRESSURE: 76 MMHG | BODY MASS INDEX: 35.93 KG/M2

## 2023-05-05 DIAGNOSIS — N48.6 PEYRONIE'S DISEASE: ICD-10-CM

## 2023-05-05 PROCEDURE — 54235 PR INJECT CORPORA CAVERN,PHARM AGNT: ICD-10-PCS | Mod: S$GLB,,, | Performed by: UROLOGY

## 2023-05-05 PROCEDURE — 96372 PR INJECTION,THERAP/PROPH/DIAG2ST, IM OR SUBCUT: ICD-10-PCS | Mod: 59,S$GLB,, | Performed by: UROLOGY

## 2023-05-05 PROCEDURE — 93980 PENILE VASCULAR STUDY: CPT | Mod: S$GLB,,, | Performed by: UROLOGY

## 2023-05-05 PROCEDURE — 96372 THER/PROPH/DIAG INJ SC/IM: CPT | Mod: 59,S$GLB,, | Performed by: UROLOGY

## 2023-05-05 PROCEDURE — 93981 PR PENILE VASCULAR STUDY,LTD OR F/U: ICD-10-PCS | Mod: 59,S$GLB,, | Performed by: UROLOGY

## 2023-05-05 PROCEDURE — 93981 PENILE VASCULAR STUDY: CPT | Mod: 59,S$GLB,, | Performed by: UROLOGY

## 2023-05-05 PROCEDURE — 93980 PR PENILE VASCULAR STUDY,COMPLETE: ICD-10-PCS | Mod: S$GLB,,, | Performed by: UROLOGY

## 2023-05-05 PROCEDURE — 54235 NJX CORPORA CAVERNOSA RX AGT: CPT | Mod: S$GLB,,, | Performed by: UROLOGY

## 2023-05-05 RX ORDER — PAPAVERINE HYDROCHLORIDE 30 MG/ML
90 INJECTION INTRAMUSCULAR; INTRAVENOUS
Status: COMPLETED | OUTPATIENT
Start: 2023-05-05 | End: 2023-05-05

## 2023-05-05 RX ADMIN — PAPAVERINE HYDROCHLORIDE 90 MG: 30 INJECTION INTRAMUSCULAR; INTRAVENOUS at 09:05

## 2023-05-05 NOTE — PATIENT INSTRUCTIONS
After your Doppler US Procedure/PEP injection:    You may resume your usual diet, medications and activities following the procedure. Some side effects may occur that usually do not need medical attention. These side effects may go away during treatment as your body adjusts to the medicine. Contact your [facility]ist at [phone] if any of the following side effects continue or become extremely bothersome or if you have any questions.    Bruising or bleeding at place of injection   Burning (mild) along penis   Swelling at place of injection    You may also reach a urology resident on call after regular clinic hours and on weekends at [phone]. Papaverine injected into the penis may cause tingling at the tip of the penis. This is no cause for concern.

## 2023-05-05 NOTE — PROCEDURES
Procedures Date: 05/05/2023     Pre procedure diagnosis: Peyronie's disease    Post procedure diagnosis:same    Surgeon: Eliana     Assistant: None     Procedure performed: 1. Doppler ultrasound of the penis (complete and follow up)          2. Intracavernosal injection of pharmacologic agent   Blood loss: None     Specimen: None     Procedure in detail:   The risks, benefits, complications, treatment options, and expected outcomes were discussed with the patient. The patient concurred with the proposed plan, giving informed consent.     In the flaccid state the patient's cavernosal artery was found with the doppler ultrasound. This was first done on the R and then on the L. His peak systolic velocity was 11.0 cm/s on the R and 8.29 cm/s  on the L.  End diastolic velocity was 2.47 cm/s on the R and 3.09 cm on the L.   The resistive index was 0.78 on the R and 0.63 on the L.    Using standard sterile technique, the patient was then injected with 90 mg of papaverine. A full erection was achieved.  He had approximately 30 degree curvature dorsally at the mid shaft.    The cavernosal arteries were then measured again with the doppler ultrasound. His peak systolic velocity was 19.8  cm/s on the R and 13.5 cm/s on the L. End diastolic velocity was 2.10 cm/s on the R and 4.08 cm/s on the L.   The resistive index was 0.89 on the R and 0.70 on the L.    He tolerated the procedure well without complication. He will be observed to ensure detumescence. He will be discharged home in stable condition and follow up to discuss his treatment options in the next 1-2 weeks.

## 2023-05-05 NOTE — PROGRESS NOTES
After Bene's doppler, we discussed surgical options for his peyronie's.  We discussed IPP, Xiaflex, plication, and incision and grafting as well as the indications for each.  We discussed these in conjunction with his ultrasound findings.  He was given the chance to ask questions.  Alternative treatments were discussed as well.  He would like to proceed with plication and circ.    We discussed the risks and benefits of penile plication for Peyronie's disease.  We discussed penile shortening, ED, infection, pain, failure to improve the curve, and decreased sensation amongst other risks.  He was given the chance to ask questions, and alternatives were discussed.    The patient is scheduled for a circumcision.  The risks and benefits were explained to the patient in detail.  The risks include but are not limited to bleeding, infection, pain,  fistula formation (hole in the urine tube), meatal stenosis (ulceration/scarring at the tip of the penis, skin bridge, removal of too much or too little skin, buried penis, penile swelling of discomfort, infection of incision or bleeding (usually mild) that may result in a hospital treatment.  The Patient was given the alternatives of observation and medical therapy as well. He was given the chance to ask questions.  The patient understands these risks and has agreed to proceed with surgery.

## 2023-05-08 ENCOUNTER — TELEPHONE (OUTPATIENT)
Dept: UROLOGY | Facility: CLINIC | Age: 68
End: 2023-05-08
Payer: COMMERCIAL

## 2023-05-08 ENCOUNTER — TELEPHONE (OUTPATIENT)
Dept: PRIMARY CARE CLINIC | Facility: CLINIC | Age: 68
End: 2023-05-08
Payer: COMMERCIAL

## 2023-05-08 DIAGNOSIS — N48.6 PEYRONIE'S DISEASE: Primary | ICD-10-CM

## 2023-05-08 NOTE — TELEPHONE ENCOUNTER
----- Message from Chelsea Gastelum MA sent at 5/8/2023  4:08 PM CDT -----  Good Evening,  The patient is scheduled to have surgery with Dr Monroy in Urology for a Penile Plication/ circ on 08-. And Dr Monroy would like Medical Clearance.  Can you help me with this.    Chelsea Gastelum  Urology Surgery Scheduler  [phone]

## 2023-05-18 ENCOUNTER — OFFICE VISIT (OUTPATIENT)
Dept: PODIATRY | Facility: CLINIC | Age: 68
End: 2023-05-18
Payer: OTHER MISCELLANEOUS

## 2023-05-18 VITALS
SYSTOLIC BLOOD PRESSURE: 156 MMHG | BODY MASS INDEX: 36.73 KG/M2 | DIASTOLIC BLOOD PRESSURE: 84 MMHG | HEART RATE: 75 BPM | HEIGHT: 71 IN | WEIGHT: 262.38 LBS

## 2023-05-18 DIAGNOSIS — Z89.432 S/P TRANSMETATARSAL AMPUTATION OF FOOT, LEFT: Primary | ICD-10-CM

## 2023-05-18 DIAGNOSIS — G60.9 IDIOPATHIC PERIPHERAL NEUROPATHY: ICD-10-CM

## 2023-05-18 DIAGNOSIS — Z79.01 CURRENT USE OF LONG TERM ANTICOAGULATION: ICD-10-CM

## 2023-05-18 PROCEDURE — 99213 PR OFFICE/OUTPT VISIT, EST, LEVL III, 20-29 MIN: ICD-10-PCS | Mod: S$GLB,,, | Performed by: PODIATRIST

## 2023-05-18 PROCEDURE — 99213 OFFICE O/P EST LOW 20 MIN: CPT | Mod: S$GLB,,, | Performed by: PODIATRIST

## 2023-05-18 PROCEDURE — 99999 PR PBB SHADOW E&M-EST. PATIENT-LVL III: CPT | Mod: PBBFAC,,, | Performed by: PODIATRIST

## 2023-05-18 PROCEDURE — 99999 PR PBB SHADOW E&M-EST. PATIENT-LVL III: ICD-10-PCS | Mod: PBBFAC,,, | Performed by: PODIATRIST

## 2023-05-23 ENCOUNTER — PATIENT MESSAGE (OUTPATIENT)
Dept: RESEARCH | Facility: HOSPITAL | Age: 68
End: 2023-05-23
Payer: COMMERCIAL

## 2023-05-28 NOTE — PROGRESS NOTES
Subjective:      Patient ID: Marcus Watkins is a 67 y.o. male.    Chief Complaint:   Follow-up and Foot Pain (Left )  Patient is here for follow-up status post TMA procedure.  He is doing very well.  He is awaiting shoe gear/orthotics with foot filler.  No new issues.      Review of Systems   Constitutional: Negative for chills, decreased appetite, fever and night sweats.   HENT:  Negative for congestion, ear discharge, nosebleeds and tinnitus.    Eyes:  Negative for double vision, pain and visual disturbance.   Cardiovascular:  Negative for chest pain, claudication, cyanosis and palpitations.   Respiratory:  Negative for cough, hemoptysis, shortness of breath and wheezing.    Endocrine: Negative for cold intolerance and heat intolerance.   Hematologic/Lymphatic: Negative for adenopathy and bleeding problem.   Skin:  Positive for color change, dry skin, nail changes and unusual hair distribution.   Musculoskeletal:  Positive for arthritis, joint pain and stiffness. Negative for myalgias.   Gastrointestinal:  Negative for abdominal pain, dysphagia, nausea and vomiting.   Genitourinary:  Negative for dysuria, flank pain, hematuria and pelvic pain.   Neurological:  Positive for disturbances in coordination, numbness, paresthesias and sensory change.   Psychiatric/Behavioral:  Negative for altered mental status, hallucinations and suicidal ideas. The patient does not have insomnia.    Allergic/Immunologic: Negative for environmental allergies and persistent infections.         Objective:      Physical Exam  Vitals reviewed.   Constitutional:       Appearance: He is well-developed.   HENT:      Head: Normocephalic and atraumatic.   Cardiovascular:      Pulses:           Dorsalis pedis pulses are 1+ on the right side and 1+ on the left side.        Posterior tibial pulses are 1+ on the right side and 1+ on the left side.   Pulmonary:      Effort: Pulmonary effort is normal.   Musculoskeletal:         General: Normal range  of motion.      Comments: Anterior, lateral, and posterior muscle groups bilateral lower extremities show strength 4 over 5 symmetrically. Inspection and palpation of the joints and bones reveal no crepitus or joint effusion. No tenderness upon palpation. Mild plantar flexor contractures noted to digits 2 through 5 bilaterally.  Angle and base of gait are normal.   Feet:      Right foot:      Skin integrity: Callus and dry skin present.      Left foot:      Skin integrity: Callus and dry skin present.   Skin:     General: Skin is warm and dry.      Capillary Refill: Capillary refill takes 2 to 3 seconds.      Coloration: Skin is pale.      Comments: Skin bilateral lower extremities noted to be thin, dry, and atrophic.  TMA site healed.   Neurological:      Mental Status: He is alert and oriented to person, place, and time.      Sensory: Sensory deficit present.      Motor: Atrophy present.      Deep Tendon Reflexes: Reflexes abnormal.      Reflex Scores:       Patellar reflexes are 1+ on the right side and 1+ on the left side.       Achilles reflexes are 1+ on the right side and 1+ on the left side.     Comments: Sharp, dull, light touch, and vibratory sensation are diminished bilaterally. Proprioceptive sensation is intact to both lower extremities. Orange Cecilia monofilament exam shows loss of protective sensation to plantar toes 1 through 5 bilaterally. Deep tendon reflexes to the patellar tendons is 1 over 4 bilaterally symmetrical. Deep tendon reflexes to the Achilles tendon is 0 over 4 bilaterally symmetrical. No ankle clonus or Babinski reflex noted bilaterally. Coordination is fair to both lower extremities.  Patient admits to intermittent burning and tingling in the feet.   Psychiatric:         Behavior: Behavior normal.             Assessment:       Encounter Diagnoses   Name Primary?    S/P transmetatarsal amputation of foot, left Yes    Idiopathic peripheral neuropathy     Current use of long term  anticoagulation      Independent visualization of imaging was performed.  Results were reviewed in detail with patient.       Plan:       Marcus was seen today for follow-up and foot pain.    Diagnoses and all orders for this visit:    S/P transmetatarsal amputation of foot, left    Idiopathic peripheral neuropathy    Current use of long term anticoagulation      I counseled the patient on his conditions, their implications and medical management.    The nature of the condition, options for management, as well as potential risks and complications were discussed in detail with patient. Patient was amenable to my recommendations and left my office fully informed and will follow up as instructed or sooner if necessary.      Patient no longer needs disability from work.  He is released to full duty without restrictions.  Follow-up in 3 months for routine foot evaluation.

## 2023-05-29 ENCOUNTER — OFFICE VISIT (OUTPATIENT)
Dept: PRIMARY CARE CLINIC | Facility: CLINIC | Age: 68
End: 2023-05-29
Payer: COMMERCIAL

## 2023-05-29 VITALS
RESPIRATION RATE: 18 BRPM | DIASTOLIC BLOOD PRESSURE: 74 MMHG | TEMPERATURE: 98 F | WEIGHT: 266.44 LBS | SYSTOLIC BLOOD PRESSURE: 138 MMHG | BODY MASS INDEX: 37.3 KG/M2 | HEIGHT: 71 IN | HEART RATE: 78 BPM | OXYGEN SATURATION: 97 %

## 2023-05-29 DIAGNOSIS — N48.6 PEYRONIE'S DISEASE: ICD-10-CM

## 2023-05-29 DIAGNOSIS — E66.01 SEVERE OBESITY (BMI 35.0-39.9) WITH COMORBIDITY: ICD-10-CM

## 2023-05-29 DIAGNOSIS — Z01.818 PREOPERATIVE EXAMINATION: Primary | ICD-10-CM

## 2023-05-29 DIAGNOSIS — I48.0 PAROXYSMAL ATRIAL FIBRILLATION: ICD-10-CM

## 2023-05-29 PROCEDURE — 3044F PR MOST RECENT HEMOGLOBIN A1C LEVEL <7.0%: ICD-10-PCS | Mod: CPTII,S$GLB,, | Performed by: FAMILY MEDICINE

## 2023-05-29 PROCEDURE — 3288F FALL RISK ASSESSMENT DOCD: CPT | Mod: CPTII,S$GLB,, | Performed by: FAMILY MEDICINE

## 2023-05-29 PROCEDURE — 99214 PR OFFICE/OUTPT VISIT, EST, LEVL IV, 30-39 MIN: ICD-10-PCS | Mod: S$GLB,,, | Performed by: FAMILY MEDICINE

## 2023-05-29 PROCEDURE — 1101F PT FALLS ASSESS-DOCD LE1/YR: CPT | Mod: CPTII,S$GLB,, | Performed by: FAMILY MEDICINE

## 2023-05-29 PROCEDURE — 3008F PR BODY MASS INDEX (BMI) DOCUMENTED: ICD-10-PCS | Mod: CPTII,S$GLB,, | Performed by: FAMILY MEDICINE

## 2023-05-29 PROCEDURE — 3044F HG A1C LEVEL LT 7.0%: CPT | Mod: CPTII,S$GLB,, | Performed by: FAMILY MEDICINE

## 2023-05-29 PROCEDURE — 99999 PR PBB SHADOW E&M-EST. PATIENT-LVL III: CPT | Mod: PBBFAC,,, | Performed by: FAMILY MEDICINE

## 2023-05-29 PROCEDURE — 93010 EKG 12-LEAD: ICD-10-PCS | Mod: S$GLB,,, | Performed by: INTERNAL MEDICINE

## 2023-05-29 PROCEDURE — 93010 ELECTROCARDIOGRAM REPORT: CPT | Mod: S$GLB,,, | Performed by: INTERNAL MEDICINE

## 2023-05-29 PROCEDURE — 93005 ELECTROCARDIOGRAM TRACING: CPT | Mod: S$GLB,,, | Performed by: FAMILY MEDICINE

## 2023-05-29 PROCEDURE — 1159F MED LIST DOCD IN RCRD: CPT | Mod: CPTII,S$GLB,, | Performed by: FAMILY MEDICINE

## 2023-05-29 PROCEDURE — 99999 PR PBB SHADOW E&M-EST. PATIENT-LVL III: ICD-10-PCS | Mod: PBBFAC,,, | Performed by: FAMILY MEDICINE

## 2023-05-29 PROCEDURE — 1126F AMNT PAIN NOTED NONE PRSNT: CPT | Mod: CPTII,S$GLB,, | Performed by: FAMILY MEDICINE

## 2023-05-29 PROCEDURE — 99214 OFFICE O/P EST MOD 30 MIN: CPT | Mod: S$GLB,,, | Performed by: FAMILY MEDICINE

## 2023-05-29 PROCEDURE — 1160F RVW MEDS BY RX/DR IN RCRD: CPT | Mod: CPTII,S$GLB,, | Performed by: FAMILY MEDICINE

## 2023-05-29 PROCEDURE — 99499 UNLISTED E&M SERVICE: CPT | Mod: S$GLB,,, | Performed by: FAMILY MEDICINE

## 2023-05-29 PROCEDURE — 1159F PR MEDICATION LIST DOCUMENTED IN MEDICAL RECORD: ICD-10-PCS | Mod: CPTII,S$GLB,, | Performed by: FAMILY MEDICINE

## 2023-05-29 PROCEDURE — 3075F SYST BP GE 130 - 139MM HG: CPT | Mod: CPTII,S$GLB,, | Performed by: FAMILY MEDICINE

## 2023-05-29 PROCEDURE — 3078F DIAST BP <80 MM HG: CPT | Mod: CPTII,S$GLB,, | Performed by: FAMILY MEDICINE

## 2023-05-29 PROCEDURE — 1160F PR REVIEW ALL MEDS BY PRESCRIBER/CLIN PHARMACIST DOCUMENTED: ICD-10-PCS | Mod: CPTII,S$GLB,, | Performed by: FAMILY MEDICINE

## 2023-05-29 PROCEDURE — 3288F PR FALLS RISK ASSESSMENT DOCUMENTED: ICD-10-PCS | Mod: CPTII,S$GLB,, | Performed by: FAMILY MEDICINE

## 2023-05-29 PROCEDURE — 93005 EKG 12-LEAD: ICD-10-PCS | Mod: S$GLB,,, | Performed by: FAMILY MEDICINE

## 2023-05-29 PROCEDURE — 1126F PR PAIN SEVERITY QUANTIFIED, NO PAIN PRESENT: ICD-10-PCS | Mod: CPTII,S$GLB,, | Performed by: FAMILY MEDICINE

## 2023-05-29 PROCEDURE — 1101F PR PT FALLS ASSESS DOC 0-1 FALLS W/OUT INJ PAST YR: ICD-10-PCS | Mod: CPTII,S$GLB,, | Performed by: FAMILY MEDICINE

## 2023-05-29 PROCEDURE — 3008F BODY MASS INDEX DOCD: CPT | Mod: CPTII,S$GLB,, | Performed by: FAMILY MEDICINE

## 2023-05-29 PROCEDURE — 3078F PR MOST RECENT DIASTOLIC BLOOD PRESSURE < 80 MM HG: ICD-10-PCS | Mod: CPTII,S$GLB,, | Performed by: FAMILY MEDICINE

## 2023-05-29 PROCEDURE — 99499 RISK ADDL DX/OHS AUDIT: ICD-10-PCS | Mod: S$GLB,,, | Performed by: FAMILY MEDICINE

## 2023-05-29 PROCEDURE — 3075F PR MOST RECENT SYSTOLIC BLOOD PRESS GE 130-139MM HG: ICD-10-PCS | Mod: CPTII,S$GLB,, | Performed by: FAMILY MEDICINE

## 2023-05-29 NOTE — PROGRESS NOTES
"Subjective:       Patient ID: Marcus Watkins is a 67 y.o. male.    Chief Complaint: Surgery Clearance    Scheduled for penile plication for Peyronie disease by Dr. Monroy.  No previous surgical complications.  No recent illness or injury.    Review of Systems   Constitutional:  Negative for chills and fever.   HENT:  Negative for trouble swallowing.    Respiratory:  Negative for shortness of breath.    Cardiovascular:  Negative for chest pain and palpitations.   Gastrointestinal:  Negative for blood in stool, nausea and vomiting.   Genitourinary:  Negative for difficulty urinating.   Skin:  Negative for wound.   Psychiatric/Behavioral:  Negative for agitation and confusion.      Objective:      Vitals:    05/29/23 1221   BP: 138/74   BP Location: Right arm   Patient Position: Sitting   BP Method: Large (Manual)   Pulse: 78   Resp: 18   Temp: 98 °F (36.7 °C)   TempSrc: Temporal   SpO2: 97%   Weight: 120.8 kg (266 lb 6.8 oz)   Height: 5' 11" (1.803 m)     Physical Exam  Vitals and nursing note reviewed.   Constitutional:       General: He is not in acute distress.     Appearance: Normal appearance. He is well-developed.   HENT:      Head: Normocephalic and atraumatic.      Mouth/Throat:      Mouth: Mucous membranes are moist.      Pharynx: Oropharynx is clear.      Comments: Mallampati class 1  Neck:      Vascular: No carotid bruit.   Cardiovascular:      Rate and Rhythm: Normal rate and regular rhythm.      Heart sounds: Normal heart sounds.   Pulmonary:      Effort: Pulmonary effort is normal.      Breath sounds: Normal breath sounds.   Musculoskeletal:      Right lower leg: No edema.      Left lower leg: No edema.   Skin:     General: Skin is warm and dry.   Neurological:      Mental Status: He is alert and oriented to person, place, and time.   Psychiatric:         Mood and Affect: Mood normal.         Behavior: Behavior normal.       Lab Results   Component Value Date    WBC 9.62 03/08/2023    HGB 14.8 03/08/2023 "    HCT 44.3 03/08/2023     03/08/2023    CHOL 197 03/08/2023    TRIG 86 03/08/2023    HDL 44 03/08/2023    ALT 19 03/08/2023    AST 16 03/08/2023     03/08/2023    K 4.1 03/08/2023     03/08/2023    CREATININE 0.8 03/08/2023    BUN 12 03/08/2023    CO2 22 (L) 03/08/2023    TSH 1.068 03/08/2023    PSA 2.1 03/08/2023    INR 0.9 10/16/2020    HGBA1C 5.9 (H) 03/08/2023      Assessment:       1. Preoperative examination    2. Peyronie's disease    3. Severe obesity (BMI 35.0-39.9) with comorbidity    4. Paroxysmal atrial fibrillation        Plan:       Preoperative examination  -     EKG 12-lead  EKG normal.  Patient is medically cleared for surgery under general anesthesia.  Okay to hold Eliquis for 48 hours before surgery.  Peyronie's disease    Severe obesity (BMI 35.0-39.9) with comorbidity  Lifestyle modification  Paroxysmal atrial fibrillation  Continue beta-blocker and Eliquis    Medication List with Changes/Refills   Current Medications    APIXABAN (ELIQUIS) 5 MG TAB    Take 1 tablet (5 mg total) by mouth 2 (two) times daily.    METOPROLOL SUCCINATE (TOPROL-XL) 25 MG 24 HR TABLET    Take 1 tablet (25 mg total) by mouth once daily.    MULTIVIT-MIN/FOLIC/VIT K/LYCOP (ONE-A-DAY MEN'S 50 PLUS ORAL)    Take 1 tablet by mouth once daily.    OMEGA-3 FATTY ACIDS/FISH OIL (FISH OIL-OMEGA-3 FATTY ACIDS) 300-1,000 MG CAPSULE    Take 1 capsule by mouth once daily.    VITAMIN E ACETATE ORAL    Take 1 tablet by mouth once daily.   Discontinued Medications    CRANBERRY 500 MG CAP    Take 1 capsule by mouth once daily.

## 2023-07-05 ENCOUNTER — TELEPHONE (OUTPATIENT)
Dept: UROLOGY | Facility: CLINIC | Age: 68
End: 2023-07-05
Payer: COMMERCIAL

## 2023-07-05 NOTE — TELEPHONE ENCOUNTER
Call placed to patient. Name and date of birth verified. Patient informed he has been clear for surgery by Dr. Benedict stating that patient is to hold eliquis for 48 hrs prior to surgery, as noted in epic. Patient informed he will be contacted by Dr. Monroy surgery scheduler in regards to arrival time information. Patient verbalized understanding.

## 2023-07-05 NOTE — TELEPHONE ENCOUNTER
----- Message from [place] LPN sent at 6/29/2023  4:11 PM CDT -----  Regarding: FW: Medication Review  Contact: [phone]    ----- Message -----  From: Emerita Stout  Sent: 6/29/2023   1:36 PM CDT  To: [facility] Staff  Subject: Medication Review                                Calling to speak with provider or nurse regarding how patient should be taking blood thinners prior to surgery.

## 2023-07-11 ENCOUNTER — ANESTHESIA EVENT (OUTPATIENT)
Dept: SURGERY | Facility: HOSPITAL | Age: 68
End: 2023-07-11
Payer: COMMERCIAL

## 2023-07-11 NOTE — ANESTHESIA PREPROCEDURE EVALUATION
07/11/2023  Mracus Watkins is a 67 y.o., male.      Pre-op Assessment    I have reviewed the Patient Summary Reports.    I have reviewed the NPO Status.   I have reviewed the Medications.     Review of Systems  Anesthesia Hx:  Denies Family Hx of Anesthesia complications.   Denies Personal Hx of Anesthesia complications.   Cardiovascular:   Valvular problems/Murmurs, MR Dysrhythmias (paroxysmal s/p ablation) atrial fibrillation NYHA Classification II Normal left ventricular systolic function (EF 55-60%).     2 - Biatrial enlargement.     3 - Impaired LV relaxation, elevated LAP (grade 2 diastolic dysfunction).     4 - Normal right ventricular systolic function .     5 - The estimated PA systolic pressure is 34 mmHg.     6 - Mild mitral regurgitation.     7 - Mild tricuspid regurgitation.    Renal/:   BPH    Neurological:   Neuromuscular Disease,    Endocrine:   Hypothyroidism: nodules.       S/P transmetatarsal amputation of foot, left     Idiopathic peripheral neuropathy     Current use of long term anticoagulation           Physical Exam  General: Well nourished, Cooperative, Alert and Oriented    Airway:  Mallampati: II   Mouth Opening: Normal  TM Distance: Normal  Tongue: Normal  Neck ROM: Normal ROM    Dental:  Dentures  6 present, chips      Anesthesia Plan  Type of Anesthesia, risks & benefits discussed:    Anesthesia Type: Gen ETT  Intra-op Monitoring Plan: Standard ASA Monitors  Post Op Pain Control Plan: multimodal analgesia and IV/PO Opioids PRN  Induction:  IV  Informed Consent: Informed consent signed with the Patient and all parties understand the risks and agree with anesthesia plan.  All questions answered.   ASA Score: 3  Day of Surgery Review of History & Physical: H&P Update referred to the surgeon/provider.I have interviewed and examined the patient. I have reviewed the patient's H&P  dated: There are no significant changes. H&P completed by Anesthesiologist.    Ready For Surgery From Anesthesia Perspective.     .

## 2023-07-12 ENCOUNTER — TELEPHONE (OUTPATIENT)
Dept: UROLOGY | Facility: CLINIC | Age: 68
End: 2023-07-12
Payer: COMMERCIAL

## 2023-07-12 ENCOUNTER — PATIENT MESSAGE (OUTPATIENT)
Dept: PREADMISSION TESTING | Facility: HOSPITAL | Age: 68
End: 2023-07-12
Payer: COMMERCIAL

## 2023-07-12 NOTE — TELEPHONE ENCOUNTER
I called the patient to make sure he has taken any asa products in the last 7 days. The patient stated no he hasn't.

## 2023-07-12 NOTE — PRE-PROCEDURE INSTRUCTIONS
Following instructions given via phone & sent via my chart:    On the day of surgery please report to [facility] located at [address] [place] [address].  Please park in the lot in front of the building and enter at the main entrance. Proceed to the second floor for registration.    Arrival time: 7 AM    You may not drive home after your procedure. You may not leave alone in an uber, taxi, etc.  You must be discharged to a responsible adult. Please remain under adult supervision for 24 hours.   If possible, please have your visitor &/or ride home stay during your visit.   The surgeon should speak with your visitors after your procedure.  All visitors must be 18 years of age or older. Please limit your visitors to max of 2 people.    No food, no drink after midnight.    Take the following medications with water the morning of your procedure:   Metoprolol    Please hold all other medications.     Please stop all vitamins, minerals and NSAIDS today.  NSAIDS include Advil, Ibuprofen, Aspirin, Mobic, Naproxen, Anaprox, Aleve, Excedrin, Voltaren, Celebrex, etc.     You may take Tylenol for pain.       If applicable, do not shave the surgical site 5 days prior to your procedure.  Shower the night before and the morning of your procedure with antibacterial soap.   Please do not use antibacterial soap to wash your face. Use your regular face soap.  Do not apply any products to your skin nor your hair after you shower the morning of your procedure.  Products include oils, powders, lotion, deodorant, make-up and sunscreen.    Wear loose, comfortable clothing.  No jewelry. No contacts. Bring glasses if necessary.  If you have dentures, please bring a case.  Please leave all valuables at home.    Thanks,  Kailee, ÁNGEL  [phone]

## 2023-07-13 ENCOUNTER — HOSPITAL ENCOUNTER (OUTPATIENT)
Facility: HOSPITAL | Age: 68
Discharge: HOME | End: 2023-07-13
Attending: UROLOGY | Admitting: UROLOGY
Payer: COMMERCIAL

## 2023-07-13 ENCOUNTER — ANESTHESIA (OUTPATIENT)
Dept: SURGERY | Facility: HOSPITAL | Age: 68
End: 2023-07-13
Payer: COMMERCIAL

## 2023-07-13 VITALS
TEMPERATURE: 98 F | WEIGHT: 262 LBS | DIASTOLIC BLOOD PRESSURE: 79 MMHG | BODY MASS INDEX: 36.68 KG/M2 | HEIGHT: 71 IN | HEART RATE: 67 BPM | SYSTOLIC BLOOD PRESSURE: 139 MMHG | RESPIRATION RATE: 16 BRPM | OXYGEN SATURATION: 98 %

## 2023-07-13 DIAGNOSIS — N13.8 BPH WITH URINARY OBSTRUCTION: ICD-10-CM

## 2023-07-13 DIAGNOSIS — N40.1 BPH WITH URINARY OBSTRUCTION: ICD-10-CM

## 2023-07-13 DIAGNOSIS — N48.6 PEYRONIE DISEASE: Primary | ICD-10-CM

## 2023-07-13 PROCEDURE — 63600175 PHARM REV CODE 636 W HCPCS: Performed by: NURSE ANESTHETIST, CERTIFIED REGISTERED

## 2023-07-13 PROCEDURE — 54360 PENIS PLASTIC SURGERY: CPT | Mod: ,,, | Performed by: UROLOGY

## 2023-07-13 PROCEDURE — 54235 NJX CORPORA CAVERNOSA RX AGT: CPT | Mod: 51,,, | Performed by: UROLOGY

## 2023-07-13 PROCEDURE — 63600175 PHARM REV CODE 636 W HCPCS: Performed by: UROLOGY

## 2023-07-13 PROCEDURE — 25000003 PHARM REV CODE 250: Performed by: NURSE ANESTHETIST, CERTIFIED REGISTERED

## 2023-07-13 PROCEDURE — 54161 CIRCUM 28 DAYS OR OLDER: CPT | Mod: 51,,, | Performed by: UROLOGY

## 2023-07-13 PROCEDURE — D9220A PRA ANESTHESIA: ICD-10-PCS | Mod: ,,, | Performed by: NURSE ANESTHETIST, CERTIFIED REGISTERED

## 2023-07-13 PROCEDURE — 37000008 HC ANESTHESIA 1ST 15 MINUTES: Performed by: UROLOGY

## 2023-07-13 PROCEDURE — 54360 PR PENIS PLASTIC SURG,CORRECT ANGULATN: ICD-10-PCS | Mod: ,,, | Performed by: UROLOGY

## 2023-07-13 PROCEDURE — 71000015 HC POSTOP RECOV 1ST HR: Performed by: UROLOGY

## 2023-07-13 PROCEDURE — 36000707: Performed by: UROLOGY

## 2023-07-13 PROCEDURE — 71000016 HC POSTOP RECOV ADDL HR: Performed by: UROLOGY

## 2023-07-13 PROCEDURE — D9220A PRA ANESTHESIA: Mod: ,,, | Performed by: NURSE ANESTHETIST, CERTIFIED REGISTERED

## 2023-07-13 PROCEDURE — 36000706: Performed by: UROLOGY

## 2023-07-13 PROCEDURE — 54235 PR INJECT CORPORA CAVERN,PHARM AGNT: ICD-10-PCS | Mod: 51,,, | Performed by: UROLOGY

## 2023-07-13 PROCEDURE — 25000003 PHARM REV CODE 250: Performed by: ANESTHESIOLOGY

## 2023-07-13 PROCEDURE — 88304 TISSUE EXAM BY PATHOLOGIST: CPT | Performed by: STUDENT IN AN ORGANIZED HEALTH CARE EDUCATION/TRAINING PROGRAM

## 2023-07-13 PROCEDURE — 88304 PR  SURG PATH,LEVEL III: ICD-10-PCS | Mod: 26,,, | Performed by: STUDENT IN AN ORGANIZED HEALTH CARE EDUCATION/TRAINING PROGRAM

## 2023-07-13 PROCEDURE — 99900035 HC TECH TIME PER 15 MIN (STAT)

## 2023-07-13 PROCEDURE — 54161 PR CIRCUMCISION - SURGICAL NO CLAMP/DEVICE, 29+ DAYS OF AGE ONLY: ICD-10-PCS | Mod: 51,,, | Performed by: UROLOGY

## 2023-07-13 PROCEDURE — 94799 UNLISTED PULMONARY SVC/PX: CPT

## 2023-07-13 PROCEDURE — 37000009 HC ANESTHESIA EA ADD 15 MINS: Performed by: UROLOGY

## 2023-07-13 PROCEDURE — 71000033 HC RECOVERY, INTIAL HOUR: Performed by: UROLOGY

## 2023-07-13 PROCEDURE — 94761 N-INVAS EAR/PLS OXIMETRY MLT: CPT

## 2023-07-13 PROCEDURE — 88304 TISSUE EXAM BY PATHOLOGIST: CPT | Mod: 26,,, | Performed by: STUDENT IN AN ORGANIZED HEALTH CARE EDUCATION/TRAINING PROGRAM

## 2023-07-13 RX ORDER — FENTANYL CITRATE 50 UG/ML
INJECTION, SOLUTION INTRAMUSCULAR; INTRAVENOUS
Status: DISCONTINUED | OUTPATIENT
Start: 2023-07-13 | End: 2023-07-13

## 2023-07-13 RX ORDER — GABAPENTIN 300 MG/1
600 CAPSULE ORAL
Status: COMPLETED | OUTPATIENT
Start: 2023-07-13 | End: 2023-07-13

## 2023-07-13 RX ORDER — OXYCODONE AND ACETAMINOPHEN 5; 325 MG/1; MG/1
1 TABLET ORAL EVERY 4 HOURS PRN
Qty: 5 TABLET | Refills: 0 | Status: SHIPPED | OUTPATIENT
Start: 2023-07-13

## 2023-07-13 RX ORDER — MORPHINE SULFATE 2 MG/ML
1 INJECTION, SOLUTION INTRAMUSCULAR; INTRAVENOUS EVERY 10 MIN PRN
Status: ACTIVE | OUTPATIENT
Start: 2023-07-13

## 2023-07-13 RX ORDER — ROCURONIUM BROMIDE 10 MG/ML
INJECTION, SOLUTION INTRAVENOUS
Status: DISCONTINUED | OUTPATIENT
Start: 2023-07-13 | End: 2023-07-13

## 2023-07-13 RX ORDER — SUCCINYLCHOLINE CHLORIDE 20 MG/ML
INJECTION INTRAMUSCULAR; INTRAVENOUS
Status: DISCONTINUED | OUTPATIENT
Start: 2023-07-13 | End: 2023-07-13

## 2023-07-13 RX ORDER — LIDOCAINE HYDROCHLORIDE 10 MG/ML
INJECTION, SOLUTION INTRAVENOUS
Status: DISCONTINUED | OUTPATIENT
Start: 2023-07-13 | End: 2023-07-13

## 2023-07-13 RX ORDER — ONDANSETRON 2 MG/ML
4 INJECTION INTRAMUSCULAR; INTRAVENOUS ONCE AS NEEDED
Status: ACTIVE | OUTPATIENT
Start: 2023-07-13 | End: 2034-12-09

## 2023-07-13 RX ORDER — ACETAMINOPHEN 500 MG
1000 TABLET ORAL
Status: COMPLETED | OUTPATIENT
Start: 2023-07-13 | End: 2023-07-13

## 2023-07-13 RX ORDER — PROPOFOL 10 MG/ML
VIAL (ML) INTRAVENOUS
Status: DISCONTINUED | OUTPATIENT
Start: 2023-07-13 | End: 2023-07-13

## 2023-07-13 RX ORDER — PHENYLEPHRINE HYDROCHLORIDE 10 MG/ML
INJECTION INTRAVENOUS
Status: DISCONTINUED | OUTPATIENT
Start: 2023-07-13 | End: 2023-07-13 | Stop reason: HOSPADM

## 2023-07-13 RX ORDER — FAMOTIDINE 10 MG/ML
INJECTION INTRAVENOUS
Status: DISCONTINUED | OUTPATIENT
Start: 2023-07-13 | End: 2023-07-13

## 2023-07-13 RX ORDER — ONDANSETRON 2 MG/ML
INJECTION INTRAMUSCULAR; INTRAVENOUS
Status: DISCONTINUED | OUTPATIENT
Start: 2023-07-13 | End: 2023-07-13

## 2023-07-13 RX ORDER — MIDAZOLAM HYDROCHLORIDE 1 MG/ML
INJECTION, SOLUTION INTRAMUSCULAR; INTRAVENOUS
Status: DISCONTINUED | OUTPATIENT
Start: 2023-07-13 | End: 2023-07-13

## 2023-07-13 RX ORDER — HYDROCODONE BITARTRATE AND ACETAMINOPHEN 5; 325 MG/1; MG/1
1 TABLET ORAL ONCE AS NEEDED
Status: ACTIVE | OUTPATIENT
Start: 2023-07-13

## 2023-07-13 RX ORDER — PAPAVERINE HYDROCHLORIDE 30 MG/ML
INJECTION INTRAMUSCULAR; INTRAVENOUS
Status: DISCONTINUED | OUTPATIENT
Start: 2023-07-13 | End: 2023-07-13 | Stop reason: HOSPADM

## 2023-07-13 RX ADMIN — FENTANYL CITRATE 100 MCG: 50 INJECTION, SOLUTION INTRAMUSCULAR; INTRAVENOUS at 08:07

## 2023-07-13 RX ADMIN — PROPOFOL 150 MG: 10 INJECTION, EMULSION INTRAVENOUS at 08:07

## 2023-07-13 RX ADMIN — LIDOCAINE HYDROCHLORIDE 50 MG: 10 INJECTION, SOLUTION INTRAVENOUS at 08:07

## 2023-07-13 RX ADMIN — SUCCINYLCHOLINE CHLORIDE 140 MG: 20 INJECTION, SOLUTION INTRAMUSCULAR; INTRAVENOUS at 08:07

## 2023-07-13 RX ADMIN — ONDANSETRON 4 MG: 2 INJECTION INTRAMUSCULAR; INTRAVENOUS at 08:07

## 2023-07-13 RX ADMIN — PROPOFOL 50 MG: 10 INJECTION, EMULSION INTRAVENOUS at 08:07

## 2023-07-13 RX ADMIN — FAMOTIDINE 20 MG: 10 INJECTION, SOLUTION INTRAVENOUS at 08:07

## 2023-07-13 RX ADMIN — GABAPENTIN 600 MG: 300 CAPSULE ORAL at 06:07

## 2023-07-13 RX ADMIN — SODIUM CHLORIDE: 9 INJECTION, SOLUTION INTRAVENOUS at 08:07

## 2023-07-13 RX ADMIN — ACETAMINOPHEN 1000 MG: 500 TABLET ORAL at 06:07

## 2023-07-13 RX ADMIN — ROCURONIUM BROMIDE 5 MG: 10 INJECTION INTRAVENOUS at 08:07

## 2023-07-13 RX ADMIN — MIDAZOLAM HYDROCHLORIDE 2 MG: 1 INJECTION, SOLUTION INTRAMUSCULAR; INTRAVENOUS at 08:07

## 2023-07-13 NOTE — PLAN OF CARE
Discharge instructions given and explained to patient and family with verbalization of understanding all instructions. Prescription given and explained next time and doses of each medication. Ambulated and voided prior to DC.  Patients v/s stable, denies n/v and tolerating po, rates pain level tolerable, IV removed, and family at bedside for patient discharge home.

## 2023-07-13 NOTE — DISCHARGE INSTRUCTIONS
Instructions    Post Penile Skin Surgery Instructions     No sex or masturbation for SIX weeks  Ok to remove dressing in 1 days  Do not get your incision wet for 2 days  If you had a circumcision, ok to put bacitracin/Neosporin on incision to keep it from sticking to your underwear.  Return to ER or call [phone] and ask to speak with the urology clinic if you have any excessive bleeding or swelling  Use Tylenol or Advil first then use the pain medicine we prescribe for breakthrough pain medicine, do not exceed 4000mg of Tylenol  If you see excessive bleeding or swelling, return to ER

## 2023-07-13 NOTE — OP NOTE
[facility]   Operative Note     Date: 07/13/2023     Pre-Op Diagnosis: Peyronie's disease     Post-Op Diagnosis: same    Procedure(s) Performed:   1.  Plastic operation of the penis for correction of penile angulation (16 dot penile plication)   2.  Intracavernosal injection of pharmacologic agent   3.  Circumcision    Specimen(s): none    Staff Surgeon: Sherman Monroy MD     Assistant Surgeon: Carlos Grace MD    Anesthesia: General endotracheal anesthesia, local anesthetic    Indications:  Marcus Watkins is a 67 y.o. male with Peyronie's disease with a dorsal curvature of 30 degrees. A penile doppler ultrasound was performed preoperatively which showed no venous leak. He stated he has good erections and elects for penile plication.    Findings:   30 degree dorsal curvature as previously observed  Penis adequately straightened with 16-dot plication ventrally  Circumcision performed without issue    Estimated Blood Loss: minimal     Drains: none     Procedure in detail:  After risks, benefits and possible complications of the procedure were discussed with the patient, he elected to under go the procedure and informed consent was obtained.  All questions were answered in the pre-operative area. The patient was transferred to the operative suite and placed in supine position.  Anesthesia was administered.  Once adequately sedated, 2 mL papavarine (30 mg/mL) was injected into the lateral aspect of the right corporeal body.  This allowed time to induce an erection.      The patient was then prepped and draped in the usual sterile fashion. Time out was preformed and antibiotics were confirmed.      A circumferential incision was marked with a marking pen 1 cm proximal to coronal sulcus. A second incision was marked more proximally at the coronal margin. This was sharply incised using a 15 blade. This was undermined with a hemostat and sharply divided and the intervening skin was excised. A firgloria collar  was created ventrally. The penis was degloved sharply circumferentially using Metzenbaum scissors.  This  Dartos from Jones's fascia all the way down to the base of the penis. An additional 60 mg of papaverine was administered for adequate erection. At full erection, the penis was bent 30 degrees.  We turned our attention to the area 180 degrees opposite the point of maximal curvature.  A 16 dot plication (2 rows/side for 4 rows total, 4 dots/row) was then performed in the standard fashion using 3-0 ethibond sutures. Care was taken to avoid the penile nerves. The penis was again observed at full erection and the penis was straight.    Hemostasis was then assessed and achieved using Bovie electrocautery. Skin edges were then re approximated circumferentially using 3-0 chromic in an interrupted fashion.     500 mcg of phenylephrine was administered and the patient was monitored for detumescence. A sterile slightly compressive dressing was applied.      The patient tolerated the procedure well and was transferred to the recovery room in stable condition.      Dispotion:  The patient will follow up with Dr. Monroy in 6 weeks.  He may remove his dressing in 48 hours.  No sex or masturbation x 6 weeks.

## 2023-07-13 NOTE — PATIENT INSTRUCTIONS
Post Operative Penile Surgery Instructions  - No strenuous exercise or lifting for 7 days.   - No driving while you are on opioid pain medications  - No sexual activity or masturbation for 6 weeks after the surgery    You can expect:  - Some swelling in your penis   - Some mild bleeding from the incision site. If this happens place some light pressure at the site with a gauze or a washcloth for a few minutes.   - Use Tylenol or Advil first, then use the pain medicine we prescribe for breakthrough pain medicine, do not exceed 4000 mg of Tylenol per day.    Wound care:   - Remove dressing the day after surgery.  - You can shower 24 hours after the surgery. Pat incision dry with a towel.  - No soaking underwater in a tub for 1 week after surgery.   - You do not need to cover the incision with anything after the dressing has been removed. OK to apply petroleum jelly or neosporin on incision to prevent it from sticking to your underwear.    Call the doctor if:  Temperature is greater than 101F  Persistent vomiting and inability to keep food down  Inability to urinate  Excessive bleeding or swelling of penis    To call, dial [phone] and ask to speak with the urology clinic or resident on call.

## 2023-07-13 NOTE — BRIEF OP NOTE
[facility]  Brief Operative Note    Surgery Date: 7/13/2023     Surgeon(s) and Role:     * Sherman Monroy MD - Primary    Assisting Surgeon: None    Pre-op Diagnosis:  Peyronie's disease [N48.6]  Patient Active Problem List    Diagnosis Date Noted    Peyronie's disease 05/04/2023    BPH with urinary obstruction 05/04/2023    Risk for falls 05/04/2022    Prediabetes 05/04/2022    Paresthesia 05/29/2019    Severe obesity (BMI 35.0-39.9) with comorbidity 05/02/2019    Peripheral polyneuropathy 05/02/2019    Paroxysmal atrial fibrillation 02/18/2019    Current use of long term anticoagulation 02/18/2019    Multiple thyroid nodules 01/16/2019    Numbness of feet        Post-op Diagnosis:  Post-Op Diagnosis Codes:     * Peyronie's disease [N48.6]    Procedure(s) (LRB):  PLICATION, PENIS (N/A)  CIRCUMCISION (N/A)    Anesthesia: General    Operative Findings: Dorsal penile curvature adequately corrected with 16-dot plication    Estimated Blood Loss: * No values recorded between 7/13/2023  8:52 AM and 7/13/2023  9:33 AM *         Specimens:   Specimen (24h ago, onward)       Start     Ordered    07/13/23 0901  Specimen to Pathology, Surgery Urology  Once        Comments: Pre-op Diagnosis: Peyronie's disease [N48.6]Procedure(s):PLICATION, PENISCIRCUMCISION Number of specimens: 1Name of specimens: 1.  Foreskin     References:    Click here for ordering Quick Tip   Question Answer Comment   Procedure Type: Urology    Specimen Class: Routine/Screening    Which provider would you like to cc? SHERMAN MONROY    Release to patient Immediate        07/13/23 0901                      Discharge Note    OUTCOME: Patient tolerated treatment/procedure well without complication and is now ready for discharge.    DISPOSITION: Home or Self Care    FINAL DIAGNOSIS:  Peyronie's disease    FOLLOWUP: In clinic    DISCHARGE INSTRUCTIONS:    Discharge Procedure Orders   Notify your health care provider if you  experience any of the following:  temperature >100.4     Notify your health care provider if you experience any of the following:  persistent nausea and vomiting or diarrhea     Notify your health care provider if you experience any of the following:  severe uncontrolled pain     Notify your health care provider if you experience any of the following:  difficulty breathing or increased cough     Notify your health care provider if you experience any of the following:  severe persistent headache     Notify your health care provider if you experience any of the following:  persistent dizziness, light-headedness, or visual disturbances     Notify your health care provider if you experience any of the following:  increased confusion or weakness

## 2023-07-13 NOTE — PLAN OF CARE
Chart reviewed. Preop nursing care completed per orders. Safe surgery checklist complete. Pt denies any open wounds cuts or sores. Pt denies any metal in body. Family at bedside and belongings placed in PACU locker 11. Waiting for H&P and update prior to surgery. Pt AAOX3, VSS on room air. Pt toileted, Bed locked in lowest position, Call light within reach. Pt denies any needs at this time.

## 2023-07-13 NOTE — ANESTHESIA PROCEDURE NOTES
Intubation    Date/Time: 7/13/2023 8:33 AM  Performed by: Tex Alberto CRNA  Authorized by: Enrique Mccann MD     Intubation:     Induction:  Intravenous    Mask Ventilation:  Easy mask    Attempts:  1    Attempted By:  CRNA    Method of Intubation:  Video laryngoscopy    Laryngeal View Grade: Grade I - full view of cords      Difficult Airway Encountered?: No      Complications:  None    Airway Device:  Oral endotracheal tube    Airway Device Size:  7.5    Style/Cuff Inflation:  Cuffed    Inflation Amount (mL):  6    Tube secured:  21    Secured at:  The lips    Placement Verified By:  Capnometry    Complicating Factors:  None    Findings Post-Intubation:  BS equal bilateral

## 2023-07-13 NOTE — TRANSFER OF CARE
"Anesthesia Transfer of Care Note    Patient: Marcus Watkins    Procedure(s) Performed: Procedure(s) (LRB):  PLICATION, PENIS (N/A)  CIRCUMCISION (N/A)    Patient location: PACU    Transport from OR: Transported from OR on 6-10 L/min O2 by face mask with adequate spontaneous ventilation    Post pain: adequate analgesia    Post assessment: no apparent anesthetic complications    Post vital signs: stable    Level of consciousness: sedated    Nausea/Vomiting: no nausea/vomiting    Complications: none    Transfer of care protocol was followed      Last vitals:   Visit Vitals  BP (!) 154/75 (BP Location: Left arm, Patient Position: Lying)   Pulse 81   Temp 36.6 °C (97.9 °F) (Temporal)   Resp 18   Ht 5' 11" (1.803 m)   Wt 118.8 kg (262 lb)   SpO2 97%   BMI 36.54 kg/m²     "

## 2023-07-13 NOTE — ANESTHESIA POSTPROCEDURE EVALUATION
Anesthesia Post Evaluation    Patient: Marcus Watkins    Procedure(s) Performed: Procedure(s) (LRB):  PLICATION, PENIS (N/A)  CIRCUMCISION (N/A)    Final Anesthesia Type: general      Patient location during evaluation: PACU  Patient participation: Yes- Able to Participate  Level of consciousness: awake and alert  Post-procedure vital signs: reviewed and stable  Pain management: adequate  Airway patency: patent    PONV status at discharge: No PONV  Anesthetic complications: no      Cardiovascular status: blood pressure returned to baseline  Respiratory status: unassisted, spontaneous ventilation and room air  Hydration status: euvolemic  Follow-up not needed.          Vitals Value Taken Time   /68 07/13/23 0952   Temp 36.7 °C (98.1 °F) 07/13/23 0936   Pulse 70 07/13/23 0959   Resp 15 07/13/23 0959   SpO2 92 % 07/13/23 0959   Vitals shown include unvalidated device data.      No case tracking events are documented in the log.      Pain/Monika Score: Pain Rating Prior to Med Admin: 0 (7/13/2023  6:35 AM)  Monika Score: 9 (7/13/2023  9:50 AM)

## 2023-07-18 LAB
FINAL PATHOLOGIC DIAGNOSIS: NORMAL
GROSS: NORMAL
Lab: NORMAL

## 2023-08-28 ENCOUNTER — OFFICE VISIT (OUTPATIENT)
Dept: UROLOGY | Facility: CLINIC | Age: 68
End: 2023-08-28
Payer: COMMERCIAL

## 2023-08-28 VITALS
DIASTOLIC BLOOD PRESSURE: 76 MMHG | SYSTOLIC BLOOD PRESSURE: 124 MMHG | BODY MASS INDEX: 36.67 KG/M2 | WEIGHT: 261.94 LBS | HEART RATE: 82 BPM | HEIGHT: 71 IN

## 2023-08-28 DIAGNOSIS — N48.6 PEYRONIE'S DISEASE: Primary | ICD-10-CM

## 2023-08-28 DIAGNOSIS — N40.1 BPH WITH URINARY OBSTRUCTION: ICD-10-CM

## 2023-08-28 DIAGNOSIS — N13.8 BPH WITH URINARY OBSTRUCTION: ICD-10-CM

## 2023-08-28 PROCEDURE — 3078F DIAST BP <80 MM HG: CPT | Mod: CPTII,S$GLB,, | Performed by: UROLOGY

## 2023-08-28 PROCEDURE — 3074F SYST BP LT 130 MM HG: CPT | Mod: CPTII,S$GLB,, | Performed by: UROLOGY

## 2023-08-28 PROCEDURE — 1159F MED LIST DOCD IN RCRD: CPT | Mod: CPTII,S$GLB,, | Performed by: UROLOGY

## 2023-08-28 PROCEDURE — 1160F RVW MEDS BY RX/DR IN RCRD: CPT | Mod: CPTII,S$GLB,, | Performed by: UROLOGY

## 2023-08-28 PROCEDURE — 99999 PR PBB SHADOW E&M-EST. PATIENT-LVL III: CPT | Mod: PBBFAC,,, | Performed by: UROLOGY

## 2023-08-28 PROCEDURE — 1159F PR MEDICATION LIST DOCUMENTED IN MEDICAL RECORD: ICD-10-PCS | Mod: CPTII,S$GLB,, | Performed by: UROLOGY

## 2023-08-28 PROCEDURE — 99999 PR PBB SHADOW E&M-EST. PATIENT-LVL III: ICD-10-PCS | Mod: PBBFAC,,, | Performed by: UROLOGY

## 2023-08-28 PROCEDURE — 3008F PR BODY MASS INDEX (BMI) DOCUMENTED: ICD-10-PCS | Mod: CPTII,S$GLB,, | Performed by: UROLOGY

## 2023-08-28 PROCEDURE — 3074F PR MOST RECENT SYSTOLIC BLOOD PRESSURE < 130 MM HG: ICD-10-PCS | Mod: CPTII,S$GLB,, | Performed by: UROLOGY

## 2023-08-28 PROCEDURE — 99024 PR POST-OP FOLLOW-UP VISIT: ICD-10-PCS | Mod: S$GLB,,, | Performed by: UROLOGY

## 2023-08-28 PROCEDURE — 3044F PR MOST RECENT HEMOGLOBIN A1C LEVEL <7.0%: ICD-10-PCS | Mod: CPTII,S$GLB,, | Performed by: UROLOGY

## 2023-08-28 PROCEDURE — 99024 POSTOP FOLLOW-UP VISIT: CPT | Mod: S$GLB,,, | Performed by: UROLOGY

## 2023-08-28 PROCEDURE — 3044F HG A1C LEVEL LT 7.0%: CPT | Mod: CPTII,S$GLB,, | Performed by: UROLOGY

## 2023-08-28 PROCEDURE — 3008F BODY MASS INDEX DOCD: CPT | Mod: CPTII,S$GLB,, | Performed by: UROLOGY

## 2023-08-28 PROCEDURE — 1160F PR REVIEW ALL MEDS BY PRESCRIBER/CLIN PHARMACIST DOCUMENTED: ICD-10-PCS | Mod: CPTII,S$GLB,, | Performed by: UROLOGY

## 2023-08-28 PROCEDURE — 3078F PR MOST RECENT DIASTOLIC BLOOD PRESSURE < 80 MM HG: ICD-10-PCS | Mod: CPTII,S$GLB,, | Performed by: UROLOGY

## 2023-08-28 NOTE — PROGRESS NOTES
CHIEF COMPLAINT:    Mr. Watkins is a 67 y.o. male presenting with peyronie's disease.    PRESENTING ILLNESS:    Marcus Watkins is a 67 y.o. male who c/o peyronie's disease.  He's s/p circ and plication on 7/13/23.   He's had some erections that are straight.    He does not have ED.    He has LUTS.  Nocturia x 1-2.  Is pleased with how he voids.    REVIEW OF SYSTEMS:    Marcus Watkins denies headache, blurred vision, fever, nausea, vomiting, chills, abdominal pain, chest pain, sore throat, bleeding per rectum, cough, SOB, recent loss of consciousness, recent mental status changes, seizures, dizziness, or upper or lower extremity weakness.    GUILLERMO  1. 5  2. 5  3. 5  4. 5  5. 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 12/8/2022    Status post catheter ablation of atrial flutter 6/13/2017       Past Surgical History:   Procedure Laterality Date    ABLATION N/A 12/27/2018    Procedure: ABLATION;  Surgeon: José Grey MD;  Location: [facility] EP LAB;  Service: Cardiology;  Laterality: N/A;  AF,PVI, RFA, CARTO, GEN, GP, 3 PREP    CARDIOVERSION N/A 10/29/2018    Procedure: CARDIOVERSION;  Surgeon: José Grey MD;  Location: [facility] CATH LAB;  Service: Cardiology;  Laterality: N/A;  AF, LOPEZ, DCCV, MAC, GP, 3 PREP    CIRCUMCISION N/A 7/13/2023    Procedure: CIRCUMCISION;  Surgeon: Sherman Monroy MD;  Location: [facility] OR;  Service: Urology;  Laterality: N/A;    COLONOSCOPY N/A 04/14/2016    Procedure: COLONOSCOPY;  Surgeon: Kade Wang MD;  Location: [facility] ENDO (4TH FLR);  Service: Endoscopy;  Laterality: N/A;    COLONOSCOPY N/A 6/3/2022    Procedure: COLONOSCOPY;  Surgeon: Jerrod Tijerina MD;  Location: [facility] ENDO (4TH FLR);  Service: Endoscopy;  Laterality: N/A;  fully vaccianted  ok to hold Eliquis x 2  days per Dr. 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    TREATMENT OF CARDIAC ARRHYTHMIA N/A 2018    Procedure: CARDIOVERSION;  Surgeon: José Grey MD;  Location: [facility] EP LAB;  Service: Cardiology;  Laterality: N/A;  AF, DCCV, MAC, GP, 3 PREP    TREATMENT OF CARDIAC ARRHYTHMIA  2018    Procedure: Cardioversion or Defibrillation;  Surgeon: José Grey MD;  Location: [facility] EP LAB;  Service: Cardiology;;       Family History   Problem Relation Age of Onset    Heart disease Mother     Diabetes Brother     Mental retardation Daughter        Social History     Socioeconomic History    Marital status:    Tobacco Use    Smoking status: Former     Current packs/day: 0.00     Types: Cigarettes     Quit date: 1978     Years since quittin.1    Smokeless tobacco: Never   Substance and Sexual Activity    Alcohol use: No     Alcohol/week: 0.0 standard drinks of alcohol    Drug use: No   Social History Narrative     [employer] [place], wife Pat works for [employer], 4 children (youngest daughter with MR), nonsmoker, nondrinker     Social Determinants of Health     Financial Resource Strain: Low Risk  (2022)    Overall Financial Resource Strain (CARDIA)     Difficulty of Paying Living Expenses: Not hard at all   Food Insecurity: No Food Insecurity (2022)    Hunger Vital Sign     Worried About Running Out  of Food in the Last Year: Never true     Ran Out of Food in the Last Year: Never true   Transportation Needs: No Transportation Needs (5/4/2022)    PRAPARE - Transportation     Lack of Transportation (Medical): No     Lack of Transportation (Non-Medical): No   Physical Activity: Unknown (5/4/2022)    Exercise Vital Sign     Days of Exercise per Week: 0 days   Stress: No Stress Concern Present (5/4/2022)    [employer] - Occupational Stress Questionnaire     Feeling of Stress : Not at all   Social Connections: Unknown (5/4/2022)    Social Connection and Isolation Panel [NHANES]     Frequency of Social Gatherings with Friends and Family: More than three times a week     Marital Status:        Allergies:  Patient has no known allergies.    Medications:    Current Outpatient Medications:     apixaban (ELIQUIS) 5 mg Tab, Take 1 tablet (5 mg total) by mouth 2 (two) times daily., Disp: 180 tablet, Rfl: 3    metoprolol succinate (TOPROL-XL) 25 MG 24 hr tablet, Take 1 tablet (25 mg total) by mouth once daily., Disp: 90 tablet, Rfl: 3    multivit-min/folic/vit K/lycop (ONE-A-DAY MEN'S 50 PLUS ORAL), Take 1 tablet by mouth once daily., Disp: , Rfl:     omega-3 fatty acids/fish oil (FISH OIL-OMEGA-3 FATTY ACIDS) 300-1,000 mg capsule, Take 1 capsule by mouth once daily., Disp: , Rfl:     oxyCODONE-acetaminophen (PERCOCET) 5-325 mg per tablet, Take 1 tablet by mouth every 4 (four) hours as needed for Pain., Disp: 5 tablet, Rfl: 0    VITAMIN E ACETATE ORAL, Take 1 tablet by mouth once daily., Disp: , Rfl:   No current facility-administered medications for this visit.    Facility-Administered Medications Ordered in Other Visits:     CEFAZOLIN 2G/20 ML SYRINGE (PYXIS) IV syringe 2 g 20 mL, 2 g, Intravenous, On Call Procedure, Carlos Grace MD    HYDROcodone-acetaminophen 5-325 mg per tablet 1 tablet, 1 tablet, Oral, Once PRN, Enrique Mccann MD    morphine injection 1 mg, 1 mg, Intravenous, Q10  Min PRN, Enrique Mccann MD    ondansetron injection 4 mg, 4 mg, Intravenous, Once PRN, Enrique Mccann MD    PHYSICAL EXAMINATION:    The patient generally appears in good health, is appropriately interactive, and is in no apparent distress.     Eyes: anicteric sclerae, moist conjunctivae; no lid-lag; PERRLA     HENT: Atraumatic; oropharynx clear with moist mucous membranes and no mucosal ulcerations;normal hard and soft palate.  No evidence of lymphadenopathy.    Neck: Trachea midline.  No thyromegaly.    Skin: No lesions.    Mental: Cooperative with normal affect.  Is oriented to time, place, and person.    Neuro: Grossly intact.    Chest: Normal inspiratory effort.   No accessory muscles.  No audible wheezes.  Respirations symmetric on inspiration and expiration.    Heart: Regular rhythm.      Abdomen:  Soft, non-tender. No masses or organomegaly. Bladder is not palpable. No evidence of flank discomfort. No evidence of inguinal hernia.    Genitourinary: The penis is circumcised with a plaque c/w peyronie's on the shaft. The urethral meatus is normal. The testes, epididymides, and cord structures are normal in size and contour bilaterally. The scrotum is normal in size and contour.    Normal anal sphincter tone. No rectal mass.    The prostate is smooth. Normal landmarks. Lateral sulci. Median furrow intact.  No nodularity or induration. Seminal vesicles are normal.    Extremities: No clubbing, cyanosis, or edema      LABS:      Lab Results   Component Value Date    PSA 2.1 03/08/2023    PSA 2.1 03/04/2022    PSA 0.91 07/17/2020       IMPRESSION:    Encounter Diagnoses   Name Primary?    Peyronie's disease Yes    BPH with urinary obstruction          PLAN:    1. Will observe his LUTS as they don't bother him.  2. Ok for intercourse.  RTC prn.      Copy to: Jak

## 2023-09-18 ENCOUNTER — PATIENT MESSAGE (OUTPATIENT)
Dept: PRIMARY CARE CLINIC | Facility: CLINIC | Age: 68
End: 2023-09-18
Payer: COMMERCIAL

## 2023-10-18 ENCOUNTER — PATIENT MESSAGE (OUTPATIENT)
Dept: CARDIOLOGY | Facility: CLINIC | Age: 68
End: 2023-10-18
Payer: COMMERCIAL

## 2023-10-25 ENCOUNTER — TELEPHONE (OUTPATIENT)
Dept: PODIATRY | Facility: CLINIC | Age: 68
End: 2023-10-25
Payer: COMMERCIAL

## 2023-10-25 NOTE — TELEPHONE ENCOUNTER
Spoke with patient in reference to scheduling follow up on foot states he is not having any problems and will give office a call if he needs to be seen.

## 2023-10-30 NOTE — PROGRESS NOTES
"Cancer Genetics  Department of Hematology and Oncology  [facility]  [facility]  [facility] [place]    Date of Service:  23  Visit Provider:  Nahum Acuna DNP  Collaborating Physician:  Macie Villanueva MD    Patient ID  Name: Marcus Watkins    : 1955    MRN: [id number]      Referring Provider  Self, Aaareferral  No address on file    SUBJECTIVE      Chief Complaint: Genetic Evaluation    History of Present Illness (HPI):  Marcus Watkins ("Marcus"), 68 y.o., assigned male sex at birth, is new to the [facility] Department of Hematology and Oncology and to me.  He presents today for cancer-genetic risk assessment given his daughter's TSC1 gene mutation.     Today, Marcus provided a copy of said daughter's genetic test results report as well as a clinic note of said daughter's.  Both are to be scanned into Marcus's [facility] Epic record under the Media tab.    Focused Medical History  Genetic testing:  No  Cancer:  No  Colon polyp:  Yes   2009 colonoscopy (age 53; Dr. Wang at [facility]):    One 4-mm sessile adenomatous polyp removed from the hepatic flexure  2016 colonoscopy (age 60; Dr. Wang at [facility]):  No polyps  2022 colonoscopy (age 66; Dr. Tijerina at [facility]):    One 8-mm pedunculated polyp removed from the sigmoid colon, and four 3- to 5-mm sessile polyps removed from the descending colon, transverse colon, hepatic flexure, and ascending colon; pathology indicates that four were tubular adenomas without evidence of high-grade dysplasia and one was benign colonic mucosa  History of colonoscopy in addition to those abovementioned?  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 12/8/2022    Status post catheter ablation of atrial flutter 6/13/2017     Patient Active Problem List    Diagnosis Date Noted    Peyronie's disease 05/04/2023    BPH with urinary obstruction 05/04/2023    Risk for falls 05/04/2022    Prediabetes 05/04/2022    Paresthesia 05/29/2019    Severe obesity (BMI 35.0-39.9) with comorbidity 05/02/2019    Peripheral polyneuropathy 05/02/2019    Paroxysmal atrial fibrillation 02/18/2019    Current use of long term anticoagulation 02/18/2019    Multiple thyroid nodules 01/16/2019    Numbness of feet      Focused Social History  Patient reports having smoked at a "young age" for no more than 4-5 years at a rate of <1 pack/week    Ancestry  Ashkenazi Roman Catholic:  No    Family Oncologic History  Consanguinity:  No  Hereditary cancer genetic testing in blood relatives:  Yes - daughter Cassidy only; Cassidy's mother is synchronously (with Marcus) undergoing TSC1 gene testing as well  Cassidy underwent, through [facility], the "[employer] +RNA[employer]: Analyses of Selected Hereditary Cancer Genes", blood sample for which was collected on 9/6/23, with results reported on 9/29/23 indicating that 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  Genes analyzed, per the report, were (21 in total) BAP1, FH, FLCN, MET, MLH1, MSH2, MSH6, PMS2, PTEN, SDHA, SDHB, SDHC, SDHD, STK11, TP53, TSC1, TSC2, and VHL (sequencing and deletion/duplication); EGFR and MITF (sequencing only); EPCAM (deletion/duplication only).  [facility] Accession # is [id number].  No information regarding paternal family history.  Other than noted, no known history of cancer in relatives depicted in the pedigree or in extended family.  Other than noted, no known family history of benign tumor/mass/lesion, pancreatitis, or colon polyps.  Pedigree:  ** If this " "pedigree appears small/illegible on your screen, expand this note window horizontally. **      Review of Systems  See HPI.       OBJECTIVE     Physical Exam  Very pleasant patient.  Accompanied by his wife, Phyllis, who is also very pleasant.  Vitals signs:  Reviewed:  Vitals:    11/01/23 0955   PainSc: 0-No pain   (Pain is rated on scale of 0-10 on which 10=worst.)  Distress score:  Reviewed: (0/10, on scale of 0-10 on which 10=worst).  Constitutional      Appearance:  Appears well developed and well nourished. No distress.   Pulmonary     Effort:  Normal.  Neurological     Mental Status:  Alert and oriented.     Coordination:  Normal.   Psychiatric         Mood and Affect:  Normal.     Thought Content:  Normal.     Speech:  Normal.     Behavior:  Normal.     Judgment:  Normal.  Genetics-specific     It is my assessment that the patient is ready to proceed with cancer genetic testing from a psychosocial perspective.    ASSESSMENT / PLAN      Marcus Watkins ("Marcus"), 68 y.o., assigned male sex at birth, is new to the [facility] and to me.  He presents today for cancer-genetic risk assessment given his daughter's TSC1 gene mutation.      Cancer-genetic risk assessment and pre-test cancer genetic counseling were conducted.        From a clinical standpoint, regarding hereditary cancer susceptibility gene testing:  I recommend that one or both of Marcus's daughter Cassidy's parents test(s) for Cassidy's TSC1 gene mutation; if only one parent tests and the results are negative, then, the other parent should test.  Additionally, Marcus can consider including additional hereditary cancer susceptibility genes on his genetic testing -- particularly breast cancer susceptibility genes based on family history, as, if Marcus's relative with breast cancer was diagnosed prior to age 51 such would satisfy current NCCN criteria for germline genetic testing related to such.    Cancer Genetics:  Germline " cancer genetic testing is the testing of genes associated with cancer, known as cancer susceptibility genes.  Just as these genes are inherited from parents--one copy of each gene from each parent--mutations in these genes can be inherited, as well.  A mutation in a cancer susceptibility gene adversely affects the gene's ability to prevent cancer; therefore, carriers of cancer susceptibility gene mutations may be at increased risk for certain cancers.     Causes of Cancer:  Only approximately 5%-10% of cancers are caused by an inherited cancer susceptibility gene mutation; rather, the majority of cancers are sporadic.  Causes of sporadic cancers may include environmental risk factors, lifestyle risk factors, and non-modifiable risk factors.  It is important to note that members of a family often share not only their genetics but also other risk factors, including environmental and lifestyle risk factors, so cancers can be familial.     Potential Results of Genetic Testing, and Their Implications:  Potential results of genetic testing include positive, negative, and variant of unknown significance (VUS).    Positive:  A positive result indicates the presence of at least one clinically significant gene mutation, and the individual's associated cancer risks vary depending upon the cancer susceptibility gene(s) in which there is/are a mutation(s).  With a positive result, depending upon the specific result and the individual's clinical history, modified risk management may be recommended, including measures for risk reduction and/or surveillance; however, there are not always effective strategies for modified risk management.  Negative:  A negative result indicates that no clinically significant mutations were identified in the gene(s) tested.    VUS:  A VUS indicates that there is not presently enough data for the laboratory to make a determination as to whether the genetic variant is clinically significant.  VUSs are  not typically acted upon clinically.       Genetic Mutation Inheritance:  When an individual tests positive for a gene mutation, his first-degree relatives each have a 50% chance of carrying the same mutation, and other, more distant blood relatives can also be at risk of carrying the same mutation.      Genetic Discrimination:  Genetic discrimination occurs when individuals are discriminated against on the basis of their genetic information.  The Genetic Information Nondiscrimination Act of 2008 (GILLIAN) is [place] federal legislation that provides some protections against use of an individual's genetic information by their health insurer and by their employer.  Title I of G[place]IAN prohibits most health insurers from utilizing an individual's genetic information to make decisions regarding insurance eligibility or premium charges; this protection does not apply to health insurance obtained through a job with the , and it may not apply to health insurance obtained through the Federal Employees Health Benefits Plan.  Title II of GILLIAN prohibits covered entities, in many cases, from requesting or requiring the genetic information of employees and applicants and from using said information to make employment decisions; this protection does not apply to employers with fewer than 15 employees or to the .  GILLIAN does not protect individuals from genetic discrimination by any other type of policy or entity, including but not limited to life insurance, disability insurance, long-term care insurance,  benefits, and [facility] benefits.    Genetic Testing Logistics:  An outside laboratory would perform the testing after a blood sample is collected at [facility].  One can expect this genetic testing to take approximately three weeks to result.  There is a potential for the patient to incur out-of-pocket costs related to genetic testing.  Post-test genetic counseling can be conducted once the genetic  "testing results are available.         Diagnoses and Orders for This Encounter:    ICD-10-CM ICD-9-CM   1. Encounter for nonprocreative genetic counseling  Z71.83 V26.33   2. Family history of tuberous sclerosis  Z82.79 V19.5   3. Family history of breast cancer  Z80.3 V16.3     1. Encounter for nonprocreative genetic counseling  Marcus Watkins ("Marcus"), 68 y.o., assigned male sex at birth, is new to the [facility] Department of [facility] and to me.  He presents today for cancer-genetic risk assessment given his daughter's TSC1 gene mutation.      Cancer-genetic risk assessment and pre-test cancer genetic counseling were conducted.        From a clinical standpoint, regarding hereditary cancer susceptibility gene testing:  I recommend that one or both of Marcus's daughter Cassidy's parents test(s) for Marcus's TSC1 gene mutation; if only one parent tests and the results are negative, then, the other parent should test.  Additionally, Marcus can consider including additional hereditary cancer susceptibility genes on his genetic testing -- particularly breast cancer susceptibility genes based on family history, as, if Marcus's relative with breast cancer was diagnosed prior to age 51 such would satisfy current NCCN criteria for germline genetic testing related to such.    Offered Marcus options of proceeding with hereditary cancer susceptibility gene testing at this time versus delaying/declining such.  Marcus desires to proceed with such testing at this time.  Provided germline cancer genetic test options of focused panel versus broad panel, and Marcus opts to proceed only with TSC1 testing at this time.    Genetic Test Information:  Testing lab: [facility]   Test panel: TSC1 family follow-up testing   ICD-10 code(s): Z82.79   Verbal informed consent: Obtained   Written informed consent: Obtained    Specimen type: Blood  (Patient denies blood disorders that would necessitate a skin fibroblast specimen)   Specimen " collection by: [facility]   Specimen collection date: 12/04/2023    Results expected by: Approximately 2-3 weeks after the genetic testing lab receives the specimen   Post-test genetic counseling: ~4 weeks after sample collection       2. Family history of tuberous sclerosis  - Genetic Misc Sendout Test, Blood; Future    3. Family history of breast cancer  - See #1       Questions were encouraged and answered to the patient's satisfaction, and he verbalized understanding of the information and agreement with the plan.           Nahum Acuna, DNP, APRN, FNP-BC, AOCNP, CGRA  Nurse Practitioner, Cancer Genetics  Department of Hematology and Oncology  [facility]          Routed to Cancer Genetics Navigator:  - Patient to be contacted for sample collection via:  N/A (sample collected on 11/01/2023)  - Lab order in Epic?  YES  - Lab order in portal?  YES - [facility] Order Number: [id number]  - Documents uploaded to genetics lab portal:  PEDIGREE  - Post-test visit:  Navigator to schedule

## 2023-11-01 ENCOUNTER — OFFICE VISIT (OUTPATIENT)
Dept: HEMATOLOGY/ONCOLOGY | Facility: CLINIC | Age: 68
End: 2023-11-01
Payer: COMMERCIAL

## 2023-11-01 ENCOUNTER — LAB VISIT (OUTPATIENT)
Dept: LAB | Facility: HOSPITAL | Age: 68
End: 2023-11-01
Payer: COMMERCIAL

## 2023-11-01 DIAGNOSIS — Z82.79 FAMILY HISTORY OF TUBEROUS SCLEROSIS: ICD-10-CM

## 2023-11-01 DIAGNOSIS — Z71.83 ENCOUNTER FOR NONPROCREATIVE GENETIC COUNSELING: Primary | ICD-10-CM

## 2023-11-01 DIAGNOSIS — Z80.3 FAMILY HISTORY OF BREAST CANCER: ICD-10-CM

## 2023-11-01 PROCEDURE — 99999 PR PBB SHADOW E&M-EST. PATIENT-LVL III: CPT | Mod: PBBFAC,,, | Performed by: NURSE PRACTITIONER

## 2023-11-01 PROCEDURE — 1126F AMNT PAIN NOTED NONE PRSNT: CPT | Mod: CPTII,S$GLB,, | Performed by: NURSE PRACTITIONER

## 2023-11-01 PROCEDURE — 1126F PR PAIN SEVERITY QUANTIFIED, NO PAIN PRESENT: ICD-10-PCS | Mod: CPTII,S$GLB,, | Performed by: NURSE PRACTITIONER

## 2023-11-01 PROCEDURE — 3044F HG A1C LEVEL LT 7.0%: CPT | Mod: CPTII,S$GLB,, | Performed by: NURSE PRACTITIONER

## 2023-11-01 PROCEDURE — 36415 COLL VENOUS BLD VENIPUNCTURE: CPT | Performed by: NURSE PRACTITIONER

## 2023-11-01 PROCEDURE — 99999 PR PBB SHADOW E&M-EST. PATIENT-LVL III: ICD-10-PCS | Mod: PBBFAC,,, | Performed by: NURSE PRACTITIONER

## 2023-11-01 PROCEDURE — 3044F PR MOST RECENT HEMOGLOBIN A1C LEVEL <7.0%: ICD-10-PCS | Mod: CPTII,S$GLB,, | Performed by: NURSE PRACTITIONER

## 2023-11-01 PROCEDURE — 99204 PR OFFICE/OUTPT VISIT, NEW, LEVL IV, 45-59 MIN: ICD-10-PCS | Mod: S$GLB,,, | Performed by: NURSE PRACTITIONER

## 2023-11-01 PROCEDURE — 99204 OFFICE O/P NEW MOD 45 MIN: CPT | Mod: S$GLB,,, | Performed by: NURSE PRACTITIONER

## 2023-11-01 RX ORDER — PNV NO.95/FERROUS FUM/FOLIC AC 28MG-0.8MG
TABLET ORAL
COMMUNITY

## 2023-11-01 NOTE — Clinical Note
Routed to Cancer Genetics Navigator: - Patient to be contacted for sample collection via:  N/A (sample collected on 11/01/2023) - Lab order in Epic?  YES - Lab order in portal?  YES - [facility] Order Number: [id number] - Documents uploaded to genetics lab portal:  PEDIGREE - Post-test visit:  Navigator to schedule

## 2023-11-27 DIAGNOSIS — I48.91 ATRIAL FIBRILLATION WITH RVR: ICD-10-CM

## 2023-11-27 RX ORDER — METOPROLOL SUCCINATE 25 MG/1
25 TABLET, EXTENDED RELEASE ORAL DAILY
Qty: 90 TABLET | Refills: 3 | Status: SHIPPED | OUTPATIENT
Start: 2023-11-27 | End: 2023-12-11 | Stop reason: SDUPTHER

## 2023-12-04 ENCOUNTER — PATIENT MESSAGE (OUTPATIENT)
Dept: HEMATOLOGY/ONCOLOGY | Facility: CLINIC | Age: 68
End: 2023-12-04
Payer: COMMERCIAL

## 2023-12-05 ENCOUNTER — PATIENT MESSAGE (OUTPATIENT)
Dept: HEMATOLOGY/ONCOLOGY | Facility: CLINIC | Age: 68
End: 2023-12-05
Payer: COMMERCIAL

## 2023-12-05 LAB
GENETIC COUNSELING?: YES
GENSO SPECIMEN TYPE: NORMAL
MISCELLANEOUS GENETIC TEST NAME: NORMAL
PARTENTAL OR SIBLING TESTING?: NO
REFERENCE LAB: NORMAL
TEST RESULT: NORMAL

## 2023-12-11 DIAGNOSIS — I48.91 ATRIAL FIBRILLATION WITH RVR: ICD-10-CM

## 2023-12-11 RX ORDER — METOPROLOL SUCCINATE 25 MG/1
25 TABLET, EXTENDED RELEASE ORAL DAILY
Qty: 90 TABLET | Refills: 3 | Status: SHIPPED | OUTPATIENT
Start: 2023-12-11

## 2023-12-12 ENCOUNTER — OFFICE VISIT (OUTPATIENT)
Dept: HEMATOLOGY/ONCOLOGY | Facility: CLINIC | Age: 68
End: 2023-12-12
Payer: COMMERCIAL

## 2023-12-12 DIAGNOSIS — Z71.83 ENCOUNTER FOR NONPROCREATIVE GENETIC COUNSELING: Primary | ICD-10-CM

## 2023-12-12 DIAGNOSIS — Z82.79 FAMILY HISTORY OF TUBEROUS SCLEROSIS: ICD-10-CM

## 2023-12-12 DIAGNOSIS — Z86.010 HISTORY OF COLON POLYPS: ICD-10-CM

## 2023-12-12 PROCEDURE — 3044F HG A1C LEVEL LT 7.0%: CPT | Mod: CPTII,95,, | Performed by: NURSE PRACTITIONER

## 2023-12-12 PROCEDURE — 99212 OFFICE O/P EST SF 10 MIN: CPT | Mod: 95,,, | Performed by: NURSE PRACTITIONER

## 2023-12-12 PROCEDURE — 99212 PR OFFICE/OUTPT VISIT, EST, LEVL II, 10-19 MIN: ICD-10-PCS | Mod: 95,,, | Performed by: NURSE PRACTITIONER

## 2023-12-12 PROCEDURE — 3044F PR MOST RECENT HEMOGLOBIN A1C LEVEL <7.0%: ICD-10-PCS | Mod: CPTII,95,, | Performed by: NURSE PRACTITIONER

## 2023-12-12 NOTE — Clinical Note
- Please place recall for patient to follow up with me in 2 years.  Thanks! - Please mail to patient a copy of his [facility] report -and- a copy of my 12/12/23 clinic note to his new address, per his request:   [id number] [facility], [place] [address]

## 2023-12-12 NOTE — PROGRESS NOTES
"Cancer Genetics  Hereditary and High-Risk Clinic  Department of Hematology and Oncology  [facility]    Date of Service:  23  Visit Provider:  Nahum Acuna DNP  Collaborating Physician:  Macie Villanueva MD    Patient ID  Name: Marcus Watkins    : 1955    MRN: [id number]      Televisit Information  The patient location is:  [place], [place].    The chief complaint leading to consultation is:  As below.    Visit type: audiovisual.    Face-to-face time with patient:  Approximately 15 minutes.    Approximately 19 minutes in total were spent on this encounter, which includes face-to-face time and non-face-to-face time preparing to see the patient (e.g., review of tests), obtaining and/or reviewing separately obtained history, documenting clinical information in the electronic or other health record, independently interpreting results (not separately reported) and communicating results to the patient/family/caregiver, or care coordination (not separately reported).  Each patient to whom he or she provides medical services by telemedicine is:  (1) informed of the relationship between the physician and patient and the respective role of any other health care provider with respect to management of the patient; and (2) notified that he or she may decline to receive medical services by telemedicine and may withdraw from such care at any time.  Notes:  As below.    SUBJECTIVE      Chief Complaint: Results    History of Present Illness (HPI):  Marcus Watkins ("Marcus"), 68 y.o., assigned male sex at birth, presented on 2023 for cancer-genetic risk assessment based on his family history of a TSC1 mutation in an offspring of his, and he subsequently underwent germline, specific-site analysis of her TSC1 gene.  This test did not detect the family TSC1 mutation in Marcus.  He returns today for post-test genetic counseling.  Marcus is accompanied on this visit by his wife, " "Pat.    PCP:  Román Benedict MD  Colon:  Jerrod Tijerina MD  Prostate:  Sherman Monroy MD  Derm:  Guilherme Corrales MD    Focused Medical History  Genetic testing:  No  Cancer:  No  Colon polyp:  Yes   01/30/2009 colonoscopy (age 53; Dr. Wang at [facility]):    One 4-mm sessile adenomatous polyp removed from the hepatic flexure  04/14/2016 colonoscopy (age 60; Dr. Wang at [facility]):  No polyps  06/03/2022 colonoscopy (age 66; Dr. Tijerina at [facility]):    One 8-mm pedunculated polyp removed from the sigmoid colon, and four 3- to 5-mm sessile polyps removed from the descending colon, transverse colon, hepatic flexure, and ascending colon; pathology indicates that four were tubular adenomas without evidence of high-grade dysplasia and one was benign colonic mucosa  History of colonoscopy in addition to those abovementioned?  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 12/8/2022    Status post catheter ablation of atrial flutter 6/13/2017           Patient Active Problem List     Diagnosis Date Noted    Peyronie's disease 05/04/2023    BPH with urinary obstruction 05/04/2023    Risk for falls 05/04/2022    Prediabetes 05/04/2022    Paresthesia 05/29/2019    Severe obesity (BMI 35.0-39.9) with comorbidity 05/02/2019    Peripheral polyneuropathy 05/02/2019    Paroxysmal atrial fibrillation 02/18/2019    Current use of long term anticoagulation 02/18/2019    Multiple thyroid nodules 01/16/2019    Numbness of feet        Focused Social History  Patient reports having smoked at a "young age" for no more than 4-5 years at a rate of <1 pack/week   " "  Ancestry  Ashkenazi [employer]ec[employer]al:  No     Family Oncologic History  Consanguinity:  No  Hereditary cancer genetic testing in blood relatives:  Yes - daughter Cassidy only; Cassidy's mother is synchronously (with Marcus) undergoing TSC1 gene testing as well  Cassidy underwent, through [facility] [facility] genetics lab, the "[employer] [facility]: Analyses of Selected Hereditary Cancer Genes", blood sample for which was collected on 9/6/23, with results reported on 9/29/23 indicating that 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  Genes analyzed, per the report, were (21 in total) BAP1, FH, FLCN, MET, MLH1, MSH2, MSH6, PMS2, PTEN, SDHA, SDHB, SDHC, SDHD, STK11, TP53, TSC1, TSC2, and VHL (sequencing and deletion/duplication); EGFR and MITF (sequencing only); EPCAM (deletion/duplication only).  Cassidy's [employer] Accession # is [id number].  No information regarding paternal family history.  Other than noted, no known history of cancer in relatives depicted in the pedigree or in extended family.  Other than noted, no known family history of benign tumor/mass/lesion, pancreatitis, or colon polyps.  Pedigree:  ** If this pedigree appears small/illegible on your screen, expand this note window horizontally. **      Review of Systems  See HPI.       OBJECTIVE     Physical Exam  Significantly limited secondary to the inherent nature of a virtual visit.  Very pleasant patient.  Constitutional      Appearance:  Appears well developed and well nourished. No distress.   Neurological     Mental Status:  Alert and oriented.  Psychiatric         Mood and Affect:  Normal.     Thought Content:  Normal.     Speech:  Normal.     Behavior:  Normal.     Judgment:  Normal.    ASSESSMENT / PLAN      Marcus Watkins ("Marcus"), 68 y.o., assigned male sex at birth, presented on 11/01/2023 for cancer-genetic risk assessment based on his family history of a TSC1 mutation in an offspring of his, and he subsequently underwent germline, " specific-site analysis of her TSC1 gene.  This test did not detect the family TSC1 mutation in Marcus.  He returns today for post-test genetic counseling.  Marcus is accompanied on this visit by his wife, Phyllis.    The below was discussed with and/or is being provided in writing to Marcus.    Diagnoses and Orders for This Encounter:    ICD-10-CM ICD-9-CM   1. Encounter for nonprocreative genetic counseling  Z71.83 V26.33   2. Family history of tuberous sclerosis  Z82.79 V19.5   3. History of colon polyps  Z86.010 V12.72     1. Encounter for nonprocreative genetic counseling    Germline (Hereditary) Cancer Susceptibility Gene Testing  This is a portion of the report.  The complete report can be found in the patient's medical record.      You were not found to carry your offspring's TSC1 pathogenic mutation.  Pathogenic mutations in the TSC1 gene are a cause of tuberous sclerosis complex (TSC).  Two-thirds of cases of TSC are caused by a de tamica mutation in the affected individual, meaning the affected individual is the first person in the family to carry this mutation.  When both biological parents of an individual with a known TSC1 mutation test negative (on leukocyte DNA testing) for their offspring's TSC1 mutation, the risk of TSC in the siblings of the individual with TSC is low (~1%-2%) but greater than that of the general population because of the possible of germline mosaicism in a parent (meaning that the sperm or egg cells of that parent carry that mutation which is able to be passed onto all offspring).    Based on this test result alone, you do not have a diagnosis of TSC at this time.  IMPORTANTLY, please note that your entire TSC1 gene was not tested, but rather only the specific site at which your offspring is known to have a mutation in that gene was tested in you.    Health Maintenance / Cancer Risks and Risk Management    Only a small percentage (approximately 5%-10%) of cancers are hereditary, meaning  caused by an inherited gene mutation; rather, most cancers are sporadic.  Environmental factors, lifestyle factors, and even factors beyond our control play a significant role in the development of many cancers.  As such, an individual can develop cancer even with negative genetic testing.  Furthermore, even with negative genetic testing, you can still be at increased risk for certain cancers, as you may independently have risk factors for those cancers and/or you may share risk factors with your family members affected by cancer.  For these reasons, it is strongly recommended that you ensure that your healthcare providers are aware of and up-to-date on your personal and family history so that your medical management including cancer screenings can be based in part off of this information.      The following cancer screenings are currently recommended for you (please note that these recommendations can change based on updates to clinical guidelines and/or with changes to your medical or family history and are therefore only considered accurate as of the date on which this letter was composed; additional and/or alternative screenings may be recommended by your healthcare providers, and recommendations from your healthcare providers directly managing these risks supersede the below recommendations):    Cancer in general:  Annual visit with your primary care provider (PCP) for history review and physical exam and age-appropriate testing.    Colorectal cancer:  Colorectal cancer screenings as recommended by your PCP or, if you have one, your gastroenterology (GI) provider.  Colonoscopy is the recommended method of screening for colorectal cancer for you, given your personal history of colorectal polyps.  With regard to family history, please let me know if any of the following applies, as such could change colorectal cancer screening recommendations for you:  If you learn moving forward that any blood relative of yours  has been diagnosed with colorectal cancer.  If a first-degree relative (i.e., your parent, sibling, or child) has a colorectal polyp history including any of the following characteristics:  Adenoma with high-grade dysplasia  Adenoma or sessile serrated polyp/adenoma 1 cm or larger in size  Villous or tubulovillous adenoma  Traditional serrated adenoma (TSA)  Sessile serrated lesion/polyp/adenoma with any dysplasia  Cumulatively 10 or more polyps    Prostate cancer:  Serum prostate-specific antigen (PSA) measurement (blood work), with consideration of digital rectal exam (EMA) of the prostate as well, as recommended by your urology provider.    Skin cancer:  Annual total-body skin examination (TBSE) with a dermatology provider.    If you are not established with a healthcare specialist listed above, please let me know so I can refer you.    Some information regarding general cancer risk reduction:  It is recommended to avoid tobacco use; be physically active; maintain a healthy weight; eat a diet rich in fruits, vegetables, and whole grains and low in saturated/trans fat, red meat, and processed meat; limit alcohol consumption (zero is best); protect against sexually transmitted infections; protect against the sun (by minimizing ultraviolet (UV) exposure, wearing UV-protective clothing, and using high-SPF sunblock), and avoid tanning beds; and get regular screenings for cancers as recommended by your healthcare team.    Regarding Your Relatives    Your relatives also may be at increased risk for certain cancers, depending upon their own personal and family history; therefore, it is important that they, too, ensure that their own healthcare providers are aware of and up-to-date on their personal and family history so that their medical management including cancer screenings can be based in part off of this information.      Additionally, it is possible for your relatives to have hereditary cancer susceptibility gene  mutations even when you have negative genetic testing.      Your relatives should speak with a healthcare provider about their cancer risks and whether genetic counseling and potentially testing may be indicated for them.  Anyone interested in pursuing cancer genetic counseling/testing may contact the [facility] at [phone] to schedule an appointment or may visit [email or web address] to locate a genetic specialist near them.    Cancer Genetics Follow-up    Moving forward, please update me with any changes to--or new information learned about--your personal or family history and with any relative's genetic testing results.  Barring any such changes, please follow up with me in 2 years and as you need or desire sooner.    In the meantime, please don't hesitate to reach out with any questions or concerns.    2. Family history of tuberous sclerosis  - See #1    3. History of colon polyps  - See #1     Follow-up:  Follow up in about 2 years (around 12/12/2025).  Follow up sooner as needed/desired.    Questions were encouraged and answered to the patient's satisfaction, and he verbalized understanding of the information and agreement with the plan.           Nahum Acuna, DNP, APRN, FNP-BC, AOCNP, CGRA  Nurse Practitioner, Hereditary and High-Risk Clinic  Department of Hematology and Oncology  [facility]    [facility]        Routed to Cancer Genetics Staff:  - Please place recall for patient to follow up with me in 2 years.  Thanks!  - Please mail to patient a copy of his [facility] report -and- a copy of my 12/12/23 clinic note to his new address, per his request:   [address] [address]

## 2023-12-30 NOTE — PATIENT INSTRUCTIONS
"Marcus Watkins ("Marcus"), 68 y.o., assigned male sex at birth, presented on 11/01/2023 for cancer-genetic risk assessment based on his family history of a TSC1 mutation in an offspring of his, and he subsequently underwent germline, specific-site analysis of her TSC1 gene.  This test did not detect the family TSC1 mutation in Marcus.  He returns today for post-test genetic counseling.  Marcus is accompanied on this visit by his wife, Phyllis.    The below was discussed with and/or is being provided in writing to Marcus.    Diagnoses and Orders for This Encounter:    ICD-10-CM ICD-9-CM   1. Encounter for nonprocreative genetic counseling  Z71.83 V26.33   2. Family history of tuberous sclerosis  Z82.79 V19.5   3. History of colon polyps  Z86.010 V12.72     1. Encounter for nonprocreative genetic counseling    Germline (Hereditary) Cancer Susceptibility Gene Testing  This is a portion of the report.  The complete report can be found in the patient's medical record.      You were not found to carry your offspring's TSC1 pathogenic mutation.  Pathogenic mutations in the TSC1 gene are a cause of tuberous sclerosis complex (TSC).  Two-thirds of cases of TSC are caused by a de tamica mutation in the affected individual, meaning the affected individual is the first person in the family to carry this mutation.  When both biological parents of an individual with a known TSC1 mutation test negative (on leukocyte DNA testing) for their offspring's TSC1 mutation, the risk of TSC in the siblings of the individual with TSC is low (~1%-2%) but greater than that of the general population because of the possible of germline mosaicism in a parent (meaning that the sperm or egg cells of that parent carry that mutation which is able to be passed onto all offspring).    Based on this test result alone, you do not have a diagnosis of TSC at this time.  IMPORTANTLY, please note that your entire TSC1 gene was not tested, but rather only the specific " site at which your offspring is known to have a mutation in that gene was tested in you.    Health Maintenance / Cancer Risks and Risk Management    Only a small percentage (approximately 5%-10%) of cancers are hereditary, meaning caused by an inherited gene mutation; rather, most cancers are sporadic.  Environmental factors, lifestyle factors, and even factors beyond our control play a significant role in the development of many cancers.  As such, an individual can develop cancer even with negative genetic testing.  Furthermore, even with negative genetic testing, you can still be at increased risk for certain cancers, as you may independently have risk factors for those cancers and/or you may share risk factors with your family members affected by cancer.  For these reasons, it is strongly recommended that you ensure that your healthcare providers are aware of and up-to-date on your personal and family history so that your medical management including cancer screenings can be based in part off of this information.      The following cancer screenings are currently recommended for you (please note that these recommendations can change based on updates to clinical guidelines and/or with changes to your medical or family history and are therefore only considered accurate as of the date on which this letter was composed; additional and/or alternative screenings may be recommended by your healthcare providers, and recommendations from your healthcare providers directly managing these risks supersede the below recommendations):    Cancer in general:  Annual visit with your primary care provider (PCP) for history review and physical exam and age-appropriate testing.    Colorectal cancer:  Colorectal cancer screenings as recommended by your PCP or, if you have one, your gastroenterology (GI) provider.  Colonoscopy is the recommended method of screening for colorectal cancer for you, given your personal history of  colorectal polyps.  With regard to family history, please let me know if any of the following applies, as such could change colorectal cancer screening recommendations for you:  If you learn moving forward that any blood relative of yours has been diagnosed with colorectal cancer.  If a first-degree relative (i.e., your parent, sibling, or child) has a colorectal polyp history including any of the following characteristics:  Adenoma with high-grade dysplasia  Adenoma or sessile serrated polyp/adenoma 1 cm or larger in size  Villous or tubulovillous adenoma  Traditional serrated adenoma (TSA)  Sessile serrated lesion/polyp/adenoma with any dysplasia  Cumulatively 10 or more polyps    Prostate cancer:  Serum prostate-specific antigen (PSA) measurement (blood work), with consideration of digital rectal exam (EMA) of the prostate as well, as recommended by your urology provider.    Skin cancer:  Annual total-body skin examination (TBSE) with a dermatology provider.    If you are not established with a healthcare specialist listed above, please let me know so I can refer you.    Some information regarding general cancer risk reduction:  It is recommended to avoid tobacco use; be physically active; maintain a healthy weight; eat a diet rich in fruits, vegetables, and whole grains and low in saturated/trans fat, red meat, and processed meat; limit alcohol consumption (zero is best); protect against sexually transmitted infections; protect against the sun (by minimizing ultraviolet (UV) exposure, wearing UV-protective clothing, and using high-SPF sunblock), and avoid tanning beds; and get regular screenings for cancers as recommended by your healthcare team.    Regarding Your Relatives    Your relatives also may be at increased risk for certain cancers, depending upon their own personal and family history; therefore, it is important that they, too, ensure that their own healthcare providers are aware of and up-to-date on  their personal and family history so that their medical management including cancer screenings can be based in part off of this information.      Additionally, it is possible for your relatives to have hereditary cancer susceptibility gene mutations even when you have negative genetic testing.      Your relatives should speak with a healthcare provider about their cancer risks and whether genetic counseling and potentially testing may be indicated for them.  Anyone interested in pursuing cancer genetic counseling/testing may contact the [facility] at [phone] to schedule an appointment or may visit [email or web address] to locate a genetic specialist near them.    Cancer Genetics Follow-up    Moving forward, please update me with any changes to--or new information learned about--your personal or family history and with any relative's genetic testing results.  Barring any such changes, please follow up with me in 2 years and as you need or desire sooner.    In the meantime, please don't hesitate to reach out with any questions or concerns.    2. Family history of tuberous sclerosis  - See #1    3. History of colon polyps  - See #1     Follow-up:  Follow up in about 2 years (around 12/12/2025).  Follow up sooner as needed/desired.

## 2024-01-03 ENCOUNTER — TELEPHONE (OUTPATIENT)
Dept: HEMATOLOGY/ONCOLOGY | Facility: CLINIC | Age: 69
End: 2024-01-03
Payer: COMMERCIAL

## 2024-01-03 NOTE — TELEPHONE ENCOUNTER
Recall placed. Result copy and note mailed to pt's new address.    ----- Message from Nahum Acuna DNP sent at 12/29/2023  8:16 PM CST -----  - Please place recall for patient to follow up with me in 2 years.  Thanks!  - Please mail to patient a copy of his [facility] report -and- a copy of my 12/12/23 clinic note to his new address, per his request:   [id number] [facility] [place] [address]

## 2024-01-11 DIAGNOSIS — Z00.00 ENCOUNTER FOR MEDICARE ANNUAL WELLNESS EXAM: ICD-10-CM

## 2024-01-20 NOTE — TELEPHONE ENCOUNTER
----- Message from Kirsten Melchor sent at 3/6/2017  1:06 PM CST -----  Contact: pt called  Pt called, states he is unaware of when to schedule his 48 hour Holter monitor. Ph for pt is [phone]. Thank you  
Patient scheduled for 03/13/17  
Opt out

## 2024-05-31 DIAGNOSIS — I48.91 ATRIAL FIBRILLATION WITH RVR: ICD-10-CM

## 2024-06-01 RX ORDER — METOPROLOL SUCCINATE 25 MG/1
25 TABLET, EXTENDED RELEASE ORAL DAILY
Qty: 90 TABLET | Refills: 0 | Status: SHIPPED | OUTPATIENT
Start: 2024-06-01

## 2024-08-14 ENCOUNTER — TELEPHONE (OUTPATIENT)
Dept: ELECTROPHYSIOLOGY | Facility: CLINIC | Age: 69
End: 2024-08-14
Payer: COMMERCIAL

## 2024-08-28 DIAGNOSIS — I48.91 ATRIAL FIBRILLATION WITH RVR: ICD-10-CM

## 2024-08-28 RX ORDER — METOPROLOL SUCCINATE 25 MG/1
25 TABLET, EXTENDED RELEASE ORAL DAILY
Qty: 30 TABLET | Refills: 0 | Status: SHIPPED | OUTPATIENT
Start: 2024-08-28

## 2024-09-18 ENCOUNTER — TELEPHONE (OUTPATIENT)
Dept: ELECTROPHYSIOLOGY | Facility: CLINIC | Age: 69
End: 2024-09-18
Payer: COMMERCIAL

## 2024-09-18 DIAGNOSIS — I48.0 PAROXYSMAL ATRIAL FIBRILLATION: Primary | ICD-10-CM

## 2024-09-19 ENCOUNTER — PATIENT MESSAGE (OUTPATIENT)
Dept: PRIMARY CARE CLINIC | Facility: CLINIC | Age: 69
End: 2024-09-19
Payer: OTHER MISCELLANEOUS

## 2024-09-30 NOTE — PROGRESS NOTES
Mr. Watkins is a patient of Dr. Grey and was last seen in clinic 11/21/2022.      Subjective:   Patient ID:  Marcus Watkins is a 69 y.o. male who presents for follow up of Atrial Fibrillation  .     HPI:    Mr. Watkins is a 69 y.o. male with AFL (RFA of CTI 2017), AF (PVI 12/2018) here for follow up.     Background:    PCP: Radha Vizcarra MD    Pt initially presented to the [facility] ED in early 2017 with a 24 hour history of palpitations associated with shortness of breath, nausea, and vomiting. He was found to be in typical atrial flutter with RVR, and was administered Diltiazem which converted him to AF. He then spontaneously reverted to sinus rhythm. He was discharged on a 30-day course of Eliquis, as well as Metoprolol.  In 4/2017, Mr. Watkins underwent EPS and CTI-line. In 6/2017, Mr. Watkins presented to [facility] in AF and underwent DCCV. He was started on Sotalol around this time. In fall 2018, AF recurred. A DCCV was unsuccessful, so he was switched to Amiodarone. A repeat DCCV done in 11/2018 was performed, but AF recurred again.  In 12/2018, Mr. Watkins underwent PVI. He was discharged on Amiodarone 200 mg daily, which was stopped in 4/2019. As of 9/2019, he had no recurrent AF. Today in the office Mr. Watkins feels well and denies recurrent arrhythmias. The plan at that point was to continue eliquis.    11/21/2022: Patient denies chest pain fevers chills SOB. Taking eliquis and toprol without issues.  ECG today normal sinus rhythm with normal intervals rate 78 BPM  In summary, Marcus Watkins is a 67-year old male with a history of symptomatic typical atrial flutter status-post CTI-line, who then developed AF refractory to Sotalol and Amiodarone. He is now multiple years post-PVI, and is maintaining sinus rhythm off amiodarone. He should continue Eliquis.     Update (10/02/2024):    Today he says he has been feeling well overall. No cardiac complaints. Mr. Watkins reports no chest pain with exertion or at rest, palpitations, SOB,  DALY, dizziness, or syncope.  Has not exercise much since he retired (was a ). Snores occasionally per wife.    He is currently taking eliquis 5mg BID for stroke prophylaxis and denies significant bleeding episodes. He is currently being treated with toprol 25mg daily for HR control.  Kidney function is stable, with a creatinine of 0.8 on 3/8/2023.    I have personally reviewed the patient's EKG today, which shows sinus rhythm at 80bpm. MT interval is 156. QRS is 74. QTc is 424.    Relevant Cardiac Test Results:    2D Echo (12/27/2018):  Normal left ventricular systolic function. The estimated ejection fraction is 55%  Intermediate central venous pressure (8 mm Hg).  This was a limited echo to assess for pericardial effusion post procedure. No pericardial effusion noted    Current Outpatient Medications   Medication Sig    apixaban (ELIQUIS) 5 mg Tab Take 1 tablet (5 mg total) by mouth 2 (two) times daily.    calcium carbonate/vitamin D3 (VITAMIN D-3 ORAL) Take by mouth.    krill-omega-3-dha-epa-lipids (KRILL OIL) 160-63-81-50 mg Cap Take by mouth.    Lactobacillus acidophilus (PROBIOTIC ORAL) Take by mouth.    metoprolol succinate (TOPROL-XL) 25 MG 24 hr tablet Take 1 tablet (25 mg total) by mouth once daily.    multivit-min/folic/vit K/lycop (ONE-A-DAY MEN'S 50 PLUS ORAL) Take 1 tablet by mouth once daily.    omega-3 fatty acids/fish oil (FISH OIL-OMEGA-3 FATTY ACIDS) 300-1,000 mg capsule Take 1 capsule by mouth once daily. (Patient not taking: Reported on 11/1/2023)    oxyCODONE-acetaminophen (PERCOCET) 5-325 mg per tablet Take 1 tablet by mouth every 4 (four) hours as needed for Pain. (Patient not taking: Reported on 11/1/2023)    VITAMIN E ACETATE ORAL Take 1 tablet by mouth once daily.     No current facility-administered medications for this visit.     Facility-Administered Medications Ordered in Other Visits   Medication    CEFAZOLIN 2G/20 ML SYRINGE (PYXIS) IV syringe 2 g 20 mL     "HYDROcodone-acetaminophen 5-325 mg per tablet 1 tablet    morphine injection 1 mg    ondansetron injection 4 mg       Review of Systems   Constitutional: Negative for malaise/fatigue.   Cardiovascular:  Negative for chest pain, dyspnea on exertion, irregular heartbeat, leg swelling and palpitations.   Respiratory:  Negative for shortness of breath.    Hematologic/Lymphatic: Negative for bleeding problem.   Skin:  Negative for rash.   Musculoskeletal:  Negative for myalgias.   Gastrointestinal:  Negative for hematemesis, hematochezia and nausea.   Genitourinary:  Negative for hematuria.   Neurological:  Negative for light-headedness.   Psychiatric/Behavioral:  Negative for altered mental status.    Allergic/Immunologic: Negative for persistent infections.       Objective:          BP (!) 142/77 (BP Location: Left arm, Patient Position: Sitting)   Pulse 80   Ht 5' 11" (1.803 m)   Wt 120.5 kg (265 lb 10.5 oz)   BMI 37.05 kg/m²     Physical Exam  Vitals and nursing note reviewed.   Constitutional:       Appearance: Normal appearance. He is well-developed.   HENT:      Head: Normocephalic.      Nose: Nose normal.   Eyes:      Pupils: Pupils are equal, round, and reactive to light.   Cardiovascular:      Rate and Rhythm: Normal rate and regular rhythm.   Pulmonary:      Effort: No respiratory distress.   Musculoskeletal:         General: Normal range of motion.   Skin:     General: Skin is warm and dry.      Findings: No erythema.   Neurological:      Mental Status: He is alert and oriented to person, place, and time.   Psychiatric:         Speech: Speech normal.         Behavior: Behavior normal.           Lab Results   Component Value Date     03/08/2023    K 4.1 03/08/2023    MG 2.1 08/19/2022    BUN 12 03/08/2023    CREATININE 0.8 03/08/2023    ALT 19 03/08/2023    AST 16 03/08/2023    HGB 14.8 03/08/2023    HCT 44.3 03/08/2023    TSH 1.068 03/08/2023    LDLCALC 135.8 03/08/2023       Recent Labs   Lab " 11/05/21  0806   INR 1.0       Assessment:     1. Paroxysmal atrial fibrillation    2. Current use of long term anticoagulation    3. Severe obesity (BMI 35.0-39.9) with comorbidity    4. Atrial fibrillation with RVR    5. Snoring      Plan:     In summary, Mr. Watkins is a 69 y.o. male with AFL (RFA of CTI 2017), AF (PVI 12/2018) here for follow up.   He is doing well from a rhythm standpoint, maintaining sinus rhythm since PVI in 2018. Not on AAD.  Remains on eliquis and metoprolol. BPs mildly elevated in clinic - home BP log provided.  He asks about diet - Mediterranean diet provided. Recommend regular exercise.   He snores at times and has not had a sleep evaluation - sleep referral placed. He is doing well.    Continue current medications  Regular exercise  Sleep consult  RTC 1 yr, sooner if needed    *A copy of this note has been sent to Dr. Grey*    Follow up in about 1 year (around 10/2/2025).    ------------------------------------------------------------------    GENNARO Bravo, NP-C  Cardiac Electrophysiology

## 2024-10-02 ENCOUNTER — OFFICE VISIT (OUTPATIENT)
Dept: ELECTROPHYSIOLOGY | Facility: CLINIC | Age: 69
End: 2024-10-02

## 2024-10-02 VITALS
HEART RATE: 80 BPM | WEIGHT: 265.63 LBS | BODY MASS INDEX: 37.19 KG/M2 | DIASTOLIC BLOOD PRESSURE: 77 MMHG | SYSTOLIC BLOOD PRESSURE: 142 MMHG | HEIGHT: 71 IN

## 2024-10-02 DIAGNOSIS — E66.01 SEVERE OBESITY (BMI 35.0-39.9) WITH COMORBIDITY: ICD-10-CM

## 2024-10-02 DIAGNOSIS — I48.0 PAROXYSMAL ATRIAL FIBRILLATION: Primary | ICD-10-CM

## 2024-10-02 DIAGNOSIS — Z79.01 CURRENT USE OF LONG TERM ANTICOAGULATION: ICD-10-CM

## 2024-10-02 DIAGNOSIS — R06.83 SNORING: ICD-10-CM

## 2024-10-02 DIAGNOSIS — I48.91 ATRIAL FIBRILLATION WITH RVR: ICD-10-CM

## 2024-10-02 LAB
OHS QRS DURATION: 74 MS
OHS QTC CALCULATION: 424 MS

## 2024-10-02 PROCEDURE — 93010 ELECTROCARDIOGRAM REPORT: CPT | Mod: S$PBB,,, | Performed by: INTERNAL MEDICINE

## 2024-10-02 PROCEDURE — 93005 ELECTROCARDIOGRAM TRACING: CPT | Mod: PBBFAC | Performed by: INTERNAL MEDICINE

## 2024-10-02 PROCEDURE — 99214 OFFICE O/P EST MOD 30 MIN: CPT | Mod: S$PBB,,, | Performed by: NURSE PRACTITIONER

## 2024-10-02 PROCEDURE — 99215 OFFICE O/P EST HI 40 MIN: CPT | Mod: PBBFAC | Performed by: NURSE PRACTITIONER

## 2024-10-02 PROCEDURE — 99999 PR PBB SHADOW E&M-EST. PATIENT-LVL V: CPT | Mod: PBBFAC,,, | Performed by: NURSE PRACTITIONER

## 2024-10-02 RX ORDER — METOPROLOL SUCCINATE 25 MG/1
25 TABLET, EXTENDED RELEASE ORAL DAILY
Qty: 180 TABLET | Refills: 3 | Status: SHIPPED | OUTPATIENT
Start: 2024-10-02

## 2024-10-15 ENCOUNTER — PATIENT MESSAGE (OUTPATIENT)
Dept: RESEARCH | Facility: HOSPITAL | Age: 69
End: 2024-10-15
Payer: OTHER MISCELLANEOUS

## 2024-11-05 ENCOUNTER — PATIENT MESSAGE (OUTPATIENT)
Dept: RESEARCH | Facility: HOSPITAL | Age: 69
End: 2024-11-05
Payer: OTHER MISCELLANEOUS

## 2024-12-23 DIAGNOSIS — I48.0 PAROXYSMAL ATRIAL FIBRILLATION: ICD-10-CM

## 2024-12-27 ENCOUNTER — TELEPHONE (OUTPATIENT)
Dept: ELECTROPHYSIOLOGY | Facility: CLINIC | Age: 69
End: 2024-12-27
Payer: MEDICARE

## 2024-12-27 DIAGNOSIS — I48.0 PAROXYSMAL ATRIAL FIBRILLATION: ICD-10-CM

## 2024-12-27 NOTE — TELEPHONE ENCOUNTER
Spoke with patient, let him know the script was sent out, but it was sent to [facility], asked patient if he'd like it sent to a different pharmacy since he is running low, patient asked for the number to [facility], and said he would give them a call to check how much cheaper it would be to fill it through them. Let patient know he can give me a call back if he needs anything else, patient understood.

## 2024-12-27 NOTE — TELEPHONE ENCOUNTER
----- Message from Faviola sent at 12/27/2024 10:35 AM CST -----  Regarding: RX:apixaban (ELIQUIS) 5 mg Tab  Contact: [phone]  Good morning,     Pt called sharing he needs a refill of apixaban (ELIQUIS) 5 mg Tab.   He stated he has medication for the next couple of weeks but want to make sure he does not run out.    Pt is requesting a call back if need be.    Thank you,   Rosalva FRANCIS  Access Navigator

## 2025-01-28 ENCOUNTER — OFFICE VISIT (OUTPATIENT)
Dept: DERMATOLOGY | Facility: CLINIC | Age: 70
End: 2025-01-28
Payer: MEDICARE

## 2025-01-28 DIAGNOSIS — L81.4 LENTIGINES: ICD-10-CM

## 2025-01-28 DIAGNOSIS — Z12.83 SCREENING EXAM FOR SKIN CANCER: ICD-10-CM

## 2025-01-28 DIAGNOSIS — B36.0 TINEA VERSICOLOR: ICD-10-CM

## 2025-01-28 DIAGNOSIS — L57.0 AK (ACTINIC KERATOSIS): Primary | ICD-10-CM

## 2025-01-28 DIAGNOSIS — M79.89 LEG SWELLING: ICD-10-CM

## 2025-01-28 DIAGNOSIS — L71.1 RHINOPHYMA: ICD-10-CM

## 2025-01-28 DIAGNOSIS — L91.8 SKIN TAG: ICD-10-CM

## 2025-01-28 DIAGNOSIS — D23.9 DERMATOFIBROMA: ICD-10-CM

## 2025-01-28 DIAGNOSIS — L71.9 ROSACEA: ICD-10-CM

## 2025-01-28 PROCEDURE — 1159F MED LIST DOCD IN RCRD: CPT | Mod: CPTII,S$GLB,, | Performed by: STUDENT IN AN ORGANIZED HEALTH CARE EDUCATION/TRAINING PROGRAM

## 2025-01-28 PROCEDURE — 99203 OFFICE O/P NEW LOW 30 MIN: CPT | Mod: 25,S$GLB,, | Performed by: STUDENT IN AN ORGANIZED HEALTH CARE EDUCATION/TRAINING PROGRAM

## 2025-01-28 PROCEDURE — 1126F AMNT PAIN NOTED NONE PRSNT: CPT | Mod: CPTII,S$GLB,, | Performed by: STUDENT IN AN ORGANIZED HEALTH CARE EDUCATION/TRAINING PROGRAM

## 2025-01-28 PROCEDURE — 99999 PR PBB SHADOW E&M-EST. PATIENT-LVL II: CPT | Mod: PBBFAC,,, | Performed by: STUDENT IN AN ORGANIZED HEALTH CARE EDUCATION/TRAINING PROGRAM

## 2025-01-28 PROCEDURE — 3288F FALL RISK ASSESSMENT DOCD: CPT | Mod: CPTII,S$GLB,, | Performed by: STUDENT IN AN ORGANIZED HEALTH CARE EDUCATION/TRAINING PROGRAM

## 2025-01-28 PROCEDURE — 17000 DESTRUCT PREMALG LESION: CPT | Mod: S$GLB,,, | Performed by: STUDENT IN AN ORGANIZED HEALTH CARE EDUCATION/TRAINING PROGRAM

## 2025-01-28 PROCEDURE — 1101F PT FALLS ASSESS-DOCD LE1/YR: CPT | Mod: CPTII,S$GLB,, | Performed by: STUDENT IN AN ORGANIZED HEALTH CARE EDUCATION/TRAINING PROGRAM

## 2025-01-28 PROCEDURE — 1160F RVW MEDS BY RX/DR IN RCRD: CPT | Mod: CPTII,S$GLB,, | Performed by: STUDENT IN AN ORGANIZED HEALTH CARE EDUCATION/TRAINING PROGRAM

## 2025-01-28 RX ORDER — TRETINOIN 0.5 MG/G
CREAM TOPICAL
Qty: 45 G | Refills: 2 | Status: SHIPPED | OUTPATIENT
Start: 2025-01-28

## 2025-01-28 RX ORDER — KETOCONAZOLE 20 MG/ML
SHAMPOO, SUSPENSION TOPICAL
Qty: 120 ML | Refills: 11 | Status: SHIPPED | OUTPATIENT
Start: 2025-01-28

## 2025-01-28 NOTE — PROGRESS NOTES
Subjective:      Patient ID:  Marcus Watkins is a 69 y.o. male who presents for   Chief Complaint   Patient presents with    Skin Check     TBSE      Marcus Watkins is a 69 y.o. male who presents for: FBSE screening exam for skin cancer.    New patient  Here with wife today, from [place]    The patient has the following lesions of concern:  Location: R forehead   Duration: 6 months   Symptoms: waxes and wanes in intensity.  Rough in texture   Relieving factors/Previous treatments: Goldbond healing cream     Patient with new area of concern:   Location: tip of nose   Duration: 9 months   Symptoms: recurrent, swells on left nares.    Previous treatments: Goldbond healing cream     Pertinent history:  History of blistering sunburns: Yes  History of tanning bed use: No  Family history of melanoma: No  Personal history of mole removal: No  Personal history of skin cancer: No         Review of Systems   Skin:  Positive for activity-related sunscreen use and wears hat. Negative for daily sunscreen use and recent sunburn.   Hematologic/Lymphatic: Does not bruise/bleed easily.       Objective:   Physical Exam   Constitutional: He appears well-developed and well-nourished. No distress.   Neurological: He is alert and oriented to person, place, and time. He is not disoriented.   Psychiatric: He has a normal mood and affect.   Skin:   Areas Examined (abnormalities noted in diagram):   Scalp / Hair Palpated and Inspected  Head / Face Inspection Performed  Neck Inspection Performed  Chest / Axilla Inspection Performed  Abdomen Inspection Performed  Genitals / Buttocks / Groin Inspection Performed  Back Inspection Performed  RUE Inspected  LUE Inspection Performed  RLE Inspected  LLE Inspection Performed  Nails and Digits Inspection Performed                     Diagram Legend     Erythematous scaling macule/papule c/w actinic keratosis       Vascular papule c/w angioma      Pigmented verrucoid papule/plaque c/w seborrheic keratosis       Yellow umbilicated papule c/w sebaceous hyperplasia      Irregularly shaped tan macule c/w lentigo     1-2 mm smooth white papules consistent with Milia      Movable subcutaneous cyst with punctum c/w epidermal inclusion cyst      Subcutaneous movable cyst c/w pilar cyst      Firm pink to brown papule c/w dermatofibroma      Pedunculated fleshy papule(s) c/w skin tag(s)      Evenly pigmented macule c/w junctional nevus     Mildly variegated pigmented, slightly irregular-bordered macule c/w mildly atypical nevus      Flesh colored to evenly pigmented papule c/w intradermal nevus       Pink pearly papule/plaque c/w basal cell carcinoma      Erythematous hyperkeratotic cursted plaque c/w SCC      Surgical scar with no sign of skin cancer recurrence      Open and closed comedones      Inflammatory papules and pustules      Verrucoid papule consistent consistent with wart     Erythematous eczematous patches and plaques     Dystrophic onycholytic nail with subungual debris c/w onychomycosis     Umbilicated papule    Erythematous-base heme-crusted tan verrucoid plaque consistent with inflamed seborrheic keratosis     Erythematous Silvery Scaling Plaque c/w Psoriasis     See annotation      Assessment / Plan:        AK (actinic keratosis)  Cryosurgery Procedure Note    Verbal consent from the patient is obtained including, but not limited to, risk of hypopigmentation/hyperpigmentation, scar, recurrence of lesion. The patient is aware of the precancerous quality and need for treatment of these lesions. Liquid nitrogen cryosurgery is applied to the 1 actinic keratoses, as detailed in the physical exam, to produce a freeze injury. The patient is aware that blisters may form and is instructed on wound care with gentle cleansing and use of vaseline ointment to keep moist until healed. The patient is supplied a handout on cryosurgery and is instructed to call if lesions do not completely resolve.    Rosacea  Rhinophyma  Reviewed  procedural options for rhinophyma can be considered. Tretinoin may provide mild benefits over time  -     tretinoin (RETIN-A) 0.05 % cream; Apply pea-sized amount to nose every other night and increase to nightly as tolerated  Dispense: 45 g; Refill: 2    Skin tag  Reassurance given to patient. No treatment is necessary.   Treatment of benign, asymptomatic lesions may be considered cosmetic.    Tinea versicolor  -     ketoconazole (NIZORAL) 2 % shampoo; Wash body with medicated shampoo daily for flares, 2 x weekly for maintenance  Dispense: 120 mL; Refill: 11    Dermatofibroma  This is a benign scar-like lesion secondary to minor trauma. No treatment required.     Leg swelling  Leg compression, elevation, moisturizing cream recommended    Lentigines  This is a benign hyperpigmented sun induced lesion. Recommend daily sun protection/avoidance and use of at least SPF 30, broad spectrum sunscreen (OTC drug) will reduce the number of new lesions.     Screening exam for skin cancer  Total body skin examination performed today including at least 12 points as noted in physical examination. No lesions suspicious for malignancy noted.    Recommend daily sun protection/avoidance, use of at least SPF 30, broad spectrum sunscreen (OTC drug), skin self examinations, and routine physician surveillance to optimize early detection    RTC 1 year, sooner prn

## 2025-05-12 ENCOUNTER — TELEPHONE (OUTPATIENT)
Dept: SLEEP MEDICINE | Facility: CLINIC | Age: 70
End: 2025-05-12
Payer: MEDICARE

## 2025-05-12 NOTE — TELEPHONE ENCOUNTER
Spoke with patient confrimed details of upcoming appt and what it entails    Copied from CRM #[id number]. Topic: General Inquiry - Patient Advice  >> May 9, 2025  8:43 AM Ericka wrote:  Type: Patient Call Back    Who called: Patient    What is the request in detail: Please call patient he has questions about his appointment    Can the clinic reply by [facility]? No    Would the patient rather a call back or a response via [facility]?  Call    Best call back number:[phone]    Additional Information:

## 2025-06-11 NOTE — TELEPHONE ENCOUNTER
Current regimen includes Mounjaro 15 mg, which patient is tolerating. Patient's current weight reported as 239 lbs. Has lost 47 lbs (16% of TBW) since starting therapy plan. Patient reports bowel movements are more regular now and that she has increased her fluid intake since last visit. Discussed incorporating more walking and resistance exercises.    Medication Changes:  CONTINUE:   Mounjaro 15 mg weekly   Spoke with patient reports the following symptoms SOB with exertion,(which he sounded SOB while on the phone),and sweating (without doing any activity)  Patient states he took his b/p over the weekend and noticed that his HR was high in the 80's and 90's unable to give an accurate b/p reading, no other symptoms noted.  Spoke with Dr. Luke states these are all cardiac related symptoms and patient should report to the ED for evaluation.  Patient advised verbalizes understanding.   Please note

## 2025-08-01 ENCOUNTER — TELEPHONE (OUTPATIENT)
Dept: ELECTROPHYSIOLOGY | Facility: CLINIC | Age: 70
End: 2025-08-01
Payer: MEDICARE

## 2025-08-01 NOTE — TELEPHONE ENCOUNTER
Pt due October 2nd 2025 for 12m f/u, offered pt 10/22 at [facility] or 11/17 at [facility], pt agreed to 10/22 at [facility] for 8:40am.

## 2025-08-01 NOTE — TELEPHONE ENCOUNTER
----- Message from Med Assistant Bucio sent at 7/31/2025  2:11 PM CDT -----  Regarding: FW: Schedule    ----- Message -----  From: Kayla Saenz MA  Sent: 7/31/2025   1:56 PM CDT  To: [facility] Staff  Subject: Schedule                                         Pt is calling to schedule his f/u, stating that he would prefer to be seen a [facility] if possible. He can be reached at  [phone].    Thank you.

## 2025-08-27 DIAGNOSIS — I48.0 PAROXYSMAL ATRIAL FIBRILLATION: ICD-10-CM

## (undated) DEVICE — CATH THERMOCOOL SMTCH SF D F

## (undated) DEVICE — DRAPE THREE-QTR REINF 53X77IN

## (undated) DEVICE — INTRO FAST-CATH SL1 8.5FR 63CM

## (undated) DEVICE — BANDAGE ELAS SOFTWRAP ST 4X5YD

## (undated) DEVICE — SHEATH HEMOSTASIS 8.5FR

## (undated) DEVICE — SHEATH INTRODUCER 9FR 11CM

## (undated) DEVICE — INTRODUCER HEMOSTASIS 7.5F

## (undated) DEVICE — NDL TRNSSPTL BRK-1 18GA 71CM

## (undated) DEVICE — CATH LASSO NAV 25/15

## (undated) DEVICE — SPONGE GAUZE 16PLY 4X4

## (undated) DEVICE — CATH TRICUSPID HALO XP 7FRX110

## (undated) DEVICE — CLIPPER BLADE MOD 4406 (CAREF)

## (undated) DEVICE — NDL ONLY ECLIPSE 25G X5/8

## (undated) DEVICE — SUT CHROMIC 3-0 SH 27IN GUT

## (undated) DEVICE — DRAPE T EXTRM SURG 121X128X90

## (undated) DEVICE — GLOVE SURG BIOGEL LATEX SZ 7.5

## (undated) DEVICE — SET SMARTABLATE IRR TUBE

## (undated) DEVICE — SYR 1CC TB SG 27GX1/2

## (undated) DEVICE — TRAY MINOR GEN SURG OMC

## (undated) DEVICE — NDL TRNSSPTL BRK-1 18GA 98CM

## (undated) DEVICE — DRAPE STERI INSTRUMENT 1018

## (undated) DEVICE — FORCEP STRAIGHT DISP

## (undated) DEVICE — BANDAGE DERMACEA STRETCH 4X1IN

## (undated) DEVICE — GOWN POLY REINF BRTH SLV XL

## (undated) DEVICE — REPROCESSED CATH ACUNAV 8FR

## (undated) DEVICE — LINER GLOVE POWDERFREE 8

## (undated) DEVICE — GAUZE SPONGE 4X4 12PLY

## (undated) DEVICE — SUT 3/0 36IN ETHIBOND EXTRA

## (undated) DEVICE — DRAPE LAP T SHT W/ INSTR PAD

## (undated) DEVICE — PAD DEFIB CADENCE ADULT R2

## (undated) DEVICE — TOURNIQUET SB QC DP 18X4IN

## (undated) DEVICE — TRAY MINOR ORTHO

## (undated) DEVICE — CATH BIDIRECTIONAL DF CRV 7FR

## (undated) DEVICE — BNDG COFLEX FOAM LF2 ST 4X5YD

## (undated) DEVICE — DRESSING XEROFORM FOIL PK 1X8

## (undated) DEVICE — STOCKINET 4INX48

## (undated) DEVICE — PATCH CARTO REFERENCE

## (undated) DEVICE — INTRO AGILIS MED CRL 8.5F 71CM

## (undated) DEVICE — SYR 3CC LUER LOC

## (undated) DEVICE — TAPE SILK 3IN

## (undated) DEVICE — APPLICATOR CHLORAPREP ORN 26ML

## (undated) DEVICE — LINE PRESSURE MONITORING 96IN

## (undated) DEVICE — ELECTRODE REM PLYHSV RETURN 9

## (undated) DEVICE — PAD CAST SPECIALIST STRL 4

## (undated) DEVICE — NDL HYPO REG 25G X 1 1/2

## (undated) DEVICE — TRAY SKIN SCRUB WET PREMIUM

## (undated) DEVICE — COVER DRAPE ACUSON STERILE

## (undated) DEVICE — PACK EP DRAPE

## (undated) DEVICE — COVER SURG TABLE BACK 44X76IN

## (undated) DEVICE — ELECTRODE POLYHESIVEPRE-ATTACH

## (undated) DEVICE — BANDAGE ESMARK ELASTIC ST 4X9

## (undated) DEVICE — SYR LUER LOCK 1CC

## (undated) DEVICE — GOWN SURGICAL X-LARGE

## (undated) DEVICE — DRESSING XEROFORM NONADH 1X8IN

## (undated) DEVICE — KIT COLLECTION E SWAB REGULAR